# Patient Record
Sex: FEMALE | Race: BLACK OR AFRICAN AMERICAN | NOT HISPANIC OR LATINO | Employment: OTHER | RURAL
[De-identification: names, ages, dates, MRNs, and addresses within clinical notes are randomized per-mention and may not be internally consistent; named-entity substitution may affect disease eponyms.]

---

## 2017-01-05 ENCOUNTER — OFFICE VISIT (OUTPATIENT)
Dept: FAMILY MEDICINE CLINIC | Facility: CLINIC | Age: 77
End: 2017-01-05

## 2017-01-05 VITALS
SYSTOLIC BLOOD PRESSURE: 160 MMHG | OXYGEN SATURATION: 98 % | TEMPERATURE: 98.1 F | HEIGHT: 60 IN | HEART RATE: 96 BPM | WEIGHT: 182.8 LBS | BODY MASS INDEX: 35.89 KG/M2 | DIASTOLIC BLOOD PRESSURE: 68 MMHG

## 2017-01-05 DIAGNOSIS — I10 UNCONTROLLED HYPERTENSION: ICD-10-CM

## 2017-01-05 DIAGNOSIS — Z94.0 HISTORY OF KIDNEY TRANSPLANT: ICD-10-CM

## 2017-01-05 DIAGNOSIS — Z23 ENCOUNTER FOR IMMUNIZATION: ICD-10-CM

## 2017-01-05 DIAGNOSIS — I10 ESSENTIAL HYPERTENSION: Primary | ICD-10-CM

## 2017-01-05 DIAGNOSIS — Z23 NEED FOR VACCINATION: ICD-10-CM

## 2017-01-05 DIAGNOSIS — IMO0002 UNCONTROLLED TYPE 2 DIABETES MELLITUS WITH COMPLICATION, WITH LONG-TERM CURRENT USE OF INSULIN: Primary | ICD-10-CM

## 2017-01-05 DIAGNOSIS — D61.818 PANCYTOPENIA (HCC): ICD-10-CM

## 2017-01-05 DIAGNOSIS — N18.30 CHRONIC KIDNEY DISEASE, STAGE 3 (MODERATE): ICD-10-CM

## 2017-01-05 PROCEDURE — 90746 HEPB VACCINE 3 DOSE ADULT IM: CPT | Performed by: FAMILY MEDICINE

## 2017-01-05 PROCEDURE — 90472 IMMUNIZATION ADMIN EACH ADD: CPT | Performed by: FAMILY MEDICINE

## 2017-01-05 PROCEDURE — 90632 HEPA VACCINE ADULT IM: CPT | Performed by: FAMILY MEDICINE

## 2017-01-05 PROCEDURE — G0010 ADMIN HEPATITIS B VACCINE: HCPCS | Performed by: FAMILY MEDICINE

## 2017-01-05 PROCEDURE — 99213 OFFICE O/P EST LOW 20 MIN: CPT | Performed by: FAMILY MEDICINE

## 2017-01-05 NOTE — MR AVS SNAPSHOT
Chanda Yan   1/5/2017 9:30 AM   Office Visit    Dept Phone:  [phone]   Encounter #:  [id number]    Provider:  Ayaan Swenson MD   Department:  [facility]                Your Full Care Plan              Today's Medication Changes          These changes are accurate as of: 1/5/17  9:01 AM.  If you have any questions, ask your nurse or doctor.               Stop taking medication(s)listed here:     metoprolol tartrate 25 MG tablet   Commonly known as:  LOPRESSOR   Stopped by:  Ayaan Swenson MD                      Your Updated Medication List          This list is accurate as of: 1/5/17  9:01 AM.  Always use your most recent med list.                albuterol 108 (90 BASE) MCG/ACT inhaler   Commonly known as:  PROVENTIL HFA;VENTOLIN HFA   Inhale 2 puffs every 4 (four) hours as needed for wheezing.       amitriptyline 10 MG tablet   Commonly known as:  ELAVIL       Ca Carb-FA-D-B6-B12-Boron-Mg 1342-1 MG wafer       CALCIUM 1000 + D PO       carvedilol 3.125 MG tablet   Commonly known as:  COREG   Take 1 tablet by mouth 2 (Two) Times a Day.       cetirizine 10 MG tablet   Commonly known as:  zyrTEC       clopidogrel 75 MG tablet   Commonly known as:  PLAVIX       ezetimibe 10 MG tablet   Commonly known as:  ZETIA   Take 1 tablet by mouth daily.       fluticasone 50 MCG/ACT nasal spray   Commonly known as:  FLONASE   USE 1 SPRAY IN EACH NOSTRIL EVERY DAY       furosemide 40 MG tablet   Commonly known as:  LASIX   Take 1 tablet by mouth 2 (Two) Times a Day. Take one tab in a.m. and one-half tab in p.m       hydrALAZINE 25 MG tablet   Commonly known as:  APRESOLINE   Take 2 tablets by mouth 3 (Three) Times a Day.       LANTUS SOLOSTAR 100 UNIT/ML injection pen   Generic drug:  Insulin Glargine       mycophenolate 360 MG tablet delayed-release EC tablet   Commonly known as:  MYFORTIC       NIFEDICAL XL 30 MG 24 hr tablet   Generic drug:   "NIFEdipine XL       NOVOLOG 100 UNIT/ML injection   Generic drug:  insulin aspart       ONETOUCH VERIO test strip   Generic drug:  glucose blood       pantoprazole 40 MG EC tablet   Commonly known as:  PROTONIX   TAKE 1 TABLET BY MOUTH EVERY DAY       predniSONE 5 MG tablet   Commonly known as:  DELTASONE   Take 1 tablet by mouth daily.       rosuvastatin 20 MG tablet   Commonly known as:  CRESTOR       tacrolimus 1 MG capsule   Commonly known as:  PROGRAF       vitamin D3 5000 UNITS capsule capsule               We Performed the Following     CBC Auto Differential     Comprehensive Metabolic Panel     Hemoglobin A1c     Lipid Panel       You Were Diagnosed With        Codes Comments    Uncontrolled type 2 diabetes mellitus with complication, with long-term current use of insulin    -  Primary ICD-10-CM: E11.8, E11.65, Z79.4  ICD-9-CM: 250.82, V58.67     Need for vaccination     ICD-10-CM: Z23  ICD-9-CM: V05.9       Instructions    DASH Eating Plan  DASH stands for \"Dietary Approaches to Stop Hypertension.\" The DASH eating plan is a healthy eating plan that has been shown to reduce high blood pressure (hypertension). Additional health benefits may include reducing the risk of type 2 diabetes mellitus, heart disease, and stroke. The DASH eating plan may also help with weight loss.  WHAT DO I NEED TO KNOW ABOUT THE DASH EATING PLAN?  For the DASH eating plan, you will follow these general guidelines:  · Choose foods with a percent daily value for sodium of less than 5% (as listed on the food label).  · Use salt-free seasonings or herbs instead of table salt or sea salt.  · Check with your health care provider or pharmacist before using salt substitutes.  · Eat lower-sodium products, often labeled as \"lower sodium\" or \"no salt added.\"  · Eat fresh foods.  · Eat more vegetables, fruits, and low-fat dairy products.  · Choose whole grains. Look for the word \"whole\" as the first word in the ingredient list.  · Choose fish " and skinless chicken or turkey more often than red meat. Limit fish, poultry, and meat to 6 oz (170 g) each day.  · Limit sweets, desserts, sugars, and sugary drinks.  · Choose heart-healthy fats.  · Limit cheese to 1 oz (28 g) per day.  · Eat more home-cooked food and less restaurant, buffet, and fast food.  · Limit fried foods.  · Cook foods using methods other than frying.  · Limit canned vegetables. If you do use them, rinse them well to decrease the sodium.  · When eating at a restaurant, ask that your food be prepared with less salt, or no salt if possible.  WHAT FOODS CAN I EAT?  Seek help from a dietitian for individual calorie needs.  Grains  Whole grain or whole wheat bread. Brown rice. Whole grain or whole wheat pasta. Quinoa, bulgur, and whole grain cereals. Low-sodium cereals. Corn or whole wheat flour tortillas. Whole grain cornbread. Whole grain crackers. Low-sodium crackers.  Vegetables  Fresh or frozen vegetables (raw, steamed, roasted, or grilled). Low-sodium or reduced-sodium tomato and vegetable juices. Low-sodium or reduced-sodium tomato sauce and paste. Low-sodium or reduced-sodium canned vegetables.   Fruits  All fresh, canned (in natural juice), or frozen fruits.  Meat and Other Protein Products  Ground beef (85% or leaner), grass-fed beef, or beef trimmed of fat. Skinless chicken or turkey. Ground chicken or turkey. Pork trimmed of fat. All fish and seafood. Eggs. Dried beans, peas, or lentils. Unsalted nuts and seeds. Unsalted canned beans.  Dairy  Low-fat dairy products, such as skim or 1% milk, 2% or reduced-fat cheeses, low-fat ricotta or cottage cheese, or plain low-fat yogurt. Low-sodium or reduced-sodium cheeses.  Fats and Oils  Tub margarines without trans fats. Light or reduced-fat mayonnaise and salad dressings (reduced sodium). Avocado. Safflower, olive, or canola oils. Natural peanut or almond butter.  Other  Unsalted popcorn and pretzels.  The items listed above may not be a  complete list of recommended foods or beverages. Contact your dietitian for more options.  WHAT FOODS ARE NOT RECOMMENDED?  Grains  White bread. White pasta. White rice. Refined cornbread. Bagels and croissants. Crackers that contain trans fat.  Vegetables  Creamed or fried vegetables. Vegetables in a cheese sauce. Regular canned vegetables. Regular canned tomato sauce and paste. Regular tomato and vegetable juices.  Fruits  Dried fruits. Canned fruit in light or heavy syrup. Fruit juice.  Meat and Other Protein Products  Fatty cuts of meat. Ribs, chicken wings, malhotra, sausage, bologna, salami, chitterlings, fatback, hot dogs, bratwurst, and packaged luncheon meats. Salted nuts and seeds. Canned beans with salt.  Dairy  Whole or 2% milk, cream, half-and-half, and cream cheese. Whole-fat or sweetened yogurt. Full-fat cheeses or blue cheese. Nondairy creamers and whipped toppings. Processed cheese, cheese spreads, or cheese curds.  Condiments  Onion and garlic salt, seasoned salt, table salt, and sea salt. Canned and packaged gravies. Worcestershire sauce. Tartar sauce. Barbecue sauce. Teriyaki sauce. Soy sauce, including reduced sodium. Steak sauce. Fish sauce. Oyster sauce. Cocktail sauce. Horseradish. Ketchup and mustard. Meat flavorings and tenderizers. Bouillon cubes. Hot sauce. Tabasco sauce. Marinades. Taco seasonings. Relishes.  Fats and Oils  Butter, stick margarine, lard, shortening, ghee, and malhotra fat. Coconut, palm kernel, or palm oils. Regular salad dressings.  Other  Pickles and olives. Salted popcorn and pretzels.  The items listed above may not be a complete list of foods and beverages to avoid. Contact your dietitian for more information.  WHERE CAN I FIND MORE INFORMATION?  National Heart, Lung, and [employer] [place]: www.nhlbi.nih.gov/health/health-topics/topics/dash/     This information is not intended to replace advice given to you by your health care provider. Make sure you discuss any questions  you have with your health care provider.     Document Released: 2012 Document Revised: 2016 Document Reviewed: 10/22/2014  [email or web address] Patient Education © [email or web address]       Patient Instructions History      Upcoming Appointments     Visit Type Date Time Department    OFFICE VISIT 2017  9:30 AM [facility]    OFFICE VISIT 2017  8:45 AM [facility]    FOLLOW UP 2017  1:00 PM [facility] CARDIOLOGY HOP      MyChart Signup     [facility] [employer]Point allows you to send messages to your doctor, view your test results, renew your prescriptions, schedule appointments, and more. To sign up, go to [employer] and click on the Sign Up Now link in the New User? box. Enter your [employer]Point Activation Code exactly as it appears below along with the last four digits of your Social Security Number and your Date of Birth () to complete the sign-up process. If you do not sign up before the expiration date, you must request a new code.    [employer] Activation Code: [email or web address]  Expires: 2017 11:06 AM    If you have questions, you can email [email or web address] or call [phone] to talk to our [employer] staff. Remember, [employer] is NOT to be used for urgent needs. For medical emergencies, dial 911.               Other Info from Your Visit           Your Appointments     2017  9:30 AM CST   Office Visit with Ayaan Swenson MD   [facility] (--)    [address] [place] [address]  [facility] [address] [phone]           Arrive 15 minutes prior to appointment.            2017  8:45 AM CST   Office Visit with Ayaan Swenson MD   [facility] (--)    [address] [place] [address]  [facility] [address] [phone]           Arrive 15 minutes prior to  "appointment.            Feb 01, 2017  1:00 PM CST   Follow Up with Feliciano Almazan MD PhD   [facility] CARDIOLOGY (--)    [address] [place] [address] [phone]           Arrive 15 minutes prior to appointment.              Allergies     Clonidine Derivatives      Latex      Naproxen        Reason for Visit     Hypertension     Diabetes     Heartburn           Vital Signs     Blood Pressure Pulse Temperature Height    160/68 (BP Location: Left arm, Patient Position: Sitting, Cuff Size: Adult) 96 98.1 °F (36.7 °C) (Oral) 60\" (152.4 cm)    Weight Oxygen Saturation Body Mass Index Smoking Status    182 lb 12.8 oz (82.9 kg) 98% 35.7 kg/m2 Never Smoker      Problems and Diagnoses Noted     Type II diabetes mellitus without control    Need for vaccination            "

## 2017-01-05 NOTE — PATIENT INSTRUCTIONS

## 2017-01-05 NOTE — PROGRESS NOTES
Subjective   Chanda Yan is a 76 y.o. female.     Problem List  1. 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 Hyperlipidemia  3. ASCAD sp stent placement  4. Chronic Kidney Disease sp right kidney transplant   5. Obesity  6. Essential Hypertension  7. Bilateral Eyelid swelling  8. Right kidney transplant in 2010  9. GERD  10. Bilateral leg edema  11. Pancytopenia.    12. Depression   13. Unspecified murmur    Patient is 77 yo AAF with the above medical issues. Is here for recheck. Recently saw Cardiologist and BP medication was adjusted. Lopressor was discontinued and coreg was added. Currently taking coreg 3.125 mg PO BID. Along with lasix 40 mg PO BID and Nifedical XL 30 mg PO BID.  Also hydralazine was increased from 25 mg to 50 mg PO BID.  Has been taking new medications for about a week now.  Systolic BP has still been not controlled.  Diastolic BP is under control.  Pt denies any chest pain, headaches or dizziness.  ALso has murmur that may be functional murmur.  Has echocardiogram scheduled later this month.  Regarding DM type 2.  Last hga1c was 7.4 in September 2016.   On insulin pump currently.  Still watching carb intake.      Sees Nephrologist in [place] regarding right kidney transplant.  Currently on mycophenolate and prednisone    Is due for 2nd hep A vaccination today as well as 3rd Hep B vaccination     ASCVD risk high. Continues to take statin therapy.  Cannot take aspirin due to kidney transplant   Hypertension   This is a chronic problem. The problem is unchanged. The problem is uncontrolled. Pertinent negatives include no anxiety, blurred vision, chest pain, headaches, malaise/fatigue, neck pain, orthopnea, palpitations, peripheral edema, PND or shortness of breath. There are no associated agents to hypertension. Risk factors for coronary artery disease include diabetes mellitus, obesity, sedentary lifestyle and post-menopausal state. Past treatments include beta blockers, direct  "vasodilators, lifestyle changes, diuretics and calcium channel blockers. The current treatment provides no improvement. There are no compliance problems.  Hypertensive end-organ damage includes kidney disease and renovascular disease. There is no history of angina, CAD/MI, CVA, heart failure, left ventricular hypertrophy, PVD, retinopathy or a thyroid problem. Identifiable causes of hypertension include chronic renal disease. There is no history of coarctation of the aorta, hyperaldosteronism, hyperparathyroidism, a hypertension causing med, pheochromocytoma or sleep apnea.          The following portions of the patient's history were reviewed and updated as appropriate: allergies, current medications, past family history, past medical history, past social history, past surgical history and problem list.    Review of Systems   Constitutional: Negative.  Negative for malaise/fatigue.   HENT: Negative.    Eyes: Negative.  Negative for blurred vision.   Respiratory: Negative.  Negative for shortness of breath.    Cardiovascular: Negative for chest pain, palpitations, orthopnea and PND.   Endocrine: Negative.    Genitourinary: Negative.    Musculoskeletal: Negative.  Negative for neck pain.   Skin: Negative.    Allergic/Immunologic: Negative.    Neurological: Negative.  Negative for headaches.   Hematological: Negative.    Psychiatric/Behavioral: Negative.        Objective    Visit Vitals   • /68 (BP Location: Left arm, Patient Position: Sitting, Cuff Size: Adult)   • Pulse 96   • Temp 98.1 °F (36.7 °C) (Oral)   • Ht 60\" (152.4 cm)   • Wt 182 lb 12.8 oz (82.9 kg)   • SpO2 98%   • BMI 35.7 kg/m2         Chemistry        Component Value Date/Time     09/27/2016 1321    K 4.0 09/27/2016 1321     09/27/2016 1321    CO2 25 09/27/2016 1321    BUN 25 (H) 09/27/2016 1321    CREATININE 1.3 (H) 09/27/2016 1321        Component Value Date/Time    CALCIUM 9.4 09/27/2016 1321    ALKPHOS 136 (H) 04/25/2016 1039    " AST 23 04/25/2016 1039    ALT 30 04/25/2016 1039    BILITOT 0.6 04/25/2016 1039        Lab Results   Component Value Date    WBC 3.0 (L) 09/27/2016    HGB 10.0 (L) 09/27/2016    HCT 32.5 (L) 09/27/2016    MCV 82.5 09/27/2016     (L) 09/27/2016     Lab Results   Component Value Date    HGBA1C 7.4 (H) 09/27/2016     No results found for: CHOL  Lab Results   Component Value Date    TRIG 120 09/27/2016    TRIG 86 04/25/2016    TRIG 119 04/18/2016     Lab Results   Component Value Date    HDL 65 04/25/2016    HDL 63 04/18/2016    HDL 92 03/01/2016     Lab Results   Component Value Date    LDLCALC 103 09/27/2016    LDLCALC 111 04/25/2016    LDLCALC 97 04/18/2016     No results found for: LDL  No results found for: HDLLDLRATIO  No components found for: CHOLHDL  Physical Exam   Constitutional: She appears well-developed and well-nourished. No distress.   HENT:   Head: Normocephalic and atraumatic.   Right Ear: External ear normal.   Left Ear: External ear normal.   Eyes: Conjunctivae and EOM are normal. Pupils are equal, round, and reactive to light. Right eye exhibits no discharge. Left eye exhibits no discharge. No scleral icterus.   Neck: Normal range of motion. Neck supple. No JVD present. No tracheal deviation present. No thyromegaly present.   Cardiovascular: Normal rate and regular rhythm.    Murmur heard.  Pulmonary/Chest: Effort normal. No stridor. No respiratory distress.   Abdominal: Soft. She exhibits no distension and no mass. There is no tenderness. There is no rebound and no guarding. No hernia.   Musculoskeletal: Normal range of motion. She exhibits no edema, tenderness or deformity.   Lymphadenopathy:     She has no cervical adenopathy.   Neurological: She is alert. She has normal reflexes. She displays normal reflexes. No cranial nerve deficit. Coordination normal.   Skin: Skin is warm and dry. No rash noted. She is not diaphoretic. No erythema. No pallor.   Nursing note and vitals  reviewed.      Assessment/Plan   Problems Addressed this Visit        Cardiovascular and Mediastinum    Uncontrolled hypertension       Endocrine    Diabetes type 2, uncontrolled - Primary    Relevant Orders    CBC Auto Differential    Comprehensive Metabolic Panel    Hemoglobin A1c    Lipid Panel       Immune and Lymphatic    Pancytopenia       Genitourinary    Chronic kidney disease       Other    Encounter for immunization    History of kidney transplant    Need for vaccination    Relevant Orders    Hepatitis B Vaccine Adult IM (Completed)    Hepatitis A Vaccine Adult IM (Completed)        - for hypertension. Continue with coreg 3.125 mg PO BID along with Nifedical XL 30 mg PO 2 times daily along with lasix 40 mg PO BID.  Continue with hydralazine 50 mg PO TID.  - will order [facility] for BP checks. Goal <140/90.    - will recheck in 2 weeks and if not controlled consider increased hydralazine or labwork such as renin aldosterone ratio  -  2nd hep A vaccination today and 3rd hep B vacciination today  - recheck in 2 weeks  - pt need to make appt with Endocrinologist  - will get labwork checking lipid panel and hga1c, cbc and cmp today.

## 2017-01-06 LAB
ALBUMIN SERPL-MCNC: 3.9 GM/DL (ref 3.4–4.8)
ALP SERPL-CCNC: 112 U/L (ref 38–126)
ALT SERPL-CCNC: 24 U/L (ref 9–52)
ANION GAP SERPL CALCULATED.3IONS-SCNC: 12 MMOL/L (ref 5–15)
AST SERPL-CCNC: 22 U/L (ref 14–36)
BILIRUB SERPL-MCNC: 0.5 MG/DL (ref 0.2–1.3)
BUN SERPL-MCNC: 27 MG/DL (ref 7–21)
CALCIUM SERPL-MCNC: 9.4 MG/DL (ref 8.4–10.2)
CHLORIDE SERPL-SCNC: 103 MMOL/L (ref 95–110)
CHOLEST SERPL-MCNC: 227 MG/DL (ref 0–199)
CO2 SERPL-SCNC: 30 MMOL/L (ref 22–31)
CREAT SERPL-MCNC: 1.7 MG/DL (ref 0.5–1)
GLUCOSE SERPL-MCNC: 70 MG/DL (ref 60–100)
HBA1C MFR BLD CALC: 7.6 %TOTHGB (ref 4–5.6)
HDLC SERPL-MCNC: 65 MG/DL (ref 60–200)
LDLC SERPL CALC-MCNC: 138 MG/DL (ref 0–129)
POTASSIUM SERPL-SCNC: 4 MMOL/L (ref 3.5–5.1)
PROT SERPL-MCNC: 6.5 GM/DL (ref 6.3–8.6)
SODIUM SERPL-SCNC: 145 MMOL/L (ref 137–145)
TRIGL SERPL-MCNC: 119 MG/DL (ref 20–199)

## 2017-01-19 ENCOUNTER — OFFICE VISIT (OUTPATIENT)
Dept: FAMILY MEDICINE CLINIC | Facility: CLINIC | Age: 77
End: 2017-01-19

## 2017-01-19 VITALS
TEMPERATURE: 98.4 F | HEIGHT: 60 IN | OXYGEN SATURATION: 98 % | DIASTOLIC BLOOD PRESSURE: 70 MMHG | SYSTOLIC BLOOD PRESSURE: 142 MMHG | WEIGHT: 173.8 LBS | BODY MASS INDEX: 34.12 KG/M2 | HEART RATE: 55 BPM

## 2017-01-19 DIAGNOSIS — IMO0002 UNCONTROLLED TYPE 2 DIABETES MELLITUS WITH OTHER SPECIFIED COMPLICATION, WITH LONG-TERM CURRENT USE OF INSULIN: ICD-10-CM

## 2017-01-19 DIAGNOSIS — Z94.0 HISTORY OF KIDNEY TRANSPLANT: ICD-10-CM

## 2017-01-19 DIAGNOSIS — E66.9 OBESITY, UNSPECIFIED OBESITY SEVERITY, UNSPECIFIED OBESITY TYPE: ICD-10-CM

## 2017-01-19 DIAGNOSIS — I10 UNCONTROLLED HYPERTENSION: Primary | ICD-10-CM

## 2017-01-19 DIAGNOSIS — E78.2 MIXED HYPERLIPIDEMIA: ICD-10-CM

## 2017-01-19 PROCEDURE — 99214 OFFICE O/P EST MOD 30 MIN: CPT | Performed by: FAMILY MEDICINE

## 2017-01-19 RX ORDER — CARVEDILOL 3.12 MG/1
3.12 TABLET ORAL 2 TIMES DAILY
Qty: 60 TABLET | Refills: 6 | Status: SHIPPED | OUTPATIENT
Start: 2017-01-19 | End: 2017-01-19 | Stop reason: SDUPTHER

## 2017-01-19 RX ORDER — HYDRALAZINE HYDROCHLORIDE 100 MG/1
100 TABLET, FILM COATED ORAL 3 TIMES DAILY
Qty: 90 TABLET | Refills: 11 | Status: SHIPPED | OUTPATIENT
Start: 2017-01-19 | End: 2018-01-29 | Stop reason: SDUPTHER

## 2017-01-19 RX ORDER — CLOPIDOGREL BISULFATE 75 MG/1
75 TABLET ORAL DAILY
Qty: 30 TABLET | Refills: 6 | Status: SHIPPED | OUTPATIENT
Start: 2017-01-19 | End: 2017-02-08

## 2017-01-19 RX ORDER — BLOOD SUGAR DIAGNOSTIC
STRIP MISCELLANEOUS
Qty: 100 EACH | Refills: 6 | Status: SHIPPED | OUTPATIENT
Start: 2017-01-19 | End: 2017-10-17 | Stop reason: SDUPTHER

## 2017-01-19 RX ORDER — MYCOPHENOLIC ACID 360 MG/1
360 TABLET, DELAYED RELEASE ORAL 2 TIMES DAILY
Qty: 120 TABLET | Refills: 6 | Status: SHIPPED | OUTPATIENT
Start: 2017-01-19 | End: 2017-12-13 | Stop reason: SDUPTHER

## 2017-01-19 RX ORDER — FUROSEMIDE 40 MG/1
40 TABLET ORAL 2 TIMES DAILY
Qty: 30 TABLET | Refills: 11 | Status: SHIPPED | OUTPATIENT
Start: 2017-01-19 | End: 2018-09-04 | Stop reason: SDUPTHER

## 2017-01-19 RX ORDER — CARVEDILOL 3.12 MG/1
3.12 TABLET ORAL 2 TIMES DAILY
Qty: 60 TABLET | Refills: 11 | Status: SHIPPED | OUTPATIENT
Start: 2017-01-19 | End: 2017-02-08

## 2017-01-19 RX ORDER — ROSUVASTATIN CALCIUM 20 MG/1
20 TABLET, COATED ORAL DAILY
Qty: 30 TABLET | Refills: 11 | Status: SHIPPED | OUTPATIENT
Start: 2017-01-19 | End: 2017-09-15 | Stop reason: SDUPTHER

## 2017-01-19 NOTE — MR AVS SNAPSHOT
Chanda Yan   1/19/2017 8:45 AM   Office Visit    Dept Phone:  [phone]   Encounter #:  [id number]    Provider:  Ayaan Swenson MD   Department:  [facility]                Your Full Care Plan              Today's Medication Changes          These changes are accurate as of: 1/19/17  9:04 AM.  If you have any questions, ask your nurse or doctor.               Medication(s)that have changed:     hydrALAZINE 100 MG tablet   Commonly known as:  APRESOLINE   Take 1 tablet by mouth 3 (Three) Times a Day.   What changed:    - medication strength  - how much to take   Changed by:  Ayaan Swenson MD       ONETOUCH VERIO test strip   Generic drug:  glucose blood   Test 3 times daily as needed  Dx E11.9  Insulin dependant   What changed:  See the new instructions.   Changed by:  Ayaan Swenson MD       rosuvastatin 20 MG tablet   Commonly known as:  CRESTOR   Take 1 tablet by mouth Daily.   What changed:  See the new instructions.   Changed by:  Ayaan Swenson MD            Where to Get Your Medications      These medications were sent to [facility] [address] [place] [address] AT SEC of [facility] [place] - [phone]  - [phone]   [address] [place] [address]     Phone:  [phone]     carvedilol 3.125 MG tablet    clopidogrel 75 MG tablet    furosemide 40 MG tablet    hydrALAZINE 100 MG tablet    mycophenolate 360 MG tablet delayed-release EC tablet    ONETOUCH VERIO test strip    rosuvastatin 20 MG tablet                  Your Updated Medication List          This list is accurate as of: 1/19/17  9:04 AM.  Always use your most recent med list.                albuterol 108 (90 BASE) MCG/ACT inhaler   Commonly known as:  PROVENTIL HFA;VENTOLIN HFA   Inhale 2 puffs every 4 (four) hours as needed for wheezing.       amitriptyline 10 MG tablet   Commonly known as:   ELAVIL       Ca Carb-FA-D-B6-B12-Boron-Mg 1342-1 MG wafer       CALCIUM 1000 + D PO       carvedilol 3.125 MG tablet   Commonly known as:  COREG   Take 1 tablet by mouth 2 (Two) Times a Day.       cetirizine 10 MG tablet   Commonly known as:  zyrTEC       clopidogrel 75 MG tablet   Commonly known as:  PLAVIX   Take 1 tablet by mouth Daily.       ezetimibe 10 MG tablet   Commonly known as:  ZETIA   Take 1 tablet by mouth daily.       fluticasone 50 MCG/ACT nasal spray   Commonly known as:  FLONASE   USE 1 SPRAY IN EACH NOSTRIL EVERY DAY       furosemide 40 MG tablet   Commonly known as:  LASIX   Take 1 tablet by mouth 2 (Two) Times a Day. Take one tab in a.m. and one-half tab in p.m       hydrALAZINE 100 MG tablet   Commonly known as:  APRESOLINE   Take 1 tablet by mouth 3 (Three) Times a Day.       LANTUS SOLOSTAR 100 UNIT/ML injection pen   Generic drug:  Insulin Glargine       mycophenolate 360 MG tablet delayed-release EC tablet   Commonly known as:  MYFORTIC   Take 1 tablet by mouth 2 (Two) Times a Day.       NIFEDICAL XL 30 MG 24 hr tablet   Generic drug:  NIFEdipine XL       NOVOLOG 100 UNIT/ML injection   Generic drug:  insulin aspart       ONETOUCH VERIO test strip   Generic drug:  glucose blood   Test 3 times daily as needed  Dx E11.9  Insulin dependant       pantoprazole 40 MG EC tablet   Commonly known as:  PROTONIX   TAKE 1 TABLET BY MOUTH EVERY DAY       predniSONE 5 MG tablet   Commonly known as:  DELTASONE   Take 1 tablet by mouth daily.       rosuvastatin 20 MG tablet   Commonly known as:  CRESTOR   Take 1 tablet by mouth Daily.       tacrolimus 1 MG capsule   Commonly known as:  PROGRAF       vitamin D3 5000 UNITS capsule capsule               Instructions     None    Patient Instructions History      Upcoming Appointments     Visit Type Date Time Department    OFFICE VISIT 1/19/2017  8:45 AM [facility]    FOLLOW UP 2/1/2017  1:00 PM [facility] CARDIOLOGY HOP    OFFICE VISIT 2/23/2017  8:30 AM W  "[facility]      [facility]     [facility] [employer] allows you to send messages to your doctor, view your test results, renew your prescriptions, schedule appointments, and more. To sign up, go to [email or web address] and click on the Sign Up Now link in the New User? box. Enter your [employer] Activation Code exactly as it appears below along with the last four digits of your Social Security Number and your Date of Birth () to complete the sign-up process. If you do not sign up before the expiration date, you must request a new code.    [employer] Activation Code: [email or web address]  Expires: 2017  4:38 AM    If you have questions, you can email [email or web address] or call [phone] to talk to our [employer] staff. Remember, [employer] is NOT to be used for urgent needs. For medical emergencies, dial 911.               Other Info from Your Visit           Your Appointments     2017  1:00 PM CST   Follow Up with Feliciano Almazan MD PhD   [facility] CARDIOLOGY (--)    [address] [place] [address]   [facility] [address] [phone]           Arrive 15 minutes prior to appointment.            2017  8:30 AM CST   Office Visit with Ayaan Swenson MD   [facility] (--)    [address] [place] [address]  [facility] [address] [phone]           Arrive 15 minutes prior to appointment.              Allergies     Clonidine Derivatives      Latex      Naproxen        Reason for Visit     Hypertension           Vital Signs     Blood Pressure Pulse Temperature Height    142/70 (BP Location: Left arm, Patient Position: Sitting, Cuff Size: Adult) 55 98.4 °F (36.9 °C) (Oral) 60\" (152.4 cm)    Weight Oxygen Saturation Body Mass Index Smoking Status    173 lb 12.8 oz (78.8 kg) 98% 33.94 kg/m2 Never Smoker        "

## 2017-01-19 NOTE — PROGRESS NOTES
Subjective   Chanda Yan is a 76 y.o. female.       Problem List  1. 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 Hyperlipidemia  3. ASCAD sp stent placement  4. Chronic Kidney Disease sp right kidney transplant   5. Obesity  6. Essential Hypertension  7. Bilateral Eyelid swelling  8. Right kidney transplant in 2010  9. GERD  10. Bilateral leg edema  11. Pancytopenia/ anemia/ thrombocytopenia  12. Depression   13. Unspecified murmur    Patient is 77 yo AAF with the above medical issues.Is here for blood pressure recheck. Brought log book today and has been running still >140/90.  Currently on coreg 3.125 mg PO BID, hydralazine 50 mg PO TID, lasix 40 mg PO BID as well as Nifedical XL 30 mg PO q daily.  Denies any chest pain, shortness of breath or dizzziness. Recnetly had echocardiogram done on 1/10/17. Has EF of 65% mildly dilated right ventricle and possible small restrictive VSD. Also has grade 1A diastolic dysfunction.  States she is overall doing fine. Has appt to see Cardiology soon    Regarding her DM type 2 - sugars have been <130 in mornings.  Currently on Lantus 8 units subq qhs along with novolog before her meals.  Last hga1c was 7.6.      Also sees Nephrologist In [facility] regarding right kidney transplant and chronic kidney disease     Pt also is obese. Has lost almost 10 lbs since last visit. Has been exercising and watching diet better.     Pt also had DEXA scan and was normal done on 10/6/16      Hypertension   This is a chronic problem. The current episode started more than 1 year ago. The problem has been gradually improving since onset. The problem is uncontrolled. Pertinent negatives include no anxiety, blurred vision, chest pain, headaches, malaise/fatigue, neck pain, orthopnea, palpitations, peripheral edema, PND, shortness of breath or sweats. There are no associated agents to hypertension. Risk factors for coronary artery disease include diabetes mellitus, obesity and sedentary  "lifestyle. Past treatments include calcium channel blockers, direct vasodilators and beta blockers. The current treatment provides mild improvement. There are no compliance problems.  Hypertensive end-organ damage includes kidney disease. There is no history of angina, CAD/MI, CVA, heart failure, left ventricular hypertrophy, PVD, renovascular disease, retinopathy or a thyroid problem. Identifiable causes of hypertension include chronic renal disease. There is no history of coarctation of the aorta, hyperaldosteronism, hypercortisolism, hyperparathyroidism, a hypertension causing med, pheochromocytoma or sleep apnea.        The following portions of the patient's history were reviewed and updated as appropriate: allergies, current medications, past family history, past medical history, past social history, past surgical history and problem list.    Review of Systems   Constitutional: Negative for malaise/fatigue.   Eyes: Negative for blurred vision.   Respiratory: Negative for shortness of breath.    Cardiovascular: Negative for chest pain, palpitations, orthopnea and PND.   Musculoskeletal: Negative for neck pain.   Neurological: Negative for headaches.       Objective    Visit Vitals   • /70 (BP Location: Left arm, Patient Position: Sitting, Cuff Size: Adult)   • Pulse 55   • Temp 98.4 °F (36.9 °C) (Oral)   • Ht 60\" (152.4 cm)   • Wt 173 lb 12.8 oz (78.8 kg)   • SpO2 98%   • BMI 33.94 kg/m2       Chemistry        Component Value Date/Time     01/05/2017 0917    K 4.0 01/05/2017 0917     01/05/2017 0917    CO2 30 01/05/2017 0917    BUN 27 (H) 01/05/2017 0917    CREATININE 1.7 (H) 01/05/2017 0917        Component Value Date/Time    CALCIUM 9.4 01/05/2017 0917    ALKPHOS 112 01/05/2017 0917    AST 22 01/05/2017 0917    ALT 24 01/05/2017 0917    BILITOT 0.5 01/05/2017 0917        Lab Results   Component Value Date    WBC 4.2 01/05/2017    HGB 10.1 (L) 01/05/2017    HCT 34.6 (L) 01/05/2017    MCV 86.9 " 01/05/2017     (L) 01/05/2017     Lab Results   Component Value Date    HGBA1C 7.6 (H) 01/05/2017     No results found for: TSH, Z0JWZVW, Q1GKWCE, THYROIDAB   No results found for: CHOL  Lab Results   Component Value Date    TRIG 119 01/05/2017    TRIG 120 09/27/2016    TRIG 86 04/25/2016     Lab Results   Component Value Date    HDL 65 01/05/2017    HDL 65 04/25/2016    HDL 63 04/18/2016     Lab Results   Component Value Date    LDLCALC 138 (H) 01/05/2017    LDLCALC 103 09/27/2016    LDLCALC 111 04/25/2016     No results found for: LDL  No results found for: HDLLDLRATIO  No components found for: CHOLHDL    Physical Exam   Constitutional: She appears well-developed and well-nourished. No distress.   HENT:   Head: Normocephalic and atraumatic.   Right Ear: External ear normal.   Left Ear: External ear normal.   Eyes: Conjunctivae and EOM are normal. Pupils are equal, round, and reactive to light. Right eye exhibits no discharge. Left eye exhibits no discharge. No scleral icterus.   Neck: Normal range of motion. Neck supple. No JVD present. No tracheal deviation present. No thyromegaly present.   Cardiovascular: Normal rate, regular rhythm and normal heart sounds.    Pulmonary/Chest: Effort normal and breath sounds normal. No stridor. No respiratory distress. She has no wheezes.   Abdominal: Bowel sounds are normal. She exhibits no distension and no mass. There is no tenderness. There is no rebound and no guarding. No hernia.   Obese abdomen    Musculoskeletal: Normal range of motion. She exhibits no edema, tenderness or deformity.   Lymphadenopathy:     She has no cervical adenopathy.   Skin: Skin is warm and dry. No rash noted. She is not diaphoretic. No erythema. No pallor.   Psychiatric: She has a normal mood and affect. Her behavior is normal. Judgment and thought content normal.   Nursing note and vitals reviewed.      Assessment/Plan   Problems Addressed this Visit        Cardiovascular and Mediastinum     Mixed hyperlipidemia    Relevant Medications    rosuvastatin (CRESTOR) 20 MG tablet    Uncontrolled hypertension - Primary    Relevant Medications    hydrALAZINE (APRESOLINE) 100 MG tablet    carvedilol (COREG) 3.125 MG tablet    furosemide (LASIX) 40 MG tablet       Digestive    Obesity       Endocrine    Diabetes type 2, uncontrolled       Other    History of kidney transplant        - for hypertension - continue with nifedical XL 30 mg PO q daily along with coreg 3.125 mg PO BID.  Will increase hydrlazine from 50 to 100 mg PO TID.    - refilled Coreg and lasix today  - for hyperlipidemia - refill crestor 20 mg PO qhs  - continue with weight loss, diet and exercise  - DM type 2- continue with Lantus and short acting insluin. Carb counting.  Hga1c at 7.6  - recheck in 1 month   - appt with Dr. Almazan(Cardiology) soon

## 2017-02-01 ENCOUNTER — OFFICE VISIT (OUTPATIENT)
Dept: CARDIOLOGY | Facility: CLINIC | Age: 77
End: 2017-02-01

## 2017-02-01 VITALS
OXYGEN SATURATION: 98 % | WEIGHT: 169.75 LBS | DIASTOLIC BLOOD PRESSURE: 62 MMHG | BODY MASS INDEX: 33.33 KG/M2 | HEIGHT: 60 IN | SYSTOLIC BLOOD PRESSURE: 138 MMHG | HEART RATE: 97 BPM

## 2017-02-01 DIAGNOSIS — R93.1 ABNORMAL FINDINGS ON DIAGNOSTIC IMAGING OF HEART AND CORONARY CIRCULATION: ICD-10-CM

## 2017-02-01 DIAGNOSIS — E78.2 MIXED HYPERLIPIDEMIA: ICD-10-CM

## 2017-02-01 DIAGNOSIS — I25.10 CORONARY ARTERY DISEASE INVOLVING NATIVE CORONARY ARTERY OF NATIVE HEART WITHOUT ANGINA PECTORIS: ICD-10-CM

## 2017-02-01 DIAGNOSIS — I10 ESSENTIAL HYPERTENSION: Primary | ICD-10-CM

## 2017-02-01 DIAGNOSIS — Q20.8 ABNORMALITY OF RIGHT VENTRICLE OF HEART: ICD-10-CM

## 2017-02-01 PROCEDURE — 99214 OFFICE O/P EST MOD 30 MIN: CPT | Performed by: INTERNAL MEDICINE

## 2017-02-01 NOTE — PROGRESS NOTES
Cardiovascular Medicine   Feliciano Almazan M.D., Ph.D., [facility]      The patient returns to cardiology clinic for follow-up of the following cardiac problems:    PROBLEM LIST:    1.  ASCAD  A. Unknown PCP in [place]  2.  Hypertension  A. severe LVH with septal accentuation  3.  Hyperlipidemia  4.  Type II diabetes  5.  Depression  6. ESRD, s/p DDKT  7.  Right ventricle dilated  A. unable to exclude perimembranous VSD.    ASCAD  Patient returns in follow-up for  ASCAD. She states she was having shortness of breath that was considered an anginal equivalent a couple of years ago.  She underwent left heart catheterization in [place] and I do not have these records, but she had an unknown PCI.  She then moved to [place] and was seeing a cardiologist there.  She is currently on Plavix, Lopressor, and Lipitor. She is not on ASA as she is adamant that her kidney transplant physician told her not to take it.  She denies easy bruising or bleeding.  She denies resting, exertional or nocturnal angina.    RV dilation, possible VSD  At her last visit I detected a systolic ejection murmur that was new on physical examination.  She was referred for a TTE.  This showed severe asymmetric septal hypertrophy.  I was also unable to exclude a small perimembranous VSD.  Patient also was found to have diastolic dysfunction.    HTN  Concerning her diagnosis of hypertension, at her last visit she was on Lasix, Lopressor, nifedipine and an unknown medication.  Her daughter accompanied her at her last appointment.  There was a discrepancy between the medication list that the patient had and her daughter had.  Her blood pressure was not at goal her last appointment.  However, at that time she had close follow-up with PCP. Today, her medication list is reportedly reconciled.  At her last visit, her blood pressure was not at goal.  I discontinued her Lopressor and started carvedilol.  I also increased her hydralazine and asked her to see her  PCP.    JOSUÉ  Concerning her hyperlipidemia, she remains on Crestor. She is tolerating this well.  She's medication and diet compliant.  Denies side effects.      Review of Systems - History obtained from chart review and the patient  General ROS: negative  Cardiovascular ROS: no chest pain or dyspnea on exertion  family history includes Diabetes in her other; Heart disease in her father; Lung cancer in her mother; Lupus in her other.   reports that she has never smoked. She has never used smokeless tobacco. She reports that she does not drink alcohol or use illicit drugs.  Allergies   Allergen Reactions   • Clonidine Derivatives    • Latex    • Naproxen        Current Outpatient Prescriptions:   •  albuterol (PROVENTIL HFA;VENTOLIN HFA) 108 (90 BASE) MCG/ACT inhaler, Inhale 2 puffs every 4 (four) hours as needed for wheezing., Disp: 18 g, Rfl: 11  •  amitriptyline (ELAVIL) 10 MG tablet, Take 1 tablet by mouth every night., Disp: , Rfl: 0  •  Ca Carb-FA-D-B6-B12-Boron-Mg 1342-1 MG wafer, Take 1 tablet by mouth daily., Disp: , Rfl:   •  Calcium Carb-Cholecalciferol (CALCIUM 1000 + D PO), Take 1 tablet by mouth Daily., Disp: , Rfl:   •  carvedilol (COREG) 3.125 MG tablet, Take 1 tablet by mouth 2 (Two) Times a Day., Disp: 60 tablet, Rfl: 11  •  cetirizine (ZyrTEC) 10 MG tablet, Take 10 mg by mouth daily., Disp: , Rfl:   •  Cholecalciferol (VITAMIN D3) 5000 UNITS capsule capsule, Take 5,000 Units by mouth Daily., Disp: , Rfl:   •  clopidogrel (PLAVIX) 75 MG tablet, Take 1 tablet by mouth Daily., Disp: 30 tablet, Rfl: 6  •  ezetimibe (ZETIA) 10 MG tablet, Take 1 tablet by mouth daily., Disp: 31 tablet, Rfl: 11  •  fluticasone (FLONASE) 50 MCG/ACT nasal spray, USE 1 SPRAY IN EACH NOSTRIL EVERY DAY, Disp: 16 mL, Rfl: 11  •  furosemide (LASIX) 40 MG tablet, Take 1 tablet by mouth 2 (Two) Times a Day. Take one tab in a.m. and one-half tab in p.m, Disp: 30 tablet, Rfl: 11  •  hydrALAZINE (APRESOLINE) 100 MG tablet, Take 1  tablet by mouth 3 (Three) Times a Day., Disp: 90 tablet, Rfl: 11  •  LANTUS SOLOSTAR 100 UNIT/ML injection pen, INJECT 10 UNITS SUBCUTANEOUSLY UTD AS BACK UP FOR PUMP, Disp: , Rfl: 4  •  mycophenolate (MYFORTIC) 360 MG tablet delayed-release EC tablet, Take 1 tablet by mouth 2 (Two) Times a Day., Disp: 120 tablet, Rfl: 6  •  NIFEDICAL XL 30 MG 24 hr tablet, Take 1 tablet by mouth 2 (two) times a day., Disp: , Rfl: 0  •  NOVOLOG 100 UNIT/ML injection, ADM 20 TO 60 UNITS VIA INSULIN PUMP QD UTD, Disp: , Rfl: 6  •  ONETOUCH VERIO test strip, Test 3 times daily as needed  Dx E11.9  Insulin dependant, Disp: 100 each, Rfl: 6  •  pantoprazole (PROTONIX) 40 MG EC tablet, TAKE 1 TABLET BY MOUTH EVERY DAY, Disp: 30 tablet, Rfl: 11  •  predniSONE (DELTASONE) 5 MG tablet, Take 1 tablet by mouth daily., Disp: 90 tablet, Rfl: 11  •  rosuvastatin (CRESTOR) 20 MG tablet, Take 1 tablet by mouth Daily., Disp: 30 tablet, Rfl: 11  •  tacrolimus (PROGRAF) 1 MG capsule, TK 4 CS PO BID, Disp: , Rfl: 5    Physical Exam:  There were no vitals filed for this visit.  There is no height or weight on file to calculate BMI.    GEN: alert, appears stated age and cooperative  Body Habitus: overweight  HEENT: Head: Normocephalic, no lesions, without obvious abnormality. Peg-orbital edema is present.   Neck / Thyroid: Supple, no masses, nodes, nodules or enlargement. No arcus senilis, xanthelasma or xanthomas.   JVP: 6 cm of water at 45 degrees HJR: absent      Carotid:  Upstroke: easily palpated bilaterally Volume: Normal.    Carotid Bruit:  None  Subclavian Bruit: Not present.    Chest:  Normal Excursion: Good    I:E: Good  Pulmonary:clear to auscultation, no wheezes, rales or rhonchi, symmetric air entry      Precordium:  No palpable heaves or thrusts. P2 is not palpable.   [place]:  normal size and placement Palpable S4: Not present.   Heart rate: normal  Heart Rhythm: regular     Heart Sounds: S1: normal intensity  S2: increased A2,  physiologically split  S3: absent   S4: absent  Opening Snap: absent  A2-OS:  N/A  Pericardial rub: absent    Ejection click: None      Murmurs: Systolic: II/VI NORBERT at base  Diastolic: none  Extremity: no edema, cyanosis  Trophic changes: None   Pallor on elevation: Absent.    Rubor on dependency: None  Pulses: Right radial artery has 2+ (normal) pulse    DATA REVIEWED:     TTE, 2016  LV EF normal. RV dilated. Unable to exclude VSD. Severe LVH with asymmetric septal accentuation.    Procedures    Notes from [place] were reviewed.  Showing the patient was stable during her last appointments.    Myocardial perfusion stress dated 2014 shows ejection fraction 63% without evidence of ischemia.    Assessment/Plan        1. ASCAD.  No anginal symptoms. The patient has been revascularized with PCI.   Plavix, Lipitor, Lopressor  ASA was recommended, but the patient has refused    2. HTN, essential.  HD BP noted to be well controlled today in office.  LVH: marked.  LVEF:Normal. Diastolic function:Abnormal:.  DASH; medication compliance; Low sodium diet  Carvedilol, hydralazine, Nifedical and Lasix    3.  RV dilation, unclear etiology, though I was unable to exclude a small perimembranous VSD.  -A KWAKU was recommended to the patient with conscious sedation using Versed and Fentanyl. The indications and risks/benefits were discussed. This included risks of inadvertent tracheal intubation, hematoma, oropharyngeal bleeding, esophageal perforation.  The patient agreed to proceeding.  The risks of conscious sedation were also reviewed including allergy, anaphylaxis and hypoventilation requiring mechanical ventilation.  -KWAKU with anesthesia given my concerns for GORDO and concerns for desaturation with conscious sedation    4. Cardiac Risk Assessment: Known ASCAD  Recommended ASA/Statin    5. STATIN THERAPY. Patient merits treatment with a statin due to: ASCAD  Increase daily activity to 30 minutes a day  Crestor    6.  Tobacco status:  Patient is a nonsmoker    Plan for follow-up: in 5 months    Thank you for allowing me  to participate in the care of your patient. Please do not hesitate to contact me with any questions.     Feliciano Almazan M.D., Ph.D., [facility]

## 2017-02-07 DIAGNOSIS — I48.91 ATRIAL FIBRILLATION, UNSPECIFIED TYPE (HCC): Primary | ICD-10-CM

## 2017-02-08 ENCOUNTER — OFFICE VISIT (OUTPATIENT)
Dept: CARDIOLOGY | Facility: CLINIC | Age: 77
End: 2017-02-08

## 2017-02-08 VITALS
HEIGHT: 60 IN | HEART RATE: 61 BPM | OXYGEN SATURATION: 97 % | BODY MASS INDEX: 33.96 KG/M2 | SYSTOLIC BLOOD PRESSURE: 140 MMHG | DIASTOLIC BLOOD PRESSURE: 80 MMHG | WEIGHT: 173 LBS

## 2017-02-08 DIAGNOSIS — E78.2 MIXED HYPERLIPIDEMIA: ICD-10-CM

## 2017-02-08 DIAGNOSIS — I21.4 NON-ST ELEVATION MI (NSTEMI) (HCC): ICD-10-CM

## 2017-02-08 DIAGNOSIS — I48.91 ATRIAL FIBRILLATION, UNSPECIFIED TYPE (HCC): ICD-10-CM

## 2017-02-08 DIAGNOSIS — I10 ESSENTIAL HYPERTENSION: ICD-10-CM

## 2017-02-08 DIAGNOSIS — I48.0 PAROXYSMAL ATRIAL FIBRILLATION (HCC): Primary | ICD-10-CM

## 2017-02-08 DIAGNOSIS — Q20.8 ABNORMALITY OF RIGHT VENTRICLE OF HEART: ICD-10-CM

## 2017-02-08 PROCEDURE — 93000 ELECTROCARDIOGRAM COMPLETE: CPT | Performed by: INTERNAL MEDICINE

## 2017-02-08 PROCEDURE — 99214 OFFICE O/P EST MOD 30 MIN: CPT | Performed by: INTERNAL MEDICINE

## 2017-02-08 RX ORDER — CARVEDILOL 6.25 MG/1
6.25 TABLET ORAL 2 TIMES DAILY WITH MEALS
COMMUNITY
Start: 2017-02-06 | End: 2017-02-27 | Stop reason: DRUGHIGH

## 2017-02-08 RX ORDER — DABIGATRAN ETEXILATE 75 MG/1
75 CAPSULE ORAL 2 TIMES DAILY
Qty: 60 CAPSULE | Refills: 6 | Status: SHIPPED | OUTPATIENT
Start: 2017-02-08 | End: 2018-03-02 | Stop reason: SDUPTHER

## 2017-02-08 NOTE — PATIENT INSTRUCTIONS
MUGA Scan  A MUGA scan (multigated acquisition scan) is a test that makes pictures of your heart at specific times while it beats. The test uses a radioactive dye (tracer) that is injected into your bloodstream. The dye makes it possible for your health care provider to see the red blood cells passing through your heart.  A MUGA scan shows:  · How much blood your heart is pumping out with each heartbeat.  · How well your ventricles are working. Ventricles are chambers in the lower part of your heart. They pump blood to your lungs and to the rest of your body.  A MUGA scan may be done to determine:  · What is causing heart symptoms, such as chest pain.  · If the arteries that supply your heart are blocked.  · If you have heart valve disease.  · If your heart is damaged from a heart attack.  · If your heart has trouble pumping blood.  LET YOUR HEALTH CARE PROVIDER KNOW ABOUT:  · Any allergies you have.  · All medicines you are taking, including vitamins, herbs, eye drops, creams, and over-the-counter medicines.  · Any blood disorders you have.  · Previous surgeries you have had.  · Medical conditions you have.  · If you are pregnant, if you might be pregnant, or if you are breastfeeding. This is very important.  RISKS AND COMPLICATIONS  Generally this is a safe procedure. However, problems can occur and include:  · An allergic reaction to the tracer. This side effect is rare.  · Pain and redness at the IV site.  · Potential harm to your baby if you are pregnant or breastfeeding.  BEFORE THE PROCEDURE  · Do not take your regular medicines before your procedure if your health care provider asks you not to. Ask your health care provider about changing or stopping those medicines.  · Do not drink any beverages that have caffeine for several hours before the scan. Beverages containing caffeine include coffee, tea, and soft drinks.  · Ask your health care provider if you will be having an exercise scan with your MUGA  scan.  · If you will be having an exercise scan with your MUGA scan, do not eat or drink anything except water for 4 hours before the scan.  · Wear loose, comfortable clothing.  PROCEDURE  · You will be asked to lie down on an exam table.  · An IV tube will be put into one of your veins. Medicine will flow through the tube during the procedure.  · Discs called electrodes will be attached to your chest, arms, and legs. These will be connected with wires to a machine.  · A small amount of tracer will be injected through your IV. You may feel a cold sensation in your arm as it runs through your IV.  · A camera will be placed over your chest. It will take a series of pictures while you rest.  · If you need to have an exercise scan, you will walk on a treadmill or ride a stationary bicycle. Then you will be asked to lie back down on the exam table for more pictures.  · After all of the pictures have been taken, your IV tube will be removed.  AFTER THE PROCEDURE  · Drink enough fluid to keep your urine clear or pale yellow. This helps to flush the tracer out of your body.  · It is your responsibility to get your test results. Ask the lab or department performing the test when and how you will get your results.  · Keep all follow-up visits as directed by your health care provider. This is important.     This information is not intended to replace advice given to you by your health care provider. Make sure you discuss any questions you have with your health care provider.     Document Released: 02/06/2007 Document Revised: 01/08/2016 Document Reviewed: 05/07/2015  [employer] Interactive Patient Education ©2016 [employer]

## 2017-02-08 NOTE — PROGRESS NOTES
Cardiovascular Medicine   Feliciano Almazan M.D., Ph.D., [facility]      The patient returns to cardiology clinic for follow-up of the following cardiac problems:    PROBLEM LIST:    1.  ASCAD  A. Unknown PCP in [place]  2.  Hypertension  A. severe LVH with septal accentuation  3.  Hyperlipidemia  4.  Type II diabetes  5.  Depression  6. ESRD, s/p DDKT  7.  Right ventricle dilated  A. unable to exclude perimembranous VSD.    ASCAD  Patient returns in follow-up for  ASCAD. She states she was having shortness of breath that was considered an anginal equivalent a couple of years ago.  She underwent left heart catheterization in [place] and I do not have these records, but she had an unknown PCI.  She then moved to [place] and was seeing a cardiologist there.  She is currently on Plavix, Lopressor, and Lipitor. She is not on ASA as she is adamant that her kidney transplant physician told her not to take it.  She denies easy bruising or bleeding.  Since her last admission she was admitted with AF RVR at [facility]. She had a mildly abnormal troponin. She denied any chest discomfort.     RV dilation, possible VSD  At her last visit I detected a systolic ejection murmur that was new on physical examination.  She was referred for a TTE.  This showed severe asymmetric septal hypertrophy.  I was also unable to exclude a small perimembranous VSD.  Patient also was found to have diastolic dysfunction.    HTN  Concerning her diagnosis of hypertension, at her last visit she was on Lasix, Lopressor, nifedipine and an unknown medication.  Her daughter accompanied her at her last appointment.  There was a discrepancy between the medication list that the patient had and her daughter had.  Her blood pressure was not at goal her last appointment.  However, at that time she had close follow-up with PCP. Today, her medication list is reportedly reconciled.  At her last visit, her blood pressure was not at goal.  I discontinued her Lopressor and  started carvedilol.  I also increased her hydralazine and asked her to see her PCP.    HLD  Concerning her hyperlipidemia, she remains on Crestor. She is tolerating this well.  She's medication and diet compliant.  Denies side effects.    Arrhythmia  Patient is a 76 y.o. female who presents for evaluation of atrial fibrillation.  Patient was recently having increased dyspnea and palpitations.  She was seen at [facility] and diagnosed with AF RVR.  She was initially rate controlled.  She spontaneously converted.  Anticoagulation was not initiated.    The following portions of the patient's history were reviewed and updated as appropriate: allergies, current medications, past family history, past medical history, past social history, past surgical history and problem list.      Review of Systems - History obtained from chart review and the patient  General ROS: negative  Cardiovascular ROS: no chest pain or dyspnea on exertion  family history includes Diabetes in her other; Heart disease in her father; Lung cancer in her mother; Lupus in her other.   reports that she has never smoked. She has never used smokeless tobacco. She reports that she does not drink alcohol or use illicit drugs.  Allergies   Allergen Reactions   • Clonidine Derivatives    • Latex    • Naproxen        Current Outpatient Prescriptions:   •  albuterol (PROVENTIL HFA;VENTOLIN HFA) 108 (90 BASE) MCG/ACT inhaler, Inhale 2 puffs every 4 (four) hours as needed for wheezing., Disp: 18 g, Rfl: 11  •  amitriptyline (ELAVIL) 10 MG tablet, Take 1 tablet by mouth every night., Disp: , Rfl: 0  •  Ca Carb-FA-D-B6-B12-Boron-Mg 1342-1 MG wafer, Take 1 tablet by mouth daily., Disp: , Rfl:   •  Calcium Carb-Cholecalciferol (CALCIUM 1000 + D PO), Take 1 tablet by mouth Daily., Disp: , Rfl:   •  carvedilol (COREG) 3.125 MG tablet, Take 1 tablet by mouth 2 (Two) Times a Day., Disp: 60 tablet, Rfl: 11  •  cetirizine (ZyrTEC) 10 MG tablet, Take 10 mg by mouth daily., Disp:  , Rfl:   •  Cholecalciferol (VITAMIN D3) 5000 UNITS capsule capsule, Take 5,000 Units by mouth Daily., Disp: , Rfl:   •  clopidogrel (PLAVIX) 75 MG tablet, Take 1 tablet by mouth Daily., Disp: 30 tablet, Rfl: 6  •  ezetimibe (ZETIA) 10 MG tablet, Take 1 tablet by mouth daily., Disp: 31 tablet, Rfl: 11  •  fluticasone (FLONASE) 50 MCG/ACT nasal spray, USE 1 SPRAY IN EACH NOSTRIL EVERY DAY, Disp: 16 mL, Rfl: 11  •  furosemide (LASIX) 40 MG tablet, Take 1 tablet by mouth 2 (Two) Times a Day. Take one tab in a.m. and one-half tab in p.m, Disp: 30 tablet, Rfl: 11  •  hydrALAZINE (APRESOLINE) 100 MG tablet, Take 1 tablet by mouth 3 (Three) Times a Day., Disp: 90 tablet, Rfl: 11  •  LANTUS SOLOSTAR 100 UNIT/ML injection pen, INJECT 10 UNITS SUBCUTANEOUSLY UTD AS BACK UP FOR PUMP, Disp: , Rfl: 4  •  mycophenolate (MYFORTIC) 360 MG tablet delayed-release EC tablet, Take 1 tablet by mouth 2 (Two) Times a Day., Disp: 120 tablet, Rfl: 6  •  NIFEDICAL XL 30 MG 24 hr tablet, Take 1 tablet by mouth 2 (two) times a day., Disp: , Rfl: 0  •  NOVOLOG 100 UNIT/ML injection, ADM 20 TO 60 UNITS VIA INSULIN PUMP QD UTD, Disp: , Rfl: 6  •  ONETOUCH VERIO test strip, Test 3 times daily as needed  Dx E11.9  Insulin dependant, Disp: 100 each, Rfl: 6  •  pantoprazole (PROTONIX) 40 MG EC tablet, TAKE 1 TABLET BY MOUTH EVERY DAY, Disp: 30 tablet, Rfl: 11  •  predniSONE (DELTASONE) 5 MG tablet, Take 1 tablet by mouth daily., Disp: 90 tablet, Rfl: 11  •  rosuvastatin (CRESTOR) 20 MG tablet, Take 1 tablet by mouth Daily., Disp: 30 tablet, Rfl: 11  •  tacrolimus (PROGRAF) 1 MG capsule, TK 4 CS PO BID, Disp: , Rfl: 5    Physical Exam:  Vitals:    02/08/17 1256   BP: 140/80   Pulse: 61   SpO2: 97%     Body mass index is 33.79 kg/(m^2).    GEN: alert, appears stated age and cooperative  Body Habitus: overweight  HEENT: Head: Normocephalic, no lesions, without obvious abnormality. Peg-orbital edema is present.   Neck / Thyroid: Supple, no masses,  nodes, nodules or enlargement. No arcus senilis, xanthelasma or xanthomas.   JVP: 6 cm of water at 45 degrees HJR: absent      Carotid:  Upstroke: easily palpated bilaterally Volume: Normal.    Carotid Bruit:  None  Subclavian Bruit: Not present.    Chest:  Normal Excursion: Good    I:E: Good  Pulmonary:clear to auscultation, no wheezes, rales or rhonchi, symmetric air entry      Precordium:  No palpable heaves or thrusts. P2 is not palpable.   [place]:  normal size and placement Palpable S4: Not present.   Heart rate: normal  Heart Rhythm: regular     Heart Sounds: S1: normal intensity  S2: increased A2, physiologically split  S3: absent   S4: absent  Opening Snap: absent  A2-OS:  N/A  Pericardial rub: absent    Ejection click: None      Murmurs: Systolic: II/VI NORBERT at base  Diastolic: none  Extremity: no edema, cyanosis  Trophic changes: None   Pallor on elevation: Absent.    Rubor on dependency: None  Pulses: Right radial artery has 2+ (normal) pulse    DATA REVIEWED:     TTE, 2016  LV EF normal. RV dilated. Unable to exclude VSD. Severe LVH with asymmetric septal accentuation.    Procedures     EKG: Dated today. NSR. LVH. PRWP. No prior.      [facility] Notes, 2/2017:  Showing admission for atrial fibrillation.  The patient was rate controlled.  She was not provided with anticoagulation as an outpatient.  She also was noted to have abnormal troponin concerning for possible NSTEMI.     Notes from [place] were reviewed.  Showing the patient was stable during her last appointments.    Myocardial perfusion stress dated 2014 shows ejection fraction 63% without evidence of ischemia.    Assessment/Plan        1. ASCAD.  No anginal symptoms. The patient has been revascularized with PCI.  Recent admission with abnormal troponin concerning for NSTEMI.  The patient's unable to exercise because of dyspnea.  Plavix, Lipitor, Lopressor  ASA was recommended, but the patient has refused   Lexiscan MPS    2. HTN, essential.  HD BP noted  to be well controlled today in office.  LVH: marked.  LVEF:Normal. Diastolic function:Abnormal:.  DASH; medication compliance; Low sodium diet  Carvedilol, hydralazine, Nifedical and Lasix    3.  RV dilation, unclear etiology, though I was unable to exclude a small perimembranous VSD.  -A KWAKU was recommended to the patient with conscious sedation using anesthesia provider. The indications and risks/benefits were discussed. This included risks of inadvertent tracheal intubation, hematoma, oropharyngeal bleeding, esophageal perforation.  The patient agreed to proceeding.  The risks of conscious sedation were also reviewed including allergy, anaphylaxis and hypoventilation requiring mechanical ventilation.  -KWAKU with anesthesia given my concerns for GORDO and concerns for desaturation with conscious sedation    4. paroxysmal - 7 days or less Atrial fibrillation   Bactn2Pyhe0: CHADSVASC: HTN, Age >65 and Female  Anticoagulation:requested  -Pradaxa  Rate control agents:ordered.  - Agent: Coreg  -Rhythm control agents:not indicated    5. Cardiac Risk Assessment: Known ASCAD  Recommended ASA/Statin    6. STATIN THERAPY. Patient merits treatment with a statin due to: ASCAD  Increase daily activity to 30 minutes a day  Crestor    7.  Tobacco status: Patient is a nonsmoker    Plan for follow-up: in 1 month    Thank you for allowing me  to participate in the care of your patient. Please do not hesitate to contact me with any questions.     Feliciano Almazan M.D., Ph.D., [facility]

## 2017-02-09 DIAGNOSIS — I21.4 NSTEMI (NON-ST ELEVATED MYOCARDIAL INFARCTION) (HCC): Primary | ICD-10-CM

## 2017-02-20 ENCOUNTER — APPOINTMENT (OUTPATIENT)
Dept: NUCLEAR MEDICINE | Facility: HOSPITAL | Age: 77
End: 2017-02-20
Attending: INTERNAL MEDICINE

## 2017-02-20 ENCOUNTER — HOSPITAL ENCOUNTER (OUTPATIENT)
Dept: CARDIOLOGY | Facility: HOSPITAL | Age: 77
Discharge: HOME | End: 2017-02-20
Attending: INTERNAL MEDICINE | Admitting: INTERNAL MEDICINE

## 2017-02-20 ENCOUNTER — HOSPITAL ENCOUNTER (OUTPATIENT)
Dept: NUCLEAR MEDICINE | Facility: HOSPITAL | Age: 77
Discharge: HOME | End: 2017-02-20
Attending: INTERNAL MEDICINE

## 2017-02-20 DIAGNOSIS — Q21.0 VSD (VENTRICULAR SEPTAL DEFECT): Primary | ICD-10-CM

## 2017-02-20 PROCEDURE — 93018 CV STRESS TEST I&R ONLY: CPT | Performed by: INTERNAL MEDICINE

## 2017-02-20 PROCEDURE — A9500 TC99M SESTAMIBI: HCPCS | Performed by: INTERNAL MEDICINE

## 2017-02-20 PROCEDURE — 78452 HT MUSCLE IMAGE SPECT MULT: CPT | Performed by: INTERNAL MEDICINE

## 2017-02-20 PROCEDURE — 93017 CV STRESS TEST TRACING ONLY: CPT

## 2017-02-20 PROCEDURE — 25010000002 REGADENOSON 0.4 MG/5ML SOLUTION: Performed by: INTERNAL MEDICINE

## 2017-02-20 PROCEDURE — 78452 HT MUSCLE IMAGE SPECT MULT: CPT

## 2017-02-20 PROCEDURE — 0 TECHNETIUM SESTAMIBI: Performed by: INTERNAL MEDICINE

## 2017-02-20 PROCEDURE — 93016 CV STRESS TEST SUPVJ ONLY: CPT | Performed by: INTERNAL MEDICINE

## 2017-02-20 RX ORDER — 0.9 % SODIUM CHLORIDE 0.9 %
10 VIAL (ML) INJECTION EVERY 12 HOURS SCHEDULED
Status: DISCONTINUED | OUTPATIENT
Start: 2017-02-20 | End: 2017-02-21 | Stop reason: HOSPADM

## 2017-02-20 RX ORDER — SODIUM CHLORIDE 0.9 % (FLUSH) 0.9 %
1-10 SYRINGE (ML) INJECTION AS NEEDED
Status: CANCELLED | OUTPATIENT
Start: 2017-02-20

## 2017-02-20 RX ADMIN — SODIUM CHLORIDE 10 ML: 9 INJECTION INTRAMUSCULAR; INTRAVENOUS; SUBCUTANEOUS at 10:33

## 2017-02-20 RX ADMIN — REGADENOSON 0.4 MG: 0.08 INJECTION, SOLUTION INTRAVENOUS at 10:33

## 2017-02-20 RX ADMIN — Medication 1 DOSE: at 08:31

## 2017-02-20 RX ADMIN — Medication 1 DOSE: at 10:33

## 2017-02-21 ENCOUNTER — ANESTHESIA (OUTPATIENT)
Dept: CARDIOLOGY | Facility: HOSPITAL | Age: 77
End: 2017-02-21

## 2017-02-21 ENCOUNTER — ANESTHESIA EVENT (OUTPATIENT)
Dept: CARDIOLOGY | Facility: HOSPITAL | Age: 77
End: 2017-02-21

## 2017-02-21 ENCOUNTER — HOSPITAL ENCOUNTER (OUTPATIENT)
Dept: CARDIOLOGY | Facility: HOSPITAL | Age: 77
Discharge: HOME | End: 2017-02-21
Attending: INTERNAL MEDICINE | Admitting: INTERNAL MEDICINE

## 2017-02-21 VITALS
WEIGHT: 171.3 LBS | HEIGHT: 60 IN | OXYGEN SATURATION: 98 % | DIASTOLIC BLOOD PRESSURE: 65 MMHG | HEART RATE: 60 BPM | TEMPERATURE: 98.7 F | BODY MASS INDEX: 33.63 KG/M2 | RESPIRATION RATE: 20 BRPM | SYSTOLIC BLOOD PRESSURE: 152 MMHG

## 2017-02-21 VITALS
OXYGEN SATURATION: 95 % | SYSTOLIC BLOOD PRESSURE: 137 MMHG | DIASTOLIC BLOOD PRESSURE: 62 MMHG | TEMPERATURE: 98.3 F | HEART RATE: 80 BPM | RESPIRATION RATE: 18 BRPM

## 2017-02-21 DIAGNOSIS — Q21.0 VSD (VENTRICULAR SEPTAL DEFECT): ICD-10-CM

## 2017-02-21 LAB
BH CV STRESS BP STAGE 1: NORMAL
BH CV STRESS COMMENTS STAGE 1: NORMAL
BH CV STRESS DOSE REGADENOSON STAGE 1: 0.4
BH CV STRESS DURATION MIN STAGE 1: 0
BH CV STRESS DURATION SEC STAGE 1: 10
BH CV STRESS HR STAGE 1: 68
BH CV STRESS METS STAGE 1: 1
BH CV STRESS PROTOCOL 1: NORMAL
BH CV STRESS RECOVERY BP: NORMAL MMHG
BH CV STRESS RECOVERY HR: 85 BPM
BH CV STRESS STAGE 1: 1
GLUCOSE BLDC GLUCOMTR-MCNC: 137 MG/DL (ref 70–130)
LV EF NUC BP: 64 %
MAXIMAL PREDICTED HEART RATE: 146 BPM
PERCENT MAX PREDICTED HR: 62.33 %
STRESS BASELINE BP: NORMAL MMHG
STRESS BASELINE HR: 67 BPM
STRESS PERCENT HR: 73 %
STRESS POST ESTIMATED WORKLOAD: 1 METS
STRESS POST PEAK BP: NORMAL MMHG
STRESS POST PEAK HR: 91 BPM
STRESS TARGET HR: 124 BPM

## 2017-02-21 PROCEDURE — 93321 DOPPLER ECHO F-UP/LMTD STD: CPT

## 2017-02-21 PROCEDURE — 93312 ECHO TRANSESOPHAGEAL: CPT

## 2017-02-21 PROCEDURE — 93325 DOPPLER ECHO COLOR FLOW MAPG: CPT

## 2017-02-21 PROCEDURE — 25010000002 MIDAZOLAM PER 1 MG: Performed by: NURSE ANESTHETIST, CERTIFIED REGISTERED

## 2017-02-21 PROCEDURE — 93312 ECHO TRANSESOPHAGEAL: CPT | Performed by: INTERNAL MEDICINE

## 2017-02-21 PROCEDURE — 93321 DOPPLER ECHO F-UP/LMTD STD: CPT | Performed by: INTERNAL MEDICINE

## 2017-02-21 PROCEDURE — 82962 GLUCOSE BLOOD TEST: CPT

## 2017-02-21 PROCEDURE — 93325 DOPPLER ECHO COLOR FLOW MAPG: CPT | Performed by: INTERNAL MEDICINE

## 2017-02-21 PROCEDURE — 25010000002 PROPOFOL 10 MG/ML EMULSION: Performed by: NURSE ANESTHETIST, CERTIFIED REGISTERED

## 2017-02-21 PROCEDURE — C8925 2D TEE W OR W/O FOL W/CON,IN: HCPCS

## 2017-02-21 RX ORDER — MIDAZOLAM HYDROCHLORIDE 1 MG/ML
INJECTION INTRAMUSCULAR; INTRAVENOUS AS NEEDED
Status: DISCONTINUED | OUTPATIENT
Start: 2017-02-21 | End: 2017-02-21 | Stop reason: SURG

## 2017-02-21 RX ORDER — INSULIN GLARGINE 100 [IU]/ML
8 INJECTION, SOLUTION SUBCUTANEOUS NIGHTLY
COMMUNITY
End: 2017-06-14 | Stop reason: SDUPTHER

## 2017-02-21 RX ORDER — PROPOFOL 10 MG/ML
VIAL (ML) INTRAVENOUS AS NEEDED
Status: DISCONTINUED | OUTPATIENT
Start: 2017-02-21 | End: 2017-02-21 | Stop reason: SURG

## 2017-02-21 RX ORDER — FLUTICASONE PROPIONATE 50 MCG
2 SPRAY, SUSPENSION (ML) NASAL DAILY
COMMUNITY
End: 2018-09-19 | Stop reason: SDUPTHER

## 2017-02-21 RX ORDER — PANTOPRAZOLE SODIUM 40 MG/1
40 TABLET, DELAYED RELEASE ORAL DAILY
COMMUNITY
End: 2018-12-09 | Stop reason: SDUPTHER

## 2017-02-21 RX ORDER — TACROLIMUS 1 MG/1
1 CAPSULE ORAL 2 TIMES DAILY
COMMUNITY
End: 2017-05-30 | Stop reason: SDUPTHER

## 2017-02-21 RX ORDER — LIDOCAINE HYDROCHLORIDE 10 MG/ML
INJECTION, SOLUTION INFILTRATION; PERINEURAL AS NEEDED
Status: DISCONTINUED | OUTPATIENT
Start: 2017-02-21 | End: 2017-02-21 | Stop reason: SURG

## 2017-02-21 RX ORDER — SODIUM CHLORIDE 0.9 % (FLUSH) 0.9 %
1-10 SYRINGE (ML) INJECTION AS NEEDED
Status: DISCONTINUED | OUTPATIENT
Start: 2017-02-21 | End: 2017-02-22 | Stop reason: HOSPADM

## 2017-02-21 RX ORDER — SODIUM CHLORIDE, SODIUM GLUCONATE, SODIUM ACETATE, POTASSIUM CHLORIDE, AND MAGNESIUM CHLORIDE 526; 502; 368; 37; 30 MG/100ML; MG/100ML; MG/100ML; MG/100ML; MG/100ML
INJECTION, SOLUTION INTRAVENOUS CONTINUOUS PRN
Status: DISCONTINUED | OUTPATIENT
Start: 2017-02-21 | End: 2017-02-21 | Stop reason: SURG

## 2017-02-21 RX ADMIN — PROPOFOL 20 MG: 10 INJECTION, EMULSION INTRAVENOUS at 12:09

## 2017-02-21 RX ADMIN — PROPOFOL 20 MG: 10 INJECTION, EMULSION INTRAVENOUS at 12:12

## 2017-02-21 RX ADMIN — MIDAZOLAM 1 MG: 1 INJECTION INTRAMUSCULAR; INTRAVENOUS at 11:47

## 2017-02-21 RX ADMIN — PROPOFOL 20 MG: 10 INJECTION, EMULSION INTRAVENOUS at 12:10

## 2017-02-21 RX ADMIN — Medication 10 ML: at 10:18

## 2017-02-21 RX ADMIN — PROPOFOL 50 MG: 10 INJECTION, EMULSION INTRAVENOUS at 12:01

## 2017-02-21 RX ADMIN — PROPOFOL 20 MG: 10 INJECTION, EMULSION INTRAVENOUS at 12:03

## 2017-02-21 RX ADMIN — SODIUM CHLORIDE, SODIUM GLUCONATE, SODIUM ACETATE, POTASSIUM CHLORIDE, AND MAGNESIUM CHLORIDE: 526; 502; 368; 37; 30 INJECTION, SOLUTION INTRAVENOUS at 11:47

## 2017-02-21 RX ADMIN — PROPOFOL 20 MG: 10 INJECTION, EMULSION INTRAVENOUS at 12:05

## 2017-02-21 RX ADMIN — LIDOCAINE HYDROCHLORIDE 80 MG: 10 INJECTION, SOLUTION INFILTRATION; PERINEURAL at 12:01

## 2017-02-21 RX ADMIN — PROPOFOL 20 MG: 10 INJECTION, EMULSION INTRAVENOUS at 12:07

## 2017-02-21 NOTE — PLAN OF CARE
Problem: Patient Care Overview (Adult)  Goal: Plan of Care Review  Outcome: Outcome(s) achieved Date Met:  02/21/17  Discharge criteria met  Goal: Adult Individualization and Mutuality  Outcome: Outcome(s) achieved Date Met:  02/21/17  Goal: Discharge Needs Assessment  Outcome: Outcome(s) achieved Date Met:  02/21/17    Problem: Perioperative Period (Adult)  Goal: Signs and Symptoms of Listed Potential Problems Will be Absent or Manageable (Perioperative Period)  Outcome: Outcome(s) achieved Date Met:  02/21/17

## 2017-02-21 NOTE — ANESTHESIA POSTPROCEDURE EVALUATION
Patient: Chanda Yan    Procedure Summary     Date Anesthesia Start Anesthesia Stop Room / Location    02/21/17 3419 [id number] [facility] CATH LAB       Procedure Diagnosis Scheduled Providers Provider    ADULT TRANSESOPHAGEAL ECHO WITH CONTRAST (KWAKU) Abnormal findings on diagnostic imaging of heart and coronary circulation; Abnormality of right ventricle of heart  (Non-diagnostic Echo) Feliciano Almazan MD PhD Silva Barbour CRNA          Anesthesia Type: No value filed.  Last vitals  BP   146/86   Temp      Pulse  61   Resp   16   SpO2   98     Post Anesthesia Care and Evaluation    Patient location during evaluation: bedside  Patient participation: complete - patient participated  Level of consciousness: awake and alert  Pain score: 0  Pain management: adequate  Airway patency: patent  Anesthetic complications: No anesthetic complications  PONV Status: none  Cardiovascular status: acceptable  Respiratory status: acceptable  Hydration status: acceptable

## 2017-02-21 NOTE — H&P (VIEW-ONLY)
Cardiovascular Medicine   Feliciano Almazan M.D., Ph.D., [facility]      The patient returns to cardiology clinic for follow-up of the following cardiac problems:    PROBLEM LIST:    1.  ASCAD  A. Unknown PCP in [place]  2.  Hypertension  A. severe LVH with septal accentuation  3.  Hyperlipidemia  4.  Type II diabetes  5.  Depression  6. ESRD, s/p DDKT  7.  Right ventricle dilated  A. unable to exclude perimembranous VSD.    ASCAD  Patient returns in follow-up for  ASCAD. She states she was having shortness of breath that was considered an anginal equivalent a couple of years ago.  She underwent left heart catheterization in [place] and I do not have these records, but she had an unknown PCI.  She then moved to [place] and was seeing a cardiologist there.  She is currently on Plavix, Lopressor, and Lipitor. She is not on ASA as she is adamant that her kidney transplant physician told her not to take it.  She denies easy bruising or bleeding.  Since her last admission she was admitted with AF RVR at [facility]. She had a mildly abnormal troponin. She denied any chest discomfort.     RV dilation, possible VSD  At her last visit I detected a systolic ejection murmur that was new on physical examination.  She was referred for a TTE.  This showed severe asymmetric septal hypertrophy.  I was also unable to exclude a small perimembranous VSD.  Patient also was found to have diastolic dysfunction.    HTN  Concerning her diagnosis of hypertension, at her last visit she was on Lasix, Lopressor, nifedipine and an unknown medication.  Her daughter accompanied her at her last appointment.  There was a discrepancy between the medication list that the patient had and her daughter had.  Her blood pressure was not at goal her last appointment.  However, at that time she had close follow-up with PCP. Today, her medication list is reportedly reconciled.  At her last visit, her blood pressure was not at goal.  I discontinued her Lopressor and  started carvedilol.  I also increased her hydralazine and asked her to see her PCP.    HLD  Concerning her hyperlipidemia, she remains on Crestor. She is tolerating this well.  She's medication and diet compliant.  Denies side effects.    Arrhythmia  Patient is a 76 y.o. female who presents for evaluation of atrial fibrillation.  Patient was recently having increased dyspnea and palpitations.  She was seen at [facility] and diagnosed with AF RVR.  She was initially rate controlled.  She spontaneously converted.  Anticoagulation was not initiated.    The following portions of the patient's history were reviewed and updated as appropriate: allergies, current medications, past family history, past medical history, past social history, past surgical history and problem list.      Review of Systems - History obtained from chart review and the patient  General ROS: negative  Cardiovascular ROS: no chest pain or dyspnea on exertion  family history includes Diabetes in her other; Heart disease in her father; Lung cancer in her mother; Lupus in her other.   reports that she has never smoked. She has never used smokeless tobacco. She reports that she does not drink alcohol or use illicit drugs.  Allergies   Allergen Reactions   • Clonidine Derivatives    • Latex    • Naproxen        Current Outpatient Prescriptions:   •  albuterol (PROVENTIL HFA;VENTOLIN HFA) 108 (90 BASE) MCG/ACT inhaler, Inhale 2 puffs every 4 (four) hours as needed for wheezing., Disp: 18 g, Rfl: 11  •  amitriptyline (ELAVIL) 10 MG tablet, Take 1 tablet by mouth every night., Disp: , Rfl: 0  •  Ca Carb-FA-D-B6-B12-Boron-Mg 1342-1 MG wafer, Take 1 tablet by mouth daily., Disp: , Rfl:   •  Calcium Carb-Cholecalciferol (CALCIUM 1000 + D PO), Take 1 tablet by mouth Daily., Disp: , Rfl:   •  carvedilol (COREG) 3.125 MG tablet, Take 1 tablet by mouth 2 (Two) Times a Day., Disp: 60 tablet, Rfl: 11  •  cetirizine (ZyrTEC) 10 MG tablet, Take 10 mg by mouth daily., Disp:  , Rfl:   •  Cholecalciferol (VITAMIN D3) 5000 UNITS capsule capsule, Take 5,000 Units by mouth Daily., Disp: , Rfl:   •  clopidogrel (PLAVIX) 75 MG tablet, Take 1 tablet by mouth Daily., Disp: 30 tablet, Rfl: 6  •  ezetimibe (ZETIA) 10 MG tablet, Take 1 tablet by mouth daily., Disp: 31 tablet, Rfl: 11  •  fluticasone (FLONASE) 50 MCG/ACT nasal spray, USE 1 SPRAY IN EACH NOSTRIL EVERY DAY, Disp: 16 mL, Rfl: 11  •  furosemide (LASIX) 40 MG tablet, Take 1 tablet by mouth 2 (Two) Times a Day. Take one tab in a.m. and one-half tab in p.m, Disp: 30 tablet, Rfl: 11  •  hydrALAZINE (APRESOLINE) 100 MG tablet, Take 1 tablet by mouth 3 (Three) Times a Day., Disp: 90 tablet, Rfl: 11  •  LANTUS SOLOSTAR 100 UNIT/ML injection pen, INJECT 10 UNITS SUBCUTANEOUSLY UTD AS BACK UP FOR PUMP, Disp: , Rfl: 4  •  mycophenolate (MYFORTIC) 360 MG tablet delayed-release EC tablet, Take 1 tablet by mouth 2 (Two) Times a Day., Disp: 120 tablet, Rfl: 6  •  NIFEDICAL XL 30 MG 24 hr tablet, Take 1 tablet by mouth 2 (two) times a day., Disp: , Rfl: 0  •  NOVOLOG 100 UNIT/ML injection, ADM 20 TO 60 UNITS VIA INSULIN PUMP QD UTD, Disp: , Rfl: 6  •  ONETOUCH VERIO test strip, Test 3 times daily as needed  Dx E11.9  Insulin dependant, Disp: 100 each, Rfl: 6  •  pantoprazole (PROTONIX) 40 MG EC tablet, TAKE 1 TABLET BY MOUTH EVERY DAY, Disp: 30 tablet, Rfl: 11  •  predniSONE (DELTASONE) 5 MG tablet, Take 1 tablet by mouth daily., Disp: 90 tablet, Rfl: 11  •  rosuvastatin (CRESTOR) 20 MG tablet, Take 1 tablet by mouth Daily., Disp: 30 tablet, Rfl: 11  •  tacrolimus (PROGRAF) 1 MG capsule, TK 4 CS PO BID, Disp: , Rfl: 5    Physical Exam:  Vitals:    02/08/17 1256   BP: 140/80   Pulse: 61   SpO2: 97%     Body mass index is 33.79 kg/(m^2).    GEN: alert, appears stated age and cooperative  Body Habitus: overweight  HEENT: Head: Normocephalic, no lesions, without obvious abnormality. Peg-orbital edema is present.   Neck / Thyroid: Supple, no masses,  nodes, nodules or enlargement. No arcus senilis, xanthelasma or xanthomas.   JVP: 6 cm of water at 45 degrees HJR: absent      Carotid:  Upstroke: easily palpated bilaterally Volume: Normal.    Carotid Bruit:  None  Subclavian Bruit: Not present.    Chest:  Normal Excursion: Good    I:E: Good  Pulmonary:clear to auscultation, no wheezes, rales or rhonchi, symmetric air entry      Precordium:  No palpable heaves or thrusts. P2 is not palpable.   [place]:  normal size and placement Palpable S4: Not present.   Heart rate: normal  Heart Rhythm: regular     Heart Sounds: S1: normal intensity  S2: increased A2, physiologically split  S3: absent   S4: absent  Opening Snap: absent  A2-OS:  N/A  Pericardial rub: absent    Ejection click: None      Murmurs: Systolic: II/VI ONRBERT at base  Diastolic: none  Extremity: no edema, cyanosis  Trophic changes: None   Pallor on elevation: Absent.    Rubor on dependency: None  Pulses: Right radial artery has 2+ (normal) pulse    DATA REVIEWED:     TTE, 2016  LV EF normal. RV dilated. Unable to exclude VSD. Severe LVH with asymmetric septal accentuation.    Procedures     EKG: Dated today. NSR. LVH. PRWP. No prior.      [facility] Notes, 2/2017:  Showing admission for atrial fibrillation.  The patient was rate controlled.  She was not provided with anticoagulation as an outpatient.  She also was noted to have abnormal troponin concerning for possible NSTEMI.     Notes from [place] were reviewed.  Showing the patient was stable during her last appointments.    Myocardial perfusion stress dated 2014 shows ejection fraction 63% without evidence of ischemia.    Assessment/Plan        1. ASCAD.  No anginal symptoms. The patient has been revascularized with PCI.  Recent admission with abnormal troponin concerning for NSTEMI.  The patient's unable to exercise because of dyspnea.  Plavix, Lipitor, Lopressor  ASA was recommended, but the patient has refused   Lexiscan MPS    2. HTN, essential.  HD BP noted  to be well controlled today in office.  LVH: marked.  LVEF:Normal. Diastolic function:Abnormal:.  DASH; medication compliance; Low sodium diet  Carvedilol, hydralazine, Nifedical and Lasix    3.  RV dilation, unclear etiology, though I was unable to exclude a small perimembranous VSD.  -A KWAKU was recommended to the patient with conscious sedation using anesthesia provider. The indications and risks/benefits were discussed. This included risks of inadvertent tracheal intubation, hematoma, oropharyngeal bleeding, esophageal perforation.  The patient agreed to proceeding.  The risks of conscious sedation were also reviewed including allergy, anaphylaxis and hypoventilation requiring mechanical ventilation.  -KWAKU with anesthesia given my concerns for GORDO and concerns for desaturation with conscious sedation    4. paroxysmal - 7 days or less Atrial fibrillation   [id number]: CHADSVASC: HTN, Age >65 and Female  Anticoagulation:requested  -Pradaxa  Rate control agents:ordered.  - Agent: Coreg  -Rhythm control agents:not indicated    5. Cardiac Risk Assessment: Known ASCAD  Recommended ASA/Statin    6. STATIN THERAPY. Patient merits treatment with a statin due to: ASCAD  Increase daily activity to 30 minutes a day  Crestor    7.  Tobacco status: Patient is a nonsmoker    Plan for follow-up: in 1 month    Thank you for allowing me  to participate in the care of your patient. Please do not hesitate to contact me with any questions.     Feliciano Almazan M.D., Ph.D., [facility]

## 2017-02-27 ENCOUNTER — OFFICE VISIT (OUTPATIENT)
Dept: FAMILY MEDICINE CLINIC | Facility: CLINIC | Age: 77
End: 2017-02-27

## 2017-02-27 VITALS
HEART RATE: 68 BPM | TEMPERATURE: 99 F | HEIGHT: 60 IN | OXYGEN SATURATION: 97 % | SYSTOLIC BLOOD PRESSURE: 152 MMHG | DIASTOLIC BLOOD PRESSURE: 70 MMHG | BODY MASS INDEX: 33.18 KG/M2 | WEIGHT: 169 LBS

## 2017-02-27 DIAGNOSIS — I48.0 PAROXYSMAL ATRIAL FIBRILLATION (HCC): ICD-10-CM

## 2017-02-27 DIAGNOSIS — J32.9 SINUSITIS, UNSPECIFIED CHRONICITY, UNSPECIFIED LOCATION: ICD-10-CM

## 2017-02-27 DIAGNOSIS — I10 UNCONTROLLED HYPERTENSION: ICD-10-CM

## 2017-02-27 DIAGNOSIS — D64.9 ANEMIA, UNSPECIFIED TYPE: Primary | ICD-10-CM

## 2017-02-27 DIAGNOSIS — Z94.0 HISTORY OF KIDNEY TRANSPLANT: ICD-10-CM

## 2017-02-27 DIAGNOSIS — IMO0002 UNCONTROLLED TYPE 2 DIABETES MELLITUS WITH OTHER SPECIFIED COMPLICATION, WITH LONG-TERM CURRENT USE OF INSULIN: ICD-10-CM

## 2017-02-27 DIAGNOSIS — N18.30 CHRONIC KIDNEY DISEASE, STAGE 3 (MODERATE): ICD-10-CM

## 2017-02-27 DIAGNOSIS — E78.2 MIXED HYPERLIPIDEMIA: ICD-10-CM

## 2017-02-27 DIAGNOSIS — E66.9 OBESITY, UNSPECIFIED OBESITY SEVERITY, UNSPECIFIED OBESITY TYPE: ICD-10-CM

## 2017-02-27 LAB
ALBUMIN SERPL-MCNC: 4.5 G/DL (ref 3.4–4.8)
ALBUMIN/GLOB SERPL: 1.8 G/DL (ref 1.1–1.8)
ALP SERPL-CCNC: 93 U/L (ref 38–126)
ALT SERPL W P-5'-P-CCNC: 23 U/L (ref 9–52)
ANION GAP SERPL CALCULATED.3IONS-SCNC: 15 MMOL/L (ref 5–15)
AST SERPL-CCNC: 19 U/L (ref 14–36)
BASOPHILS # BLD AUTO: 0.01 10*3/MM3 (ref 0–0.2)
BASOPHILS NFR BLD AUTO: 0.2 % (ref 0–2)
BILIRUB SERPL-MCNC: 0.7 MG/DL (ref 0.2–1.3)
BUN BLD-MCNC: 52 MG/DL (ref 7–21)
BUN/CREAT SERPL: 23.1 (ref 7–25)
CALCIUM SPEC-SCNC: 9.9 MG/DL (ref 8.4–10.2)
CHLORIDE SERPL-SCNC: 99 MMOL/L (ref 95–110)
CO2 SERPL-SCNC: 27 MMOL/L (ref 22–31)
CREAT BLD-MCNC: 2.25 MG/DL (ref 0.5–1)
DEPRECATED RDW RBC AUTO: 45.5 FL (ref 36.4–46.3)
EOSINOPHIL # BLD AUTO: 0.03 10*3/MM3 (ref 0–0.7)
EOSINOPHIL NFR BLD AUTO: 0.6 % (ref 0–7)
ERYTHROCYTE [DISTWIDTH] IN BLOOD BY AUTOMATED COUNT: 14.8 % (ref 11.5–14.5)
FERRITIN SERPL-MCNC: 705 NG/ML (ref 11.1–264)
FOLATE SERPL-MCNC: 15 NG/ML (ref 2.76–21)
GFR SERPL CREATININE-BSD FRML MDRD: 26 ML/MIN/1.73 (ref 39–90)
GLOBULIN UR ELPH-MCNC: 2.5 GM/DL (ref 2.3–3.5)
GLUCOSE BLD-MCNC: 155 MG/DL (ref 60–100)
HCT VFR BLD AUTO: 33.4 % (ref 35–45)
HGB BLD-MCNC: 10.4 G/DL (ref 12–15.5)
IMM GRANULOCYTES # BLD: 0 10*3/MM3 (ref 0–0.02)
IMM GRANULOCYTES NFR BLD: 0 % (ref 0–0.5)
IRON 24H UR-MRATE: 33 MCG/DL (ref 37–170)
IRON SATN MFR SERPL: 55 % (ref 15–50)
LYMPHOCYTES # BLD AUTO: 0.65 10*3/MM3 (ref 0.6–4.2)
LYMPHOCYTES NFR BLD AUTO: 13.2 % (ref 10–50)
MCH RBC QN AUTO: 26.1 PG (ref 26.5–34)
MCHC RBC AUTO-ENTMCNC: 31.1 G/DL (ref 31.4–36)
MCV RBC AUTO: 83.7 FL (ref 80–98)
MONOCYTES # BLD AUTO: 0.57 10*3/MM3 (ref 0–0.9)
MONOCYTES NFR BLD AUTO: 11.6 % (ref 0–12)
NEUTROPHILS # BLD AUTO: 3.67 10*3/MM3 (ref 2–8.6)
NEUTROPHILS NFR BLD AUTO: 74.4 % (ref 37–80)
PLATELET # BLD AUTO: 183 10*3/MM3 (ref 150–450)
PMV BLD AUTO: 11.8 FL (ref 8–12)
POTASSIUM BLD-SCNC: 3.7 MMOL/L (ref 3.5–5.1)
PROT SERPL-MCNC: 7 G/DL (ref 6.3–8.6)
RBC # BLD AUTO: 3.99 10*6/MM3 (ref 3.77–5.16)
SODIUM BLD-SCNC: 141 MMOL/L (ref 137–145)
TIBC SERPL-MCNC: 60 MCG/DL (ref 265–497)
TSH SERPL DL<=0.05 MIU/L-ACNC: 0.94 MIU/ML (ref 0.46–4.68)
VIT B12 BLD-MCNC: 743 PG/ML (ref 239–931)
WBC NRBC COR # BLD: 4.93 10*3/MM3 (ref 3.2–9.8)

## 2017-02-27 PROCEDURE — 99214 OFFICE O/P EST MOD 30 MIN: CPT | Performed by: FAMILY MEDICINE

## 2017-02-27 PROCEDURE — 83540 ASSAY OF IRON: CPT | Performed by: FAMILY MEDICINE

## 2017-02-27 PROCEDURE — 85025 COMPLETE CBC W/AUTO DIFF WBC: CPT | Performed by: FAMILY MEDICINE

## 2017-02-27 PROCEDURE — 82746 ASSAY OF FOLIC ACID SERUM: CPT | Performed by: FAMILY MEDICINE

## 2017-02-27 PROCEDURE — 83550 IRON BINDING TEST: CPT | Performed by: FAMILY MEDICINE

## 2017-02-27 PROCEDURE — 82728 ASSAY OF FERRITIN: CPT | Performed by: FAMILY MEDICINE

## 2017-02-27 PROCEDURE — 80053 COMPREHEN METABOLIC PANEL: CPT | Performed by: FAMILY MEDICINE

## 2017-02-27 PROCEDURE — 84443 ASSAY THYROID STIM HORMONE: CPT | Performed by: FAMILY MEDICINE

## 2017-02-27 PROCEDURE — 82607 VITAMIN B-12: CPT | Performed by: FAMILY MEDICINE

## 2017-02-27 RX ORDER — AMOXICILLIN AND CLAVULANATE POTASSIUM 875; 125 MG/1; MG/1
1 TABLET, FILM COATED ORAL 2 TIMES DAILY
Qty: 20 TABLET | Refills: 0 | Status: SHIPPED | OUTPATIENT
Start: 2017-02-27 | End: 2017-03-08

## 2017-02-27 RX ORDER — CARVEDILOL 3.12 MG/1
1 TABLET ORAL 2 TIMES DAILY
COMMUNITY
Start: 2017-02-24 | End: 2017-03-17 | Stop reason: SDUPTHER

## 2017-02-27 NOTE — PROGRESS NOTES
Subjective   Chanda Yan is a 76 y.o. female.     Problem List  1. 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 Hyperlipidemia  3. ASCAD sp stent placement/severe LDH/Right ventricular Delitation  4. ESRD sp right kidney transplant   5. Obesity  6. Essential Hypertension  7. Bilateral Eyelid swelling  8. Right kidney transplant in 2010  9. GERD  10. Bilateral leg edema  11. Pancytopenia/ anemia/ thrombocytopenia  12. Depression   13. Unspecified murmur  14. Paroxymal Atrial Fibrillation     Pt is 75 yo AAF with the above medical issues. Is here for followup. States about a few weeks ago was having palpitations and chest pain. Went to [facility] was found that heart rate was in atrial fibrillation. Pt was rate controlled with medication but no anticoagulation was given. Went to go see Cardiologist recently and was started on Pradaxa  75 mg PO BID. Currently pt is rate controlled. Denies any chest pain or symptoms.  Has had outpatient stress test and echocardiogram and is due for followup on results.  Also pt's BP is not controlled today and at home despite taking hydralazine, coreg and Nifedical XR.  Pt is also taking myfortic, prednisone and tacrolimus for her kidney transplant.  Has yet to see Nephrologist and Endocrinologist but pt does not have a ride to take her to appts.  In addition pt has been complaining of sinus pressure and tenderness for the past week. Flonase and antihistamine have not been helping. Denies any fever     Hypertension   This is a chronic problem. The current episode started more than 1 year ago. The problem is unchanged. The problem is uncontrolled. Associated symptoms include headaches. Pertinent negatives include no anxiety, chest pain, malaise/fatigue, neck pain, orthopnea, palpitations, peripheral edema, PND or shortness of breath. Associated agents: myfortic, prednisone and tracolimus  Risk factors for coronary artery disease include diabetes mellitus, obesity, sedentary lifestyle,  stress, post-menopausal state and dyslipidemia. Past treatments include beta blockers, calcium channel blockers, direct vasodilators and diuretics. The current treatment provides mild improvement. There are no compliance problems.  Hypertensive end-organ damage includes kidney disease, CAD/MI, left ventricular hypertrophy and renovascular disease. There is no history of angina, heart failure or a thyroid problem. Identifiable causes of hypertension include chronic renal disease. There is no history of coarctation of the aorta, hyperaldosteronism, hypercortisolism, hyperparathyroidism, pheochromocytoma or sleep apnea.   Atrial Fibrillation   Presents for follow-up visit. Symptoms include dizziness and hypertension. Symptoms are negative for an AICD problem, bradycardia, chest pain, hemodynamic instability, hypotension, pacemaker problem, palpitations, shortness of breath, syncope, tachycardia and weakness. The symptoms have been stable. Past medical history includes atrial fibrillation.   Sinusitis   This is a new problem. The current episode started in the past 7 days. The problem is unchanged. There has been no fever. The fever has been present for less than 1 day. Her pain is at a severity of 5/10. The pain is moderate. Associated symptoms include congestion, headaches, sinus pressure, sneezing and a sore throat. Pertinent negatives include no chills, coughing, diaphoresis, ear pain, hoarse voice, neck pain, shortness of breath or swollen glands. Treatments tried: flonase, antihistamine. The treatment provided no relief.          The following portions of the patient's history were reviewed and updated as appropriate: allergies, current medications, past family history, past medical history, past social history, past surgical history and problem list.    Review of Systems   Constitutional: Negative.  Negative for chills, diaphoresis and malaise/fatigue.   HENT: Positive for congestion, sinus pressure, sneezing and  "sore throat. Negative for dental problem, drooling, ear discharge, ear pain, facial swelling, hearing loss, hoarse voice, mouth sores, nosebleeds, postnasal drip, rhinorrhea, tinnitus, trouble swallowing and voice change.    Eyes: Negative.    Respiratory: Negative.  Negative for cough and shortness of breath.    Cardiovascular: Negative.  Negative for chest pain, palpitations, orthopnea, syncope and PND.   Gastrointestinal: Negative.    Endocrine: Negative.    Genitourinary: Negative.    Musculoskeletal: Negative.  Negative for neck pain.   Skin: Negative.    Allergic/Immunologic: Negative.    Neurological: Positive for dizziness and headaches. Negative for weakness.   Hematological: Negative.    Psychiatric/Behavioral: Negative.        Objective    Visit Vitals   • /70 (BP Location: Right arm, Patient Position: Sitting, Cuff Size: Adult)   • Pulse 68   • Temp 99 °F (37.2 °C) (Oral)   • Ht 60\" (152.4 cm)   • Wt 169 lb (76.7 kg)   • SpO2 97%   • BMI 33.01 kg/m2       Chemistry        Component Value Date/Time     01/05/2017 0917    K 4.0 01/05/2017 0917     01/05/2017 0917    CO2 30 01/05/2017 0917    BUN 27 (H) 01/05/2017 0917    CREATININE 1.7 (H) 01/05/2017 0917        Component Value Date/Time    CALCIUM 9.4 01/05/2017 0917    ALKPHOS 112 01/05/2017 0917    AST 22 01/05/2017 0917    ALT 24 01/05/2017 0917    BILITOT 0.5 01/05/2017 0917        Lab Results   Component Value Date    WBC 4.2 01/05/2017    HGB 10.1 (L) 01/05/2017    HCT 34.6 (L) 01/05/2017    MCV 86.9 01/05/2017     (L) 01/05/2017     Lab Results   Component Value Date    HGBA1C 7.6 (H) 01/05/2017     No results found for: CHOL  Lab Results   Component Value Date    TRIG 119 01/05/2017    TRIG 120 09/27/2016    TRIG 86 04/25/2016     Lab Results   Component Value Date    HDL 65 01/05/2017    HDL 65 04/25/2016    HDL 63 04/18/2016     Lab Results   Component Value Date    LDLCALC 138 (H) 01/05/2017    LDLCALC 103 09/27/2016    " LDLCALC 111 04/25/2016     No results found for: LDL  No results found for: HDLLDLRATIO  No components found for: CHOLHDL  No results found for: TSH, H6QVIKK, H1GOIJE, THYROIDAB  Physical Exam   Constitutional: She is oriented to person, place, and time. She appears well-developed and well-nourished. No distress.   HENT:   Head: Normocephalic and atraumatic.   Right Ear: External ear normal.   Left Ear: External ear normal.   Sinus tenderness frontal and maxillary area on palpation    Eyes: Conjunctivae and EOM are normal. Pupils are equal, round, and reactive to light. Right eye exhibits no discharge. Left eye exhibits no discharge. No scleral icterus.   Neck: Normal range of motion. Neck supple. No JVD present. No tracheal deviation present. No thyromegaly present.   Cardiovascular: Normal rate.  A regularly irregular rhythm present.   Murmur heard.  Pulmonary/Chest: Effort normal and breath sounds normal. No stridor. No respiratory distress. She has no wheezes.   Abdominal: Soft. Bowel sounds are normal. She exhibits no distension and no mass. There is no tenderness. There is no rebound and no guarding. No hernia.   Obese abdomen   Musculoskeletal: Normal range of motion. She exhibits no edema, tenderness or deformity.   Lymphadenopathy:     She has no cervical adenopathy.   Neurological: She is alert and oriented to person, place, and time. She has normal reflexes. No cranial nerve deficit. Coordination normal.   Skin: Skin is warm and dry. No rash noted. She is not diaphoretic. No erythema. No pallor.   Psychiatric: She has a normal mood and affect. Her behavior is normal. Judgment and thought content normal.       Assessment/Plan   Problems Addressed this Visit        Cardiovascular and Mediastinum    Mixed hyperlipidemia    Uncontrolled hypertension    Relevant Medications    carvedilol (COREG) 3.125 MG tablet    NIFEDICAL XL 30 MG 24 hr tablet    Paroxysmal atrial fibrillation    Relevant Medications     carvedilol (COREG) 3.125 MG tablet    NIFEDICAL XL 30 MG 24 hr tablet       Respiratory    Sinusitis       Digestive    Obesity       Endocrine    Diabetes type 2, uncontrolled       Genitourinary    Chronic kidney disease       Hematopoietic and Hemostatic    Anemia - Primary    Relevant Orders    CBC Auto Differential    Vitamin B12    Ferritin    Folate    Iron Profile    Comprehensive Metabolic Panel    Occult Blood X 1, Stool    TSH       Other    History of kidney transplant      - for hyperlpidemia - continue with crestor  - Paroxymal A fib - Cardiology following. Currently rate controlled with coreg.  Continue with Pradaxa.  Will order TSH  - for anemia - check iron, hemoccult stool, b12 and foalte levels   - for CKD sp kidney transplant - CMP.  Pt advised to see regular Nephrologist. Continue with Myfortic, Tacrolimus and Prednisone  - for sinusitis -  augmentin 875-125 mg PO BID for 10 days.  Sinusitis information given  - for obesity -counseled on weight loss, diet and exercise. Continue with high healthy fat diet ,low sugar and low carb  - DM type 2 - currently on insulin pump.  Pt advised to followup with Endocrinologist  - for hypertension-  Not at goal at home and in office today.  Will go up on Nifedical XL from 30 to 60 mg PO q daily. Continue with hydralazine 100 mg PO TID along with Coreg 3.125 mg PO BID.  May be hypertensive due to mediation.  Tacrolimus, Myfortic and Prednisone all can cause hypertension.  Pt advised to bring BP recordings on next visit  - recheck in 1 month

## 2017-02-27 NOTE — PATIENT INSTRUCTIONS
Take 2 pills of nifedical XR 30 mg by mouth daily =60 mg a day. Then  new prescription  PLEASE RECORD BLOOD PRESSURE and bring on next visit  - get labwork   Sinusitis, Adult  Sinusitis is redness, soreness, and inflammation of the paranasal sinuses. Paranasal sinuses are air pockets within the bones of your face. They are located beneath your eyes, in the middle of your forehead, and above your eyes. In healthy paranasal sinuses, mucus is able to drain out, and air is able to circulate through them by way of your nose. However, when your paranasal sinuses are inflamed, mucus and air can become trapped. This can allow bacteria and other germs to grow and cause infection.  Sinusitis can develop quickly and last only a short time (acute) or continue over a long period (chronic). Sinusitis that lasts for more than 12 weeks is considered chronic.  CAUSES  Causes of sinusitis include:  · Allergies.  · Structural abnormalities, such as displacement of the cartilage that separates your nostrils (deviated septum), which can decrease the air flow through your nose and sinuses and affect sinus drainage.  · Functional abnormalities, such as when the small hairs (cilia) that line your sinuses and help remove mucus do not work properly or are not present.  SIGNS AND SYMPTOMS  Symptoms of acute and chronic sinusitis are the same. The primary symptoms are pain and pressure around the affected sinuses. Other symptoms include:  · Upper toothache.  · Earache.  · Headache.  · Bad breath.  · Decreased sense of smell and taste.  · A cough, which worsens when you are lying flat.  · Fatigue.  · Fever.  · Thick drainage from your nose, which often is green and may contain pus (purulent).  · Swelling and warmth over the affected sinuses.  DIAGNOSIS  Your health care provider will perform a physical exam. During your exam, your health care provider may perform any of the following to help determine if you have acute sinusitis or  chronic sinusitis:  · Look in your nose for signs of abnormal growths in your nostrils (nasal polyps).  · Tap over the affected sinus to check for signs of infection.  · View the inside of your sinuses using an imaging device that has a light attached (endoscope).  If your health care provider suspects that you have chronic sinusitis, one or more of the following tests may be recommended:  · Allergy tests.  · Nasal culture. A sample of mucus is taken from your nose, sent to a lab, and screened for bacteria.  · Nasal cytology. A sample of mucus is taken from your nose and examined by your health care provider to determine if your sinusitis is related to an allergy.  TREATMENT  Most cases of acute sinusitis are related to a viral infection and will resolve on their own within 10 days. Sometimes, medicines are prescribed to help relieve symptoms of both acute and chronic sinusitis. These may include pain medicines, decongestants, nasal steroid sprays, or saline sprays.  However, for sinusitis related to a bacterial infection, your health care provider will prescribe antibiotic medicines. These are medicines that will help kill the bacteria causing the infection.  Rarely, sinusitis is caused by a fungal infection. In these cases, your health care provider will prescribe antifungal medicine.  For some cases of chronic sinusitis, surgery is needed. Generally, these are cases in which sinusitis recurs more than 3 times per year, despite other treatments.  HOME CARE INSTRUCTIONS  · Drink plenty of water. Water helps thin the mucus so your sinuses can drain more easily.  · Use a humidifier.  · Inhale steam 3-4 times a day (for example, sit in the bathroom with the shower running).  · Apply a warm, moist washcloth to your face 3-4 times a day, or as directed by your health care provider.  · Use saline nasal sprays to help moisten and clean your sinuses.  · Take medicines only as directed by your health care provider.  · If  you were prescribed either an antibiotic or antifungal medicine, finish it all even if you start to feel better.  SEEK IMMEDIATE MEDICAL CARE IF:  · You have increasing pain or severe headaches.  · You have nausea, vomiting, or drowsiness.  · You have swelling around your face.  · You have vision problems.  · You have a stiff neck.  · You have difficulty breathing.     This information is not intended to replace advice given to you by your health care provider. Make sure you discuss any questions you have with your health care provider.     Document Released: 12/18/2006 Document Revised: 01/08/2016 Document Reviewed: 10/12/2016  [employer] Interactive Patient Education ©2016 [employer]

## 2017-02-28 LAB — GASTROCULT GAST QL: NEGATIVE

## 2017-02-28 PROCEDURE — 82270 OCCULT BLOOD FECES: CPT | Performed by: FAMILY MEDICINE

## 2017-03-08 ENCOUNTER — OFFICE VISIT (OUTPATIENT)
Dept: CARDIOLOGY | Facility: CLINIC | Age: 77
End: 2017-03-08

## 2017-03-08 VITALS
BODY MASS INDEX: 33.38 KG/M2 | DIASTOLIC BLOOD PRESSURE: 58 MMHG | SYSTOLIC BLOOD PRESSURE: 138 MMHG | HEIGHT: 60 IN | WEIGHT: 170 LBS | OXYGEN SATURATION: 96 % | HEART RATE: 77 BPM

## 2017-03-08 DIAGNOSIS — Q20.8 ABNORMALITY OF RIGHT VENTRICLE OF HEART: ICD-10-CM

## 2017-03-08 DIAGNOSIS — E78.2 MIXED HYPERLIPIDEMIA: ICD-10-CM

## 2017-03-08 DIAGNOSIS — I48.0 PAROXYSMAL ATRIAL FIBRILLATION (HCC): Primary | ICD-10-CM

## 2017-03-08 DIAGNOSIS — Z79.01 ANTICOAGULATION ADEQUATE: ICD-10-CM

## 2017-03-08 DIAGNOSIS — I10 ESSENTIAL HYPERTENSION: ICD-10-CM

## 2017-03-08 PROCEDURE — 99214 OFFICE O/P EST MOD 30 MIN: CPT | Performed by: INTERNAL MEDICINE

## 2017-03-08 RX ORDER — NIFEDIPINE 60 MG/1
60 TABLET, EXTENDED RELEASE ORAL DAILY
COMMUNITY
End: 2017-03-23 | Stop reason: HOSPADM

## 2017-03-08 NOTE — PROGRESS NOTES
Cardiovascular Medicine   Feliciano Almazan M.D., Ph.D., [facility]      The patient returns to cardiology clinic for follow-up of the following cardiac problems:    PROBLEM LIST:    1.  ASCAD  A. Unknown PCP in [place]  2.  Hypertension  A. severe LVH with septal accentuation  3.  Hyperlipidemia  4.  Type II diabetes  5.  Depression  6. ESRD, s/p DDKT  7.  Right ventricle dilated  A. unable to exclude perimembranous VSD.    ASCAD  Patient returns in follow-up for  ASCAD. She states she was having shortness of breath that was considered an anginal equivalent a couple of years ago.  She underwent left heart catheterization in [place] and I do not have these records, but she had an unknown PCI.  She then moved to [place] and was seeing a cardiologist there.  She is currently on Plavix, Lopressor, and Lipitor. She is not on ASA as she is adamant that her kidney transplant physician told her not to take it.  She denies easy bruising or bleeding.  When I saw her last time she did actually been seen at [facility]  and was found have abnormal troponin.  However, she denied any chest pain.  I sent her for a myocardial perfusion stress.  Fortunately, this showed no evidence of inducible ischemia.    RV dilation  At her last visit I detected a systolic ejection murmur that was new on physical examination.  She was referred for a TTE.  This showed severe asymmetric septal hypertrophy.  I was also unable to exclude a small perimembranous VSD.  Patient also was found to have diastolic dysfunction.  I sent her for a KWAKU.  KWAKU shows septal hypertrophy without obstruction.  Additionally, the color flow turbulence that was seen actually localizes to the LMCA.  She did not have any evidence of a VSD.    HTN  Concerning her diagnosis of hypertension, at her last visit she was on Lasix, Lopressor, nifedipine and an unknown medication.  Her daughter accompanied her at her last appointment.  There was a discrepancy between the medication list that  the patient had and her daughter had.  Her blood pressure was not at goal her last appointment.     HLD  Concerning her hyperlipidemia, she remains on Crestor. She is tolerating this well.  She's medication and diet compliant.  Denies side effects.    Arrhythmia  Patient is a 76 y.o. female who presents for evaluation of atrial fibrillation.  Patient was recently having increased dyspnea and palpitations.  She was seen at [facility] and diagnosed with AF RVR.  She was initially rate controlled.  She spontaneously converted.  Anticoagulation was not initiated.  At her last visit, I started her on Pradaxa.    Anticoagulation  Patient is here for anticoagulation follow-up.  Indication: atrial fibrillation  Bleeding Signs/Symptoms:  None  Thromboembolic Signs/Symptoms:  None  Missed Pradaxa Doses:  None    The following portions of the patient's history were reviewed and updated as appropriate: allergies, current medications, past family history, past medical history, past social history, past surgical history and problem list.      Review of Systems - History obtained from chart review and the patient  General ROS: negative  Cardiovascular ROS: no chest pain or dyspnea on exertion  family history includes Diabetes in her other; Heart disease in her father; Lung cancer in her mother; Lupus in her other.   reports that she has never smoked. She has never used smokeless tobacco. She reports that she does not drink alcohol or use illicit drugs.  Allergies   Allergen Reactions   • Clonidine Derivatives    • Latex    • Naproxen        Current Outpatient Prescriptions:   •  albuterol (PROVENTIL HFA;VENTOLIN HFA) 108 (90 BASE) MCG/ACT inhaler, Inhale 2 puffs every 4 (four) hours as needed for wheezing., Disp: 18 g, Rfl: 11  •  amitriptyline (ELAVIL) 10 MG tablet, Take 10 mg by mouth Every Night., Disp: , Rfl: 0  •  aspirin 81 MG tablet, Take 1 tablet by mouth Daily., Disp: 30 tablet, Rfl: 11  •  Ca Carb-FA-D-B6-B12-Boron-Mg 1342-1  MG wafer, Take 1 tablet by mouth daily., Disp: , Rfl:   •  Calcium Carb-Cholecalciferol (CALCIUM 1000 + D PO), Take 1 tablet by mouth Daily., Disp: , Rfl:   •  carvedilol (COREG) 3.125 MG tablet, Take 1 tablet by mouth 2 (Two) Times a Day., Disp: , Rfl:   •  cetirizine (ZyrTEC) 10 MG tablet, Take 10 mg by mouth daily., Disp: , Rfl:   •  Cholecalciferol (VITAMIN D3) 5000 UNITS capsule capsule, Take 5,000 Units by mouth Daily., Disp: , Rfl:   •  dabigatran etexilate (PRADAXA) 75 MG capsule, Take 1 capsule by mouth 2 (Two) Times a Day., Disp: 60 capsule, Rfl: 6  •  ezetimibe (ZETIA) 10 MG tablet, Take 1 tablet by mouth daily., Disp: 31 tablet, Rfl: 11  •  fluticasone (FLONASE) 50 MCG/ACT nasal spray, 2 sprays into each nostril Daily., Disp: , Rfl:   •  hydrALAZINE (APRESOLINE) 100 MG tablet, Take 1 tablet by mouth 3 (Three) Times a Day., Disp: 90 tablet, Rfl: 11  •  insulin glargine (LANTUS) 100 UNIT/ML injection, Inject 8 Units under the skin Every Night., Disp: , Rfl:   •  mycophenolate (MYFORTIC) 360 MG tablet delayed-release EC tablet, Take 1 tablet by mouth 2 (Two) Times a Day., Disp: 120 tablet, Rfl: 6  •  NIFEdipine XL (PROCARDIA XL) 60 MG 24 hr tablet, Take 60 mg by mouth Daily., Disp: , Rfl:   •  ONETOUCH VERIO test strip, Test 3 times daily as needed  Dx E11.9  Insulin dependant, Disp: 100 each, Rfl: 6  •  pantoprazole (PROTONIX) 40 MG EC tablet, Take 40 mg by mouth Daily., Disp: , Rfl:   •  predniSONE (DELTASONE) 5 MG tablet, Take 1 tablet by mouth daily., Disp: 90 tablet, Rfl: 11  •  rosuvastatin (CRESTOR) 20 MG tablet, Take 1 tablet by mouth Daily., Disp: 30 tablet, Rfl: 11  •  tacrolimus (PROGRAF) 1 MG capsule, Take 1 mg by mouth 2 (Two) Times a Day., Disp: , Rfl:   •  furosemide (LASIX) 40 MG tablet, Take 1 tablet by mouth 2 (Two) Times a Day. Take one tab in a.m. and one-half tab in p.m, Disp: 30 tablet, Rfl: 11  •  insulin aspart (NovoLOG) 100 UNIT/ML patient supplied pump, Inject 1.2 Units under the  skin Continuous. Pt can also dose with sliding scale, Disp: , Rfl:     Physical Exam:  Vitals:    03/08/17 1432   BP: 138/58   Pulse: 77   SpO2: 96%     Body mass index is 33.2 kg/(m^2).    GEN: alert, appears stated age and cooperative  Body Habitus: overweight  HEENT: Head: Normocephalic, no lesions, without obvious abnormality. Peg-orbital edema is present.   Neck / Thyroid: Supple, no masses, nodes, nodules or enlargement. No arcus senilis, xanthelasma or xanthomas.   JVP: 6 cm of water at 45 degrees HJR: absent      Carotid:  Upstroke: easily palpated bilaterally Volume: Normal.    Carotid Bruit:  None  Subclavian Bruit: Not present.    Chest:  Normal Excursion: Good    I:E: Good  Pulmonary:clear to auscultation, no wheezes, rales or rhonchi, symmetric air entry      Precordium:  No palpable heaves or thrusts. P2 is not palpable.   [place]:  normal size and placement Palpable S4: Not present.   Heart rate: normal  Heart Rhythm: regular     Heart Sounds: S1: normal intensity  S2: increased A2, physiologically split  S3: absent   S4: absent  Opening Snap: absent  A2-OS:  N/A  Pericardial rub: absent    Ejection click: None      Murmurs: Systolic: II/VI NORBERT at base  Diastolic: none  Extremity: no edema, cyanosis  Trophic changes: None   Pallor on elevation: Absent.    Rubor on dependency: None  Pulses: Right radial artery has 2+ (normal) pulse    DATA REVIEWED:   February 2017  · A 1-day rest/stress protocol myocardial perfusion imaging study was performed  · A pharmacological stress test was performed using regadenoson  · Horizontal ST segment depression of 2 mm was noted during stress in the inferior leads (II, III and aVF), and returning to baseline after 5-9 minutes of recovery.  · Findings consistent with an abnormal ECG stress test.  · Myocardial perfusion imaging indicates a normal myocardial perfusion study with no evidence of ischemia.  · Left ventricular ejection fraction is normal (Calculated EF =  64%).  · Impressions are consistent with a low risk study.      · Transesophageal echocardiogram with agitated saline contrast.  · Left ventricular function is normal.  · Estimated EF appears to be in the range of 61 - 65%.  · Left ventricular wall thickness is consistent with severe septal asymmetric hypertrophy.  · The right ventricle is normal in size and shape with normal systolic function  · There is no evidence for VSD. The unusual color flow Doppler on the echocardigram correlates with LMCA physiologic flow.  · A prominent Eustachian valve is present with a filamentous structure off the eustachian valve.  · Mckeon grade II aortic arch  · No significant valvular abnormality.        TTE, 2016  LV EF normal. RV dilated. Unable to exclude VSD. Severe LVH with asymmetric septal accentuation.    Procedures     EKG: Dated today. NSR. LVH. PRWP. No prior.      [facility] Notes, 2/2017:  Showing admission for atrial fibrillation.  The patient was rate controlled.  She was not provided with anticoagulation as an outpatient.  She also was noted to have abnormal troponin concerning for possible NSTEMI.     Notes from [place] were reviewed.  Showing the patient was stable during her last appointments.    Myocardial perfusion stress dated 2014 shows ejection fraction 63% without evidence of ischemia.    Assessment/Plan        1. ASCAD.  No anginal symptoms. The patient has been revascularized with PCI.    Plavix, Lipitor, Lopressor, ASA    2. HTN, essential.  HD BP noted to be well controlled today in office.  LVH: marked.  LVEF:Normal. Diastolic function:Abnormal:.  DASH; medication compliance; Low sodium diet  Carvedilol, hydralazine, Nifedical and Lasix    3.  RV dilation, unclear etiology.  The degree was stable on her most recent TTE.  No evidence of VSD.  -I recommended a repeat limited TTE in 6 months.  If no interval dilation will stop monitoring.    4. paroxysmal - 7 days or less Atrial fibrillation   [id number]:  CHADSVASC: HTN, Age >65 and Female  Anticoagulation:requested  -Pradaxa  Rate control agents:ordered.  - Agent: Coreg  -Rhythm control agents:not indicated    5. Cardiac Risk Assessment: Known ASCAD  Recommended ASA/Statin    6. STATIN THERAPY. Patient merits treatment with a statin due to: ASCAD  Crestor    7.  Tobacco status: Patient is a nonsmoker    Follow-up: 6 months with limited TTE        Thank you for allowing me  to participate in the care of your patient. Please do not hesitate to contact me with any questions.     Feliciano Almazan M.D., Ph.D., FACC

## 2017-03-17 RX ORDER — CARVEDILOL 3.12 MG/1
3.12 TABLET ORAL 2 TIMES DAILY
Qty: 60 TABLET | Refills: 3 | Status: SHIPPED | OUTPATIENT
Start: 2017-03-17 | End: 2017-05-30 | Stop reason: SDUPTHER

## 2017-03-21 ENCOUNTER — HOSPITAL ENCOUNTER (INPATIENT)
Facility: HOSPITAL | Age: 77
LOS: 2 days | Discharge: HOME | End: 2017-03-23
Attending: FAMILY MEDICINE | Admitting: FAMILY MEDICINE

## 2017-03-21 ENCOUNTER — APPOINTMENT (OUTPATIENT)
Dept: GENERAL RADIOLOGY | Facility: HOSPITAL | Age: 77
End: 2017-03-21

## 2017-03-21 ENCOUNTER — TELEPHONE (OUTPATIENT)
Dept: CARDIOLOGY | Facility: CLINIC | Age: 77
End: 2017-03-21

## 2017-03-21 PROBLEM — I48.91 ATRIAL FIBRILLATION (HCC): Status: ACTIVE | Noted: 2017-03-21

## 2017-03-21 LAB
ALBUMIN SERPL-MCNC: 4.2 G/DL (ref 3.4–4.8)
ALBUMIN/GLOB SERPL: 1.8 G/DL (ref 1.1–1.8)
ALP SERPL-CCNC: 86 U/L (ref 38–126)
ALT SERPL W P-5'-P-CCNC: 25 U/L (ref 9–52)
AMYLASE SERPL-CCNC: 87 U/L (ref 50–130)
ANION GAP SERPL CALCULATED.3IONS-SCNC: 12 MMOL/L (ref 5–15)
ARTICHOKE IGE QN: 76 MG/DL (ref 1–129)
AST SERPL-CCNC: 34 U/L (ref 14–36)
BASOPHILS # BLD AUTO: 0.01 10*3/MM3 (ref 0–0.2)
BASOPHILS NFR BLD AUTO: 0.3 % (ref 0–2)
BILIRUB SERPL-MCNC: 0.5 MG/DL (ref 0.2–1.3)
BUN BLD-MCNC: 47 MG/DL (ref 7–21)
BUN/CREAT SERPL: 23.2 (ref 7–25)
CALCIUM SPEC-SCNC: 9.4 MG/DL (ref 8.4–10.2)
CHLORIDE SERPL-SCNC: 96 MMOL/L (ref 95–110)
CHOLEST SERPL-MCNC: 185 MG/DL (ref 0–199)
CK SERPL-CCNC: 73 U/L (ref 30–135)
CO2 SERPL-SCNC: 26 MMOL/L (ref 22–31)
CREAT BLD-MCNC: 2.03 MG/DL (ref 0.5–1)
DEPRECATED RDW RBC AUTO: 44.8 FL (ref 36.4–46.3)
EOSINOPHIL # BLD AUTO: 0.02 10*3/MM3 (ref 0–0.7)
EOSINOPHIL NFR BLD AUTO: 0.6 % (ref 0–7)
ERYTHROCYTE [DISTWIDTH] IN BLOOD BY AUTOMATED COUNT: 15.2 % (ref 11.5–14.5)
GFR SERPL CREATININE-BSD FRML MDRD: 29 ML/MIN/1.73 (ref 39–90)
GLOBULIN UR ELPH-MCNC: 2.3 GM/DL (ref 2.3–3.5)
GLUCOSE BLD-MCNC: 168 MG/DL (ref 60–100)
GLUCOSE BLDC GLUCOMTR-MCNC: 212 MG/DL (ref 70–130)
HBA1C MFR BLD: 7.81 % (ref 4–5.6)
HCT VFR BLD AUTO: 29.7 % (ref 35–45)
HDLC SERPL-MCNC: 62 MG/DL (ref 60–200)
HGB BLD-MCNC: 9.5 G/DL (ref 12–15.5)
IMM GRANULOCYTES # BLD: 0.01 10*3/MM3 (ref 0–0.02)
IMM GRANULOCYTES NFR BLD: 0.3 % (ref 0–0.5)
LDLC/HDLC SERPL: 1.56 {RATIO} (ref 0–3.22)
LIPASE SERPL-CCNC: 215 U/L (ref 23–300)
LYMPHOCYTES # BLD AUTO: 0.64 10*3/MM3 (ref 0.6–4.2)
LYMPHOCYTES NFR BLD AUTO: 17.9 % (ref 10–50)
MAGNESIUM SERPL-MCNC: 2.4 MG/DL (ref 1.6–2.3)
MCH RBC QN AUTO: 26.1 PG (ref 26.5–34)
MCHC RBC AUTO-ENTMCNC: 32 G/DL (ref 31.4–36)
MCV RBC AUTO: 81.6 FL (ref 80–98)
MONOCYTES # BLD AUTO: 0.56 10*3/MM3 (ref 0–0.9)
MONOCYTES NFR BLD AUTO: 15.6 % (ref 0–12)
NEUTROPHILS # BLD AUTO: 2.34 10*3/MM3 (ref 2–8.6)
NEUTROPHILS NFR BLD AUTO: 65.3 % (ref 37–80)
NT-PROBNP SERPL-MCNC: 3210 PG/ML (ref 0–900)
PLATELET # BLD AUTO: 176 10*3/MM3 (ref 150–450)
PMV BLD AUTO: 10.4 FL (ref 8–12)
POTASSIUM BLD-SCNC: 3.3 MMOL/L (ref 3.5–5.1)
PROT SERPL-MCNC: 6.5 G/DL (ref 6.3–8.6)
RBC # BLD AUTO: 3.64 10*6/MM3 (ref 3.77–5.16)
SODIUM BLD-SCNC: 134 MMOL/L (ref 137–145)
TRIGL SERPL-MCNC: 130 MG/DL (ref 20–199)
TROPONIN I SERPL-MCNC: 0.34 NG/ML
TSH SERPL DL<=0.05 MIU/L-ACNC: 1.54 MIU/ML (ref 0.46–4.68)
WBC NRBC COR # BLD: 3.58 10*3/MM3 (ref 3.2–9.8)

## 2017-03-21 PROCEDURE — 82150 ASSAY OF AMYLASE: CPT | Performed by: FAMILY MEDICINE

## 2017-03-21 PROCEDURE — 84443 ASSAY THYROID STIM HORMONE: CPT | Performed by: FAMILY MEDICINE

## 2017-03-21 PROCEDURE — 83036 HEMOGLOBIN GLYCOSYLATED A1C: CPT | Performed by: FAMILY MEDICINE

## 2017-03-21 PROCEDURE — 82550 ASSAY OF CK (CPK): CPT | Performed by: FAMILY MEDICINE

## 2017-03-21 PROCEDURE — 80053 COMPREHEN METABOLIC PANEL: CPT | Performed by: FAMILY MEDICINE

## 2017-03-21 PROCEDURE — 71010 HC CHEST PA OR AP: CPT

## 2017-03-21 PROCEDURE — 82962 GLUCOSE BLOOD TEST: CPT

## 2017-03-21 PROCEDURE — 93005 ELECTROCARDIOGRAM TRACING: CPT | Performed by: FAMILY MEDICINE

## 2017-03-21 PROCEDURE — 84484 ASSAY OF TROPONIN QUANT: CPT | Performed by: FAMILY MEDICINE

## 2017-03-21 PROCEDURE — 85025 COMPLETE CBC W/AUTO DIFF WBC: CPT | Performed by: FAMILY MEDICINE

## 2017-03-21 PROCEDURE — 93010 ELECTROCARDIOGRAM REPORT: CPT | Performed by: INTERNAL MEDICINE

## 2017-03-21 PROCEDURE — 63710000001 INSULIN DETEMIR PER 5 UNITS: Performed by: FAMILY MEDICINE

## 2017-03-21 PROCEDURE — 83880 ASSAY OF NATRIURETIC PEPTIDE: CPT | Performed by: FAMILY MEDICINE

## 2017-03-21 PROCEDURE — 83735 ASSAY OF MAGNESIUM: CPT | Performed by: FAMILY MEDICINE

## 2017-03-21 PROCEDURE — 83690 ASSAY OF LIPASE: CPT | Performed by: FAMILY MEDICINE

## 2017-03-21 PROCEDURE — 80061 LIPID PANEL: CPT | Performed by: FAMILY MEDICINE

## 2017-03-21 PROCEDURE — 63710000001 MYCOPHENOLATE MOFETIL PER 250 MG: Performed by: INTERNAL MEDICINE

## 2017-03-21 RX ORDER — SODIUM CHLORIDE 0.9 % (FLUSH) 0.9 %
1-10 SYRINGE (ML) INJECTION AS NEEDED
Status: DISCONTINUED | OUTPATIENT
Start: 2017-03-21 | End: 2017-03-23 | Stop reason: HOSPADM

## 2017-03-21 RX ORDER — DOCUSATE SODIUM 100 MG/1
100 CAPSULE, LIQUID FILLED ORAL 2 TIMES DAILY
Status: DISCONTINUED | OUTPATIENT
Start: 2017-03-21 | End: 2017-03-23 | Stop reason: HOSPADM

## 2017-03-21 RX ORDER — MYCOPHENOLIC ACID 360 MG/1
360 TABLET, DELAYED RELEASE ORAL 2 TIMES DAILY
Status: DISCONTINUED | OUTPATIENT
Start: 2017-03-22 | End: 2017-03-23

## 2017-03-21 RX ORDER — PREDNISONE 1 MG/1
5 TABLET ORAL DAILY
Status: DISCONTINUED | OUTPATIENT
Start: 2017-03-22 | End: 2017-03-23 | Stop reason: HOSPADM

## 2017-03-21 RX ORDER — ASPIRIN 81 MG/1
81 TABLET ORAL DAILY
Status: DISCONTINUED | OUTPATIENT
Start: 2017-03-22 | End: 2017-03-21

## 2017-03-21 RX ORDER — MYCOPHENOLATE MOFETIL 250 MG/1
500 CAPSULE ORAL EVERY 12 HOURS SCHEDULED
Status: COMPLETED | OUTPATIENT
Start: 2017-03-21 | End: 2017-03-22

## 2017-03-21 RX ORDER — MYCOPHENOLIC ACID 360 MG/1
360 TABLET, DELAYED RELEASE ORAL 2 TIMES DAILY
Status: DISCONTINUED | OUTPATIENT
Start: 2017-03-21 | End: 2017-03-21

## 2017-03-21 RX ORDER — DABIGATRAN ETEXILATE 75 MG/1
75 CAPSULE ORAL EVERY 12 HOURS SCHEDULED
Status: DISCONTINUED | OUTPATIENT
Start: 2017-03-21 | End: 2017-03-23 | Stop reason: HOSPADM

## 2017-03-21 RX ORDER — NICOTINE POLACRILEX 4 MG
15 LOZENGE BUCCAL
Status: DISCONTINUED | OUTPATIENT
Start: 2017-03-21 | End: 2017-03-23 | Stop reason: HOSPADM

## 2017-03-21 RX ORDER — HYDRALAZINE HYDROCHLORIDE 50 MG/1
100 TABLET, FILM COATED ORAL EVERY 8 HOURS SCHEDULED
Status: DISCONTINUED | OUTPATIENT
Start: 2017-03-21 | End: 2017-03-23 | Stop reason: HOSPADM

## 2017-03-21 RX ORDER — PANTOPRAZOLE SODIUM 40 MG/1
40 TABLET, DELAYED RELEASE ORAL DAILY
Status: DISCONTINUED | OUTPATIENT
Start: 2017-03-22 | End: 2017-03-22 | Stop reason: SDUPTHER

## 2017-03-21 RX ORDER — ONDANSETRON 2 MG/ML
4 INJECTION INTRAMUSCULAR; INTRAVENOUS EVERY 6 HOURS PRN
Status: DISCONTINUED | OUTPATIENT
Start: 2017-03-21 | End: 2017-03-23 | Stop reason: HOSPADM

## 2017-03-21 RX ORDER — ASPIRIN 81 MG/1
81 TABLET ORAL DAILY
Status: DISCONTINUED | OUTPATIENT
Start: 2017-03-22 | End: 2017-03-23 | Stop reason: HOSPADM

## 2017-03-21 RX ORDER — NIFEDIPINE 60 MG/1
60 TABLET, EXTENDED RELEASE ORAL DAILY
Status: DISCONTINUED | OUTPATIENT
Start: 2017-03-22 | End: 2017-03-22

## 2017-03-21 RX ORDER — ATORVASTATIN CALCIUM 40 MG/1
40 TABLET, FILM COATED ORAL DAILY
Status: DISCONTINUED | OUTPATIENT
Start: 2017-03-22 | End: 2017-03-23 | Stop reason: HOSPADM

## 2017-03-21 RX ORDER — TACROLIMUS 1 MG/1
1 CAPSULE ORAL 2 TIMES DAILY
Status: DISCONTINUED | OUTPATIENT
Start: 2017-03-21 | End: 2017-03-22

## 2017-03-21 RX ORDER — DEXTROSE MONOHYDRATE 25 G/50ML
25 INJECTION, SOLUTION INTRAVENOUS
Status: DISCONTINUED | OUTPATIENT
Start: 2017-03-21 | End: 2017-03-23 | Stop reason: HOSPADM

## 2017-03-21 RX ORDER — AMITRIPTYLINE HYDROCHLORIDE 10 MG/1
10 TABLET, FILM COATED ORAL NIGHTLY
Status: DISCONTINUED | OUTPATIENT
Start: 2017-03-21 | End: 2017-03-23 | Stop reason: HOSPADM

## 2017-03-21 RX ORDER — ACETAMINOPHEN 325 MG/1
650 TABLET ORAL EVERY 4 HOURS PRN
Status: DISCONTINUED | OUTPATIENT
Start: 2017-03-21 | End: 2017-03-23 | Stop reason: HOSPADM

## 2017-03-21 RX ORDER — FUROSEMIDE 40 MG/1
40 TABLET ORAL 2 TIMES DAILY
Status: DISCONTINUED | OUTPATIENT
Start: 2017-03-21 | End: 2017-03-23 | Stop reason: HOSPADM

## 2017-03-21 RX ORDER — CARVEDILOL 3.12 MG/1
3.12 TABLET ORAL 2 TIMES DAILY
Status: DISCONTINUED | OUTPATIENT
Start: 2017-03-21 | End: 2017-03-23 | Stop reason: HOSPADM

## 2017-03-21 RX ADMIN — HYDRALAZINE HYDROCHLORIDE 100 MG: 50 TABLET ORAL at 23:55

## 2017-03-21 RX ADMIN — DABIGATRAN ETEXILATE MESYLATE 75 MG: 75 CAPSULE ORAL at 23:54

## 2017-03-21 RX ADMIN — INSULIN DETEMIR 8 UNITS: 100 INJECTION, SOLUTION SUBCUTANEOUS at 23:57

## 2017-03-21 RX ADMIN — CARVEDILOL 3.12 MG: 3.12 TABLET, FILM COATED ORAL at 23:56

## 2017-03-21 RX ADMIN — AMITRIPTYLINE HYDROCHLORIDE 10 MG: 10 TABLET, FILM COATED ORAL at 23:56

## 2017-03-21 RX ADMIN — FUROSEMIDE 40 MG: 40 TABLET ORAL at 23:57

## 2017-03-21 RX ADMIN — MYCOPHENOLATE MOFETIL 500 MG: 250 CAPSULE ORAL at 23:54

## 2017-03-22 LAB
ALBUMIN SERPL-MCNC: 4.1 G/DL (ref 3.4–4.8)
ALBUMIN/GLOB SERPL: 1.7 G/DL (ref 1.1–1.8)
ALP SERPL-CCNC: 77 U/L (ref 38–126)
ALT SERPL W P-5'-P-CCNC: 23 U/L (ref 9–52)
ANION GAP SERPL CALCULATED.3IONS-SCNC: 12 MMOL/L (ref 5–15)
ARTICHOKE IGE QN: 74 MG/DL (ref 1–129)
AST SERPL-CCNC: 28 U/L (ref 14–36)
BASOPHILS # BLD AUTO: 0.01 10*3/MM3 (ref 0–0.2)
BASOPHILS NFR BLD AUTO: 0.3 % (ref 0–2)
BILIRUB SERPL-MCNC: 0.5 MG/DL (ref 0.2–1.3)
BUN BLD-MCNC: 45 MG/DL (ref 7–21)
BUN/CREAT SERPL: 21.4 (ref 7–25)
CALCIUM SPEC-SCNC: 9.3 MG/DL (ref 8.4–10.2)
CHLORIDE SERPL-SCNC: 101 MMOL/L (ref 95–110)
CHOLEST SERPL-MCNC: 183 MG/DL (ref 0–199)
CO2 SERPL-SCNC: 27 MMOL/L (ref 22–31)
CREAT BLD-MCNC: 2.1 MG/DL (ref 0.5–1)
DEPRECATED RDW RBC AUTO: 46.7 FL (ref 36.4–46.3)
EOSINOPHIL # BLD AUTO: 0.03 10*3/MM3 (ref 0–0.7)
EOSINOPHIL NFR BLD AUTO: 0.9 % (ref 0–7)
ERYTHROCYTE [DISTWIDTH] IN BLOOD BY AUTOMATED COUNT: 15.5 % (ref 11.5–14.5)
GFR SERPL CREATININE-BSD FRML MDRD: 28 ML/MIN/1.73 (ref 39–90)
GLOBULIN UR ELPH-MCNC: 2.4 GM/DL (ref 2.3–3.5)
GLUCOSE BLD-MCNC: 190 MG/DL (ref 60–100)
GLUCOSE BLDC GLUCOMTR-MCNC: 180 MG/DL (ref 70–130)
GLUCOSE BLDC GLUCOMTR-MCNC: 219 MG/DL (ref 70–130)
GLUCOSE BLDC GLUCOMTR-MCNC: 308 MG/DL (ref 70–130)
HCT VFR BLD AUTO: 29.2 % (ref 35–45)
HDLC SERPL-MCNC: 59 MG/DL (ref 60–200)
HGB BLD-MCNC: 9.2 G/DL (ref 12–15.5)
IMM GRANULOCYTES # BLD: 0 10*3/MM3 (ref 0–0.02)
IMM GRANULOCYTES NFR BLD: 0 % (ref 0–0.5)
LDLC/HDLC SERPL: 1.66 {RATIO} (ref 0–3.22)
LYMPHOCYTES # BLD AUTO: 0.71 10*3/MM3 (ref 0.6–4.2)
LYMPHOCYTES NFR BLD AUTO: 21.6 % (ref 10–50)
MAGNESIUM SERPL-MCNC: 2.4 MG/DL (ref 1.6–2.3)
MCH RBC QN AUTO: 26.1 PG (ref 26.5–34)
MCHC RBC AUTO-ENTMCNC: 31.5 G/DL (ref 31.4–36)
MCV RBC AUTO: 82.7 FL (ref 80–98)
MONOCYTES # BLD AUTO: 0.55 10*3/MM3 (ref 0–0.9)
MONOCYTES NFR BLD AUTO: 16.8 % (ref 0–12)
NEUTROPHILS # BLD AUTO: 1.98 10*3/MM3 (ref 2–8.6)
NEUTROPHILS NFR BLD AUTO: 60.4 % (ref 37–80)
NT-PROBNP SERPL-MCNC: 2700 PG/ML (ref 0–900)
PLATELET # BLD AUTO: 177 10*3/MM3 (ref 150–450)
PMV BLD AUTO: 10.8 FL (ref 8–12)
POTASSIUM BLD-SCNC: 3.6 MMOL/L (ref 3.5–5.1)
PROT SERPL-MCNC: 6.5 G/DL (ref 6.3–8.6)
RBC # BLD AUTO: 3.53 10*6/MM3 (ref 3.77–5.16)
SODIUM BLD-SCNC: 140 MMOL/L (ref 137–145)
TRIGL SERPL-MCNC: 130 MG/DL (ref 20–199)
TROPONIN I SERPL-MCNC: 0.11 NG/ML
TROPONIN I SERPL-MCNC: 0.15 NG/ML
TROPONIN I SERPL-MCNC: 0.23 NG/ML
WBC NRBC COR # BLD: 3.28 10*3/MM3 (ref 3.2–9.8)

## 2017-03-22 PROCEDURE — 83735 ASSAY OF MAGNESIUM: CPT | Performed by: FAMILY MEDICINE

## 2017-03-22 PROCEDURE — 63710000001 TACROLIMUS PER 1 MG: Performed by: FAMILY MEDICINE

## 2017-03-22 PROCEDURE — 63710000001 MYCOPHENOLATE MOFETIL PER 250 MG: Performed by: INTERNAL MEDICINE

## 2017-03-22 PROCEDURE — 63710000001 PREDNISONE PER 5 MG: Performed by: FAMILY MEDICINE

## 2017-03-22 PROCEDURE — 85025 COMPLETE CBC W/AUTO DIFF WBC: CPT | Performed by: FAMILY MEDICINE

## 2017-03-22 PROCEDURE — 80061 LIPID PANEL: CPT | Performed by: FAMILY MEDICINE

## 2017-03-22 PROCEDURE — 82962 GLUCOSE BLOOD TEST: CPT

## 2017-03-22 PROCEDURE — 83880 ASSAY OF NATRIURETIC PEPTIDE: CPT | Performed by: FAMILY MEDICINE

## 2017-03-22 PROCEDURE — 84484 ASSAY OF TROPONIN QUANT: CPT | Performed by: FAMILY MEDICINE

## 2017-03-22 PROCEDURE — 99233 SBSQ HOSP IP/OBS HIGH 50: CPT | Performed by: INTERNAL MEDICINE

## 2017-03-22 PROCEDURE — 80053 COMPREHEN METABOLIC PANEL: CPT | Performed by: FAMILY MEDICINE

## 2017-03-22 PROCEDURE — 63710000001 INSULIN DETEMIR PER 5 UNITS: Performed by: FAMILY MEDICINE

## 2017-03-22 RX ORDER — TACROLIMUS 1 MG/1
4 CAPSULE ORAL ONCE
Status: COMPLETED | OUTPATIENT
Start: 2017-03-22 | End: 2017-03-22

## 2017-03-22 RX ORDER — TACROLIMUS 1 MG/1
3 CAPSULE ORAL ONCE
Status: DISCONTINUED | OUTPATIENT
Start: 2017-03-22 | End: 2017-03-22

## 2017-03-22 RX ORDER — DILTIAZEM HYDROCHLORIDE 90 MG/1
180 CAPSULE, EXTENDED RELEASE ORAL DAILY
Status: DISCONTINUED | OUTPATIENT
Start: 2017-03-22 | End: 2017-03-23 | Stop reason: HOSPADM

## 2017-03-22 RX ORDER — PANTOPRAZOLE SODIUM 40 MG/1
40 TABLET, DELAYED RELEASE ORAL
Status: DISCONTINUED | OUTPATIENT
Start: 2017-03-22 | End: 2017-03-23 | Stop reason: HOSPADM

## 2017-03-22 RX ORDER — TACROLIMUS 1 MG/1
4 CAPSULE ORAL 2 TIMES DAILY
Status: DISCONTINUED | OUTPATIENT
Start: 2017-03-22 | End: 2017-03-23 | Stop reason: HOSPADM

## 2017-03-22 RX ADMIN — DILTIAZEM HYDROCHLORIDE 180 MG: 90 CAPSULE, EXTENDED RELEASE ORAL at 18:07

## 2017-03-22 RX ADMIN — CARVEDILOL 3.12 MG: 3.12 TABLET, FILM COATED ORAL at 09:00

## 2017-03-22 RX ADMIN — ATORVASTATIN CALCIUM 40 MG: 40 TABLET, FILM COATED ORAL at 09:00

## 2017-03-22 RX ADMIN — PANTOPRAZOLE SODIUM 40 MG: 40 TABLET, DELAYED RELEASE ORAL at 14:26

## 2017-03-22 RX ADMIN — HYDRALAZINE HYDROCHLORIDE 100 MG: 50 TABLET ORAL at 14:25

## 2017-03-22 RX ADMIN — DABIGATRAN ETEXILATE MESYLATE 75 MG: 75 CAPSULE ORAL at 09:00

## 2017-03-22 RX ADMIN — HYDRALAZINE HYDROCHLORIDE 100 MG: 50 TABLET ORAL at 05:47

## 2017-03-22 RX ADMIN — CARVEDILOL 3.12 MG: 3.12 TABLET, FILM COATED ORAL at 17:57

## 2017-03-22 RX ADMIN — DOCUSATE SODIUM 100 MG: 100 CAPSULE, LIQUID FILLED ORAL at 17:57

## 2017-03-22 RX ADMIN — DOCUSATE SODIUM 100 MG: 100 CAPSULE, LIQUID FILLED ORAL at 09:00

## 2017-03-22 RX ADMIN — DABIGATRAN ETEXILATE MESYLATE 75 MG: 75 CAPSULE ORAL at 21:31

## 2017-03-22 RX ADMIN — INSULIN DETEMIR 8 UNITS: 100 INJECTION, SOLUTION SUBCUTANEOUS at 21:31

## 2017-03-22 RX ADMIN — NIFEDIPINE 60 MG: 60 TABLET, FILM COATED, EXTENDED RELEASE ORAL at 09:00

## 2017-03-22 RX ADMIN — MYCOPHENOLATE MOFETIL 500 MG: 250 CAPSULE ORAL at 08:59

## 2017-03-22 RX ADMIN — PREDNISONE 5 MG: 5 TABLET ORAL at 08:59

## 2017-03-22 RX ADMIN — PANTOPRAZOLE SODIUM 40 MG: 40 TABLET, DELAYED RELEASE ORAL at 08:59

## 2017-03-22 RX ADMIN — FUROSEMIDE 40 MG: 40 TABLET ORAL at 17:57

## 2017-03-22 RX ADMIN — FUROSEMIDE 40 MG: 40 TABLET ORAL at 09:00

## 2017-03-22 RX ADMIN — HYDRALAZINE HYDROCHLORIDE 100 MG: 50 TABLET ORAL at 21:31

## 2017-03-22 RX ADMIN — AMITRIPTYLINE HYDROCHLORIDE 10 MG: 10 TABLET, FILM COATED ORAL at 21:31

## 2017-03-22 RX ADMIN — ASPIRIN 81 MG: 81 TABLET, COATED ORAL at 09:00

## 2017-03-22 RX ADMIN — DOCUSATE SODIUM 100 MG: 100 CAPSULE, LIQUID FILLED ORAL at 00:01

## 2017-03-22 RX ADMIN — TACROLIMUS 4 MG: 1 CAPSULE ORAL at 01:20

## 2017-03-22 RX ADMIN — TACROLIMUS 4 MG: 1 CAPSULE ORAL at 09:00

## 2017-03-22 RX ADMIN — TACROLIMUS 4 MG: 1 CAPSULE ORAL at 17:57

## 2017-03-22 NOTE — PLAN OF CARE
Problem: Patient Care Overview (Adult)  Goal: Plan of Care Review  Outcome: Ongoing (interventions implemented as appropriate)    03/21/17 2940   Coping/Psychosocial Response Interventions   Plan Of Care Reviewed With patient   Patient Care Overview   Progress improving       Goal: Adult Individualization and Mutuality  Outcome: Ongoing (interventions implemented as appropriate)  Goal: Discharge Needs Assessment  Outcome: Ongoing (interventions implemented as appropriate)    Problem: Arrhythmia/Dysrhythmia (Symptomatic) (Adult)  Goal: Signs and Symptoms of Listed Potential Problems Will be Absent or Manageable (Arrhythmia/Dysrhythmia)  Outcome: Ongoing (interventions implemented as appropriate)

## 2017-03-22 NOTE — PLAN OF CARE
Problem: Patient Care Overview (Adult)  Goal: Plan of Care Review  Outcome: Ongoing (interventions implemented as appropriate)    03/22/17 1816   Coping/Psychosocial Response Interventions   Plan Of Care Reviewed With patient   Patient Care Overview   Progress improving       Goal: Adult Individualization and Mutuality  Outcome: Ongoing (interventions implemented as appropriate)  Goal: Discharge Needs Assessment  Outcome: Ongoing (interventions implemented as appropriate)    Problem: Arrhythmia/Dysrhythmia (Symptomatic) (Adult)  Goal: Signs and Symptoms of Listed Potential Problems Will be Absent or Manageable (Arrhythmia/Dysrhythmia)  Outcome: Ongoing (interventions implemented as appropriate)

## 2017-03-22 NOTE — PROGRESS NOTES
Discharge Planning Assessment  [facility]     Patient Name: Chanda Yan  MRN: [id number]  Today's Date: 3/22/2017    Admit Date: 3/21/2017          Discharge Needs Assessment       03/22/17 1501    Living Environment    Lives With child(darshana), adult    Living Arrangements house    Provides Primary Care For no one    Quality Of Family Relationships involved;helpful;supportive    Able to Return to Prior Living Arrangements yes    Discharge Needs Assessment    Concerns To Be Addressed adjustment to diagnosis/illness concerns    Readmission Within The Last 30 Days no previous admission in last 30 days    Anticipated Changes Related to Illness none    Equipment Currently Used at Home none    Equipment Needed After Discharge none    Transportation Available family or friend will provide;car    Discharge Disposition home or self-care    Discharge Planning Comments Pt reports she does not need HH services at d/c.             Discharge Plan       03/22/17 1501    Case Management/Social Work Plan    Plan Home no Services    Patient/Family In Agreement With Plan yes    Additional Comments Pt voices no needs or concerns at this time. Pt reports she does not need HH services.         Discharge Placement     No information found        Expected Discharge Date and Time     Expected Discharge Date Expected Discharge Time    Mar 23, 2017               Demographic Summary       03/22/17 1500    Referral Information    Admission Type inpatient    Arrived From home or self-care    Contact Information    Permission Granted to Share Information With ;primary care physician    Primary Care Physician Information    Name DAVID OMER            Functional Status       03/22/17 1500    IADL    Medications independent    Meal Preparation independent    Housekeeping independent    Laundry independent    Shopping independent    Oral Care independent    Cognitive/Perceptual/Developmental    Current Mental Status/Cognitive  Functioning no deficits noted    Recent Changes in Mental Status/Cognitive Functioning no changes            Psychosocial     None            Abuse/Neglect     None            Legal     None            Substance Abuse     None            Patient Forms     None          Stacey Stevens, [place]

## 2017-03-22 NOTE — H&P
[facility] Medicine Admission      Date of Admission: 3/21/2017      Primary Care Physician: Ayaan Swenson MD      Chief Complaint: Chest pain and rapid heart rate.    HPI: 76-year-old Afro-American female presented to the ER with complaints of pain in her chest along with palpitations.  Patient reports that she had been doing fairly well up until this morning.  Reports that she was sitting and was about to take her morning medications when she had sudden onset of chest pain which was mostly localized to the middle of the chest, she rated the pain as mild to moderate.  And describes the pain as a heartburn.  She denied any radiation of the pain.  She denies any aggravating or relieving factors.  The pain was not associated with any nausea vomiting or sweating.  Patient denied any radiation of the pain to the neck, arm, to the jaw or to the back or interscapular region.   Pain was associated with rapid racing of the heart.  Patient reports that she has had similar problems in the past and was diagnosed to have atrial fibrillation.  At this point the EMS was called and patient was transferred to the ER at [facility].  Evaluation done a at the ER in [facility] revealed that patient has A. fib with RVR.  Patient reports to me that she was given aspirin and nitroglycerin by the EMS which did not help her.  Patient was started on Cardizem drip in the ER following which patient converted to normal sinus rhythm with a controlled rate. 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    Past Surgical History:  has a past surgical history that includes Colonoscopy (02/12/2014); Injection of Medication (12/21/2015); Injection of Medication (06/30/2016); Injection of Medication (12/21/2015); and Transplantation renal.    Family History: family history includes Diabetes in her other; Heart disease in her father; Lung cancer in her mother; Lupus in her other.    Social History:  reports that she has never smoked. She has never used smokeless tobacco. She reports that she does not drink alcohol or use illicit drugs.    Allergies:   Allergies   Allergen Reactions   • Clonidine Derivatives    • Latex    • Naproxen        Medications: .  Prior to Admission medications    Medication Sig Start Date End Date Taking? Authorizing Provider   albuterol (PROVENTIL HFA;VENTOLIN HFA) 108 (90 BASE) MCG/ACT inhaler Inhale 2 puffs every 4 (four) hours as needed for wheezing. 9/27/16  Yes Ayaan Swenson MD   amitriptyline (ELAVIL) 10 MG tablet Take 10 mg by mouth Every Night. 6/17/16  Yes Historical Provider, MD   aspirin 81 MG tablet Take 1 tablet by mouth Daily. 2/23/17  Yes Ayaan Swenson MD   Ca Carb-FA-D-B6-B12-Boron-Mg 1342-1 MG wafer Take 1 tablet by mouth daily.   Yes Historical Provider, MD   Calcium Carb-Cholecalciferol (CALCIUM 1000 + D PO) Take 1 tablet by mouth Daily.   Yes Historical Provider, MD   carvedilol (COREG) 3.125 MG tablet Take 1 tablet by mouth 2 (Two) Times a Day. 3/17/17  Yes Ayaan Swenson MD   cetirizine (ZyrTEC) 10 MG tablet Take 10 mg by mouth daily.   Yes Historical Provider, MD   Cholecalciferol (VITAMIN D3) 5000 UNITS capsule capsule Take 5,000 Units by mouth Daily.   Yes Historical Provider, MD   dabigatran etexilate (PRADAXA) 75 MG capsule Take 1 capsule by mouth 2 (Two) Times a Day. 2/8/17  Yes Feliciano Almazan MD PhD   ezetimibe  (ZETIA) 10 MG tablet Take 1 tablet by mouth daily. 9/27/16  Yes Ayaan Swenson MD   fluticasone (FLONASE) 50 MCG/ACT nasal spray 2 sprays into each nostril Daily.   Yes Historical Provider, MD   furosemide (LASIX) 40 MG tablet Take 1 tablet by mouth 2 (Two) Times a Day. Take one tab in a.m. and one-half tab in p.m 1/19/17  Yes Ayaan Swenson MD   hydrALAZINE (APRESOLINE) 100 MG tablet Take 1 tablet by mouth 3 (Three) Times a Day. 1/19/17  Yes Ayaan Swenson MD   insulin aspart (NovoLOG) 100 UNIT/ML patient supplied pump Inject 1.2 Units under the skin Continuous. Pt can also dose with sliding scale   Yes Historical Provider, MD   insulin glargine (LANTUS) 100 UNIT/ML injection Inject 8 Units under the skin Every Night.   Yes Historical Provider, MD   mycophenolate (MYFORTIC) 360 MG tablet delayed-release EC tablet Take 1 tablet by mouth 2 (Two) Times a Day. 1/19/17  Yes Ayaan Swenson MD   NIFEdipine XL (PROCARDIA XL) 60 MG 24 hr tablet Take 60 mg by mouth Daily.   Yes Historical Provider, MD   ONETOUCH VERIO test strip Test 3 times daily as needed  Dx E11.9  Insulin dependant 1/19/17  Yes Ayaan Swenson MD   pantoprazole (PROTONIX) 40 MG EC tablet Take 40 mg by mouth Daily.   Yes Historical Provider, MD   predniSONE (DELTASONE) 5 MG tablet Take 1 tablet by mouth daily. 9/27/16  Yes Ayaan Swenson MD   rosuvastatin (CRESTOR) 20 MG tablet Take 1 tablet by mouth Daily. 1/19/17  Yes Ayaan Swenson MD   tacrolimus (PROGRAF) 1 MG capsule Take 1 mg by mouth 2 (Two) Times a Day.   Yes Historical Provider, MD       Review of Systems:  Review of Systems   Constitutional: Negative for fatigue and fever.   HENT: Negative for congestion.    Eyes: Negative for visual disturbance.   Respiratory: Negative for chest tightness and shortness of breath.    Cardiovascular: Negative for chest pain, palpitations and leg swelling.   Gastrointestinal: Negative for abdominal distention, abdominal pain, diarrhea, nausea and vomiting.    Neurological: Negative for dizziness, speech difficulty and weakness.   Psychiatric/Behavioral: Negative for agitation, confusion and suicidal ideas.      Otherwise complete ROS is negative except as mentioned above.    Physical Exam:   Temp:  [98 °F (36.7 °C)] 98 °F (36.7 °C)  Heart Rate:  [70-73] 70  Resp:  [18] 18  BP: (165)/(70) 165/70  Physical Exam   Constitutional: She is oriented to person, place, and time. She appears well-developed and well-nourished.   HENT:   Head: Normocephalic and atraumatic.   Eyes: Pupils are equal, round, and reactive to light.   Neck: Neck supple.   Cardiovascular: Normal rate, regular rhythm and normal heart sounds.    Pulmonary/Chest: Effort normal and breath sounds normal.   Abdominal: Soft. Bowel sounds are normal.   Neurological: She is alert and oriented to person, place, and time.   Skin: Skin is warm.   Psychiatric: She has a normal mood and affect. Her behavior is normal. Judgment and thought content normal.         Results Reviewed:  I have personally reviewed current lab, radiology, and data and agree with results.  Lab Results (last 24 hours)     Procedure Component Value Units Date/Time    POC Glucose Fingerstick [[id number]]  (Abnormal) Collected:  03/21/17 2039    Specimen:  Blood Updated:  03/21/17 2102     Glucose 212 (H) mg/dL       Meter: [id number]: [id number] VASU CASTILLO       Troponin [[id number]] Collected:  03/21/17 2154    Specimen:  Blood Updated:  03/21/17 2159    Troponin [[id number]] Collected:  03/21/17 2154    Specimen:  Blood Updated:  03/21/17 2159    TSH [[id number]] Collected:  03/21/17 2154    Specimen:  Blood Updated:  03/21/17 2159    CBC & Differential [[id number]] Collected:  03/21/17 2154    Specimen:  Blood Updated:  03/21/17 2203    Narrative:       The following orders were created for panel order CBC & Differential.  Procedure                               Abnormality         Status                     ---------                                -----------         ------                     CBC Auto Differential[[id number]]         Abnormal            Final result                 Please view results for these tests on the individual orders.    CBC Auto Differential [[id number]]  (Abnormal) Collected:  03/21/17 2154    Specimen:  Blood Updated:  03/21/17 2203     WBC 3.58 10*3/mm3      RBC 3.64 (L) 10*6/mm3      Hemoglobin 9.5 (L) g/dL      Hematocrit 29.7 (L) %      MCV 81.6 fL      MCH 26.1 (L) pg      MCHC 32.0 g/dL      RDW 15.2 (H) %      RDW-SD 44.8 fl      MPV 10.4 fL      Platelets 176 10*3/mm3      Neutrophil % 65.3 %      Lymphocyte % 17.9 %      Monocyte % 15.6 (H) %      Eosinophil % 0.6 %      Basophil % 0.3 %      Immature Grans % 0.3 %      Neutrophils, Absolute 2.34 10*3/mm3      Lymphocytes, Absolute 0.64 10*3/mm3      Monocytes, Absolute 0.56 10*3/mm3      Eosinophils, Absolute 0.02 10*3/mm3      Basophils, Absolute 0.01 10*3/mm3      Immature Grans, Absolute 0.01 10*3/mm3     Hemoglobin A1c [[id number]]  (Abnormal) Collected:  03/21/17 2154    Specimen:  Blood Updated:  03/21/17 2210     Hemoglobin A1C 7.81 (H) %     Magnesium [[id number]]  (Abnormal) Collected:  03/21/17 2154    Specimen:  Blood Updated:  03/21/17 2214     Magnesium 2.4 (H) mg/dL     Amylase [[id number]]  (Normal) Collected:  03/21/17 2154    Specimen:  Blood Updated:  03/21/17 2214     Amylase 87 U/L     CK [[id number]]  (Normal) Collected:  03/21/17 2154    Specimen:  Blood Updated:  03/21/17 2214     Creatine Kinase 73 U/L     Lipase [[id number]]  (Normal) Collected:  03/21/17 2154    Specimen:  Blood Updated:  03/21/17 2214     Lipase 215 U/L     Lipid Panel [[id number]]  (Normal) Collected:  03/21/17 2154    Specimen:  Blood Updated:  03/21/17 2224     Total Cholesterol 185 mg/dL      Triglycerides 130 mg/dL      HDL Cholesterol 62 mg/dL      LDL Cholesterol  76 mg/dL      LDL/HDL Ratio 1.56     BNP [[id number]]  (Abnormal) Collected:  03/21/17 3591    Specimen:  Blood  Updated:  03/21/17 2228     proBNP 3210.0 (H) pg/mL     Comprehensive Metabolic Panel [[id number]]  (Abnormal) Collected:  03/21/17 2154    Specimen:  Blood Updated:  03/21/17 2239     Glucose 168 (H) mg/dL      BUN 47 (H) mg/dL      Creatinine 2.03 (H) mg/dL      Sodium 134 (L) mmol/L      Potassium 3.3 (L) mmol/L      Chloride 96 mmol/L      CO2 26.0 mmol/L      Calcium 9.4 mg/dL      Total Protein 6.5 g/dL      Albumin 4.20 g/dL      ALT (SGPT) 25 U/L      AST (SGOT) 34 U/L      Alkaline Phosphatase 86 U/L      Total Bilirubin 0.5 mg/dL      eGFR  African Amer 29 (L) mL/min/1.73      Globulin 2.3 gm/dL      A/G Ratio 1.8 g/dL      BUN/Creatinine Ratio 23.2      Anion Gap 12.0 mmol/L     Narrative:       The MDRD GFR formula is only valid for adults with stable renal function between ages 18 and 70.        Imaging Results (last 24 hours)     Procedure Component Value Units Date/Time    XR Chest 1 View [[id number]]      Updated:  03/21/17 2206            Assessment and plan    76-year-old admitted for    1.  A. fib with RVR- history of atrial fibrillation in the past, patient was initially treated with IV Cardizem.  She is now in normal sinus rhythm.  Cardizem drip is held because the patient's heart rate was in the 60s.  We'll continue the patient on pradaxa, cardiology consult be placed.  Echo done on 2/21/17  · Transesophageal echocardiogram with agitated saline contrast.  · Left ventricular function is normal.  · Estimated EF appears to be in the range of 61 - 65%.  · Left ventricular wall thickness is consistent with severe septal asymmetric hypertrophy.  · The right ventricle is normal in size and shape with normal systolic function  · There is no evidence for VSD. The unusual color flow Doppler on the echocardigram correlates with LMCA physiologic flow.  · A prominent Eustachian valve is present with a filamentous structure off the eustachian valve.  · Mckeon grade II aortic arch  · No significant valvular  abnormality.    2.  Coronary artery disease- patient denies any chest pain now. continue the patient on aspirin Coreg Lipitor and voiding ACEI/ ARB due to chronic in disease.    3.  Chronic kidney disease status post renal transplant- creatinine appears to be at its baseline.  Continue the patient on transplant medication.  Nephrology will be consulted.    4.  Diabetes mellitus- A1c 7.8, continue the patient on ADA diet, Levemir and insulin pump  .    5.  Hypertension- continue the patient on home medications that Coreg, hydralazine and nifedipine.    6.  DVT prophylaxis with pradaxa,    7.  Anemia secondary to chronic kidney disease - follow patient's H&H.    I discussed the patients findings and my recommendations with: patient.    Akbar Bashir MD  03/21/17  10:43 PM

## 2017-03-22 NOTE — CONSULTS
Cardiology at [facility]  Cardiovascular Consultation Note      Chanda Yan  312/1  [id number]  1940    DATE OF ADMISSION: 3/21/2017  DATE OF CONSULTATION:  3/22/2017    Ayaan Swenson MD  Treatment Team:   Attending Provider: Akbar Bashir MD  Consulting Physician: Chaim Kate MD  Consulting Physician: Dimas Manzanares MD  Admitting Provider: Akbar Bashir MD    No chief complaint on file.      Chief complaint/ Reason for Consultation: History of paroxysmal atrial fibrillation transferred [facility] when she admitted for evaluations of palpitation noted to be in atrial fibrillation with a rapid ventricular response with a heart rate up to 130 to 1 40 bpm currently she is in sinus rhythm and associated chest pain with mildly abnormal troponin and chest with a stage III renal insufficiency      History of Present Illness: 76 years old patient with the past medical history significant for hypertension with hypertensive nephropathy and a single disease status post renal transplant and history of paroxysmal atrial fibrillations who went to the [facility] for evaluations of palpitation, fluttering and associates chest pain and shortness of breath.  Per patient and her heart rate was close to 170-1 80 bpm however on the EKG the heart rate is 1:30 to 1 40 bpm.  The patient was started on IV Cardizem and then transferred to our facility patient continued sinus rhythm.  The patient mildly abnormal troponin multifactorial in etiology given the stage III kidney disease.  BNP is approximately thousand also seemed to be probably normal given her age and stage III renal status.  She is resting comfortably in the chair.  No chest pain was reported at the time of medication however she had the chest pain during tachycardia.  She had normal stress test done her behavior February 2000 to 17 by Dr. Almazan echocardiographic study done and reported to have  hypertension with hypertensive heart disease mildly dilated left atrium good heart function diastolic dysfunction and possibility small  VSD was reported.  Patient denies any syncopal episode.  Baseline EKG sinus rhythm with evidence of left ventricle hypertrophy and strain pattern  No dysuria hematuria.  Denies any bright red blood per rectum.  Denies any syncopal episode.    Past Medical History:   Diagnosis Date   • Acute bronchitis 12/21/2015   • Allergic    • Asthma    • Chronic kidney disease 04/25/2016    unspecified   • Coronary atherosclerosis 06/22/2016    s/p stent placement 2 years ago      • Diabetes mellitus type 2, insulin dependent 06/22/2016   • Dyslipidemia 06/22/2016   • Edema of left upper eyelid 06/30/2016   • Edema of right upper eyelid 06/30/2016   • Essential hypertension 06/30/2016   • History of echocardiogram 07/07/2016    Normal LV function with Ef of 65%.Normal RV size and function.Grade 1a diastolic dysfunction.Mild LVH, and severe left atrial dilation.Moderate tricuspid regurg.   • Hyperlipidemia 06/22/2016   • Low back pain 02/29/2016   • Sinusitis    • Upper respiratory infection 12/21/2015       Past Surgical History:   Procedure Laterality Date   • COLONOSCOPY  02/12/2014    Solid stool through out the colon.I biopsies the colonic mucosa given her diarrhea.Exam markedly limited by poor prep.[facility].   • INJECTION OF MEDICATION  12/21/2015    Kenalog (1)     • INJECTION OF MEDICATION  06/30/2016    PNEUMOC VAC/ADMIN/RCVD 4040F (1)      • INJECTION OF MEDICATION  12/21/2015    Rocephin (1)     • TRANSPLANTATION RENAL         No current facility-administered medications on file prior to encounter.      Current Outpatient Prescriptions on File Prior to Encounter   Medication Sig Dispense Refill   • albuterol (PROVENTIL HFA;VENTOLIN HFA) 108 (90 BASE) MCG/ACT inhaler Inhale 2 puffs every 4 (four) hours as needed for wheezing. 18 g 11   • amitriptyline (ELAVIL) 10 MG tablet Take 10 mg  by mouth Every Night.  0   • aspirin 81 MG tablet Take 1 tablet by mouth Daily. 30 tablet 11   • Ca Carb-FA-D-B6-B12-Boron-Mg 1342-1 MG wafer Take 1 tablet by mouth daily.     • Calcium Carb-Cholecalciferol (CALCIUM 1000 + D PO) Take 1 tablet by mouth Daily.     • carvedilol (COREG) 3.125 MG tablet Take 1 tablet by mouth 2 (Two) Times a Day. 60 tablet 3   • cetirizine (ZyrTEC) 10 MG tablet Take 10 mg by mouth daily.     • Cholecalciferol (VITAMIN D3) 5000 UNITS capsule capsule Take 5,000 Units by mouth Daily.     • dabigatran etexilate (PRADAXA) 75 MG capsule Take 1 capsule by mouth 2 (Two) Times a Day. 60 capsule 6   • ezetimibe (ZETIA) 10 MG tablet Take 1 tablet by mouth daily. 31 tablet 11   • fluticasone (FLONASE) 50 MCG/ACT nasal spray 2 sprays into each nostril Daily.     • furosemide (LASIX) 40 MG tablet Take 1 tablet by mouth 2 (Two) Times a Day. Take one tab in a.m. and one-half tab in p.m 30 tablet 11   • hydrALAZINE (APRESOLINE) 100 MG tablet Take 1 tablet by mouth 3 (Three) Times a Day. 90 tablet 11   • insulin aspart (NovoLOG) 100 UNIT/ML patient supplied pump Inject 1.2 Units under the skin Continuous. Pt can also dose with sliding scale     • insulin glargine (LANTUS) 100 UNIT/ML injection Inject 8 Units under the skin Every Night.     • mycophenolate (MYFORTIC) 360 MG tablet delayed-release EC tablet Take 1 tablet by mouth 2 (Two) Times a Day. 120 tablet 6   • NIFEdipine XL (PROCARDIA XL) 60 MG 24 hr tablet Take 60 mg by mouth Daily.     • ONETOUCH VERIO test strip Test 3 times daily as needed  Dx E11.9  Insulin dependant 100 each 6   • pantoprazole (PROTONIX) 40 MG EC tablet Take 40 mg by mouth Daily.     • predniSONE (DELTASONE) 5 MG tablet Take 1 tablet by mouth daily. 90 tablet 11   • rosuvastatin (CRESTOR) 20 MG tablet Take 1 tablet by mouth Daily. 30 tablet 11   • tacrolimus (PROGRAF) 1 MG capsule Take 1 mg by mouth 2 (Two) Times a Day.         Social History     Social History   • Marital  "status:      Spouse name: N/A   • Number of children: N/A   • Years of education: N/A     Occupational History   • Not on file.     Social History Main Topics   • Smoking status: Never Smoker   • Smokeless tobacco: Never Used   • Alcohol use No   • Drug use: No   • Sexual activity: Defer     Other Topics Concern   • Not on file     Social History Narrative           REVIEW OF SYSTEMS:   Review of Systems   Constitution: Negative for diaphoresis and weakness.   HENT: Positive for hoarse voice. Negative for ear pain and headaches.    Eyes: Negative for discharge.   Cardiovascular: Positive for chest pain, irregular heartbeat and palpitations. Negative for dyspnea on exertion, near-syncope, paroxysmal nocturnal dyspnea and syncope.   Respiratory: Negative for snoring and sputum production.    Endocrine: Negative for polydipsia and polyphagia.   Skin: Negative for flushing.   Musculoskeletal: Negative for falls.   Gastrointestinal: Negative for change in bowel habit, constipation and dysphagia.   Genitourinary: Negative for dysuria and flank pain.   Neurological: Negative for focal weakness and loss of balance.   Psychiatric/Behavioral: The patient is not nervous/anxious.          Objective:     Vitals:    03/22/17 0730 03/22/17 0805 03/22/17 1129 03/22/17 1528   BP: 145/66  153/70 141/63   BP Location: Left arm  Left arm Left arm   Patient Position: Sitting  Sitting Sitting   Pulse: 71 91 79 83   Resp: 18  18 18   Temp: 97.4 °F (36.3 °C)  97.6 °F (36.4 °C) 99.3 °F (37.4 °C)   TempSrc: Temporal Artery   Oral Oral   SpO2: 98%  98% 95%   Weight:       Height:         Body mass index is 32.5 kg/(m^2).  Flowsheet Rows         First Filed Value    Admission Height  60\" (152.4 cm) Documented at 03/21/2017 2030    Admission Weight  172 lb 12.8 oz (78.4 kg) Documented at 03/21/2017 2030          Intake/Output Summary (Last 24 hours) at 03/22/17 1601  Last data filed at 03/22/17 1100   Gross per 24 hour   Intake          "    1080 ml   Output             1300 ml   Net             -220 ml         Physical Exam   Physical Exam   Constitutional: She is oriented to person, place, and time. She appears well-developed and well-nourished.   HENT:   Head: Normocephalic and atraumatic.   Eyes: Pupils are equal, round, and reactive to light. Right eye exhibits no discharge. No scleral icterus.   Neck: Normal range of motion. No JVD present. No thyromegaly present.   Cardiovascular: Normal rate and regular rhythm.    Murmur heard.  Pulmonary/Chest: Effort normal and breath sounds normal. She has no wheezes.   Abdominal: Soft. Bowel sounds are normal.   Musculoskeletal: She exhibits no edema or tenderness.   Neurological: She is oriented to person, place, and time.   Skin: Skin is warm and dry.   Psychiatric: She has a normal mood and affect.       Radiology Review    SCANNED - IMAGING   Final Result      XR Chest 1 View   Final Result   Cardiomegaly, no active disease.         Electronically signed by:  Farhan Graff MD  3/21/2017 10:16 PM   CDT Workstation: [id number]          Lab Results:  Lab Results (last 24 hours)     Procedure Component Value Units Date/Time    POC Glucose Fingerstick [[id number]]  (Abnormal) Collected:  03/21/17 2039    Specimen:  Blood Updated:  03/21/17 2102     Glucose 212 (H) mg/dL       Meter: [id number]: [id number] VASU CASTILLO       CBC & Differential [[id number]] Collected:  03/21/17 2154    Specimen:  Blood Updated:  03/21/17 2203    Narrative:       The following orders were created for panel order CBC & Differential.  Procedure                               Abnormality         Status                     ---------                               -----------         ------                     CBC Auto Differential[[id number]]         Abnormal            Final result                 Please view results for these tests on the individual orders.    CBC Auto Differential [[id number]]  (Abnormal) Collected:   03/21/17 2154    Specimen:  Blood Updated:  03/21/17 2203     WBC 3.58 10*3/mm3      RBC 3.64 (L) 10*6/mm3      Hemoglobin 9.5 (L) g/dL      Hematocrit 29.7 (L) %      MCV 81.6 fL      MCH 26.1 (L) pg      MCHC 32.0 g/dL      RDW 15.2 (H) %      RDW-SD 44.8 fl      MPV 10.4 fL      Platelets 176 10*3/mm3      Neutrophil % 65.3 %      Lymphocyte % 17.9 %      Monocyte % 15.6 (H) %      Eosinophil % 0.6 %      Basophil % 0.3 %      Immature Grans % 0.3 %      Neutrophils, Absolute 2.34 10*3/mm3      Lymphocytes, Absolute 0.64 10*3/mm3      Monocytes, Absolute 0.56 10*3/mm3      Eosinophils, Absolute 0.02 10*3/mm3      Basophils, Absolute 0.01 10*3/mm3      Immature Grans, Absolute 0.01 10*3/mm3     Hemoglobin A1c [[id number]]  (Abnormal) Collected:  03/21/17 2154    Specimen:  Blood Updated:  03/21/17 2210     Hemoglobin A1C 7.81 (H) %     Magnesium [[id number]]  (Abnormal) Collected:  03/21/17 2154    Specimen:  Blood Updated:  03/21/17 2214     Magnesium 2.4 (H) mg/dL     Amylase [[id number]]  (Normal) Collected:  03/21/17 2154    Specimen:  Blood Updated:  03/21/17 2214     Amylase 87 U/L     CK [[id number]]  (Normal) Collected:  03/21/17 2154    Specimen:  Blood Updated:  03/21/17 2214     Creatine Kinase 73 U/L     Lipase [[id number]]  (Normal) Collected:  03/21/17 2154    Specimen:  Blood Updated:  03/21/17 2214     Lipase 215 U/L     Lipid Panel [[id number]]  (Normal) Collected:  03/21/17 2154    Specimen:  Blood Updated:  03/21/17 2224     Total Cholesterol 185 mg/dL      Triglycerides 130 mg/dL      HDL Cholesterol 62 mg/dL      LDL Cholesterol  76 mg/dL      LDL/HDL Ratio 1.56    BNP [[id number]]  (Abnormal) Collected:  03/21/17 2154    Specimen:  Blood Updated:  03/21/17 2228     proBNP 3210.0 (H) pg/mL     Comprehensive Metabolic Panel [[id number]]  (Abnormal) Collected:  03/21/17 2154    Specimen:  Blood Updated:  03/21/17 2239     Glucose 168 (H) mg/dL      BUN 47 (H) mg/dL      Creatinine 2.03 (H) mg/dL      Sodium 134  (L) mmol/L      Potassium 3.3 (L) mmol/L      Chloride 96 mmol/L      CO2 26.0 mmol/L      Calcium 9.4 mg/dL      Total Protein 6.5 g/dL      Albumin 4.20 g/dL      ALT (SGPT) 25 U/L      AST (SGOT) 34 U/L      Alkaline Phosphatase 86 U/L      Total Bilirubin 0.5 mg/dL      eGFR  African Amer 29 (L) mL/min/1.73      Globulin 2.3 gm/dL      A/G Ratio 1.8 g/dL      BUN/Creatinine Ratio 23.2     Anion Gap 12.0 mmol/L     Narrative:       The MDRD GFR formula is only valid for adults with stable renal function between ages 18 and 70.    Troponin [[id number]]  (Abnormal) Collected:  03/21/17 2154    Specimen:  Blood Updated:  03/21/17 2307     Troponin I 0.341 (C) ng/mL     TSH [[id number]]  (Normal) Collected:  03/21/17 2154    Specimen:  Blood Updated:  03/21/17 2307     TSH 1.540 mIU/mL     CBC & Differential [[id number]] Collected:  03/22/17 0604    Specimen:  Blood Updated:  03/22/17 0618    Narrative:       The following orders were created for panel order CBC & Differential.  Procedure                               Abnormality         Status                     ---------                               -----------         ------                     CBC Auto Differential[[id number]]         Abnormal            Final result                 Please view results for these tests on the individual orders.    CBC Auto Differential [[id number]]  (Abnormal) Collected:  03/22/17 0604    Specimen:  Blood Updated:  03/22/17 0618     WBC 3.28 10*3/mm3      RBC 3.53 (L) 10*6/mm3      Hemoglobin 9.2 (L) g/dL      Hematocrit 29.2 (L) %      MCV 82.7 fL      MCH 26.1 (L) pg      MCHC 31.5 g/dL      RDW 15.5 (H) %      RDW-SD 46.7 (H) fl      MPV 10.8 fL      Platelets 177 10*3/mm3      Neutrophil % 60.4 %      Lymphocyte % 21.6 %      Monocyte % 16.8 (H) %      Eosinophil % 0.9 %      Basophil % 0.3 %      Immature Grans % 0.0 %      Neutrophils, Absolute 1.98 (L) 10*3/mm3      Lymphocytes, Absolute 0.71 10*3/mm3      Monocytes, Absolute 0.55  10*3/mm3      Eosinophils, Absolute 0.03 10*3/mm3      Basophils, Absolute 0.01 10*3/mm3      Immature Grans, Absolute 0.00 10*3/mm3     Comprehensive Metabolic Panel [[id number]]  (Abnormal) Collected:  03/22/17 0604    Specimen:  Blood Updated:  03/22/17 0628     Glucose 190 (H) mg/dL      BUN 45 (H) mg/dL      Creatinine 2.10 (H) mg/dL      Sodium 140 mmol/L      Potassium 3.6 mmol/L      Chloride 101 mmol/L      CO2 27.0 mmol/L      Calcium 9.3 mg/dL      Total Protein 6.5 g/dL      Albumin 4.10 g/dL      ALT (SGPT) 23 U/L      AST (SGOT) 28 U/L      Alkaline Phosphatase 77 U/L      Total Bilirubin 0.5 mg/dL      eGFR  African Amer 28 (L) mL/min/1.73      Globulin 2.4 gm/dL      A/G Ratio 1.7 g/dL      BUN/Creatinine Ratio 21.4     Anion Gap 12.0 mmol/L     Narrative:       The MDRD GFR formula is only valid for adults with stable renal function between ages 18 and 70.    Magnesium [[id number]]  (Abnormal) Collected:  03/22/17 0604    Specimen:  Blood Updated:  03/22/17 0628     Magnesium 2.4 (H) mg/dL     Lipid Panel [[id number]]  (Abnormal) Collected:  03/22/17 0604    Specimen:  Blood Updated:  03/22/17 0638     Total Cholesterol 183 mg/dL      Triglycerides 130 mg/dL      HDL Cholesterol 59 (L) mg/dL      LDL Cholesterol  74 mg/dL      LDL/HDL Ratio 1.66    Troponin [[id number]]  (Abnormal) Collected:  03/22/17 0604    Specimen:  Blood Updated:  03/22/17 0641     Troponin I 0.232 (C) ng/mL     BNP [[id number]]  (Abnormal) Collected:  03/22/17 0604    Specimen:  Blood Updated:  03/22/17 0641     proBNP 2700.0 (H) pg/mL     POC Glucose Fingerstick [[id number]]  (Abnormal) Collected:  03/22/17 0541    Specimen:  Blood Updated:  03/22/17 0658     Glucose 219 (H) mg/dL       Sliding Scale AdminRN NotifiedMeter: [id number]: [id number] SHERRY       SCANNED - LABS [[id number]] Resulted:  03/21/17      Updated:  03/22/17 1010    POC Glucose Fingerstick [[id number]]  (Abnormal) Collected:  03/22/17 1126    Specimen:  Blood  Updated:  03/22/17 1142     Glucose 180 (H) mg/dL       Meter: [id number]: [id number] LC MCCURDY       Troponin [[id number]]  (Abnormal) Collected:  03/22/17 1134    Specimen:  Blood Updated:  03/22/17 1300     Troponin I 0.154 (C) ng/mL           I personally viewed and interpreted the patient's EKG/Telemetry data      Assessment/Plan:     #1 paroxysmal atrial fibrillation with rapid ventricular response on EKG the heart rate was 1:30 to 1 40 bpm per patient heart rate was greater than 170.  #2 hypertension with hypertensive heart disease with the possibility of small VSD on last echocardiographic study # 3 stage III kidney disease status post renal transplant #4 abnormal BNP multifactorial in etiology given her rate is stage III kidney disease clinically she is not in congestive heart failure #5 mildly abnormal troponin gain multifactorial in etiology possibly secondary to atrial fibrillation with a rapid ventricle response, read by stage III kidney disease patient abnormal recent stress test.      Clinically there is no sign of any acute cardiac decompensation based the clinical history physical finding.  No evidence of active ischemia.  Had a normal recent stress test done and mildly abnormal troponin probably secondary to atrial fibrillation with rapid ventricle response compounded by stage III kidney disease I doubt this is ischemic in etiology.  She had elevated BNP but clinically doesn't appear to be in congestive heart failure again this is multifactorial in etiology.  She is currently in a sinus rhythm and being managed with dabigatran 75 mg twice a day to decrease risk of cardiac bun phenomena Coreg with history of hypertension hypertensive heart disease.  She is on Procardia the calcium channel blocker can be contemplated such as Cardizem as an AV davian blocking drug she is on anti-Spinal Injections Medications.  Recommend to Continue transplant medication.  Dr. Almazan will follow from  tomorrow          Thank you for allowing me to participate in the care of Chanda Yan. Feel free to contact me directly with any further questions or concerns.    Janette Carpenter MD  03/22/17  4:01 PM.      EMR Dragon/Transcription disclaimer:   Much of this encounter note is an electronic transcription/translation of spoken language to printed text. The electronic translation of spoken language may permit erroneous, or at times, nonsensical words or phrases to be inadvertently transcribed; Although I have reviewed the note for such errors, some may still exist.

## 2017-03-22 NOTE — CONSULTS
Referring Provider: Hospitalist  Reason for Consultation: Elevated serum creatinine    Subjective     Chief complaint No chief complaint on file.      History of present illness:  The patient is a 76-year-old -American lady with past medical history of end-stage renal disease secondary to hypertensive nephrosclerosis who was on maintenance hemodialysis for 7 years before she had a cadaveric kidney transplant at [facility] in [place] in 2012.  Since then she's been followed by Dr. Tim Reza.  Her baseline serum creatinine runs around 2 mg/dL and her immunosuppressive medications include Prograf Myfortic and prednisone.  She presented to the nursing home at [facility] with rapid heartbeat.  She was found to be in atrial fibrillation with rapid ventricular response and was transferred to [facility] for further treatment and management.  By the time she arrived here she converted back to normal sinus rhythm and has been in normal sinus rhythm then.  She was evaluated  Dr. Carpenter.  He switched her from Procardia to Cardizem.  She reports no shortness of breath chest pain or urinary symptoms.    Past Medical History:   Diagnosis Date   • Acute bronchitis 12/21/2015   • Allergic    • Asthma    • Chronic kidney disease 04/25/2016    unspecified   • Coronary atherosclerosis 06/22/2016    s/p stent placement 2 years ago      • Diabetes mellitus type 2, insulin dependent 06/22/2016   • Dyslipidemia 06/22/2016   • Edema of left upper eyelid 06/30/2016   • Edema of right upper eyelid 06/30/2016   • Essential hypertension 06/30/2016   • History of echocardiogram 07/07/2016    Normal LV function with Ef of 65%.Normal RV size and function.Grade 1a diastolic dysfunction.Mild LVH, and severe left atrial dilation.Moderate tricuspid regurg.   • Hyperlipidemia 06/22/2016   • Low back pain 02/29/2016   • Sinusitis    • Upper respiratory infection 12/21/2015     Past Surgical  History:   Procedure Laterality Date   • COLONOSCOPY  02/12/2014    Solid stool through out the colon.I biopsies the colonic mucosa given her diarrhea.Exam markedly limited by poor prep.[facility].   • INJECTION OF MEDICATION  12/21/2015    Kenalog (1)     • INJECTION OF MEDICATION  06/30/2016    PNEUMOC VAC/ADMIN/RCVD 4040F (1)      • INJECTION OF MEDICATION  12/21/2015    Rocephin (1)     • TRANSPLANTATION RENAL       Family History   Problem Relation Age of Onset   • Lung cancer Mother    • Heart disease Father    • Lupus Other    • Diabetes Other        Social History   Substance Use Topics   • Smoking status: Never Smoker   • Smokeless tobacco: Never Used   • Alcohol use No     Prescriptions Prior to Admission   Medication Sig Dispense Refill Last Dose   • albuterol (PROVENTIL HFA;VENTOLIN HFA) 108 (90 BASE) MCG/ACT inhaler Inhale 2 puffs every 4 (four) hours as needed for wheezing. 18 g 11 3/20/2017 at Unknown time   • amitriptyline (ELAVIL) 10 MG tablet Take 10 mg by mouth Every Night.  0 3/20/2017 at Unknown time   • aspirin 81 MG tablet Take 1 tablet by mouth Daily. 30 tablet 11 3/21/2017 at Unknown time   • Ca Carb-FA-D-B6-B12-Boron-Mg 1342-1 MG wafer Take 1 tablet by mouth daily.   3/20/2017 at Unknown time   • Calcium Carb-Cholecalciferol (CALCIUM 1000 + D PO) Take 1 tablet by mouth Daily.   3/20/2017 at Unknown time   • carvedilol (COREG) 3.125 MG tablet Take 1 tablet by mouth 2 (Two) Times a Day. 60 tablet 3 3/20/2017 at Unknown time   • cetirizine (ZyrTEC) 10 MG tablet Take 10 mg by mouth daily.   3/20/2017 at Unknown time   • Cholecalciferol (VITAMIN D3) 5000 UNITS capsule capsule Take 5,000 Units by mouth Daily.   3/20/2017 at Unknown time   • dabigatran etexilate (PRADAXA) 75 MG capsule Take 1 capsule by mouth 2 (Two) Times a Day. 60 capsule 6 3/20/2017 at Unknown time   • ezetimibe (ZETIA) 10 MG tablet Take 1 tablet by mouth daily. 31 tablet 11 3/20/2017 at Unknown time   • fluticasone (FLONASE) 50  "MCG/ACT nasal spray 2 sprays into each nostril Daily.   3/20/2017 at Unknown time   • furosemide (LASIX) 40 MG tablet Take 1 tablet by mouth 2 (Two) Times a Day. Take one tab in a.m. and one-half tab in p.m 30 tablet 11 3/20/2017 at Unknown time   • hydrALAZINE (APRESOLINE) 100 MG tablet Take 1 tablet by mouth 3 (Three) Times a Day. 90 tablet 11 3/20/2017 at Unknown time   • insulin aspart (NovoLOG) 100 UNIT/ML patient supplied pump Inject 1.2 Units under the skin Continuous. Pt can also dose with sliding scale   3/21/2017 at Unknown time   • insulin glargine (LANTUS) 100 UNIT/ML injection Inject 8 Units under the skin Every Night.   3/20/2017 at Unknown time   • mycophenolate (MYFORTIC) 360 MG tablet delayed-release EC tablet Take 1 tablet by mouth 2 (Two) Times a Day. 120 tablet 6 3/20/2017 at Unknown time   • NIFEdipine XL (PROCARDIA XL) 60 MG 24 hr tablet Take 60 mg by mouth Daily.   3/20/2017 at Unknown time   • ONETOUCH VERIO test strip Test 3 times daily as needed  Dx E11.9  Insulin dependant 100 each 6 3/20/2017 at Unknown time   • pantoprazole (PROTONIX) 40 MG EC tablet Take 40 mg by mouth Daily.   3/20/2017 at Unknown time   • predniSONE (DELTASONE) 5 MG tablet Take 1 tablet by mouth daily. 90 tablet 11 3/20/2017 at Unknown time   • rosuvastatin (CRESTOR) 20 MG tablet Take 1 tablet by mouth Daily. 30 tablet 11 3/20/2017 at Unknown time   • tacrolimus (PROGRAF) 1 MG capsule Take 1 mg by mouth 2 (Two) Times a Day.   3/20/2017 at Unknown time     Allergies:  Clonidine derivatives; Latex; and Naproxen    Review of Systems  Pertinent items are noted in HPI.    Objective     Vital Signs  Temp:  [97 °F (36.1 °C)-99.3 °F (37.4 °C)] 99.3 °F (37.4 °C)  Heart Rate:  [68-91] 82  Resp:  [16-18] 18  BP: (141-165)/(63-70) 141/63    Flowsheet Rows         First Filed Value    Admission Height  60\" (152.4 cm) Documented at 03/21/2017 2030    Admission Weight  172 lb 12.8 oz (78.4 kg) Documented at 03/21/2017 2030         "   I/O this shift:  In: 480 [P.O.:480]  Out: 700 [Urine:700]  I/O last 3 completed shifts:  In: 600 [P.O.:600]  Out: 600 [Urine:600]    Intake/Output Summary (Last 24 hours) at 03/22/17 1644  Last data filed at 03/22/17 1100   Gross per 24 hour   Intake             1080 ml   Output             1300 ml   Net             -220 ml       Physical Exam:     General Appearance:    Alert, cooperative, in no acute distress   Head:    Normocephalic, without obvious abnormality, atraumatic   Eyes:            Lids and lashes normal, conjunctivae and sclerae normal, no   icterus, no pallor, corneas clear, PERRLA   Ears:    Ears appear intact with no abnormalities noted   Throat:   No oral lesions, no thrush, oral mucosa moist   Neck:   No adenopathy, supple, trachea midline, no thyromegaly, no     carotid bruit, no JVD       Lungs:     Clear to auscultation,respirations regular, even and                   unlabored    Heart:    Regular rhythm and normal rate, normal S1 and S2, no            murmur, no gallop, no rub, no click   Breast Exam:    Deferred   Abdomen:     soft nontender nondistended.  Transplanted kidney is        palpable in the right iliac fossa.  There is no tenderness.  Bowel sounds are present.  No other palpable masses    Genitalia:    Deferred   Extremities:   Moves all extremities well, no edema, no cyanosis, no              redness   Pulses:   Pulses palpable and equal bilaterally   Skin:   No bleeding, bruising or rash               Results Review:    Lab Results (last 24 hours)     Procedure Component Value Units Date/Time    POC Glucose Fingerstick [[id number]]  (Abnormal) Collected:  03/21/17 2039    Specimen:  Blood Updated:  03/21/17 2102     Glucose 212 (H) mg/dL       Meter: [id number]: [id number] VASU CASTILLO       CBC & Differential [[id number]] Collected:  03/21/17 2154    Specimen:  Blood Updated:  03/21/17 2203    Narrative:       The following orders were created for panel order CBC &  Differential.  Procedure                               Abnormality         Status                     ---------                               -----------         ------                     CBC Auto Differential[[id number]]         Abnormal            Final result                 Please view results for these tests on the individual orders.    CBC Auto Differential [[id number]]  (Abnormal) Collected:  03/21/17 2154    Specimen:  Blood Updated:  03/21/17 2203     WBC 3.58 10*3/mm3      RBC 3.64 (L) 10*6/mm3      Hemoglobin 9.5 (L) g/dL      Hematocrit 29.7 (L) %      MCV 81.6 fL      MCH 26.1 (L) pg      MCHC 32.0 g/dL      RDW 15.2 (H) %      RDW-SD 44.8 fl      MPV 10.4 fL      Platelets 176 10*3/mm3      Neutrophil % 65.3 %      Lymphocyte % 17.9 %      Monocyte % 15.6 (H) %      Eosinophil % 0.6 %      Basophil % 0.3 %      Immature Grans % 0.3 %      Neutrophils, Absolute 2.34 10*3/mm3      Lymphocytes, Absolute 0.64 10*3/mm3      Monocytes, Absolute 0.56 10*3/mm3      Eosinophils, Absolute 0.02 10*3/mm3      Basophils, Absolute 0.01 10*3/mm3      Immature Grans, Absolute 0.01 10*3/mm3     Hemoglobin A1c [[id number]]  (Abnormal) Collected:  03/21/17 2154    Specimen:  Blood Updated:  03/21/17 2210     Hemoglobin A1C 7.81 (H) %     Magnesium [[id number]]  (Abnormal) Collected:  03/21/17 2154    Specimen:  Blood Updated:  03/21/17 2214     Magnesium 2.4 (H) mg/dL     Amylase [[id number]]  (Normal) Collected:  03/21/17 2154    Specimen:  Blood Updated:  03/21/17 2214     Amylase 87 U/L     CK [[id number]]  (Normal) Collected:  03/21/17 2154    Specimen:  Blood Updated:  03/21/17 2214     Creatine Kinase 73 U/L     Lipase [[id number]]  (Normal) Collected:  03/21/17 2154    Specimen:  Blood Updated:  03/21/17 2214     Lipase 215 U/L     Lipid Panel [[id number]]  (Normal) Collected:  03/21/17 2154    Specimen:  Blood Updated:  03/21/17 2224     Total Cholesterol 185 mg/dL      Triglycerides 130 mg/dL      HDL Cholesterol 62 mg/dL       LDL Cholesterol  76 mg/dL      LDL/HDL Ratio 1.56    BNP [[id number]]  (Abnormal) Collected:  03/21/17 2154    Specimen:  Blood Updated:  03/21/17 2228     proBNP 3210.0 (H) pg/mL     Comprehensive Metabolic Panel [[id number]]  (Abnormal) Collected:  03/21/17 2154    Specimen:  Blood Updated:  03/21/17 2239     Glucose 168 (H) mg/dL      BUN 47 (H) mg/dL      Creatinine 2.03 (H) mg/dL      Sodium 134 (L) mmol/L      Potassium 3.3 (L) mmol/L      Chloride 96 mmol/L      CO2 26.0 mmol/L      Calcium 9.4 mg/dL      Total Protein 6.5 g/dL      Albumin 4.20 g/dL      ALT (SGPT) 25 U/L      AST (SGOT) 34 U/L      Alkaline Phosphatase 86 U/L      Total Bilirubin 0.5 mg/dL      eGFR  African Amer 29 (L) mL/min/1.73      Globulin 2.3 gm/dL      A/G Ratio 1.8 g/dL      BUN/Creatinine Ratio 23.2     Anion Gap 12.0 mmol/L     Narrative:       The MDRD GFR formula is only valid for adults with stable renal function between ages 18 and 70.    Troponin [[id number]]  (Abnormal) Collected:  03/21/17 2154    Specimen:  Blood Updated:  03/21/17 2307     Troponin I 0.341 (C) ng/mL     TSH [[id number]]  (Normal) Collected:  03/21/17 2154    Specimen:  Blood Updated:  03/21/17 2307     TSH 1.540 mIU/mL     CBC & Differential [[id number]] Collected:  03/22/17 0604    Specimen:  Blood Updated:  03/22/17 0618    Narrative:       The following orders were created for panel order CBC & Differential.  Procedure                               Abnormality         Status                     ---------                               -----------         ------                     CBC Auto Differential[[id number]]         Abnormal            Final result                 Please view results for these tests on the individual orders.    CBC Auto Differential [[id number]]  (Abnormal) Collected:  03/22/17 0604    Specimen:  Blood Updated:  03/22/17 0618     WBC 3.28 10*3/mm3      RBC 3.53 (L) 10*6/mm3      Hemoglobin 9.2 (L) g/dL      Hematocrit 29.2 (L) %      MCV 82.7  fL      MCH 26.1 (L) pg      MCHC 31.5 g/dL      RDW 15.5 (H) %      RDW-SD 46.7 (H) fl      MPV 10.8 fL      Platelets 177 10*3/mm3      Neutrophil % 60.4 %      Lymphocyte % 21.6 %      Monocyte % 16.8 (H) %      Eosinophil % 0.9 %      Basophil % 0.3 %      Immature Grans % 0.0 %      Neutrophils, Absolute 1.98 (L) 10*3/mm3      Lymphocytes, Absolute 0.71 10*3/mm3      Monocytes, Absolute 0.55 10*3/mm3      Eosinophils, Absolute 0.03 10*3/mm3      Basophils, Absolute 0.01 10*3/mm3      Immature Grans, Absolute 0.00 10*3/mm3     Comprehensive Metabolic Panel [[id number]]  (Abnormal) Collected:  03/22/17 0604    Specimen:  Blood Updated:  03/22/17 0628     Glucose 190 (H) mg/dL      BUN 45 (H) mg/dL      Creatinine 2.10 (H) mg/dL      Sodium 140 mmol/L      Potassium 3.6 mmol/L      Chloride 101 mmol/L      CO2 27.0 mmol/L      Calcium 9.3 mg/dL      Total Protein 6.5 g/dL      Albumin 4.10 g/dL      ALT (SGPT) 23 U/L      AST (SGOT) 28 U/L      Alkaline Phosphatase 77 U/L      Total Bilirubin 0.5 mg/dL      eGFR  African Amer 28 (L) mL/min/1.73      Globulin 2.4 gm/dL      A/G Ratio 1.7 g/dL      BUN/Creatinine Ratio 21.4     Anion Gap 12.0 mmol/L     Narrative:       The MDRD GFR formula is only valid for adults with stable renal function between ages 18 and 70.    Magnesium [[id number]]  (Abnormal) Collected:  03/22/17 0604    Specimen:  Blood Updated:  03/22/17 0628     Magnesium 2.4 (H) mg/dL     Lipid Panel [[id number]]  (Abnormal) Collected:  03/22/17 0604    Specimen:  Blood Updated:  03/22/17 0638     Total Cholesterol 183 mg/dL      Triglycerides 130 mg/dL      HDL Cholesterol 59 (L) mg/dL      LDL Cholesterol  74 mg/dL      LDL/HDL Ratio 1.66    Troponin [[id number]]  (Abnormal) Collected:  03/22/17 0604    Specimen:  Blood Updated:  03/22/17 0641     Troponin I 0.232 (C) ng/mL     BNP [[id number]]  (Abnormal) Collected:  03/22/17 0604    Specimen:  Blood Updated:  03/22/17 0641     proBNP 2700.0 (H) pg/mL     POC  Glucose Fingerstick [[id number]]  (Abnormal) Collected:  03/22/17 0541    Specimen:  Blood Updated:  03/22/17 0658     Glucose 219 (H) mg/dL       Sliding Scale AdminRN NotifiedMeter: [id number]: [id number] SHERRY       SCANNED - LABS [[id number]] Resulted:  03/21/17      Updated:  03/22/17 1010    POC Glucose Fingerstick [[id number]]  (Abnormal) Collected:  03/22/17 1126    Specimen:  Blood Updated:  03/22/17 1142     Glucose 180 (H) mg/dL       Meter: [id number]: [id number] LC MCCURDY       Troponin [[id number]]  (Abnormal) Collected:  03/22/17 1134    Specimen:  Blood Updated:  03/22/17 1300     Troponin I 0.154 (C) ng/mL           BUN BUN   Date Value Ref Range Status   03/22/2017 45 (H) 7 - 21 mg/dL Final   03/21/2017 47 (H) 7 - 21 mg/dL Final      Creatinine Creatinine   Date Value Ref Range Status   03/22/2017 2.10 (H) 0.50 - 1.00 mg/dL Final   03/21/2017 2.03 (H) 0.50 - 1.00 mg/dL Final        HGB Hemoglobin   Date Value Ref Range Status   03/22/2017 9.2 (L) 12.0 - 15.5 g/dL Final   03/21/2017 9.5 (L) 12.0 - 15.5 g/dL Final              amitriptyline 10 mg Oral Nightly   aspirin 81 mg Oral Daily   atorvastatin 40 mg Oral Daily   carvedilol 3.125 mg Oral BID   dabigatran etexilate 75 mg Oral Q12H   diltiaZEM  mg Oral Daily   docusate sodium 100 mg Oral BID   furosemide 40 mg Oral BID   hydrALAZINE 100 mg Oral Q8H   insulin detemir 8 Units Subcutaneous Nightly   mycophenolate 360 mg Oral BID   pantoprazole 40 mg Oral Daily   pantoprazole 40 mg Oral QAM AC   predniSONE 5 mg Oral Daily   tacrolimus 4 mg Oral BID          Assessment/Plan   1. End-stage renal disease secondary to hypertensive nephrosclerosis status post cadaveric kidney transplant.  2. Chronic kidney disease stage III which seems to represent baseline  3.  Paroxysmal atrial fibrillation  4.  Type 2 diabetes mellitus  5.  Multiple other medical problems as listed before  Plan  1.  Renal function is at baseline so we will  continue with the same immunosuppressive medications.  2.  From a renal standpoint there is no need for any further workup.  3.  Patient wants to follow-up with me in [place] office and we will arrange for it upon discharge    Thank you for involving me in the care of this pleasant lady  Active Problems:    Atrial fibrillation        I discussed the patients findings and my recommendations with patient    Dimas Manzanares MD  03/22/17  4:44 PM

## 2017-03-22 NOTE — PROGRESS NOTES
1.  A. fib with RVR- history of atrial fibrillation in the past, patient was initially treated with IV Cardizem.  She is now in normal sinus rhythm.  Cardizem drip is held because the patient's heart rate was in the 60s.  We'll continue the patient on pradaxa, cardiology consult be placed.  Echo done on 2/21/17  · Transesophageal echocardiogram with agitated saline contrast.  · Left ventricular function is normal.  · Estimated EF appears to be in the range of 61 - 65%.  · Left ventricular wall thickness is consistent with severe septal asymmetric hypertrophy.  · The right ventricle is normal in size and shape with normal systolic function  · There is no evidence for VSD. The unusual color flow Doppler on the echocardigram correlates with LMCA physiologic flow.  · A prominent Eustachian valve is present with a filamentous structure off the eustachian valve.  · Mckeon grade II aortic arch  · No significant valvular abnormality.     2.  Coronary artery disease- patient denies any chest pain now. continue the patient on aspirin Coreg Lipitor and voiding ACEI/ ARB due to chronic in disease.     3.  Chronic kidney disease status post renal transplant- creatinine appears to be at its baseline. Continue the patient on transplant medication. Nephrology will be consulted.     4.  Diabetes mellitus- A1c 7.8, continue the patient on ADA diet, Levemir and insulin pump .     5.  Hypertension- continue the patient on home medications that Coreg, hydralazine and nifedipine.     6.  DVT prophylaxis with pradaxa,     7.  Anemia secondary to chronic kidney disease - follow patient's H&H.     I overall feels okay denies any chest pain or shortness of breath will continue to monitor

## 2017-03-23 VITALS
OXYGEN SATURATION: 96 % | HEART RATE: 68 BPM | DIASTOLIC BLOOD PRESSURE: 61 MMHG | WEIGHT: 166.4 LBS | TEMPERATURE: 98.5 F | RESPIRATION RATE: 18 BRPM | BODY MASS INDEX: 32.67 KG/M2 | SYSTOLIC BLOOD PRESSURE: 130 MMHG | HEIGHT: 60 IN

## 2017-03-23 LAB
ALBUMIN SERPL-MCNC: 4.1 G/DL (ref 3.4–4.8)
ALBUMIN/GLOB SERPL: 1.9 G/DL (ref 1.1–1.8)
ALP SERPL-CCNC: 84 U/L (ref 38–126)
ALT SERPL W P-5'-P-CCNC: 28 U/L (ref 9–52)
ANION GAP SERPL CALCULATED.3IONS-SCNC: 13 MMOL/L (ref 5–15)
AST SERPL-CCNC: 27 U/L (ref 14–36)
BASOPHILS # BLD AUTO: 0.01 10*3/MM3 (ref 0–0.2)
BASOPHILS NFR BLD AUTO: 0.3 % (ref 0–2)
BILIRUB SERPL-MCNC: 0.4 MG/DL (ref 0.2–1.3)
BUN BLD-MCNC: 56 MG/DL (ref 7–21)
BUN/CREAT SERPL: 24.9 (ref 7–25)
CALCIUM SPEC-SCNC: 9.1 MG/DL (ref 8.4–10.2)
CHLORIDE SERPL-SCNC: 97 MMOL/L (ref 95–110)
CO2 SERPL-SCNC: 26 MMOL/L (ref 22–31)
CREAT BLD-MCNC: 2.25 MG/DL (ref 0.5–1)
DEPRECATED RDW RBC AUTO: 45 FL (ref 36.4–46.3)
EOSINOPHIL # BLD AUTO: 0.05 10*3/MM3 (ref 0–0.7)
EOSINOPHIL NFR BLD AUTO: 1.3 % (ref 0–7)
ERYTHROCYTE [DISTWIDTH] IN BLOOD BY AUTOMATED COUNT: 15.2 % (ref 11.5–14.5)
GFR SERPL CREATININE-BSD FRML MDRD: 26 ML/MIN/1.73 (ref 39–90)
GLOBULIN UR ELPH-MCNC: 2.2 GM/DL (ref 2.3–3.5)
GLUCOSE BLD-MCNC: 252 MG/DL (ref 60–100)
GLUCOSE BLDC GLUCOMTR-MCNC: 163 MG/DL (ref 70–130)
GLUCOSE BLDC GLUCOMTR-MCNC: 215 MG/DL (ref 70–130)
GLUCOSE BLDC GLUCOMTR-MCNC: 239 MG/DL (ref 70–130)
GLUCOSE BLDC GLUCOMTR-MCNC: 268 MG/DL (ref 70–130)
HCT VFR BLD AUTO: 27.6 % (ref 35–45)
HGB BLD-MCNC: 8.9 G/DL (ref 12–15.5)
IMM GRANULOCYTES # BLD: 0.01 10*3/MM3 (ref 0–0.02)
IMM GRANULOCYTES NFR BLD: 0.3 % (ref 0–0.5)
LYMPHOCYTES # BLD AUTO: 0.73 10*3/MM3 (ref 0.6–4.2)
LYMPHOCYTES NFR BLD AUTO: 18.4 % (ref 10–50)
MCH RBC QN AUTO: 26.3 PG (ref 26.5–34)
MCHC RBC AUTO-ENTMCNC: 32.2 G/DL (ref 31.4–36)
MCV RBC AUTO: 81.4 FL (ref 80–98)
MONOCYTES # BLD AUTO: 0.33 10*3/MM3 (ref 0–0.9)
MONOCYTES NFR BLD AUTO: 8.3 % (ref 0–12)
NEUTROPHILS # BLD AUTO: 2.83 10*3/MM3 (ref 2–8.6)
NEUTROPHILS NFR BLD AUTO: 71.4 % (ref 37–80)
PLATELET # BLD AUTO: 160 10*3/MM3 (ref 150–450)
PMV BLD AUTO: 10.1 FL (ref 8–12)
POTASSIUM BLD-SCNC: 4 MMOL/L (ref 3.5–5.1)
PROT SERPL-MCNC: 6.3 G/DL (ref 6.3–8.6)
RBC # BLD AUTO: 3.39 10*6/MM3 (ref 3.77–5.16)
SODIUM BLD-SCNC: 136 MMOL/L (ref 137–145)
TROPONIN I SERPL-MCNC: 0.08 NG/ML
TROPONIN I SERPL-MCNC: 0.09 NG/ML
TROPONIN I SERPL-MCNC: 0.1 NG/ML
WBC NRBC COR # BLD: 3.96 10*3/MM3 (ref 3.2–9.8)
WHOLE BLOOD HOLD SPECIMEN: NORMAL

## 2017-03-23 PROCEDURE — 82962 GLUCOSE BLOOD TEST: CPT

## 2017-03-23 PROCEDURE — 80053 COMPREHEN METABOLIC PANEL: CPT | Performed by: INTERNAL MEDICINE

## 2017-03-23 PROCEDURE — 84484 ASSAY OF TROPONIN QUANT: CPT | Performed by: FAMILY MEDICINE

## 2017-03-23 PROCEDURE — 85025 COMPLETE CBC W/AUTO DIFF WBC: CPT | Performed by: INTERNAL MEDICINE

## 2017-03-23 PROCEDURE — 63710000001 PREDNISONE PER 5 MG: Performed by: FAMILY MEDICINE

## 2017-03-23 PROCEDURE — 63710000001 TACROLIMUS PER 1 MG: Performed by: FAMILY MEDICINE

## 2017-03-23 PROCEDURE — 99232 SBSQ HOSP IP/OBS MODERATE 35: CPT | Performed by: INTERNAL MEDICINE

## 2017-03-23 RX ORDER — TACROLIMUS 1 MG/1
1 CAPSULE ORAL EVERY 12 HOURS
Status: DISCONTINUED | OUTPATIENT
Start: 2017-03-23 | End: 2017-03-23 | Stop reason: SDUPTHER

## 2017-03-23 RX ORDER — DILTIAZEM HYDROCHLORIDE 90 MG/1
180 CAPSULE, EXTENDED RELEASE ORAL DAILY
Qty: 60 CAPSULE | Refills: 2 | Status: SHIPPED | OUTPATIENT
Start: 2017-03-23 | End: 2017-03-27

## 2017-03-23 RX ORDER — MYCOPHENOLIC ACID 360 MG/1
360 TABLET, DELAYED RELEASE ORAL 2 TIMES DAILY
Status: DISCONTINUED | OUTPATIENT
Start: 2017-03-23 | End: 2017-03-23 | Stop reason: HOSPADM

## 2017-03-23 RX ADMIN — CARVEDILOL 3.12 MG: 3.12 TABLET, FILM COATED ORAL at 10:44

## 2017-03-23 RX ADMIN — ASPIRIN 81 MG: 81 TABLET, COATED ORAL at 10:44

## 2017-03-23 RX ADMIN — PREDNISONE 5 MG: 5 TABLET ORAL at 10:45

## 2017-03-23 RX ADMIN — ATORVASTATIN CALCIUM 40 MG: 40 TABLET, FILM COATED ORAL at 10:44

## 2017-03-23 RX ADMIN — TACROLIMUS 4 MG: 1 CAPSULE ORAL at 10:43

## 2017-03-23 RX ADMIN — DILTIAZEM HYDROCHLORIDE 180 MG: 90 CAPSULE, EXTENDED RELEASE ORAL at 10:44

## 2017-03-23 RX ADMIN — FUROSEMIDE 40 MG: 40 TABLET ORAL at 10:44

## 2017-03-23 RX ADMIN — DABIGATRAN ETEXILATE MESYLATE 75 MG: 75 CAPSULE ORAL at 10:44

## 2017-03-23 RX ADMIN — PANTOPRAZOLE SODIUM 40 MG: 40 TABLET, DELAYED RELEASE ORAL at 10:44

## 2017-03-23 RX ADMIN — HYDRALAZINE HYDROCHLORIDE 100 MG: 50 TABLET ORAL at 16:48

## 2017-03-23 RX ADMIN — DOCUSATE SODIUM 100 MG: 100 CAPSULE, LIQUID FILLED ORAL at 10:47

## 2017-03-23 NOTE — PROGRESS NOTES
"   LOS: 2 days    Patient Care Team:  Ayaan Swenson MD as PCP - General    Subjective:  Patient seems to be doing fairly well.  She does not have any chest pain shortness of breath or other complaints.  No urinary symptoms.  Tolerating immunosuppressive medications well.    Review of Systems:   ROS    Objective     Vital Signs  Temp:  [97 °F (36.1 °C)-99.3 °F (37.4 °C)] 97.2 °F (36.2 °C)  Heart Rate:  [69-88] 79  Resp:  [18-20] 18  BP: (118-168)/(50-72) 168/72    Flowsheet Rows         First Filed Value    Admission Height  60\" (152.4 cm) Documented at 03/21/2017 2030    Admission Weight  172 lb 12.8 oz (78.4 kg) Documented at 03/21/2017 2030             I/O last 3 completed shifts:  In: 1080 [P.O.:1080]  Out: 1300 [Urine:1300]  No intake or output data in the 24 hours ending 03/23/17 1443    Physical Exam:  Physical Exam   Lungs fair air entry bilaterally.  Heart regular rate and rhythm no gallop.  Abdomen soft and tender distended no tenderness over the transplanted kidney in the right iliac fossa.  Extremities no clubbing cyanosis or edema     Results Review:      Results from last 7 days  Lab Units 03/23/17  0141 03/22/17  0604 03/21/17  2154   SODIUM mmol/L 136* 140 134*   POTASSIUM mmol/L 4.0 3.6 3.3*   CHLORIDE mmol/L 97 101 96   TOTAL CO2 mmol/L 26.0 27.0 26.0   BUN mg/dL 56* 45* 47*   CREATININE mg/dL 2.25* 2.10* 2.03*   CALCIUM mg/dL 9.1 9.3 9.4   BILIRUBIN mg/dL 0.4 0.5 0.5   ALK PHOS U/L 84 77 86   ALT (SGPT) U/L 28 23 25   AST (SGOT) U/L 27 28 34   GLUCOSE mg/dL 252* 190* 168*       Estimated Creatinine Clearance: 19.9 mL/min (by C-G formula based on Cr of 2.25).      Results from last 7 days  Lab Units 03/22/17  0604 03/21/17  2154   MAGNESIUM mg/dL 2.4* 2.4*               Results from last 7 days  Lab Units 03/23/17  0141 03/22/17  0604 03/21/17  2154   WBC 10*3/mm3 3.96 3.28 3.58   HEMOGLOBIN g/dL 8.9* 9.2* 9.5*   PLATELETS 10*3/mm3 160 177 176               Imaging Results (last 24 hours)     ** " No results found for the last 24 hours. **            Medication Review:   Current Facility-Administered Medications   Medication Dose Route Frequency Provider Last Rate Last Dose   • acetaminophen (TYLENOL) tablet 650 mg  650 mg Oral Q4H PRN Akbar Bashir MD       • amitriptyline (ELAVIL) tablet 10 mg  10 mg Oral Nightly Akbar Bashir MD   10 mg at 03/22/17 2131   • aspirin EC tablet 81 mg  81 mg Oral Daily Akbar Bashir MD   81 mg at 03/23/17 1044   • atorvastatin (LIPITOR) tablet 40 mg  40 mg Oral Daily Akbar Bashir MD   40 mg at 03/23/17 1044   • carvedilol (COREG) tablet 3.125 mg  3.125 mg Oral BID Akbar Bashir MD   3.125 mg at 03/23/17 1044   • dabigatran etexilate (PRADAXA) capsule 75 mg  75 mg Oral Q12H Akbar Bashir MD   75 mg at 03/23/17 1044   • dextrose (D50W) solution 25 g  25 g Intravenous Q15 Min PRN Akbar Bashir MD       • dextrose (GLUTOSE) oral gel 15 g  15 g Oral Q15 Min PRN Akbar Bashir MD       • diltiaZEM SR (CARDIZEM SR) 12 hr capsule 180 mg  180 mg Oral Daily Janette Carpenter MD   180 mg at 03/23/17 1044   • docusate sodium (COLACE) capsule 100 mg  100 mg Oral BID Akbar Bashir MD   100 mg at 03/23/17 1047   • furosemide (LASIX) tablet 40 mg  40 mg Oral BID Akbar Bashir MD   40 mg at 03/23/17 1044   • glucagon (human recombinant) (GLUCAGEN DIAGNOSTIC) injection 1 mg  1 mg Subcutaneous Q15 Min PRN Akbar Bashir MD       • hydrALAZINE (APRESOLINE) tablet 100 mg  100 mg Oral Q8H Akbar Bashir MD   100 mg at 03/22/17 2131   • insulin detemir (LEVEMIR) injection 8 Units  8 Units Subcutaneous Nightly Akbar Bashir MD   8 Units at 03/22/17 9027   • mycophenolate (MYFORTIC) EC tablet 360 mg  360 mg Oral BID Akbar Bashir MD       • ondansetron (ZOFRAN) injection 4 mg  4 mg Intravenous Q6H PRN Akbar Bashir MD       • pantoprazole (PROTONIX) EC tablet 40 mg  40 mg Oral QAM AC Jennifer Buchanan MD   40 mg at  03/23/17 1044   • predniSONE (DELTASONE) tablet 5 mg  5 mg Oral Daily Akbar Bashir MD   5 mg at 03/23/17 1045   • sodium chloride 0.9 % flush 1-10 mL  1-10 mL Intravenous PRN Akbar Bashir MD       • tacrolimus (PROGRAF) capsule 4 mg  4 mg Oral BID Akbar Bashir MD   4 mg at 03/23/17 1043       Assessment/Plan   1.  Chronic kidney disease stage III  in a patient with cadaveric kidney transplant, renal function is stable and at baseline  2.  Paroxysmal atrial fibrillation, now back in normal sinus rhythm with  3.  Type 2 diabetes mellitus  4.  Multiple other medical problems as listed before  Plan   Patient is being discharged home today.  She will continue on same immunosuppressive medications.  She'll come and see me in [place] office in about 3 weeks from discharge.  We'll obtain a CBC CMP and Prograf level prior to office visit  Active Problems:    Atrial fibrillation              Dimas Manzanares MD  03/23/17  2:43 PM

## 2017-03-23 NOTE — PROGRESS NOTES
"Cardiovascular Daily Progress Note--Sign off  Feliciano Almazan M.D., Ph.D., [facility]      Subjective     Interval History:   Remains in sinus. No further episodes.     Review of Systems - History obtained from chart review and the patient  General ROS: negative  Respiratory ROS: no cough, shortness of breath, or wheezing  Cardiovascular ROS: no chest pain or dyspnea on exertion  Full ROS taken and was otherwise negative.    Objective     Vital Sign Min/Max for last 24 hours  Temp  Min: 97 °F (36.1 °C)  Max: 99.3 °F (37.4 °C)   BP  Min: 118/50  Max: 153/70   Pulse  Min: 69  Max: 91   Resp  Min: 18  Max: 20   SpO2  Min: 95 %  Max: 98 %   No Data Recorded   No Data Recorded     Flowsheet Rows         First Filed Value    Admission Height  60\" (152.4 cm) Documented at 03/21/2017 2030    Admission Weight  172 lb 12.8 oz (78.4 kg) Documented at 03/21/2017 2030        Last 3 weights    03/21/17 2030 03/22/17  0422   Weight: 172 lb 12.8 oz (78.4 kg) 166 lb 6.4 oz (75.5 kg)     Body mass index is 32.5 kg/(m^2).    Physical Exam:   GEN: alert, appears stated age and cooperative  Body Habitus: obese  HEENT: Head: Normocephalic, no lesions, without obvious abnormality.  Neck / Thyroid: Supple, no masses, nodes, nodules or enlargement. No arcus senilis, xanthelasma or xanthomas.   JVP: 6 cm of water at 45 degrees HJR: absent      Carotid:  Upstroke: easily palpated bilaterally Volume: Normal.    Carotid Bruit:  None  Subclavian Bruit: Not present.    Chest:  Normal Excursion: Good    I:E: Good  Pulmonary:clear to auscultation, no wheezes, rales or rhonchi, symmetric air entry      Precordium:  No palpable heaves or thrusts. P2 is not palpable.   [place]:  normal size and placement Palpable S4: Not present.   Heart rate: normal  Heart Rhythm: regular     Heart Sounds: S1: normal intensity  S2: normal intensity, increased P2, increased A2, physiologically split  S3: absent   S4: absent  Opening Snap: absent  A2-OS:  N/A  Pericardial " rub: absent    Ejection click: None      Murmurs: Systolic: II/VI NORBERT at base  Diastolic: none  Abdomen: Soft, non-tender, normal bowel sounds; no bruits, organomegaly or masses.  Extremity: no edema, cyanosis  Trophic changes: None   Pallor on elevation: Absent.    Rubor on dependency: None  Pulses: Right radial artery has 2+ (normal) pulse and Left radial artery has 2+ (normal) pulse         DATA REVIEWED:    Lab Results (last 24 hours)     Procedure Component Value Units Date/Time    SCANNED - LABS [[id number]] Resulted:  03/21/17      Updated:  03/22/17 1010    POC Glucose Fingerstick [[id number]]  (Abnormal) Collected:  03/22/17 1126    Specimen:  Blood Updated:  03/22/17 1142     Glucose 180 (H) mg/dL       Meter: [id number]: [id number] LC MCCURDY       Troponin [[id number]]  (Abnormal) Collected:  03/22/17 1134    Specimen:  Blood Updated:  03/22/17 1300     Troponin I 0.154 (C) ng/mL     POC Glucose Fingerstick [[id number]]  (Abnormal) Collected:  03/22/17 1539    Specimen:  Blood Updated:  03/22/17 1803     Glucose 308 (H) mg/dL       RN NotifiedMeter: [id number]: [id number] JOSIANERENETTA BOLAÑOSIE       Troponin [[id number]]  (Abnormal) Collected:  03/22/17 1806    Specimen:  Blood Updated:  03/22/17 1854     Troponin I 0.112 (H) ng/mL     POC Glucose Fingerstick [[id number]]  (Abnormal) Collected:  03/22/17 2031    Specimen:  Blood Updated:  03/23/17 0021     Glucose 239 (H) mg/dL       RN NotifiedMeter: [id number]: [id number] DENIA GALLOWAY       CBC & Differential [[id number]] Collected:  03/23/17 0141    Specimen:  Blood Updated:  03/23/17 0209    Narrative:       The following orders were created for panel order CBC & Differential.  Procedure                               Abnormality         Status                     ---------                               -----------         ------                     CBC Auto Differential[[id number]]         Abnormal            Final result                  Please view results for these tests on the individual orders.    CBC Auto Differential [[id number]]  (Abnormal) Collected:  03/23/17 0141    Specimen:  Blood Updated:  03/23/17 0209     WBC 3.96 10*3/mm3      RBC 3.39 (L) 10*6/mm3      Hemoglobin 8.9 (L) g/dL      Hematocrit 27.6 (L) %      MCV 81.4 fL      MCH 26.3 (L) pg      MCHC 32.2 g/dL      RDW 15.2 (H) %      RDW-SD 45.0 fl      MPV 10.1 fL      Platelets 160 10*3/mm3      Neutrophil % 71.4 %      Lymphocyte % 18.4 %      Monocyte % 8.3 %      Eosinophil % 1.3 %      Basophil % 0.3 %      Immature Grans % 0.3 %      Neutrophils, Absolute 2.83 10*3/mm3      Lymphocytes, Absolute 0.73 10*3/mm3      Monocytes, Absolute 0.33 10*3/mm3      Eosinophils, Absolute 0.05 10*3/mm3      Basophils, Absolute 0.01 10*3/mm3      Immature Grans, Absolute 0.01 10*3/mm3     Comprehensive Metabolic Panel [[id number]]  (Abnormal) Collected:  03/23/17 0141    Specimen:  Blood Updated:  03/23/17 0215     Glucose 252 (H) mg/dL      BUN 56 (H) mg/dL      Creatinine 2.25 (H) mg/dL      Sodium 136 (L) mmol/L      Potassium 4.0 mmol/L      Chloride 97 mmol/L      CO2 26.0 mmol/L      Calcium 9.1 mg/dL      Total Protein 6.3 g/dL      Albumin 4.10 g/dL      ALT (SGPT) 28 U/L      AST (SGOT) 27 U/L      Alkaline Phosphatase 84 U/L      Total Bilirubin 0.4 mg/dL      eGFR  African Amer 26 (L) mL/min/1.73      Globulin 2.2 (L) gm/dL      A/G Ratio 1.9 (H) g/dL      BUN/Creatinine Ratio 24.9     Anion Gap 13.0 mmol/L     Narrative:       The MDRD GFR formula is only valid for adults with stable renal function between ages 18 and 70.    Troponin [[id number]]  (Abnormal) Collected:  03/23/17 0139    Specimen:  Blood Updated:  03/23/17 0256     Troponin I 0.095 (H) ng/mL     Extra Tubes [[id number]] Collected:  03/23/17 0526    Specimen:  Blood from Blood, Venous Line Updated:  03/23/17 0621    Narrative:       The following orders were created for panel order Extra Tubes.  Procedure                                Abnormality         Status                     ---------                               -----------         ------                     Daija Garcia[[id number]]                                      In process                   Please view results for these tests on the individual orders.    Daija Top [[id number]] Collected:  03/23/17 0526    Specimen:  Blood Updated:  03/23/17 0621    Troponin [[id number]]  (Abnormal) Collected:  03/23/17 0526    Specimen:  Blood Updated:  03/23/17 0700     Troponin I 0.092 (H) ng/mL         Imaging Results (last 24 hours)     Procedure Component Value Units Date/Time    SCANNED - IMAGING [[id number]] Resulted:  03/21/17      Updated:  03/22/17 1014          EKG: Sinus with LVH    · A 1-day rest/stress protocol myocardial perfusion imaging study was performed  · A pharmacological stress test was performed using regadenoson  · Horizontal ST segment depression of 2 mm was noted during stress in the inferior leads (II, III and aVF), and returning to baseline after 5-9 minutes of recovery.  · Findings consistent with an abnormal ECG stress test.  · Myocardial perfusion imaging indicates a normal myocardial perfusion study with no evidence of ischemia.  · Left ventricular ejection fraction is normal (Calculated EF = 64%).  · Impressions are consistent with a low risk study.    · Transesophageal echocardiogram with agitated saline contrast.  · Left ventricular function is normal.  · Estimated EF appears to be in the range of 61 - 65%.  · Left ventricular wall thickness is consistent with severe septal asymmetric hypertrophy.  · The right ventricle is normal in size and shape with normal systolic function  · There is no evidence for VSD. The unusual color flow Doppler on the echocardigram correlates with LMCA physiologic flow.  · A prominent Eustachian valve is present with a filamentous structure off the eustachian valve.  · Mckeon grade II aortic arch  · No significant  valvular abnormality.      Assessment/Plan     1. paroxysmal - 7 days or less Atrial fibrillation. Now in NSR withour recurrence.   [id number]: CHADSVASC: HTN, Age >65 and Female  Anticoagulation:requested  -Pradaxa  Rate control agents:ordered.  - Agent: Coreg,  Diltiazem  -Rhythm control agents:not indicated    2. ASCAD. No anginal symptoms. The patient has been revascularized with PCI.   ASA, Lipitor, Coreg     3. HTN, essential.  HD BP noted to be well controlled today in office. LVH: marked. LVEF:Normal. Diastolic function:Abnormal:.  DASH; medication compliance; Low sodium diet  Continue medications     4.  RV dilation, unclear etiology.  The degree was stable on her most recent TTE. No evidence of VSD.  -I recommended a repeat limited TTE in 6 months. If no interval dilation will stop monitoring.     .5. Cardiac Risk Assessment: Known ASCAD  Recommended ASA/Statin     6. STATIN THERAPY. Patient merits treatment with a statin due to: ASCAD  [place]     7. Tobacco status: Patient is a nonsmoker    Thank you for allowing me to participate in the care of your patient.  If I can be of any further assistance, please do not hesitate contact me.    Will sign off. Please have the patient seen in follow-up in 6-8 weeks.           This document has been electronically signed by Feliciano Almazan MD PhD on March 23, 2017 7:24 AM

## 2017-03-23 NOTE — PROGRESS NOTES
[facility] Medicine Services  INPATIENT PROGRESS NOTE    Length of Stay: 2  Date of Admission: 3/21/2017  Primary Care Physician: Ayaan Swenson MD    Subjective   Subjective    Feels fine.  No chest pain  No SOA.    Review of Systems   Constitutional: Negative for activity change.   Respiratory: Negative for chest tightness and shortness of breath.    Cardiovascular: Negative for chest pain.   Gastrointestinal: Negative for diarrhea.   Genitourinary: Negative for dysuria.        All pertinent negatives and positives are as above. All other systems have been reviewed and are negative unless otherwise stated.     Objective    Temp:  [97 °F (36.1 °C)-99.3 °F (37.4 °C)] 97.2 °F (36.2 °C)  Heart Rate:  [69-88] 79  Resp:  [18-20] 18  BP: (118-168)/(50-72) 168/72  Physical Exam   Constitutional: She appears well-developed and well-nourished. No distress.   HENT:   Head: Normocephalic and atraumatic.   Cardiovascular: Normal rate.    Pulmonary/Chest: Effort normal. No respiratory distress. She has no wheezes.   Abdominal: Soft.   Musculoskeletal: Normal range of motion.   Neurological: She is alert.   Skin: Skin is warm and dry. She is not diaphoretic.   Psychiatric: She has a normal mood and affect. Her behavior is normal. Judgment and thought content normal.   Vitals reviewed.          Results Review:  I have reviewed the labs, radiology results, and diagnostic studies.    Laboratory Data:   Lab Results (last 24 hours)     Procedure Component Value Units Date/Time    Troponin [[id number]]  (Abnormal) Collected:  03/22/17 1134    Specimen:  Blood Updated:  03/22/17 1300     Troponin I 0.154 (C) ng/mL     POC Glucose Fingerstick [[id number]]  (Abnormal) Collected:  03/22/17 1539    Specimen:  Blood Updated:  03/22/17 1803     Glucose 308 (H) mg/dL       RN NotifiedMeter: [id number]: [id number] JOISANERENETTA BOLAÑOSIE       Troponin [[id number]]  (Abnormal) Collected:  03/22/17 1806     Specimen:  Blood Updated:  03/22/17 1854     Troponin I 0.112 (H) ng/mL     POC Glucose Fingerstick [[id number]]  (Abnormal) Collected:  03/22/17 2031    Specimen:  Blood Updated:  03/23/17 0021     Glucose 239 (H) mg/dL       RN NotifiedMeter: [id number]: [id number] DENIA GALLOWAY       CBC & Differential [[id number]] Collected:  03/23/17 0141    Specimen:  Blood Updated:  03/23/17 0209    Narrative:       The following orders were created for panel order CBC & Differential.  Procedure                               Abnormality         Status                     ---------                               -----------         ------                     CBC Auto Differential[[id number]]         Abnormal            Final result                 Please view results for these tests on the individual orders.    CBC Auto Differential [[id number]]  (Abnormal) Collected:  03/23/17 0141    Specimen:  Blood Updated:  03/23/17 0209     WBC 3.96 10*3/mm3      RBC 3.39 (L) 10*6/mm3      Hemoglobin 8.9 (L) g/dL      Hematocrit 27.6 (L) %      MCV 81.4 fL      MCH 26.3 (L) pg      MCHC 32.2 g/dL      RDW 15.2 (H) %      RDW-SD 45.0 fl      MPV 10.1 fL      Platelets 160 10*3/mm3      Neutrophil % 71.4 %      Lymphocyte % 18.4 %      Monocyte % 8.3 %      Eosinophil % 1.3 %      Basophil % 0.3 %      Immature Grans % 0.3 %      Neutrophils, Absolute 2.83 10*3/mm3      Lymphocytes, Absolute 0.73 10*3/mm3      Monocytes, Absolute 0.33 10*3/mm3      Eosinophils, Absolute 0.05 10*3/mm3      Basophils, Absolute 0.01 10*3/mm3      Immature Grans, Absolute 0.01 10*3/mm3     Comprehensive Metabolic Panel [[id number]]  (Abnormal) Collected:  03/23/17 0141    Specimen:  Blood Updated:  03/23/17 0215     Glucose 252 (H) mg/dL      BUN 56 (H) mg/dL      Creatinine 2.25 (H) mg/dL      Sodium 136 (L) mmol/L      Potassium 4.0 mmol/L      Chloride 97 mmol/L      CO2 26.0 mmol/L      Calcium 9.1 mg/dL      Total Protein 6.3 g/dL      Albumin 4.10 g/dL       ALT (SGPT) 28 U/L      AST (SGOT) 27 U/L      Alkaline Phosphatase 84 U/L      Total Bilirubin 0.4 mg/dL      eGFR  African Amer 26 (L) mL/min/1.73      Globulin 2.2 (L) gm/dL      A/G Ratio 1.9 (H) g/dL      BUN/Creatinine Ratio 24.9     Anion Gap 13.0 mmol/L     Narrative:       The MDRD GFR formula is only valid for adults with stable renal function between ages 18 and 70.    Troponin [[id number]]  (Abnormal) Collected:  03/23/17 0139    Specimen:  Blood Updated:  03/23/17 0256     Troponin I 0.095 (H) ng/mL     Troponin [[id number]]  (Abnormal) Collected:  03/23/17 0526    Specimen:  Blood Updated:  03/23/17 0700     Troponin I 0.092 (H) ng/mL     POC Glucose Fingerstick [[id number]]  (Abnormal) Collected:  03/23/17 0651    Specimen:  Blood Updated:  03/23/17 0816     Glucose 163 (H) mg/dL       RN NotifiedMeter: [id number]: [id number] CHESNEL LAVERNE       Extra Tubes [[id number]] Collected:  03/23/17 0526    Specimen:  Blood from Blood, Venous Line Updated:  03/23/17 1001    Narrative:       The following orders were created for panel order Extra Tubes.  Procedure                               Abnormality         Status                     ---------                               -----------         ------                     Lavender Top[[id number]]                                      Final result                 Please view results for these tests on the individual orders.    Lavender Top [[id number]] Collected:  03/23/17 0526    Specimen:  Blood Updated:  03/23/17 1001     Extra Tube hold for add-on      Auto resulted       POC Glucose Fingerstick [[id number]]  (Abnormal) Collected:  03/23/17 1004    Specimen:  Blood Updated:  03/23/17 1038     Glucose 215 (H) mg/dL       RN NotifiedMeter: [id number]: [id number] DARRICK HANIA             Culture Data:   No results found for: BLOODCX  No results found for: URINECX  No results found for: RESPCX  No results found for: WOUNDCX  No results found for:  STOOLCX  No components found for: BODYFLD    Radiology Data:   Imaging Results (last 24 hours)     ** No results found for the last 24 hours. **          I have reviewed the patient current medications.     Assessment/Plan     Hospital Problem List     Atrial fibrillation          Atrial fibrillation - rate controlled now, cardizem  HTN - hydralazine  CKD - stable  DM II - levemir  Anemia of chronic disease - stable          Discharge Planning: I expect patient to be discharged to home today    Austen Cruz MD   03/23/17   12:27 PM

## 2017-03-23 NOTE — DISCHARGE SUMMARY
"    [facility] Medicine Services  DISCHARGE SUMMARY       Date of Admission: 3/21/2017  Date of Discharge:  3/23/2017  Primary Care Physician: Ayaan Swenson MD    Presenting Problem/History of Present Illness:  Atrial fibrillation [I48.91]       Final Discharge Diagnoses:  Hospital Problem List     Atrial fibrillation          Consults:   Consults     Date and Time Order Name Status Description    3/22/2017 1632 Inpatient Consult to Cardiology      3/21/2017 2212 Inpatient Consult to Nephrology Completed     3/21/2017 2146 Inpatient Consult to Cardiology Completed           Procedures Performed:                 Pertinent Test Results:     Chief Complaint on Day of Discharge: none    Hospital Course:  The patient is a 76 y.o. female who presented to [facility] with complaints of pain in her chest along with palpitations. Patient reports that she had been doing fairly well up until this morning. Reports that she was sitting and was about to take her morning medications when she had sudden onset of chest pain which was mostly localized to the middle of the chest, she rated the pain as mild to moderate. And describes the pain as a heartburn. She had Atrial fibrillation with RVR but  It resolved shortly after admission  She was continued on coreg and cardizem and pradaxa.  Patient was stable on discharge    Condition on Discharge:  good    Physical Exam on Discharge:  /72 (BP Location: Left arm, Patient Position: Sitting)  Pulse 79  Temp 97.2 °F (36.2 °C) (Oral)   Resp 18  Ht 60\" (152.4 cm)  Wt 166 lb 6.4 oz (75.5 kg)  SpO2 97%  BMI 32.5 kg/m2  Physical Exam   Constitutional: She appears well-developed and well-nourished. No distress.   HENT:   Head: Normocephalic and atraumatic.   Cardiovascular: Normal rate and regular rhythm.    Pulmonary/Chest: Effort normal and breath sounds normal. No respiratory distress.   Abdominal: Soft.   Neurological: She " is alert.   Skin: Skin is warm and dry. She is not diaphoretic.   Psychiatric: She has a normal mood and affect. Her behavior is normal. Judgment and thought content normal.   Vitals reviewed.        Discharge Disposition:  Home or Self Care    Discharge Medications:   Yuriy Orsanam Reza   Home Medication Instructions GABY:[id number]    Printed on:03/23/17 2334   Medication Information                      albuterol (PROVENTIL HFA;VENTOLIN HFA) 108 (90 BASE) MCG/ACT inhaler  Inhale 2 puffs every 4 (four) hours as needed for wheezing.             amitriptyline (ELAVIL) 10 MG tablet  Take 10 mg by mouth Every Night.             aspirin 81 MG tablet  Take 1 tablet by mouth Daily.             Ca Carb-FA-D-B6-B12-Boron-Mg 1342-1 MG wafer  Take 1 tablet by mouth daily.             Calcium Carb-Cholecalciferol (CALCIUM 1000 + D PO)  Take 1 tablet by mouth Daily.             carvedilol (COREG) 3.125 MG tablet  Take 1 tablet by mouth 2 (Two) Times a Day.             cetirizine (ZyrTEC) 10 MG tablet  Take 10 mg by mouth daily.             Cholecalciferol (VITAMIN D3) 5000 UNITS capsule capsule  Take 5,000 Units by mouth Daily.             dabigatran etexilate (PRADAXA) 75 MG capsule  Take 1 capsule by mouth 2 (Two) Times a Day.             diltiaZEM SR (CARDIZEM SR) 90 MG 12 hr capsule  Take 2 capsules by mouth Daily.             ezetimibe (ZETIA) 10 MG tablet  Take 1 tablet by mouth daily.             fluticasone (FLONASE) 50 MCG/ACT nasal spray  2 sprays into each nostril Daily.             furosemide (LASIX) 40 MG tablet  Take 1 tablet by mouth 2 (Two) Times a Day. Take one tab in a.m. and one-half tab in p.m             hydrALAZINE (APRESOLINE) 100 MG tablet  Take 1 tablet by mouth 3 (Three) Times a Day.             insulin aspart (NovoLOG) 100 UNIT/ML patient supplied pump  Inject 1.2 Units under the skin Continuous. Pt can also dose with sliding scale             insulin glargine (LANTUS) 100 UNIT/ML injection  Inject 8  Units under the skin Every Night.             mycophenolate (MYFORTIC) 360 MG tablet delayed-release EC tablet  Take 1 tablet by mouth 2 (Two) Times a Day.             ONETOUCH VERIO test strip  Test 3 times daily as needed  Dx E11.9  Insulin dependant             pantoprazole (PROTONIX) 40 MG EC tablet  Take 40 mg by mouth Daily.             predniSONE (DELTASONE) 5 MG tablet  Take 1 tablet by mouth daily.             rosuvastatin (CRESTOR) 20 MG tablet  Take 1 tablet by mouth Daily.             tacrolimus (PROGRAF) 1 MG capsule  Take 1 mg by mouth 2 (Two) Times a Day.                 Discharge Diet:   Diet Instructions     Diet: Cardiac; Thin Liquids, No Restrictions       Discharge Diet:  Cardiac   Fluid Consistency:  Thin Liquids, No Restrictions                 Activity at Discharge:   Activity Instructions     Activity as Tolerated                     Discharge Care Plan/Instructions: f/u with PCP and cardiology    Follow-up Appointments:   Future Appointments  Date Time Provider Department Center   3/27/2017 10:00 AM Ayaan Swenson MD [facility]  [facility] None   9/6/2017 9:00 AM [facility] ECHO ROOM W CD [facility] None   9/13/2017 11:15 AM Feliciano Almazan MD PhD [facility] None       Test Results Pending at Discharge:  Order Current Status    Troponin Collected (03/23/17 1223)          Austen Cruz MD  03/23/17  12:36 PM

## 2017-03-27 ENCOUNTER — OFFICE VISIT (OUTPATIENT)
Dept: FAMILY MEDICINE CLINIC | Facility: CLINIC | Age: 77
End: 2017-03-27

## 2017-03-27 VITALS
WEIGHT: 163 LBS | BODY MASS INDEX: 32 KG/M2 | SYSTOLIC BLOOD PRESSURE: 154 MMHG | RESPIRATION RATE: 16 BRPM | HEIGHT: 60 IN | HEART RATE: 68 BPM | DIASTOLIC BLOOD PRESSURE: 78 MMHG | TEMPERATURE: 98.6 F

## 2017-03-27 DIAGNOSIS — IMO0002 UNCONTROLLED TYPE 2 DIABETES MELLITUS WITH OTHER SPECIFIED COMPLICATION, WITH LONG-TERM CURRENT USE OF INSULIN: ICD-10-CM

## 2017-03-27 DIAGNOSIS — I48.0 PAROXYSMAL ATRIAL FIBRILLATION (HCC): Primary | ICD-10-CM

## 2017-03-27 DIAGNOSIS — D64.9 ANEMIA, UNSPECIFIED TYPE: ICD-10-CM

## 2017-03-27 DIAGNOSIS — I10 UNCONTROLLED HYPERTENSION: ICD-10-CM

## 2017-03-27 PROCEDURE — 99214 OFFICE O/P EST MOD 30 MIN: CPT | Performed by: FAMILY MEDICINE

## 2017-03-27 RX ORDER — NIFEDIPINE 30 MG/1
TABLET, EXTENDED RELEASE ORAL
Refills: 10 | COMMUNITY
Start: 2017-03-12 | End: 2017-03-28

## 2017-03-27 RX ORDER — ASPIRIN 81 MG
TABLET, DELAYED RELEASE (ENTERIC COATED) ORAL
Refills: 11 | COMMUNITY
Start: 2017-02-24 | End: 2017-04-28

## 2017-03-27 RX ORDER — DILTIAZEM HYDROCHLORIDE 90 MG/1
90 CAPSULE, EXTENDED RELEASE ORAL 2 TIMES DAILY
Qty: 60 CAPSULE | Refills: 11 | Status: SHIPPED | OUTPATIENT
Start: 2017-03-27

## 2017-03-27 RX ORDER — DILTIAZEM HYDROCHLORIDE 90 MG/1
90 CAPSULE, EXTENDED RELEASE ORAL 2 TIMES DAILY
Qty: 60 CAPSULE | Refills: 11 | Status: SHIPPED | OUTPATIENT
Start: 2017-03-27 | End: 2017-03-27 | Stop reason: SDUPTHER

## 2017-03-27 RX ORDER — DILTIAZEM HYDROCHLORIDE 180 MG/1
180 CAPSULE, COATED, EXTENDED RELEASE ORAL DAILY
Qty: 30 CAPSULE | Refills: 11 | Status: SHIPPED | OUTPATIENT
Start: 2017-03-27 | End: 2017-03-27

## 2017-03-27 NOTE — PATIENT INSTRUCTIONS
" Cardizem 90 mg. Take 2 tablets daily  - call Dr. Ordonez for diabetic eye examination  DASH Eating Plan  DASH stands for \"Dietary Approaches to Stop Hypertension.\" The DASH eating plan is a healthy eating plan that has been shown to reduce high blood pressure (hypertension). Additional health benefits may include reducing the risk of type 2 diabetes mellitus, heart disease, and stroke. The DASH eating plan may also help with weight loss.  WHAT DO I NEED TO KNOW ABOUT THE DASH EATING PLAN?  For the DASH eating plan, you will follow these general guidelines:  · Choose foods with a percent daily value for sodium of less than 5% (as listed on the food label).  · Use salt-free seasonings or herbs instead of table salt or sea salt.  · Check with your health care provider or pharmacist before using salt substitutes.  · Eat lower-sodium products, often labeled as \"lower sodium\" or \"no salt added.\"  · Eat fresh foods.  · Eat more vegetables, fruits, and low-fat dairy products.  · Choose whole grains. Look for the word \"whole\" as the first word in the ingredient list.  · Choose fish and skinless chicken or turkey more often than red meat. Limit fish, poultry, and meat to 6 oz (170 g) each day.  · Limit sweets, desserts, sugars, and sugary drinks.  · Choose heart-healthy fats.  · Limit cheese to 1 oz (28 g) per day.  · Eat more home-cooked food and less restaurant, buffet, and fast food.  · Limit fried foods.  · Cook foods using methods other than frying.  · Limit canned vegetables. If you do use them, rinse them well to decrease the sodium.  · When eating at a restaurant, ask that your food be prepared with less salt, or no salt if possible.  WHAT FOODS CAN I EAT?  Seek help from a dietitian for individual calorie needs.  Grains  Whole grain or whole wheat bread. Brown rice. Whole grain or whole wheat pasta. Quinoa, bulgur, and whole grain cereals. Low-sodium cereals. Corn or whole wheat flour tortillas. Whole grain " cornbread. Whole grain crackers. Low-sodium crackers.  Vegetables  Fresh or frozen vegetables (raw, steamed, roasted, or grilled). Low-sodium or reduced-sodium tomato and vegetable juices. Low-sodium or reduced-sodium tomato sauce and paste. Low-sodium or reduced-sodium canned vegetables.   Fruits  All fresh, canned (in natural juice), or frozen fruits.  Meat and Other Protein Products  Ground beef (85% or leaner), grass-fed beef, or beef trimmed of fat. Skinless chicken or turkey. Ground chicken or turkey. Pork trimmed of fat. All fish and seafood. Eggs. Dried beans, peas, or lentils. Unsalted nuts and seeds. Unsalted canned beans.  Dairy  Low-fat dairy products, such as skim or 1% milk, 2% or reduced-fat cheeses, low-fat ricotta or cottage cheese, or plain low-fat yogurt. Low-sodium or reduced-sodium cheeses.  Fats and Oils  Tub margarines without trans fats. Light or reduced-fat mayonnaise and salad dressings (reduced sodium). Avocado. Safflower, olive, or canola oils. Natural peanut or almond butter.  Other  Unsalted popcorn and pretzels.  The items listed above may not be a complete list of recommended foods or beverages. Contact your dietitian for more options.  WHAT FOODS ARE NOT RECOMMENDED?  Grains  White bread. White pasta. White rice. Refined cornbread. Bagels and croissants. Crackers that contain trans fat.  Vegetables  Creamed or fried vegetables. Vegetables in a cheese sauce. Regular canned vegetables. Regular canned tomato sauce and paste. Regular tomato and vegetable juices.  Fruits  Dried fruits. Canned fruit in light or heavy syrup. Fruit juice.  Meat and Other Protein Products  Fatty cuts of meat. Ribs, chicken wings, malhotra, sausage, bologna, salami, chitterlings, fatback, hot dogs, bratwurst, and packaged luncheon meats. Salted nuts and seeds. Canned beans with salt.  Dairy  Whole or 2% milk, cream, half-and-half, and cream cheese. Whole-fat or sweetened yogurt. Full-fat cheeses or blue cheese.  Nondairy creamers and whipped toppings. Processed cheese, cheese spreads, or cheese curds.  Condiments  Onion and garlic salt, seasoned salt, table salt, and sea salt. Canned and packaged gravies. Worcestershire sauce. Tartar sauce. Barbecue sauce. Teriyaki sauce. Soy sauce, including reduced sodium. Steak sauce. Fish sauce. Oyster sauce. Cocktail sauce. Horseradish. Ketchup and mustard. Meat flavorings and tenderizers. Bouillon cubes. Hot sauce. Tabasco sauce. Marinades. Taco seasonings. Relishes.  Fats and Oils  Butter, stick margarine, lard, shortening, ghee, and malhotra fat. Coconut, palm kernel, or palm oils. Regular salad dressings.  Other  Pickles and olives. Salted popcorn and pretzels.  The items listed above may not be a complete list of foods and beverages to avoid. Contact your dietitian for more information.  WHERE CAN I FIND MORE INFORMATION?  National [employer], [employer], [employer] [place]: www.nhlbi.nih.gov/health/health-topics/topics/dash/     This information is not intended to replace advice given to you by your health care provider. Make sure you discuss any questions you have with your health care provider.     Document Released: 12/06/2012 Document Revised: 01/08/2016 Document Reviewed: 10/22/2014  [employer] Interactive Patient Education ©2016 [employer]

## 2017-03-27 NOTE — PROGRESS NOTES
Subjective   Chanda Yan is a 76 y.o. female.     Problem List  1. 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 Hyperlipidemia  3. ASCAD sp stent placement/severe LDH/Right ventricular Delitation  4. ESRD sp right kidney transplant   5. Obesity  6. Essential Hypertension  8. Right kidney transplant in 2010  9. GERD  10. Bilateral leg edema  11. Anemia of Chronic Disease   12. Depression   13. Unspecified murmur  14. Paroxymal Atrial Fibrillation       Pt is 75 yo AAF with the above medical issues who is here for hospital followup.  Pt was admitted to [facility] on 3/21/17 and discharged on 3/23/17.  Pt on 3/21/17 was having complaints of chest pain and palpitations that occurred suddenly while sitting at her table.  Pt was found to be in Atrial fibrillation with RVR at ED that shortly resolved after admission.  Pt was continued on coreg and pradaxa.  Also Cardizem was given.  Pt was seen by both Nephrologist for CKD sp right kidney transplant as well as Dr. Almazan (Cardiologist)  Pt was discharged in stable condition on 3/23/17.  Pt today states she is doing better and has not had episodes of chest pain or palpitations since discharge. She has been compliant with medications.  Pt currently is rate controlled and VS stable. BP is still not controlled     Pt also recently saw Endocrinologist regarding DM type 2.  Has insulin pump. Currently on  Novolog before meals as well as Lantus at bedtime. Pt is due for refills on Novolog.  Last hga1c was 7.8    Atrial Fibrillation   Presents for follow-up visit. Symptoms include chest pain, hypertension, palpitations and shortness of breath. Symptoms are negative for an AICD problem, bradycardia, hypotension, pacemaker problem, syncope, tachycardia and weakness. The symptoms have been stable. Past medical history includes atrial fibrillation. There are no medication compliance problems.   Anemia   Presents for follow-up visit. Symptoms include palpitations. There has been no  abdominal pain, anorexia, bruising/bleeding easily, confusion, fever, leg swelling, light-headedness, malaise/fatigue, pallor, paresthesias, pica or weight loss. Signs of blood loss that are not present include hematemesis, hematochezia, melena, menorrhagia and vaginal bleeding. Past medical history includes chronic renal disease. There is no history of heart failure. There are no compliance problems.    Hypertension   This is a chronic problem. The problem is unchanged. The problem is uncontrolled. Associated symptoms include chest pain, palpitations and shortness of breath. Pertinent negatives include no anxiety, blurred vision, headaches, malaise/fatigue, neck pain, orthopnea, peripheral edema, PND or sweats. Risk factors for coronary artery disease include diabetes mellitus, obesity, sedentary lifestyle, post-menopausal state and dyslipidemia. Past treatments include calcium channel blockers, beta blockers and direct vasodilators. The current treatment provides mild improvement. There are no compliance problems.  Hypertensive end-organ damage includes kidney disease. There is no history of angina, CAD/MI, CVA, heart failure, left ventricular hypertrophy, PVD, renovascular disease or a thyroid problem. Identifiable causes of hypertension include chronic renal disease. There is no history of coarctation of the aorta, hyperaldosteronism, hypercortisolism, hyperparathyroidism, a hypertension causing med, pheochromocytoma or sleep apnea.          The following portions of the patient's history were reviewed and updated as appropriate: allergies, current medications, past family history, past medical history, past social history, past surgical history and problem list.    Review of Systems   Constitutional: Negative.  Negative for fever, malaise/fatigue and weight loss.   HENT: Negative.    Eyes: Negative.  Negative for blurred vision.   Respiratory: Positive for chest tightness and shortness of breath.   "  Cardiovascular: Positive for chest pain and palpitations. Negative for orthopnea, syncope and PND.   Gastrointestinal: Negative.  Negative for abdominal pain, anorexia, hematemesis, hematochezia and melena.   Endocrine: Negative.    Genitourinary: Negative.  Negative for menorrhagia and vaginal bleeding.   Musculoskeletal: Negative.  Negative for neck pain.   Skin: Negative.  Negative for pallor.   Allergic/Immunologic: Negative.    Neurological: Negative.  Negative for weakness, light-headedness, headaches and paresthesias.   Hematological: Negative.  Does not bruise/bleed easily.   Psychiatric/Behavioral: Negative.  Negative for confusion.       Objective    /78  Pulse 68  Temp 98.6 °F (37 °C)  Resp 16  Ht 60\" (152.4 cm)  Wt 163 lb (73.9 kg)  BMI 31.83 kg/m2    Chemistry        Component Value Date/Time     (L) 03/23/2017 0141    K 4.0 03/23/2017 0141    CL 97 03/23/2017 0141    CO2 26.0 03/23/2017 0141    BUN 56 (H) 03/23/2017 0141    CREATININE 2.25 (H) 03/23/2017 0141        Component Value Date/Time    CALCIUM 9.1 03/23/2017 0141    ALKPHOS 84 03/23/2017 0141    AST 27 03/23/2017 0141    ALT 28 03/23/2017 0141    BILITOT 0.4 03/23/2017 0141        Lab Results   Component Value Date    WBC 3.96 03/23/2017    HGB 8.9 (L) 03/23/2017    HCT 27.6 (L) 03/23/2017    MCV 81.4 03/23/2017     03/23/2017     Lab Results   Component Value Date    CHOL 183 03/22/2017    CHOL 185 03/21/2017     Lab Results   Component Value Date    TRIG 130 03/22/2017    TRIG 130 03/21/2017    TRIG 119 01/05/2017     Lab Results   Component Value Date    HDL 59 (L) 03/22/2017    HDL 62 03/21/2017    HDL 65 01/05/2017     Lab Results   Component Value Date    LDLCALC 138 (H) 01/05/2017    LDLCALC 103 09/27/2016    LDLCALC 111 04/25/2016     No results found for: LDL  No results found for: HDLLDLRATIO  No components found for: CHOLHDL  Lab Results   Component Value Date    HGBA1C 7.81 (H) 03/21/2017     Lab Results "   Component Value Date    TSH 1.540 03/21/2017     Physical Exam   Constitutional: She is oriented to person, place, and time. She appears well-developed and well-nourished. No distress.   HENT:   Head: Normocephalic and atraumatic.   Right Ear: External ear normal.   Left Ear: External ear normal.   Eyes: Conjunctivae and EOM are normal. Pupils are equal, round, and reactive to light. Right eye exhibits no discharge. Left eye exhibits no discharge. No scleral icterus.   Neck: Normal range of motion. Neck supple. No JVD present. No tracheal deviation present. No thyromegaly present.   Cardiovascular: Normal rate and S2 normal.  An irregularly irregular rhythm present.   No murmur heard.   No systolic murmur is present    No diastolic murmur is present   Pulmonary/Chest: Effort normal and breath sounds normal. No stridor. No respiratory distress. She has no wheezes.   Abdominal: Soft. Bowel sounds are normal. She exhibits no distension and no mass. There is no tenderness. There is no rebound and no guarding. No hernia.   Musculoskeletal: Normal range of motion. She exhibits no edema, tenderness or deformity.   Lymphadenopathy:     She has no cervical adenopathy.   Neurological: She is alert and oriented to person, place, and time. She has normal reflexes. No cranial nerve deficit. Coordination normal.   Skin: Skin is warm and dry. No rash noted. She is not diaphoretic. No erythema. No pallor.   Psychiatric: She has a normal mood and affect. Her behavior is normal. Judgment and thought content normal.   Nursing note and vitals reviewed.      Assessment/Plan   Problems Addressed this Visit        Cardiovascular and Mediastinum    Uncontrolled hypertension    Relevant Medications    diltiaZEM SR (CARDIZEM SR) 90 MG 12 hr capsule    Paroxysmal atrial fibrillation - Primary    Relevant Medications    diltiaZEM SR (CARDIZEM SR) 90 MG 12 hr capsule       Endocrine    Diabetes type 2, uncontrolled       Hematopoietic and  Hemostatic    Anemia        - for Paroxysmal Atrial Fibrillation - Pt currently rate controlled. Has followup appt with Cardiologist in a few months.  Pt asymptomatic VS stable. Continue with coreg, Pradaxa and Cardizem. Will refill Cardizem SR  90 mg 2 pills daily.  Pt advised to call 911 or go to ER if symptoms of chest pain or palpitations recur.    - For hypertension - will continue to monitior. Continue with hydralazine, coreg and cardizem.  Pt just recently started on cardizem.  Will recheck in 1 month.  Recommend DASH diet  - DM type 2 - will refill Novolog insulin. Pt to continue with Lantus.  Recommend ADA diet.  Has appt with Endocrinologist soon   -for Anemia - likely anemia of chronic disease. Will continue to monitor hemoglobin.    - recheck in 1 month    - pt advised to Call Dr. Ordonez office for diabetic eye examination

## 2017-04-28 ENCOUNTER — OFFICE VISIT (OUTPATIENT)
Dept: FAMILY MEDICINE CLINIC | Facility: CLINIC | Age: 77
End: 2017-04-28

## 2017-04-28 VITALS
SYSTOLIC BLOOD PRESSURE: 144 MMHG | HEART RATE: 66 BPM | WEIGHT: 163.4 LBS | HEIGHT: 60 IN | DIASTOLIC BLOOD PRESSURE: 60 MMHG | TEMPERATURE: 98.6 F | RESPIRATION RATE: 16 BRPM | BODY MASS INDEX: 32.08 KG/M2

## 2017-04-28 DIAGNOSIS — H61.22 IMPACTED CERUMEN OF LEFT EAR: ICD-10-CM

## 2017-04-28 DIAGNOSIS — I10 UNCONTROLLED HYPERTENSION: ICD-10-CM

## 2017-04-28 DIAGNOSIS — I48.0 PAROXYSMAL ATRIAL FIBRILLATION (HCC): ICD-10-CM

## 2017-04-28 DIAGNOSIS — Z94.0 HISTORY OF KIDNEY TRANSPLANT: ICD-10-CM

## 2017-04-28 DIAGNOSIS — N18.30 CHRONIC KIDNEY DISEASE, STAGE 3 (MODERATE): ICD-10-CM

## 2017-04-28 DIAGNOSIS — H92.02 OTALGIA, LEFT: Primary | ICD-10-CM

## 2017-04-28 PROCEDURE — 99214 OFFICE O/P EST MOD 30 MIN: CPT | Performed by: FAMILY MEDICINE

## 2017-04-28 RX ORDER — LOSARTAN POTASSIUM 25 MG/1
25 TABLET ORAL DAILY
Qty: 30 TABLET | Refills: 11 | Status: SHIPPED | OUTPATIENT
Start: 2017-04-28 | End: 2017-05-30 | Stop reason: SDUPTHER

## 2017-05-01 RX ORDER — FUROSEMIDE 40 MG/1
TABLET ORAL
Qty: 30 TABLET | Refills: 0 | Status: SHIPPED | OUTPATIENT
Start: 2017-05-01 | End: 2017-05-21 | Stop reason: SDUPTHER

## 2017-05-22 RX ORDER — FUROSEMIDE 40 MG/1
TABLET ORAL
Qty: 30 TABLET | Refills: 0 | Status: SHIPPED | OUTPATIENT
Start: 2017-05-22 | End: 2017-05-30 | Stop reason: SDUPTHER

## 2017-05-30 ENCOUNTER — OFFICE VISIT (OUTPATIENT)
Dept: FAMILY MEDICINE CLINIC | Facility: CLINIC | Age: 77
End: 2017-05-30

## 2017-05-30 VITALS
WEIGHT: 162.2 LBS | HEART RATE: 63 BPM | DIASTOLIC BLOOD PRESSURE: 60 MMHG | RESPIRATION RATE: 16 BRPM | TEMPERATURE: 98.6 F | SYSTOLIC BLOOD PRESSURE: 152 MMHG | BODY MASS INDEX: 31.84 KG/M2 | HEIGHT: 60 IN

## 2017-05-30 DIAGNOSIS — H21.01 HYPHEMA, RIGHT: ICD-10-CM

## 2017-05-30 DIAGNOSIS — H91.91 HEARING LOSS, RIGHT: ICD-10-CM

## 2017-05-30 DIAGNOSIS — I10 UNCONTROLLED HYPERTENSION: Primary | ICD-10-CM

## 2017-05-30 DIAGNOSIS — Z94.0 HISTORY OF KIDNEY TRANSPLANT: ICD-10-CM

## 2017-05-30 DIAGNOSIS — N18.30 CHRONIC KIDNEY DISEASE, STAGE 3 (MODERATE): ICD-10-CM

## 2017-05-30 PROBLEM — H21.00 HYPHEMA: Status: ACTIVE | Noted: 2017-05-30

## 2017-05-30 PROBLEM — H91.90 HEARING LOSS: Status: ACTIVE | Noted: 2017-05-30

## 2017-05-30 PROCEDURE — 99214 OFFICE O/P EST MOD 30 MIN: CPT | Performed by: FAMILY MEDICINE

## 2017-05-30 RX ORDER — CARVEDILOL 3.12 MG/1
TABLET ORAL
Qty: 60 TABLET | Refills: 0 | Status: SHIPPED | OUTPATIENT
Start: 2017-05-30 | End: 2017-09-05

## 2017-05-30 RX ORDER — TACROLIMUS 1 MG/1
1 CAPSULE ORAL 2 TIMES DAILY
Qty: 240 CAPSULE | Refills: 11 | Status: SHIPPED | OUTPATIENT
Start: 2017-05-30 | End: 2017-12-13 | Stop reason: SDUPTHER

## 2017-05-30 RX ORDER — FUROSEMIDE 40 MG/1
TABLET ORAL
Qty: 30 TABLET | Refills: 0 | Status: SHIPPED | OUTPATIENT
Start: 2017-05-30 | End: 2017-06-14

## 2017-05-30 RX ORDER — LOSARTAN POTASSIUM 25 MG/1
50 TABLET ORAL DAILY
Qty: 30 TABLET | Refills: 11 | Status: SHIPPED | OUTPATIENT
Start: 2017-05-30 | End: 2017-12-06 | Stop reason: HOSPADM

## 2017-06-14 ENCOUNTER — OFFICE VISIT (OUTPATIENT)
Dept: OTOLARYNGOLOGY | Facility: CLINIC | Age: 77
End: 2017-06-14

## 2017-06-14 VITALS — TEMPERATURE: 97.4 F | HEIGHT: 60 IN | WEIGHT: 156 LBS | BODY MASS INDEX: 30.63 KG/M2

## 2017-06-14 DIAGNOSIS — J30.0 VASOMOTOR RHINITIS: ICD-10-CM

## 2017-06-14 DIAGNOSIS — H61.23 BILATERAL IMPACTED CERUMEN: Primary | ICD-10-CM

## 2017-06-14 PROCEDURE — 99203 OFFICE O/P NEW LOW 30 MIN: CPT | Performed by: OTOLARYNGOLOGY

## 2017-06-14 RX ORDER — IPRATROPIUM BROMIDE 42 UG/1
2 SPRAY, METERED NASAL 2 TIMES DAILY
Qty: 15 ML | Refills: 11 | Status: SHIPPED | OUTPATIENT
Start: 2017-06-14

## 2017-06-14 RX ORDER — TRAMADOL HYDROCHLORIDE 200 MG/1
185 TABLET, EXTENDED RELEASE ORAL DAILY
COMMUNITY
End: 2017-09-05

## 2017-06-14 RX ORDER — INSULIN GLARGINE 100 [IU]/ML
INJECTION, SOLUTION SUBCUTANEOUS
Refills: 4 | COMMUNITY
Start: 2017-05-12

## 2017-06-14 NOTE — PROGRESS NOTES
Subjective   Chanda Yan is a 76 y.o. female.   CC: bothersome clear rhinitis in ams omly and cerumen impaction  History of Present Illness   Bothersome rhinitis the morning 1 cm treated she's use Flonase without success.  She's not having facial pain or purulent drainage  She also claims her ear stopped up had more of a pulse sensation in the past and is now only at night.  That's related to her ears being stopped up.  She's not having any other major ear complaints pain drainage or other symptoms she's tried peroxide  in her ears without success.      The following portions of the patient's history were reviewed and updated as appropriate: allergies, current medications, past family history, past medical history, past social history, past surgical history and problem list.      Chanda Yan reports that she has never smoked. She has never used smokeless tobacco. She reports that she does not drink alcohol or use illicit drugs.  Patient is not a tobacco user and has not been counseled for use of tobacco products    Family History   Problem Relation Age of Onset   • Lung cancer Mother    • Heart disease Father    • Lupus Other    • Diabetes Other          Current Outpatient Prescriptions:   •  albuterol (PROVENTIL HFA;VENTOLIN HFA) 108 (90 BASE) MCG/ACT inhaler, Inhale 2 puffs every 4 (four) hours as needed for wheezing., Disp: 18 g, Rfl: 11  •  amitriptyline (ELAVIL) 10 MG tablet, Take 10 mg by mouth Every Night., Disp: , Rfl: 0  •  aspirin 81 MG tablet, Take 1 tablet by mouth Daily., Disp: 30 tablet, Rfl: 11  •  Ca Carb-FA-D-B6-B12-Boron-Mg 1342-1 MG wafer, Take 1 tablet by mouth daily., Disp: , Rfl:   •  Calcium Carb-Cholecalciferol (CALCIUM 1000 + D PO), Take 1 tablet by mouth Daily., Disp: , Rfl:   •  carvedilol (COREG) 3.125 MG tablet, TAKE 1 TABLET BY MOUTH TWICE DAILY, Disp: 60 tablet, Rfl: 0  •  cetirizine (ZyrTEC) 10 MG tablet, Take 10 mg by mouth daily., Disp: , Rfl:   •  Cholecalciferol (VITAMIN D3)  5000 UNITS capsule capsule, Take 5,000 Units by mouth Daily., Disp: , Rfl:   •  dabigatran etexilate (PRADAXA) 75 MG capsule, Take 1 capsule by mouth 2 (Two) Times a Day., Disp: 60 capsule, Rfl: 6  •  diltiaZEM SR (CARDIZEM SR) 90 MG 12 hr capsule, Take 1 capsule by mouth 2 (Two) Times a Day., Disp: 60 capsule, Rfl: 11  •  ezetimibe (ZETIA) 10 MG tablet, Take 1 tablet by mouth daily., Disp: 31 tablet, Rfl: 11  •  fluticasone (FLONASE) 50 MCG/ACT nasal spray, 2 sprays into each nostril Daily., Disp: , Rfl:   •  furosemide (LASIX) 40 MG tablet, Take 1 tablet by mouth 2 (Two) Times a Day. Take one tab in a.m. and one-half tab in p.m, Disp: 30 tablet, Rfl: 11  •  hydrALAZINE (APRESOLINE) 100 MG tablet, Take 1 tablet by mouth 3 (Three) Times a Day., Disp: 90 tablet, Rfl: 11  •  insulin aspart (NOVOLOG) 100 UNIT/ML injection, Inject 5 Units under the skin 3 (Three) Times a Day Before Meals., Disp: 300 Units, Rfl: 11  •  LANTUS SOLOSTAR 100 UNIT/ML injection pen, INJECT 10 UNITS SUBCUTANEOUSLY UTD AS A BACK UP FOR PUMP, Disp: , Rfl: 4  •  losartan (COZAAR) 25 MG tablet, Take 2 tablets by mouth Daily., Disp: 30 tablet, Rfl: 11  •  mycophenolate (MYFORTIC) 360 MG tablet delayed-release EC tablet, Take 1 tablet by mouth 2 (Two) Times a Day., Disp: 120 tablet, Rfl: 6  •  ONETOUCH VERIO test strip, Test 3 times daily as needed  Dx E11.9  Insulin dependant, Disp: 100 each, Rfl: 6  •  pantoprazole (PROTONIX) 40 MG EC tablet, Take 40 mg by mouth Daily., Disp: , Rfl:   •  predniSONE (DELTASONE) 5 MG tablet, Take 1 tablet by mouth daily., Disp: 90 tablet, Rfl: 11  •  rosuvastatin (CRESTOR) 20 MG tablet, Take 1 tablet by mouth Daily., Disp: 30 tablet, Rfl: 11  •  tacrolimus (PROGRAF) 1 MG capsule, Take 1 capsule by mouth 2 (Two) Times a Day., Disp: 240 capsule, Rfl: 11  •  traMADol ER (ULTRAM-ER) 200 MG 24 hr tablet, Take 185 mg by mouth Daily., Disp: , Rfl:   •  ipratropium (ATROVENT) 0.06 % nasal spray, 2 sprays into each nostril 2  (Two) Times a Day., Disp: 15 mL, Rfl: 11      Past Medical History:   Diagnosis Date   • Acute bronchitis 12/21/2015   • Allergic    • Asthma    • Chronic kidney disease 04/25/2016    unspecified   • Coronary atherosclerosis 06/22/2016    s/p stent placement 2 years ago      • Diabetes mellitus type 2, insulin dependent 06/22/2016   • Dyslipidemia 06/22/2016   • Edema of left upper eyelid 06/30/2016   • Edema of right upper eyelid 06/30/2016   • Essential hypertension 06/30/2016   • History of echocardiogram 07/07/2016    Normal LV function with Ef of 65%.Normal RV size and function.Grade 1a diastolic dysfunction.Mild LVH, and severe left atrial dilation.Moderate tricuspid regurg.   • Hyperlipidemia 06/22/2016   • Low back pain 02/29/2016   • Sinusitis    • Upper respiratory infection 12/21/2015         Review of Systems   Constitutional: Positive for unexpected weight change. Negative for fever.   HENT: Positive for rhinorrhea and tinnitus. Negative for congestion, ear discharge, ear pain and facial swelling.    Endocrine:        Diabetes   Hematological: Negative.    All other systems reviewed and are negative.          Objective   Physical Exam   Constitutional: She is oriented to person, place, and time. She appears well-developed and well-nourished.   HENT:   Head: Normocephalic and atraumatic.   Right Ear: Hearing, tympanic membrane, external ear and ear canal normal.   Left Ear: Hearing, tympanic membrane, external ear and ear canal normal.   Ears:    Nose: Nose normal. No mucosal edema, rhinorrhea, nasal deformity or septal deviation. No epistaxis. Right sinus exhibits no maxillary sinus tenderness and no frontal sinus tenderness. Left sinus exhibits no maxillary sinus tenderness and no frontal sinus tenderness.   Mouth/Throat: Uvula is midline, oropharynx is clear and moist and mucous membranes are normal. No trismus in the jaw. Normal dentition. No oropharyngeal exudate or posterior oropharyngeal edema. No  tonsillar exudate.       Eyes: Conjunctivae are normal.   Neck: Normal range of motion. Neck supple. No JVD present. No tracheal deviation present. No thyromegaly present.   Cardiovascular: Normal rate and regular rhythm.    Pulmonary/Chest: Effort normal.   Musculoskeletal: Normal range of motion.   Lymphadenopathy:        Head (right side): No submental, no submandibular, no tonsillar, no preauricular, no posterior auricular and no occipital adenopathy present.        Head (left side): No submental, no submandibular, no tonsillar, no preauricular, no posterior auricular and no occipital adenopathy present.     She has no cervical adenopathy.        Right cervical: No superficial cervical, no deep cervical and no posterior cervical adenopathy present.       Left cervical: No superficial cervical, no deep cervical and no posterior cervical adenopathy present.   Neurological: She is alert and oriented to person, place, and time. No cranial nerve deficit.   Skin: Skin is warm.   Psychiatric: She has a normal mood and affect. Her speech is normal and behavior is normal. Thought content normal.   Nursing note and vitals reviewed.            Assessment/Plan   Ora was seen today for cerumen impaction and ear problem.    Diagnoses and all orders for this visit:    Bilateral impacted cerumen    Vasomotor rhinitis    Other orders  -     ipratropium (ATROVENT) 0.06 % nasal spray; 2 sprays into each nostril 2 (Two) Times a Day.    Discussed a trial of using ipratropium only in the morning To drainage.  She's call was not helpful.  Explained her that she can hear better and felt better.  She tolerated this well.  I suggested that is a persistent issues with her for recheck her hearing she is to let us know.  Otherwise will see her as needed  Discussed techniques to minimize wax buildup using peroxide she does not use Q-tips or ears.

## 2017-07-03 ENCOUNTER — LAB (OUTPATIENT)
Dept: LAB | Facility: CLINIC | Age: 77
End: 2017-07-03

## 2017-07-03 ENCOUNTER — TRANSCRIBE ORDERS (OUTPATIENT)
Dept: LAB | Facility: CLINIC | Age: 77
End: 2017-07-03

## 2017-07-03 DIAGNOSIS — N18.6 END STAGE RENAL DISEASE (HCC): Primary | ICD-10-CM

## 2017-07-03 DIAGNOSIS — Z94.0 KIDNEY REPLACED BY TRANSPLANT: ICD-10-CM

## 2017-07-03 DIAGNOSIS — E11.9 DIABETES MELLITUS WITHOUT COMPLICATION (HCC): ICD-10-CM

## 2017-07-03 DIAGNOSIS — N18.6 END STAGE RENAL DISEASE (HCC): ICD-10-CM

## 2017-07-03 DIAGNOSIS — E55.9 UNSPECIFIED VITAMIN D DEFICIENCY: ICD-10-CM

## 2017-07-03 DIAGNOSIS — I10 ESSENTIAL HYPERTENSION, MALIGNANT: ICD-10-CM

## 2017-07-03 DIAGNOSIS — E78.5 HYPERLIPOPROTEINEMIA: ICD-10-CM

## 2017-07-03 DIAGNOSIS — I48.91 NEW ONSET ATRIAL FIBRILLATION (HCC): ICD-10-CM

## 2017-07-03 LAB
25(OH)D3 SERPL-MCNC: 76.4 NG/ML (ref 30–100)
ANISOCYTOSIS BLD QL: NORMAL
BASOPHILS # BLD AUTO: 0 10*3/MM3 (ref 0–0.2)
BASOPHILS NFR BLD AUTO: 0 % (ref 0–2)
CLUMPED PLATELETS: NORMAL
DEPRECATED RDW RBC AUTO: 46.8 FL (ref 36.4–46.3)
EOSINOPHIL # BLD AUTO: 0.06 10*3/MM3 (ref 0–0.7)
EOSINOPHIL NFR BLD AUTO: 1.8 % (ref 0–7)
ERYTHROCYTE [DISTWIDTH] IN BLOOD BY AUTOMATED COUNT: 15.6 % (ref 11.5–14.5)
HCT VFR BLD AUTO: 28.9 % (ref 35–45)
HGB BLD-MCNC: 8.9 G/DL (ref 12–15.5)
IMM GRANULOCYTES # BLD: 0 10*3/MM3 (ref 0–0.02)
IMM GRANULOCYTES NFR BLD: 0 % (ref 0–0.5)
LYMPHOCYTES # BLD AUTO: 0.66 10*3/MM3 (ref 0.6–4.2)
LYMPHOCYTES NFR BLD AUTO: 19.4 % (ref 10–50)
MCH RBC QN AUTO: 25.2 PG (ref 26.5–34)
MCHC RBC AUTO-ENTMCNC: 30.8 G/DL (ref 31.4–36)
MCV RBC AUTO: 81.9 FL (ref 80–98)
MONOCYTES # BLD AUTO: 0.51 10*3/MM3 (ref 0–0.9)
MONOCYTES NFR BLD AUTO: 15 % (ref 0–12)
NEUTROPHILS # BLD AUTO: 2.18 10*3/MM3 (ref 2–8.6)
NEUTROPHILS NFR BLD AUTO: 63.8 % (ref 37–80)
NRBC BLD MANUAL-RTO: 0 /100 WBC (ref 0–0)
OVALOCYTES BLD QL SMEAR: NORMAL
PHOSPHATE SERPL-MCNC: 3.1 MG/DL (ref 2.4–4.4)
PLATELET # BLD AUTO: 129 10*3/MM3 (ref 150–450)
PMV BLD AUTO: 12.2 FL (ref 8–12)
POLYCHROMASIA BLD QL SMEAR: NORMAL
RBC # BLD AUTO: 3.53 10*6/MM3 (ref 3.77–5.16)
SMALL PLATELETS BLD QL SMEAR: NORMAL
URATE SERPL-MCNC: 6.8 MG/DL (ref 2.5–8.5)
WBC MORPH BLD: NORMAL
WBC NRBC COR # BLD: 3.41 10*3/MM3 (ref 3.2–9.8)

## 2017-07-03 PROCEDURE — 84550 ASSAY OF BLOOD/URIC ACID: CPT | Performed by: FAMILY MEDICINE

## 2017-07-03 PROCEDURE — 80061 LIPID PANEL: CPT | Performed by: FAMILY MEDICINE

## 2017-07-03 PROCEDURE — 80053 COMPREHEN METABOLIC PANEL: CPT | Performed by: FAMILY MEDICINE

## 2017-07-03 PROCEDURE — 83036 HEMOGLOBIN GLYCOSYLATED A1C: CPT | Performed by: FAMILY MEDICINE

## 2017-07-03 PROCEDURE — 84100 ASSAY OF PHOSPHORUS: CPT | Performed by: FAMILY MEDICINE

## 2017-07-03 PROCEDURE — 80197 ASSAY OF TACROLIMUS: CPT | Performed by: INTERNAL MEDICINE

## 2017-07-03 PROCEDURE — 82306 VITAMIN D 25 HYDROXY: CPT | Performed by: INTERNAL MEDICINE

## 2017-07-03 PROCEDURE — 85025 COMPLETE CBC W/AUTO DIFF WBC: CPT | Performed by: INTERNAL MEDICINE

## 2017-07-03 PROCEDURE — 85007 BL SMEAR W/DIFF WBC COUNT: CPT | Performed by: INTERNAL MEDICINE

## 2017-07-07 LAB — TACROLIMUS BLD-MCNC: 2.6 NG/ML (ref 2–20)

## 2017-07-21 ENCOUNTER — TRANSCRIBE ORDERS (OUTPATIENT)
Dept: LAB | Facility: CLINIC | Age: 77
End: 2017-07-21

## 2017-07-21 ENCOUNTER — LAB (OUTPATIENT)
Dept: LAB | Facility: CLINIC | Age: 77
End: 2017-07-21

## 2017-07-21 DIAGNOSIS — E78.49 OTHER HYPERLIPIDEMIA: ICD-10-CM

## 2017-07-21 DIAGNOSIS — Z94.0 KIDNEY REPLACED BY TRANSPLANT: ICD-10-CM

## 2017-07-21 DIAGNOSIS — N18.6 END STAGE RENAL DISEASE (HCC): ICD-10-CM

## 2017-07-21 DIAGNOSIS — N18.6 END STAGE RENAL DISEASE (HCC): Primary | ICD-10-CM

## 2017-07-21 LAB
FERRITIN SERPL-MCNC: 589 NG/ML (ref 11.1–264)
FOLATE SERPL-MCNC: 14.2 NG/ML (ref 2.76–21)
IRON 24H UR-MRATE: 49 MCG/DL (ref 37–170)
IRON SATN MFR SERPL: 20 % (ref 15–50)
PTH-INTACT SERPL-MCNC: 348.3 PG/ML (ref 10–65)
TIBC SERPL-MCNC: 249 MCG/DL (ref 265–497)
VIT B12 BLD-MCNC: 731 PG/ML (ref 239–931)

## 2017-07-21 PROCEDURE — 82607 VITAMIN B-12: CPT | Performed by: INTERNAL MEDICINE

## 2017-07-21 PROCEDURE — 82728 ASSAY OF FERRITIN: CPT | Performed by: INTERNAL MEDICINE

## 2017-07-21 PROCEDURE — 83540 ASSAY OF IRON: CPT | Performed by: INTERNAL MEDICINE

## 2017-07-21 PROCEDURE — 36415 COLL VENOUS BLD VENIPUNCTURE: CPT | Performed by: FAMILY MEDICINE

## 2017-07-21 PROCEDURE — 82746 ASSAY OF FOLIC ACID SERUM: CPT | Performed by: INTERNAL MEDICINE

## 2017-07-21 PROCEDURE — 83550 IRON BINDING TEST: CPT | Performed by: INTERNAL MEDICINE

## 2017-07-21 PROCEDURE — 83970 ASSAY OF PARATHORMONE: CPT | Performed by: INTERNAL MEDICINE

## 2017-07-24 LAB
CREAT UR-MCNC: 38 MG/DL
PROT UR-MCNC: 10.9 MG/DL
PROT/CREAT UR: 286.8 MG/G CREA (ref 0–200)

## 2017-07-24 PROCEDURE — 36415 COLL VENOUS BLD VENIPUNCTURE: CPT | Performed by: FAMILY MEDICINE

## 2017-07-24 PROCEDURE — 80197 ASSAY OF TACROLIMUS: CPT | Performed by: INTERNAL MEDICINE

## 2017-07-24 PROCEDURE — 82570 ASSAY OF URINE CREATININE: CPT | Performed by: INTERNAL MEDICINE

## 2017-07-24 PROCEDURE — 84156 ASSAY OF PROTEIN URINE: CPT | Performed by: INTERNAL MEDICINE

## 2017-07-27 LAB — TACROLIMUS BLD-MCNC: NORMAL NG/ML (ref 2–20)

## 2017-08-30 NOTE — TELEPHONE ENCOUNTER
Patient called has been in hospital due to low blood sugar.  Requested glucagon injections.  Spoke with Dr. Swenson new order given of glucagon 1 mg. Subq x 5 refills for low blood sugar per V.O. Dr Swenson/Celia Vinson  Patient has follow up christina next week.

## 2017-09-05 ENCOUNTER — OFFICE VISIT (OUTPATIENT)
Dept: FAMILY MEDICINE CLINIC | Facility: CLINIC | Age: 77
End: 2017-09-05

## 2017-09-05 VITALS
HEART RATE: 72 BPM | HEIGHT: 60 IN | WEIGHT: 159.4 LBS | DIASTOLIC BLOOD PRESSURE: 63 MMHG | OXYGEN SATURATION: 97 % | BODY MASS INDEX: 31.29 KG/M2 | SYSTOLIC BLOOD PRESSURE: 158 MMHG

## 2017-09-05 DIAGNOSIS — Z46.81 COUNSELING FOR INSULIN PUMP: ICD-10-CM

## 2017-09-05 DIAGNOSIS — Z94.0 HISTORY OF KIDNEY TRANSPLANT: ICD-10-CM

## 2017-09-05 DIAGNOSIS — E16.2 HYPOGLYCEMIA: ICD-10-CM

## 2017-09-05 DIAGNOSIS — N18.30 CHRONIC KIDNEY DISEASE, STAGE 3 (MODERATE): ICD-10-CM

## 2017-09-05 DIAGNOSIS — I10 UNCONTROLLED HYPERTENSION: ICD-10-CM

## 2017-09-05 DIAGNOSIS — IMO0002 UNCONTROLLED TYPE 2 DIABETES MELLITUS WITH COMPLICATION, WITH LONG-TERM CURRENT USE OF INSULIN: Primary | ICD-10-CM

## 2017-09-05 PROCEDURE — 99214 OFFICE O/P EST MOD 30 MIN: CPT | Performed by: FAMILY MEDICINE

## 2017-09-05 RX ORDER — CARVEDILOL 6.25 MG/1
6.25 TABLET ORAL 2 TIMES DAILY WITH MEALS
Qty: 60 TABLET | Refills: 11 | Status: SHIPPED | OUTPATIENT
Start: 2017-09-05 | End: 2017-09-15 | Stop reason: SDUPTHER

## 2017-09-05 NOTE — PROGRESS NOTES
Subjective   Chanda Yan is a 76 y.o. female.     Problem List  1. 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 Hyperlipidemia  3. ASCAD sp stent placement/severe LDH/Right ventricular Delitation  4. ESRD sp right kidney transplant   5. Obesity  6. Essential Hypertension  8. Right kidney transplant in 2010  9. GERD  10. Bilateral leg edema  11. Anemia of Chronic Disease   12. Depression   13. Unspecified murmur  14. Paroxymal Atrial Fibrillation     Pt is 75 yo AAF with the above medical issues. She is here for recheck. Pt was recently at [facility] hospital and was admitted overnight for hypoglycemic episodes.  Pt currently has an insulin pump for DM type 2 and pt seems to think she may have overdosed on her insulin before her meals. Pt's usually carb to insulin unit was 40:1 and pt has a basal rate of 1.1-1.4.  Pt was admitted on 8/28/17. Prior to admission pt must have entered 10 units instead of 1 units on her insulin pump.  She became unresponsive around 11 pm.  Pt was transferred via ambulance to [facility] and had a blood sugar of 25. She received multiple treatments of D5 and D50 and her sugar stabilized. Pt also was given her medications for atrial fibrillation and was restarted on coreg and pradaxa for anticoagulation.   Pt was discharged the next day in stable condition. Pt was recently prescribed a glucagon kit and has yet to pick that up along with glucose tabs.  Pt states she feels better today. She has appt with Endocrinologist in [facility] but pt would like to see and Endocrinologist here.  Pt's last hemoglobin a1c was >9.0    BP is still not at goal despite pt taking losartan 50 mg PO q daily, hydralazine 100 mg PO TID lasix 40 mg PO BID and coreg 3.125 mg PO BID.  Pt states she is compliant with medications     Pt also sees Nephrologist in [place] regarding right kidney transplant and CKD.  Pt's CR and GFR have been stable          Diabetes   She presents for her follow-up diabetic visit.  She has type 2 diabetes mellitus. Her disease course has been fluctuating. Pertinent negatives for hypoglycemia include no confusion, dizziness, headaches, hunger, mood changes, nervousness/anxiousness, pallor, seizures, sleepiness, speech difficulty, sweats or tremors. Pertinent negatives for diabetes include no blurred vision, no chest pain, no fatigue, no foot ulcerations, no polydipsia, no polyphagia, no polyuria, no visual change, no weakness and no weight loss. Hypoglycemia complications include blackouts and hospitalization. Pertinent negatives for hypoglycemia complications include no required assistance and no required glucagon injection. Pertinent negatives for diabetic complications include no autonomic neuropathy, CVA, heart disease, impotence, nephropathy, peripheral neuropathy, PVD or retinopathy. Risk factors for coronary artery disease include diabetes mellitus, hypertension, dyslipidemia, sedentary lifestyle, stress and obesity. Current diabetic treatment includes insulin pump. She is compliant with treatment most of the time. She is following a generally unhealthy diet. Meal planning includes carbohydrate counting. She has not had a previous visit with a dietitian. She participates in exercise intermittently. Her home blood glucose trend is fluctuating dramatically. Her breakfast blood glucose is taken between 7-8 am. Her breakfast blood glucose range is generally 140-180 mg/dl. An ACE inhibitor/angiotensin II receptor blocker is being taken. She does not see a podiatrist.Eye exam is not current.   Atrial Fibrillation   Presents for follow-up visit. Symptoms are negative for an AICD problem, bradycardia, chest pain, dizziness, hemodynamic instability, hypertension, hypotension, pacemaker problem, palpitations, shortness of breath, syncope, tachycardia and weakness. The symptoms have been stable. Past medical history includes atrial fibrillation. There are no medication compliance problems.    Hypertension   This is a chronic problem. The current episode started more than 1 year ago. The problem has been waxing and waning since onset. The problem is uncontrolled. Pertinent negatives include no anxiety, blurred vision, chest pain, headaches, malaise/fatigue, neck pain, orthopnea, palpitations, peripheral edema, PND, shortness of breath or sweats. Risk factors for coronary artery disease include diabetes mellitus, obesity, sedentary lifestyle and dyslipidemia. Past treatments include angiotensin blockers, beta blockers, diuretics and direct vasodilators. The current treatment provides mild improvement. There are no compliance problems.  Hypertensive end-organ damage includes kidney disease. There is no history of angina, CAD/MI, CVA, heart failure, left ventricular hypertrophy, PVD, renovascular disease, retinopathy or a thyroid problem. Identifiable causes of hypertension include chronic renal disease. There is no history of coarctation of the aorta, hyperaldosteronism, hypercortisolism, hyperparathyroidism, a hypertension causing med, pheochromocytoma or sleep apnea.            The following portions of the patient's history were reviewed and updated as appropriate: allergies, current medications, past family history, past medical history, past social history, past surgical history and problem list.    Review of Systems   Constitutional: Negative for fatigue, malaise/fatigue and weight loss.   Eyes: Negative for blurred vision.   Respiratory: Negative for shortness of breath.    Cardiovascular: Negative for chest pain, palpitations, orthopnea, syncope and PND.   Endocrine: Negative for polydipsia, polyphagia and polyuria.   Genitourinary: Negative for impotence.   Musculoskeletal: Negative for neck pain.   Skin: Negative for pallor.   Neurological: Negative for dizziness, tremors, seizures, speech difficulty, weakness and headaches.   Psychiatric/Behavioral: Negative for confusion. The patient is not  "nervous/anxious.        Objective    /63 (BP Location: Left arm, Patient Position: Sitting)  Pulse 72  Ht 60\" (152.4 cm)  Wt 159 lb 6.4 oz (72.3 kg)  SpO2 97%  BMI 31.13 kg/m2  Lab on 07/21/2017   Component Date Value Ref Range Status   • Iron 07/21/2017 49  37 - 170 mcg/dL Final   • TIBC 07/21/2017 249* 265 - 497 mcg/dL Final   • Iron Saturation 07/21/2017 20  15 - 50 % Final   • Ferritin 07/21/2017 589.00* 11.10 - 264.00 ng/mL Final   • Folate 07/21/2017 14.20  2.76 - 21.00 ng/mL Final   • Vitamin B-12 07/21/2017 731  239 - 931 pg/mL Final   • Tacrolimus 07/24/2017 None Detected  2.0 - 20.0 ng/mL Final            Trough (immediately following                  transplant)                       15.0          Trough (steady state, 2 weeks or                  more after transplant):      3.0 - 8.0                                   Detection Limit = 1.0          Performed by LC-MS/MS technology.   • PTH, Intact 07/21/2017 348.3* 10.0 - 65.0 pg/mL Final   • Protein/Creatinine Ratio, Urine 07/24/2017 286.8* 0.0 - 200.0 mg/G Crea Final   • Creatinine, Urine 07/24/2017 38.0  mg/dL Final   • Total Protein, Urine 07/24/2017 10.9  mg/dL Final       Chemistry        Component Value Date/Time     07/03/2017 0822    K 4.1 07/03/2017 0822     07/03/2017 0822    CO2 24.0 07/03/2017 0822    BUN 39 (H) 07/03/2017 0822    CREATININE 1.69 (H) 07/03/2017 0822        Component Value Date/Time    CALCIUM 9.4 07/03/2017 0822    ALKPHOS 85 07/03/2017 0822    AST 22 07/03/2017 0822    ALT 43 07/03/2017 0822    BILITOT 0.4 07/03/2017 0822        Lab Results   Component Value Date    WBC 3.41 07/03/2017    HGB 8.9 (L) 07/03/2017    HCT 28.9 (L) 07/03/2017    MCV 81.9 07/03/2017     (L) 07/03/2017     Lab Results   Component Value Date    CHOL 158 07/03/2017    CHOL 183 03/22/2017    CHOL 185 03/21/2017     Lab Results   Component Value Date    TRIG 74 07/03/2017    TRIG 130 03/22/2017    TRIG 130 03/21/2017 "     Lab Results   Component Value Date    HDL 57 (L) 07/03/2017    HDL 59 (L) 03/22/2017    HDL 62 03/21/2017     Lab Results   Component Value Date    LDLCALC 138 (H) 01/05/2017    LDLCALC 103 09/27/2016    LDLCALC 111 04/25/2016     No results found for: LDL  No results found for: HDLLDLRATIO  No components found for: CHOLHDL  Lab Results   Component Value Date    HGBA1C 9.11 (H) 07/03/2017     Lab Results   Component Value Date    TSH 1.540 03/21/2017     Lab on 07/21/2017   Component Date Value Ref Range Status   • Iron 07/21/2017 49  37 - 170 mcg/dL Final   • TIBC 07/21/2017 249* 265 - 497 mcg/dL Final   • Iron Saturation 07/21/2017 20  15 - 50 % Final   • Ferritin 07/21/2017 589.00* 11.10 - 264.00 ng/mL Final   • Folate 07/21/2017 14.20  2.76 - 21.00 ng/mL Final   • Vitamin B-12 07/21/2017 731  239 - 931 pg/mL Final   • Tacrolimus 07/24/2017 None Detected  2.0 - 20.0 ng/mL Final            Trough (immediately following                  transplant)                       15.0          Trough (steady state, 2 weeks or                  more after transplant):      3.0 - 8.0                                   Detection Limit = 1.0          Performed by LC-MS/MS technology.   • PTH, Intact 07/21/2017 348.3* 10.0 - 65.0 pg/mL Final   • Protein/Creatinine Ratio, Urine 07/24/2017 286.8* 0.0 - 200.0 mg/G Crea Final   • Creatinine, Urine 07/24/2017 38.0  mg/dL Final   • Total Protein, Urine 07/24/2017 10.9  mg/dL Final       Physical Exam   Constitutional: She is oriented to person, place, and time. She appears well-developed and well-nourished. No distress.   HENT:   Head: Normocephalic and atraumatic.   Right Ear: External ear normal.   Left Ear: External ear normal.   Eyes: Conjunctivae and EOM are normal. Pupils are equal, round, and reactive to light. Right eye exhibits no discharge. Left eye exhibits no discharge. No scleral icterus.   Neck: Normal range of motion. Neck supple. No JVD present. No tracheal deviation  present. No thyromegaly present.   Cardiovascular: Normal rate, regular rhythm and normal heart sounds.    Pulmonary/Chest: Effort normal. No stridor. No respiratory distress. She has no wheezes.   Abdominal: Soft. She exhibits no distension and no mass. There is no tenderness. There is no rebound and no guarding. No hernia.   Musculoskeletal: Normal range of motion. She exhibits no edema, tenderness or deformity.   Lymphadenopathy:     She has no cervical adenopathy.   Neurological: She is alert and oriented to person, place, and time. She has normal reflexes. No cranial nerve deficit. Coordination normal.   Skin: Skin is warm and dry. No rash noted. She is not diaphoretic. No erythema. No pallor.   Psychiatric: She has a normal mood and affect.   Nursing note and vitals reviewed.      Assessment/Plan   Problems Addressed this Visit        Cardiovascular and Mediastinum    Uncontrolled hypertension    Relevant Medications    carvedilol (COREG) 6.25 MG tablet       Endocrine    Diabetes type 2, uncontrolled - Primary    Relevant Orders    Ambulatory Referral to Endocrinology       Genitourinary    Chronic kidney disease       Other    Counseling for insulin pump    Relevant Orders    Ambulatory Referral to Endocrinology        - for hypertension - continue lasix, losartan and hydralazine. Will go up on dosage of coreg from 3.125 mg to 6.25 mg PO BID.  PT advised to check blood pressure daily and bring on next visit  - For DM type 2/hypoglycemic episodes  - pt has appt with Endocrinologist soon.  Pt would like to see Endocrinologist here. Will refer pt to Dr. Levine (Endocrinologist) consultation appreciated. Pt has yet to pickup glucagon injection and glucose tabs. Advised pt to check sugars daily and bring on next visit  - CKD stage/history of kidney transplant - Nephrology following   -recheck in 2 weeks  - pt advised if similar episodes occur  Such as hypoglycemia to go to ER or call 911

## 2017-09-05 NOTE — PATIENT INSTRUCTIONS
Will go up on coreg from 3.125 to 6.25 mg twice a day. Take 2 pills of 3.125 mg in morning and evening.  Record heart rate as well     Get Prevnar 13 vaccine at pharmacy  Get flu vaccine at pharmacy.

## 2017-09-06 PROBLEM — Z46.81 COUNSELING FOR INSULIN PUMP: Status: ACTIVE | Noted: 2017-09-06

## 2017-09-06 PROBLEM — E16.2 HYPOGLYCEMIA: Status: ACTIVE | Noted: 2017-09-06

## 2017-09-11 NOTE — PROGRESS NOTES
Subjective   Chanda Yan is a 76 y.o. female.     Problem List  1. 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 Hyperlipidemia  3. ASCAD sp stent placement/severe LDH/Right ventricular Delitation  4. ESRD sp right kidney transplant   5. Obesity  6. Essential Hypertension  8. Right kidney transplant in 2010  9. GERD  10. Bilateral leg edema  11. Anemia of Chronic Disease   12. Depression   13. Unspecified murmur  14. Paroxymal Atrial Fibrillation     Pt is 77 yo AAF with the above medical issues. Is here for recheck on BP. Was started on losartan 25 mg PO q daily.  PT states she can tolerate medication. Is also taking coreg 3.125 mg PO BID, Cardizem 90 mg PO BID along with lasix 40 mg PO BID.  Denies any chest pain headaches or dizziness. BP has been running >150/90.  With some readings less than <150/90.     Pt also needs refills on myfortic for her history or right kidney transplantation.  She also follows Nephrologist in [place] regarding this     Pt also has right sided hyphema on that she experienced after blowing her nose a few days ago. Denies any pain or vision problems currently.  Pt is on pradaxa for atrial fibrillation    Pt continues to have some hearing loss on right side.  Has tried debrox ointment which has helped with cerumen.  Has appt with Dr. Vini FAROOQ later in June     Hypertension   This is a chronic problem. The current episode started more than 1 year ago. The problem has been waxing and waning since onset. The problem is uncontrolled. Associated symptoms include sweats. Pertinent negatives include no anxiety, blurred vision, chest pain, headaches, malaise/fatigue, neck pain, orthopnea, palpitations, peripheral edema or PND. There are no associated agents to hypertension. Risk factors for coronary artery disease include diabetes mellitus, dyslipidemia, post-menopausal state, obesity and sedentary lifestyle. Past treatments include ACE inhibitors, angiotensin blockers, beta blockers,  "calcium channel blockers, diuretics and lifestyle changes. The current treatment provides moderate improvement. There are no compliance problems.  Hypertensive end-organ damage includes kidney disease, CAD/MI and renovascular disease. There is no history of angina, CVA, heart failure, left ventricular hypertrophy, PVD, retinopathy or a thyroid problem. Identifiable causes of hypertension include chronic renal disease. There is no history of coarctation of the aorta, hyperaldosteronism, hypercortisolism, hyperparathyroidism or sleep apnea.        The following portions of the patient's history were reviewed and updated as appropriate: allergies, current medications, past family history, past medical history, past social history, past surgical history and problem list.    Review of Systems   Constitutional: Negative.  Negative for malaise/fatigue.   HENT: Positive for hearing loss.    Eyes: Negative.  Negative for blurred vision.   Respiratory: Negative.    Cardiovascular: Negative.  Negative for chest pain, palpitations, orthopnea and PND.   Gastrointestinal: Negative.    Endocrine: Negative.    Genitourinary: Negative.    Musculoskeletal: Negative.  Negative for neck pain.   Skin: Negative.    Allergic/Immunologic: Negative.    Neurological: Negative for headaches.   Hematological: Negative.    Psychiatric/Behavioral: Negative.        Objective    /60  Pulse 63  Temp 98.6 °F (37 °C)  Resp 16  Ht 60\" (152.4 cm)  Wt 162 lb 3.2 oz (73.6 kg)  BMI 31.68 kg/m2    /60  Pulse 63  Temp 98.6 °F (37 °C)  Resp 16  Ht 60\" (152.4 cm)  Wt 162 lb 3.2 oz (73.6 kg)  BMI 31.68 kg/m2    Chemistry        Component Value Date/Time     (L) 03/23/2017 0141    K 4.0 03/23/2017 0141    CL 97 03/23/2017 0141    CO2 26.0 03/23/2017 0141    BUN 56 (H) 03/23/2017 0141    CREATININE 2.25 (H) 03/23/2017 0141        Component Value Date/Time    CALCIUM 9.1 03/23/2017 0141    ALKPHOS 84 03/23/2017 0141    AST 27 " 03/23/2017 0141    ALT 28 03/23/2017 0141    BILITOT 0.4 03/23/2017 0141        Lab Results   Component Value Date    WBC 3.96 03/23/2017    HGB 8.9 (L) 03/23/2017    HCT 27.6 (L) 03/23/2017    MCV 81.4 03/23/2017     03/23/2017           Lab Results   Component Value Date    CHOL 183 03/22/2017    CHOL 185 03/21/2017     Lab Results   Component Value Date    TRIG 130 03/22/2017    TRIG 130 03/21/2017    TRIG 119 01/05/2017     Lab Results   Component Value Date    HDL 59 (L) 03/22/2017    HDL 62 03/21/2017    HDL 65 01/05/2017     Lab Results   Component Value Date    LDLCALC 138 (H) 01/05/2017    LDLCALC 103 09/27/2016    LDLCALC 111 04/25/2016     No results found for: LDL  No results found for: HDLLDLRATIO  No components found for: CHOLHDL  Lab Results   Component Value Date    HGBA1C 7.81 (H) 03/21/2017     Lab Results   Component Value Date    TSH 1.540 03/21/2017     Physical Exam   Constitutional: She is oriented to person, place, and time. She appears well-developed and well-nourished.   HENT:   Head: Normocephalic and atraumatic.   Right Ear: External ear normal.   Left Ear: External ear normal.   Some cerumen debris in right ear    Eyes: EOM are normal. Pupils are equal, round, and reactive to light. Right eye exhibits no discharge. Left eye exhibits no discharge. No scleral icterus.   Hyphemia on medial conjunctiva on right eye    Neck: Normal range of motion. Neck supple. No JVD present. No tracheal deviation present. No thyromegaly present.   Cardiovascular: Normal rate, regular rhythm and normal heart sounds.   Abdominal: Soft. Bowel sounds are normal. She exhibits no distension and no mass. There is no tenderness. There is no rebound and no guarding. No hernia.   Musculoskeletal: Normal range of motion. She exhibits no edema, tenderness or deformity.   Lymphadenopathy:     She has no cervical adenopathy.   Neurological: She is alert and oriented to person, place, and time. She has normal  reflexes. No cranial nerve deficit. Coordination normal.   Skin: Skin is warm and dry. No rash noted. She is not diaphoretic. No erythema. No pallor.   Psychiatric: She has a normal mood and affect. Her behavior is normal. Thought content normal.   Nursing note and vitals reviewed.  Physical Exam   Constitutional: She is oriented to person, place, and time. She appears well-developed and well-nourished.   HENT:   Head: Normocephalic and atraumatic.   Right Ear: External ear normal.   Left Ear: External ear normal.   Some cerumen debris in right ear    Eyes: EOM are normal. Pupils are equal, round, and reactive to light. Right eye exhibits no discharge. Left eye exhibits no discharge. No scleral icterus.   Hyphemia on medial conjunctiva on right eye    Neck: Normal range of motion. Neck supple. No JVD present. No tracheal deviation present. No thyromegaly present.   Cardiovascular: Normal rate, regular rhythm and normal heart sounds.    Pulmonary/Chest: Effort normal and breath sounds normal. No stridor. No respiratory distress.   Abdominal: Soft. Bowel sounds are normal. She exhibits no distension and no mass. There is no tenderness. There is no rebound and no guarding. No hernia.   Musculoskeletal: Normal range of motion. She exhibits no edema, tenderness or deformity.   Lymphadenopathy:     She has no cervical adenopathy.   Neurological: She is alert and oriented to person, place, and time. She has normal reflexes. No cranial nerve deficit. Coordination normal.   Skin: Skin is warm and dry. No rash noted. She is not diaphoretic. No erythema. No pallor.   Psychiatric: She has a normal mood and affect. Her behavior is normal. Thought content normal.   Nursing note and vitals reviewed.      Assessment/Plan   Problems Addressed this Visit        Cardiovascular and Mediastinum    Uncontrolled hypertension - Primary    Relevant Medications    losartan (COZAAR) 25 MG tablet       Nervous and Auditory    Hearing loss        Genitourinary    Chronic kidney disease       Other    History of kidney transplant    Hyphema        - regarding hyphemia - advised pt this will resolve on its own.  Gave information on home care instructions. If problems persist or vision gets worse advised pt to go see her Ophthalmologist.    - hearing loss - appt with Dr. Martini in June  - CKD and history of kidney transplant - will refill myfortic.  360 mg PO BID  - for hypertension - continue with coreg, cardizem and hydralazine. Will go up on dosage of losartan from 25 to 50 mg PO q daily.  Pt to continue checking BP at home  - recheck in 1 month   - get labwork and recheck DM type 2 in 1 month

## 2017-09-12 ENCOUNTER — LAB (OUTPATIENT)
Dept: LAB | Facility: CLINIC | Age: 77
End: 2017-09-12

## 2017-09-12 ENCOUNTER — TRANSCRIBE ORDERS (OUTPATIENT)
Dept: LAB | Facility: CLINIC | Age: 77
End: 2017-09-12

## 2017-09-12 DIAGNOSIS — Z94.0 KIDNEY REPLACED BY TRANSPLANT: ICD-10-CM

## 2017-09-12 DIAGNOSIS — I48.91 ATRIAL FIBRILLATION, UNSPECIFIED TYPE (HCC): ICD-10-CM

## 2017-09-12 DIAGNOSIS — E08.3213 DIABETES MELLITUS DUE TO UNDERLYING CONDITION WITH BOTH EYES AFFECTED BY MILD NONPROLIFERATIVE RETINOPATHY AND MACULAR EDEMA, WITHOUT LONG-TERM CURRENT USE OF INSULIN (HCC): ICD-10-CM

## 2017-09-12 DIAGNOSIS — N18.6 END STAGE RENAL DISEASE (HCC): Primary | ICD-10-CM

## 2017-09-12 DIAGNOSIS — N18.6 END STAGE RENAL DISEASE (HCC): ICD-10-CM

## 2017-09-12 LAB
ALBUMIN SERPL-MCNC: 3.8 G/DL (ref 3.4–4.8)
ANION GAP SERPL CALCULATED.3IONS-SCNC: 11 MMOL/L (ref 5–15)
ARTICHOKE IGE QN: 94 MG/DL (ref 1–129)
BASOPHILS # BLD AUTO: 0.02 10*3/MM3 (ref 0–0.2)
BASOPHILS NFR BLD AUTO: 0.8 % (ref 0–2)
BUN BLD-MCNC: 44 MG/DL (ref 7–21)
BUN/CREAT SERPL: 19.1 (ref 7–25)
CALCIUM SPEC-SCNC: 9.3 MG/DL (ref 8.4–10.2)
CHLORIDE SERPL-SCNC: 99 MMOL/L (ref 95–110)
CHOLEST SERPL-MCNC: 175 MG/DL (ref 0–199)
CO2 SERPL-SCNC: 26 MMOL/L (ref 22–31)
CREAT BLD-MCNC: 2.3 MG/DL (ref 0.5–1)
DEPRECATED RDW RBC AUTO: 46.6 FL (ref 36.4–46.3)
EOSINOPHIL # BLD AUTO: 0.07 10*3/MM3 (ref 0–0.7)
EOSINOPHIL NFR BLD AUTO: 2.7 % (ref 0–7)
ERYTHROCYTE [DISTWIDTH] IN BLOOD BY AUTOMATED COUNT: 15.1 % (ref 11.5–14.5)
GFR SERPL CREATININE-BSD FRML MDRD: 25 ML/MIN/1.73 (ref 39–90)
GLUCOSE BLD-MCNC: 150 MG/DL (ref 60–100)
HBA1C MFR BLD: 8.5 % (ref 4–5.6)
HCT VFR BLD AUTO: 29.5 % (ref 35–45)
HDLC SERPL-MCNC: 46 MG/DL (ref 60–200)
HGB BLD-MCNC: 9.2 G/DL (ref 12–15.5)
IMM GRANULOCYTES # BLD: 0 10*3/MM3 (ref 0–0.02)
IMM GRANULOCYTES NFR BLD: 0 % (ref 0–0.5)
LDLC/HDLC SERPL: 2.22 {RATIO} (ref 0–3.22)
LYMPHOCYTES # BLD AUTO: 0.49 10*3/MM3 (ref 0.6–4.2)
LYMPHOCYTES NFR BLD AUTO: 18.6 % (ref 10–50)
MCH RBC QN AUTO: 26.3 PG (ref 26.5–34)
MCHC RBC AUTO-ENTMCNC: 31.2 G/DL (ref 31.4–36)
MCV RBC AUTO: 84.3 FL (ref 80–98)
MONOCYTES # BLD AUTO: 0.43 10*3/MM3 (ref 0–0.9)
MONOCYTES NFR BLD AUTO: 16.3 % (ref 0–12)
NEUTROPHILS # BLD AUTO: 1.63 10*3/MM3 (ref 2–8.6)
NEUTROPHILS NFR BLD AUTO: 61.6 % (ref 37–80)
PHOSPHATE SERPL-MCNC: 4.1 MG/DL (ref 2.4–4.4)
PLATELET # BLD AUTO: 177 10*3/MM3 (ref 150–450)
PMV BLD AUTO: 11.2 FL (ref 8–12)
POTASSIUM BLD-SCNC: 3.9 MMOL/L (ref 3.5–5.1)
RBC # BLD AUTO: 3.5 10*6/MM3 (ref 3.77–5.16)
SODIUM BLD-SCNC: 136 MMOL/L (ref 137–145)
TRIGL SERPL-MCNC: 134 MG/DL (ref 20–199)
WBC NRBC COR # BLD: 2.64 10*3/MM3 (ref 3.2–9.8)

## 2017-09-12 PROCEDURE — 85025 COMPLETE CBC W/AUTO DIFF WBC: CPT | Performed by: INTERNAL MEDICINE

## 2017-09-12 PROCEDURE — 80061 LIPID PANEL: CPT | Performed by: INTERNAL MEDICINE

## 2017-09-12 PROCEDURE — 36415 COLL VENOUS BLD VENIPUNCTURE: CPT | Performed by: FAMILY MEDICINE

## 2017-09-12 PROCEDURE — 80069 RENAL FUNCTION PANEL: CPT | Performed by: INTERNAL MEDICINE

## 2017-09-12 PROCEDURE — 83036 HEMOGLOBIN GLYCOSYLATED A1C: CPT | Performed by: INTERNAL MEDICINE

## 2017-09-12 PROCEDURE — 80197 ASSAY OF TACROLIMUS: CPT | Performed by: INTERNAL MEDICINE

## 2017-09-15 ENCOUNTER — OFFICE VISIT (OUTPATIENT)
Dept: FAMILY MEDICINE CLINIC | Facility: CLINIC | Age: 77
End: 2017-09-15

## 2017-09-15 VITALS
SYSTOLIC BLOOD PRESSURE: 126 MMHG | HEART RATE: 65 BPM | HEIGHT: 60 IN | BODY MASS INDEX: 30.74 KG/M2 | WEIGHT: 156.6 LBS | RESPIRATION RATE: 16 BRPM | TEMPERATURE: 98.6 F | DIASTOLIC BLOOD PRESSURE: 60 MMHG

## 2017-09-15 DIAGNOSIS — IMO0002 UNCONTROLLED TYPE 2 DIABETES MELLITUS WITH OTHER SPECIFIED COMPLICATION, WITH LONG-TERM CURRENT USE OF INSULIN: ICD-10-CM

## 2017-09-15 DIAGNOSIS — Z94.0 HISTORY OF KIDNEY TRANSPLANT: ICD-10-CM

## 2017-09-15 DIAGNOSIS — N18.30 CHRONIC KIDNEY DISEASE, STAGE 3 (MODERATE): ICD-10-CM

## 2017-09-15 DIAGNOSIS — I10 UNCONTROLLED HYPERTENSION: Primary | ICD-10-CM

## 2017-09-15 LAB — TACROLIMUS BLD-MCNC: 3.2 NG/ML (ref 2–20)

## 2017-09-15 PROCEDURE — 99214 OFFICE O/P EST MOD 30 MIN: CPT | Performed by: FAMILY MEDICINE

## 2017-09-15 RX ORDER — ROSUVASTATIN CALCIUM 20 MG/1
20 TABLET, COATED ORAL DAILY
Qty: 90 TABLET | Refills: 11 | Status: SHIPPED | OUTPATIENT
Start: 2017-09-15 | End: 2018-04-30 | Stop reason: SDUPTHER

## 2017-09-15 RX ORDER — CARVEDILOL 6.25 MG/1
6.25 TABLET ORAL 2 TIMES DAILY WITH MEALS
Qty: 180 TABLET | Refills: 3 | Status: SHIPPED | OUTPATIENT
Start: 2017-09-15 | End: 2017-12-06 | Stop reason: HOSPADM

## 2017-09-15 RX ORDER — ROSUVASTATIN CALCIUM 20 MG/1
20 TABLET, COATED ORAL DAILY
Qty: 30 TABLET | Refills: 11 | Status: SHIPPED | OUTPATIENT
Start: 2017-09-15 | End: 2017-09-15 | Stop reason: SDUPTHER

## 2017-09-15 NOTE — PROGRESS NOTES
"Subjective   Chanda Yan is a 77 y.o. female.     History of Present Illness     Problem List  1. 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 Hyperlipidemia  3. ASCAD sp stent placement/severe LDH/Right ventricular Delitation  4. ESRD sp right kidney transplant   5. Obesity  6. Essential Hypertension  8. Right kidney transplant in 2010  9. GERD  10. Bilateral leg edema  11. Anemia of Chronic Disease   12. Depression   13. Unspecified murmur  14. Paroxymal Atrial Fibrillation     Pt is 76 yo AAF with the above medical issues Is here for recheck on hypertension. On last visit pt's coreg was increased to 6.25 mg PO BID.  Pt states BP better controlled  With losartan 50 mg PO q daily and cardizem 90 mg PO BID.  Pt also is on lasix 50 mg pO BID.  Pt has been following with Nephrologist for CKD and sp kidney transplantation. Also seen Endocrinologist recently and had pump adjusted. Currently takes insulin at basal rate of 4.4. Pt also counting carbs. She is seeing less frequent hypoglycemic episodes. Pt was at [facility] recently admitted for hypoglycemic episodes. Sugasr better controlled      The following portions of the patient's history were reviewed and updated as appropriate: allergies, current medications, past family history, past medical history, past social history, past surgical history and problem list.    Review of Systems   Constitutional: Negative.    HENT: Negative.    Eyes: Negative.    Respiratory: Negative.    Cardiovascular: Negative.    Gastrointestinal: Negative.    Endocrine: Negative.    Genitourinary: Negative.    Musculoskeletal: Negative.    Skin: Negative.    Allergic/Immunologic: Negative.    Neurological: Negative.    Hematological: Negative.    Psychiatric/Behavioral: Negative.        Objective    /60  Pulse 65  Temp 98.6 °F (37 °C)  Resp 16  Ht 60\" (152.4 cm)  Wt 156 lb 9.6 oz (71 kg)  BMI 30.58 kg/m2      Chemistry        Component Value Date/Time     (L) 09/12/2017 " 0814    K 3.9 09/12/2017 0814    CL 99 09/12/2017 0814    CO2 26.0 09/12/2017 0814    BUN 44 (H) 09/12/2017 0814    CREATININE 2.30 (H) 09/12/2017 0814        Component Value Date/Time    CALCIUM 9.3 09/12/2017 0814    ALKPHOS 85 07/03/2017 0822    AST 22 07/03/2017 0822    ALT 43 07/03/2017 0822    BILITOT 0.4 07/03/2017 0822        Lab Results   Component Value Date    WBC 2.64 (L) 09/12/2017    HGB 9.2 (L) 09/12/2017    HCT 29.5 (L) 09/12/2017    MCV 84.3 09/12/2017     09/12/2017     Lab Results   Component Value Date    CHOL 175 09/12/2017    CHOL 158 07/03/2017    CHOL 183 03/22/2017     Lab Results   Component Value Date    TRIG 134 09/12/2017    TRIG 74 07/03/2017    TRIG 130 03/22/2017     Lab Results   Component Value Date    HDL 46 (L) 09/12/2017    HDL 57 (L) 07/03/2017    HDL 59 (L) 03/22/2017     Lab Results   Component Value Date    LDLCALC 138 (H) 01/05/2017    LDLCALC 103 09/27/2016    LDLCALC 111 04/25/2016     No results found for: LDL  No results found for: HDLLDLRATIO  No components found for: CHOLHDL  Lab Results   Component Value Date    HGBA1C 8.5 (H) 09/12/2017     Lab Results   Component Value Date    TSH 1.540 03/21/2017     Physical Exam   Constitutional: She is oriented to person, place, and time. She appears well-developed and well-nourished. No distress.   HENT:   Head: Normocephalic and atraumatic.   Right Ear: External ear normal.   Left Ear: External ear normal.   Eyes: Conjunctivae and EOM are normal. Pupils are equal, round, and reactive to light. Right eye exhibits no discharge. Left eye exhibits no discharge. No scleral icterus.   Neck: Normal range of motion. Neck supple. No JVD present. No tracheal deviation present. No thyromegaly present.   Cardiovascular: Normal rate, regular rhythm and normal heart sounds.    Pulmonary/Chest: Effort normal. No stridor. No respiratory distress. She has no wheezes.   Abdominal: Soft. She exhibits no distension and no mass. There is no  tenderness. There is no rebound and no guarding. No hernia.   Obese abdomen    Musculoskeletal: Normal range of motion. She exhibits tenderness. She exhibits no edema or deformity.   Lymphadenopathy:     She has no cervical adenopathy.   Neurological: She is alert and oriented to person, place, and time. She has normal reflexes. No cranial nerve deficit. Coordination normal.   Skin: Skin is warm and dry. No rash noted. No erythema. No pallor.   Psychiatric: She has a normal mood and affect. Her behavior is normal.   Nursing note and vitals reviewed.      Assessment/Plan   Problems Addressed this Visit        Cardiovascular and Mediastinum    Uncontrolled hypertension - Primary    Relevant Medications    carvedilol (COREG) 6.25 MG tablet       Endocrine    Diabetes type 2, uncontrolled       Genitourinary    Chronic kidney disease       Other    History of kidney transplant        - Blood pressure currently controlled. Continue with lasix, losartan, cardizem, coreg BID.    - DM type 2 - pt referred to Dr. Levine (Endocrinologist) continue with insulin pump. Sugars better conrolled  - CKD stage 3/sp kidney transplant - continue with tacrolimus and myfortic.  Nephrologist following   - recheck in 1 month

## 2017-10-12 DIAGNOSIS — R05.9 COUGH: ICD-10-CM

## 2017-10-12 RX ORDER — ALBUTEROL SULFATE 90 UG/1
AEROSOL, METERED RESPIRATORY (INHALATION)
Qty: 18 G | Refills: 0 | Status: SHIPPED | OUTPATIENT
Start: 2017-10-12 | End: 2017-12-15 | Stop reason: SDUPTHER

## 2017-10-16 ENCOUNTER — OFFICE VISIT (OUTPATIENT)
Dept: FAMILY MEDICINE CLINIC | Facility: CLINIC | Age: 77
End: 2017-10-16

## 2017-10-16 ENCOUNTER — TRANSCRIBE ORDERS (OUTPATIENT)
Dept: LAB | Facility: CLINIC | Age: 77
End: 2017-10-16

## 2017-10-16 ENCOUNTER — LAB (OUTPATIENT)
Dept: LAB | Facility: CLINIC | Age: 77
End: 2017-10-16

## 2017-10-16 VITALS
BODY MASS INDEX: 30.35 KG/M2 | TEMPERATURE: 98.6 F | DIASTOLIC BLOOD PRESSURE: 74 MMHG | RESPIRATION RATE: 16 BRPM | HEART RATE: 65 BPM | HEIGHT: 60 IN | WEIGHT: 154.6 LBS | SYSTOLIC BLOOD PRESSURE: 174 MMHG

## 2017-10-16 DIAGNOSIS — IMO0002 UNCONTROLLED TYPE 2 DIABETES MELLITUS WITH OTHER SPECIFIED COMPLICATION, WITH LONG-TERM CURRENT USE OF INSULIN: ICD-10-CM

## 2017-10-16 DIAGNOSIS — N18.6 END STAGE RENAL DISEASE (HCC): Primary | ICD-10-CM

## 2017-10-16 DIAGNOSIS — N18.30 STAGE 3 CHRONIC KIDNEY DISEASE (HCC): ICD-10-CM

## 2017-10-16 DIAGNOSIS — Z94.0 HISTORY OF KIDNEY TRANSPLANT: ICD-10-CM

## 2017-10-16 DIAGNOSIS — E78.2 MIXED HYPERLIPIDEMIA: Primary | ICD-10-CM

## 2017-10-16 DIAGNOSIS — N18.6 END STAGE RENAL DISEASE (HCC): ICD-10-CM

## 2017-10-16 DIAGNOSIS — I48.0 PAROXYSMAL ATRIAL FIBRILLATION (HCC): ICD-10-CM

## 2017-10-16 LAB
BASOPHILS # BLD AUTO: 0.01 10*3/MM3 (ref 0–0.2)
BASOPHILS NFR BLD AUTO: 0.3 % (ref 0–2)
DEPRECATED RDW RBC AUTO: 47.2 FL (ref 36.4–46.3)
EOSINOPHIL # BLD AUTO: 0.1 10*3/MM3 (ref 0–0.7)
EOSINOPHIL NFR BLD AUTO: 2.9 % (ref 0–7)
ERYTHROCYTE [DISTWIDTH] IN BLOOD BY AUTOMATED COUNT: 15.1 % (ref 11.5–14.5)
HCT VFR BLD AUTO: 28.4 % (ref 35–45)
HGB BLD-MCNC: 8.6 G/DL (ref 12–15.5)
IMM GRANULOCYTES # BLD: 0 10*3/MM3 (ref 0–0.02)
IMM GRANULOCYTES NFR BLD: 0 % (ref 0–0.5)
LYMPHOCYTES # BLD AUTO: 0.67 10*3/MM3 (ref 0.6–4.2)
LYMPHOCYTES NFR BLD AUTO: 19.3 % (ref 10–50)
MCH RBC QN AUTO: 25.6 PG (ref 26.5–34)
MCHC RBC AUTO-ENTMCNC: 30.3 G/DL (ref 31.4–36)
MCV RBC AUTO: 84.5 FL (ref 80–98)
MONOCYTES # BLD AUTO: 0.33 10*3/MM3 (ref 0–0.9)
MONOCYTES NFR BLD AUTO: 9.5 % (ref 0–12)
NEUTROPHILS # BLD AUTO: 2.37 10*3/MM3 (ref 2–8.6)
NEUTROPHILS NFR BLD AUTO: 68 % (ref 37–80)
PLATELET # BLD AUTO: 181 10*3/MM3 (ref 150–450)
PMV BLD AUTO: 12.3 FL (ref 8–12)
RBC # BLD AUTO: 3.36 10*6/MM3 (ref 3.77–5.16)
WBC NRBC COR # BLD: 3.48 10*3/MM3 (ref 3.2–9.8)

## 2017-10-16 PROCEDURE — 80197 ASSAY OF TACROLIMUS: CPT | Performed by: INTERNAL MEDICINE

## 2017-10-16 PROCEDURE — 36415 COLL VENOUS BLD VENIPUNCTURE: CPT | Performed by: FAMILY MEDICINE

## 2017-10-16 PROCEDURE — 83970 ASSAY OF PARATHORMONE: CPT | Performed by: INTERNAL MEDICINE

## 2017-10-16 PROCEDURE — 83735 ASSAY OF MAGNESIUM: CPT | Performed by: INTERNAL MEDICINE

## 2017-10-16 PROCEDURE — 82306 VITAMIN D 25 HYDROXY: CPT | Performed by: INTERNAL MEDICINE

## 2017-10-16 PROCEDURE — 80069 RENAL FUNCTION PANEL: CPT | Performed by: INTERNAL MEDICINE

## 2017-10-16 PROCEDURE — 85025 COMPLETE CBC W/AUTO DIFF WBC: CPT | Performed by: INTERNAL MEDICINE

## 2017-10-16 PROCEDURE — 99214 OFFICE O/P EST MOD 30 MIN: CPT | Performed by: FAMILY MEDICINE

## 2017-10-16 NOTE — PROGRESS NOTES
"Subjective   Chanda Yan is a 77 y.o. female.     History of Present Illness     Problem List  1. 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 Hyperlipidemia ASCVD risk high   3. ASCAD sp stent placement/severe LDH/Right ventricular Delitation  4. ESRD sp right kidney transplant   5. Obesity BMI >30   6. Essential Hypertension  8. Right kidney transplant in 2010  9. GERD  10. Bilateral leg edema  11. Anemia of Chronic Disease   12. Depression   13. Unspecified murmur  14. Paroxymal Atrial Fibrillation     Pt is 78 yo AAF with the above medical issues. Is here for followup.  Sees Nephrologist. Dr. Morley for CKD/ESRD and sp right kidney transplant. Pt had labwork recently. States her sugars have been well controlled lately. Pt is on insulin pump. Last hga1c was <9.0.  States morning sugars running <130 in am and <180 2 hours after meals.  Has yet to hear with appt with Dr. Levine (Endocrinologist). BP is also elevated today but it is well controlled at home. Did bring BP readings and is runing <140/90.  Pt continue st o take hydralazine, losartan, coreg. For hyperlipidemi pt is on zetia and crestor.  Last LDL was not at goal     The following portions of the patient's history were reviewed and updated as appropriate: allergies, current medications, past family history, past medical history, past social history, past surgical history and problem list.    Review of Systems   Constitutional: Negative.    HENT: Negative.    Eyes: Negative.    Respiratory: Negative.    Cardiovascular: Negative.    Gastrointestinal: Negative.    Endocrine: Negative.    Genitourinary: Negative.    Musculoskeletal: Negative.    Skin: Negative.    Allergic/Immunologic: Negative.    Neurological: Negative.    Hematological: Negative.    Psychiatric/Behavioral: Negative.        Objective    /74  Pulse 65  Temp 98.6 °F (37 °C)  Resp 16  Ht 60\" (152.4 cm)  Wt 154 lb 9.6 oz (70.1 kg)  BMI 30.19 kg/m2      Chemistry      "   Component Value Date/Time     (L) 09/12/2017 0814    K 3.9 09/12/2017 0814    CL 99 09/12/2017 0814    CO2 26.0 09/12/2017 0814    BUN 44 (H) 09/12/2017 0814    CREATININE 2.30 (H) 09/12/2017 0814        Component Value Date/Time    CALCIUM 9.3 09/12/2017 0814    ALKPHOS 85 07/03/2017 0822    AST 22 07/03/2017 0822    ALT 43 07/03/2017 0822    BILITOT 0.4 07/03/2017 0822        Lab Results   Component Value Date    WBC 2.64 (L) 09/12/2017    HGB 9.2 (L) 09/12/2017    HCT 29.5 (L) 09/12/2017    MCV 84.3 09/12/2017     09/12/2017     Lab Results   Component Value Date    CHOL 175 09/12/2017    CHOL 158 07/03/2017    CHOL 183 03/22/2017     Lab Results   Component Value Date    TRIG 134 09/12/2017    TRIG 74 07/03/2017    TRIG 130 03/22/2017     Lab Results   Component Value Date    HDL 46 (L) 09/12/2017    HDL 57 (L) 07/03/2017    HDL 59 (L) 03/22/2017     Lab Results   Component Value Date    LDLCALC 138 (H) 01/05/2017    LDLCALC 103 09/27/2016    LDLCALC 111 04/25/2016     No results found for: LDL  No results found for: HDLLDLRATIO  No components found for: CHOLHDL  Lab Results   Component Value Date    HGBA1C 8.5 (H) 09/12/2017     Lab Results   Component Value Date    TSH 1.540 03/21/2017       Lab on 09/12/2017   Component Date Value Ref Range Status   • Total Cholesterol 09/12/2017 175  0 - 199 mg/dL Final   • Triglycerides 09/12/2017 134  20 - 199 mg/dL Final   • HDL Cholesterol 09/12/2017 46* 60 - 200 mg/dL Final   • LDL Cholesterol  09/12/2017 94  1 - 129 mg/dL Final   • LDL/HDL Ratio 09/12/2017 2.22  0.00 - 3.22 Final   • Hemoglobin A1C 09/12/2017 8.5* 4 - 5.6 % Final   • Glucose 09/12/2017 150* 60 - 100 mg/dL Final   • BUN 09/12/2017 44* 7 - 21 mg/dL Final   • Creatinine 09/12/2017 2.30* 0.50 - 1.00 mg/dL Final   • Sodium 09/12/2017 136* 137 - 145 mmol/L Final   • Potassium 09/12/2017 3.9  3.5 - 5.1 mmol/L Final   • Chloride 09/12/2017 99  95 - 110 mmol/L Final   • CO2 09/12/2017 26.0  22.0  - 31.0 mmol/L Final   • Calcium 09/12/2017 9.3  8.4 - 10.2 mg/dL Final   • Albumin 09/12/2017 3.80  3.40 - 4.80 g/dL Final   • Phosphorus 09/12/2017 4.1  2.4 - 4.4 mg/dL Final   • Anion Gap 09/12/2017 11.0  5.0 - 15.0 mmol/L Final   • BUN/Creatinine Ratio 09/12/2017 19.1  7.0 - 25.0 Final   • eGFR   Amer 09/12/2017 25* 39 - 90 mL/min/1.73 Final   • Tacrolimus 09/12/2017 3.2  2.0 - 20.0 ng/mL Final            Trough (immediately following                  transplant)                       15.0          Trough (steady state, 2 weeks or                  more after transplant):      3.0 - 8.0                                   Detection Limit = 1.0          Performed by LC-MS/MS technology.   • WBC 09/12/2017 2.64* 3.20 - 9.80 10*3/mm3 Final   • RBC 09/12/2017 3.50* 3.77 - 5.16 10*6/mm3 Final   • Hemoglobin 09/12/2017 9.2* 12.0 - 15.5 g/dL Final   • Hematocrit 09/12/2017 29.5* 35.0 - 45.0 % Final   • MCV 09/12/2017 84.3  80.0 - 98.0 fL Final   • MCH 09/12/2017 26.3* 26.5 - 34.0 pg Final   • MCHC 09/12/2017 31.2* 31.4 - 36.0 g/dL Final   • RDW 09/12/2017 15.1* 11.5 - 14.5 % Final   • RDW-SD 09/12/2017 46.6* 36.4 - 46.3 fl Final   • MPV 09/12/2017 11.2  8.0 - 12.0 fL Final   • Platelets 09/12/2017 177  150 - 450 10*3/mm3 Final   • Neutrophil % 09/12/2017 61.6  37.0 - 80.0 % Final   • Lymphocyte % 09/12/2017 18.6  10.0 - 50.0 % Final   • Monocyte % 09/12/2017 16.3* 0.0 - 12.0 % Final   • Eosinophil % 09/12/2017 2.7  0.0 - 7.0 % Final   • Basophil % 09/12/2017 0.8  0.0 - 2.0 % Final   • Immature Grans % 09/12/2017 0.0  0.0 - 0.5 % Final   • Neutrophils, Absolute 09/12/2017 1.63* 2.00 - 8.60 10*3/mm3 Final   • Lymphocytes, Absolute 09/12/2017 0.49* 0.60 - 4.20 10*3/mm3 Final   • Monocytes, Absolute 09/12/2017 0.43  0.00 - 0.90 10*3/mm3 Final   • Eosinophils, Absolute 09/12/2017 0.07  0.00 - 0.70 10*3/mm3 Final   • Basophils, Absolute 09/12/2017 0.02  0.00 - 0.20 10*3/mm3 Final   • Immature Grans, Absolute 09/12/2017  0.00  0.00 - 0.02 10*3/mm3 Final       Physical Exam   Constitutional: She is oriented to person, place, and time. She appears well-developed and well-nourished. No distress.   HENT:   Head: Normocephalic and atraumatic.   Right Ear: External ear normal.   Left Ear: External ear normal.   Eyes: Conjunctivae and EOM are normal. Pupils are equal, round, and reactive to light. Right eye exhibits no discharge. Left eye exhibits no discharge. No scleral icterus.   Neck: Normal range of motion. Neck supple. No JVD present. No tracheal deviation present. No thyromegaly present.   Cardiovascular: Normal rate, regular rhythm and normal heart sounds.    Pulmonary/Chest: Effort normal. No stridor. No respiratory distress. She has no wheezes.   Abdominal: Soft. She exhibits no distension and no mass. There is no tenderness. There is no rebound and no guarding. No hernia.   Obese abdomen    Musculoskeletal: Normal range of motion. She exhibits no edema or deformity.   Lymphadenopathy:     She has no cervical adenopathy.   Neurological: She is alert and oriented to person, place, and time. She has normal reflexes. No cranial nerve deficit. Coordination normal.   Skin: Skin is warm and dry. No rash noted. No erythema. No pallor.   Psychiatric: She has a normal mood and affect. Her behavior is normal.   Nursing note and vitals reviewed.      Assessment/Plan   Problems Addressed this Visit        Cardiovascular and Mediastinum    Mixed hyperlipidemia - Primary    Atrial fibrillation       Endocrine    Diabetes type 2, uncontrolled       Other    Chronic kidney disease    History of kidney transplant        - CKD/history of kidney transplant. Nephrology following - is on prednisone and tacrolimus curerntly.    - essential hypertension - currently elevated today. May have White Coat Syndrome.  Have BP readings today from home and will put on file.    - atrial fibrillation - currently rate controlled on coreg,ditilazem. On Pradaxa for  anticoagulation  - hyperlipidemia - continue crestor and zetia  - DM type 2 - will look into appt with Dr. Levine (Endocrinologist).  PT would like to establish with Endocrinologist at [facility].  -will Schedule Medicare Wellness Exam  - recheck in 2 months

## 2017-10-17 LAB
25(OH)D3 SERPL-MCNC: 73.2 NG/ML (ref 30–100)
ALBUMIN SERPL-MCNC: 3.8 G/DL (ref 3.4–4.8)
ANION GAP SERPL CALCULATED.3IONS-SCNC: 13 MMOL/L (ref 5–15)
BILIRUB UR QL STRIP: ABNORMAL
BUN BLD-MCNC: 57 MG/DL (ref 7–21)
BUN/CREAT SERPL: 20.6 (ref 7–25)
CALCIUM SPEC-SCNC: 9.7 MG/DL (ref 8.4–10.2)
CHLORIDE SERPL-SCNC: 106 MMOL/L (ref 95–110)
CLARITY UR: ABNORMAL
CO2 SERPL-SCNC: 22 MMOL/L (ref 22–31)
COLOR UR: ABNORMAL
CREAT BLD-MCNC: 2.77 MG/DL (ref 0.5–1)
GFR SERPL CREATININE-BSD FRML MDRD: 20 ML/MIN/1.73 (ref 39–90)
GLUCOSE BLD-MCNC: 77 MG/DL (ref 60–100)
GLUCOSE UR STRIP-MCNC: NEGATIVE MG/DL
HGB UR QL STRIP.AUTO: ABNORMAL
KETONES UR QL STRIP: ABNORMAL
LEUKOCYTE ESTERASE UR QL STRIP.AUTO: NEGATIVE
MAGNESIUM SERPL-MCNC: 1.9 MG/DL (ref 1.6–2.3)
NITRITE UR QL STRIP: NEGATIVE
PH UR STRIP.AUTO: 5 [PH] (ref 5–8)
PHOSPHATE SERPL-MCNC: 3.6 MG/DL (ref 2.4–4.4)
POTASSIUM BLD-SCNC: 4.1 MMOL/L (ref 3.5–5.1)
PROT UR QL STRIP: ABNORMAL
SODIUM BLD-SCNC: 141 MMOL/L (ref 137–145)
SP GR UR STRIP: 1.03 (ref 1–1.03)
UROBILINOGEN UR QL STRIP: ABNORMAL

## 2017-10-17 PROCEDURE — 84156 ASSAY OF PROTEIN URINE: CPT | Performed by: INTERNAL MEDICINE

## 2017-10-17 PROCEDURE — 82570 ASSAY OF URINE CREATININE: CPT | Performed by: INTERNAL MEDICINE

## 2017-10-17 RX ORDER — EZETIMIBE 10 MG/1
10 TABLET ORAL DAILY
Qty: 31 TABLET | Refills: 11 | Status: SHIPPED | OUTPATIENT
Start: 2017-10-17 | End: 2018-08-12 | Stop reason: SDUPTHER

## 2017-10-17 RX ORDER — BLOOD SUGAR DIAGNOSTIC
STRIP MISCELLANEOUS
Qty: 100 EACH | Refills: 6 | Status: SHIPPED | OUTPATIENT
Start: 2017-10-17 | End: 2017-10-26

## 2017-10-18 ENCOUNTER — OFFICE VISIT (OUTPATIENT)
Dept: OTOLARYNGOLOGY | Facility: CLINIC | Age: 77
End: 2017-10-18

## 2017-10-18 VITALS — BODY MASS INDEX: 31.02 KG/M2 | TEMPERATURE: 97.7 F | WEIGHT: 158 LBS | HEIGHT: 60 IN

## 2017-10-18 DIAGNOSIS — H61.23 BILATERAL IMPACTED CERUMEN: Primary | ICD-10-CM

## 2017-10-18 DIAGNOSIS — H90.0 CONDUCTIVE HEARING LOSS, BILATERAL: ICD-10-CM

## 2017-10-18 LAB
BACTERIA UR QL AUTO: ABNORMAL /HPF
CREAT UR-MCNC: 135.4 MG/DL
HYALINE CASTS UR QL AUTO: ABNORMAL /LPF
PROT UR-MCNC: 23.5 MG/DL
PROT/CREAT UR: 173.6 MG/G CREA (ref 0–200)
PTH-INTACT SERPL-MCNC: 245.3 PG/ML (ref 10–65)
RBC # UR: ABNORMAL /HPF
REF LAB TEST METHOD: ABNORMAL
SQUAMOUS #/AREA URNS HPF: ABNORMAL /HPF
TACROLIMUS BLD-MCNC: 2.9 NG/ML (ref 2–20)
WBC UR QL AUTO: ABNORMAL /HPF

## 2017-10-18 PROCEDURE — 99213 OFFICE O/P EST LOW 20 MIN: CPT | Performed by: OTOLARYNGOLOGY

## 2017-10-18 PROCEDURE — 69210 REMOVE IMPACTED EAR WAX UNI: CPT | Performed by: OTOLARYNGOLOGY

## 2017-10-18 NOTE — PROGRESS NOTES
Subjective   Chanda Yan is a 77 y.o. female.   CC; f/u OE and CI    History of Present Illness     Surgeon comes back in follow-up from her cerumen impaction itching says itching much better she still  stopped up she's not having pain drainage there hearing is slightly down she has another's no other significant ENT complaints    The following portions of the patient's history were reviewed and updated as appropriate: allergies, current medications, past family history, past medical history, past social history, past surgical history and problem list.      Current Outpatient Prescriptions:   •  amitriptyline (ELAVIL) 10 MG tablet, Take 10 mg by mouth Every Night., Disp: , Rfl: 0  •  aspirin 81 MG tablet, Take 1 tablet by mouth Daily., Disp: 30 tablet, Rfl: 11  •  Ca Carb-FA-D-B6-B12-Boron-Mg 1342-1 MG wafer, Take 1 tablet by mouth daily., Disp: , Rfl:   •  Calcium Carb-Cholecalciferol (CALCIUM 1000 + D PO), Take 1 tablet by mouth Daily., Disp: , Rfl:   •  carvedilol (COREG) 6.25 MG tablet, Take 1 tablet by mouth 2 (Two) Times a Day With Meals., Disp: 180 tablet, Rfl: 3  •  cetirizine (ZyrTEC) 10 MG tablet, Take 10 mg by mouth daily., Disp: , Rfl:   •  Cholecalciferol (VITAMIN D3) 5000 UNITS capsule capsule, Take 5,000 Units by mouth Daily., Disp: , Rfl:   •  dabigatran etexilate (PRADAXA) 75 MG capsule, Take 1 capsule by mouth 2 (Two) Times a Day., Disp: 60 capsule, Rfl: 6  •  diltiaZEM SR (CARDIZEM SR) 90 MG 12 hr capsule, Take 1 capsule by mouth 2 (Two) Times a Day., Disp: 60 capsule, Rfl: 11  •  ezetimibe (ZETIA) 10 MG tablet, Take 1 tablet by mouth Daily., Disp: 31 tablet, Rfl: 11  •  fluticasone (FLONASE) 50 MCG/ACT nasal spray, 2 sprays into each nostril Daily., Disp: , Rfl:   •  furosemide (LASIX) 40 MG tablet, Take 1 tablet by mouth 2 (Two) Times a Day. Take one tab in a.m. and one-half tab in p.m, Disp: 30 tablet, Rfl: 11  •  glucagon (GLUCAGON EMERGENCY) 1 MG injection, Inject 1 mg under the skin 1  (One) Time As Needed for Low Blood Sugar for up to 1 dose., Disp: 1 kit, Rfl: 5  •  hydrALAZINE (APRESOLINE) 100 MG tablet, Take 1 tablet by mouth 3 (Three) Times a Day., Disp: 90 tablet, Rfl: 11  •  insulin aspart (NOVOLOG) 100 UNIT/ML injection, Inject 5 Units under the skin 3 (Three) Times a Day Before Meals., Disp: 300 Units, Rfl: 11  •  ipratropium (ATROVENT) 0.06 % nasal spray, 2 sprays into each nostril 2 (Two) Times a Day., Disp: 15 mL, Rfl: 11  •  LANTUS SOLOSTAR 100 UNIT/ML injection pen, INJECT 10 UNITS SUBCUTANEOUSLY UTD AS A BACK UP FOR PUMP, Disp: , Rfl: 4  •  losartan (COZAAR) 25 MG tablet, Take 2 tablets by mouth Daily., Disp: 30 tablet, Rfl: 11  •  mycophenolate (MYFORTIC) 360 MG tablet delayed-release EC tablet, Take 1 tablet by mouth 2 (Two) Times a Day., Disp: 120 tablet, Rfl: 6  •  ONETOUCH VERIO test strip, Test 3 times daily as needed  Dx E11.9  Insulin dependant, Disp: 100 each, Rfl: 6  •  pantoprazole (PROTONIX) 40 MG EC tablet, Take 40 mg by mouth Daily., Disp: , Rfl:   •  predniSONE (DELTASONE) 5 MG tablet, Take 1 tablet by mouth daily., Disp: 90 tablet, Rfl: 11  •  rosuvastatin (CRESTOR) 20 MG tablet, Take 1 tablet by mouth Daily., Disp: 90 tablet, Rfl: 11  •  tacrolimus (PROGRAF) 1 MG capsule, Take 1 capsule by mouth 2 (Two) Times a Day., Disp: 240 capsule, Rfl: 11  •  VENTOLIN  (90 Base) MCG/ACT inhaler, INHALE 2 PUFFS BY MOUTH EVERY 4 HOURS AS NEEDED FOR WHEEZING, Disp: 18 g, Rfl: 0    Allergies   Allergen Reactions   • Clonidine Derivatives    • Latex    • Naproxen             Review of Systems   Constitutional: Negative for fever.   HENT: Positive for hearing loss. Negative for ear pain.    Hematological: Negative for adenopathy.           Objective   Physical Exam   Constitutional: She is oriented to person, place, and time. She appears well-developed and well-nourished.   HENT:   Head: Normocephalic and atraumatic.   Right Ear: Hearing, tympanic membrane and external ear  normal.   Left Ear: Hearing, tympanic membrane and external ear normal.   Ears:    Nose: Nose normal. No mucosal edema, rhinorrhea, nasal deformity or septal deviation. No epistaxis. Right sinus exhibits no maxillary sinus tenderness and no frontal sinus tenderness. Left sinus exhibits no maxillary sinus tenderness and no frontal sinus tenderness.   Mouth/Throat: Uvula is midline, oropharynx is clear and moist and mucous membranes are normal. No trismus in the jaw. Normal dentition. No oropharyngeal exudate or posterior oropharyngeal edema. No tonsillar exudate.       Eyes: Conjunctivae are normal.   Neck: Normal range of motion. Neck supple. No JVD present. No tracheal deviation present. No thyromegaly present.   Cardiovascular: Normal rate and regular rhythm.    Pulmonary/Chest: Effort normal.   Musculoskeletal: Normal range of motion.   Lymphadenopathy:        Head (right side): No submental, no submandibular, no tonsillar, no preauricular, no posterior auricular and no occipital adenopathy present.        Head (left side): No submental, no submandibular, no tonsillar, no preauricular, no posterior auricular and no occipital adenopathy present.     She has no cervical adenopathy.        Right cervical: No superficial cervical, no deep cervical and no posterior cervical adenopathy present.       Left cervical: No superficial cervical, no deep cervical and no posterior cervical adenopathy present.   Neurological: She is alert and oriented to person, place, and time. No cranial nerve deficit.   Skin: Skin is warm.   Psychiatric: She has a normal mood and affect. Her speech is normal and behavior is normal. Thought content normal.   Nursing note and vitals reviewed.    Teen tolerated cleaning of her ears with use of microscope.  She has narrow ear canals which contribute problem discussed the critical need      Assessment/Plan   Ora was seen today for follow-up.    Diagnoses and all orders for this  visit:    Bilateral impacted cerumen    Conductive hearing loss, bilateral      She can hear better after cleaning her ears discussed the critical need using peroxide regular basis not using Q-tips.  Call for unusual pain or discomfort otherwise will recheck in 3 months all questions were answered

## 2017-10-20 ENCOUNTER — OFFICE VISIT (OUTPATIENT)
Dept: FAMILY MEDICINE CLINIC | Facility: CLINIC | Age: 77
End: 2017-10-20

## 2017-10-20 VITALS
SYSTOLIC BLOOD PRESSURE: 168 MMHG | WEIGHT: 156 LBS | DIASTOLIC BLOOD PRESSURE: 66 MMHG | HEART RATE: 91 BPM | TEMPERATURE: 97.4 F | HEIGHT: 60 IN | BODY MASS INDEX: 30.63 KG/M2 | OXYGEN SATURATION: 98 %

## 2017-10-20 DIAGNOSIS — A63.0 ANOGENITAL WART: Primary | ICD-10-CM

## 2017-10-20 DIAGNOSIS — L98.9 SKIN SORE: ICD-10-CM

## 2017-10-20 PROCEDURE — 99213 OFFICE O/P EST LOW 20 MIN: CPT | Performed by: NURSE PRACTITIONER

## 2017-10-20 RX ORDER — LOSARTAN POTASSIUM 50 MG/1
TABLET ORAL
Refills: 6 | COMMUNITY
Start: 2017-10-04 | End: 2017-12-15 | Stop reason: SDUPTHER

## 2017-10-20 RX ORDER — INFLUENZA A VIRUS A/MICHIGAN/45/2015 X-275 (H1N1) ANTIGEN (FORMALDEHYDE INACTIVATED), INFLUENZA A VIRUS A/SINGAPORE/INFIMH-16-0019/2016 IVR-186 (H3N2) ANTIGEN (FORMALDEHYDE INACTIVATED), AND INFLUENZA B VIRUS B/MARYLAND/15/2016 BX-69A (A B/COLORADO/6/2017-LIKE VIRUS) ANTIGEN (FORMALDEHYDE INACTIVATED) 60; 60; 60 UG/.5ML; UG/.5ML; UG/.5ML
INJECTION, SUSPENSION INTRAMUSCULAR
Refills: 0 | COMMUNITY
Start: 2017-09-05 | End: 2017-12-14

## 2017-10-20 RX ORDER — ASPIRIN 81 MG
TABLET, DELAYED RELEASE (ENTERIC COATED) ORAL
Refills: 11 | COMMUNITY
Start: 2017-10-04 | End: 2017-10-20 | Stop reason: SDUPTHER

## 2017-10-20 RX ORDER — PNEUMOCOCCAL 13-VALENT CONJUGATE VACCINE 2.2; 2.2; 2.2; 2.2; 2.2; 4.4; 2.2; 2.2; 2.2; 2.2; 2.2; 2.2; 2.2 UG/.5ML; UG/.5ML; UG/.5ML; UG/.5ML; UG/.5ML; UG/.5ML; UG/.5ML; UG/.5ML; UG/.5ML; UG/.5ML; UG/.5ML; UG/.5ML; UG/.5ML
INJECTION, SUSPENSION INTRAMUSCULAR
Refills: 0 | COMMUNITY
Start: 2017-09-05 | End: 2017-12-14

## 2017-10-20 RX ORDER — TRAMADOL HYDROCHLORIDE 50 MG/1
50 TABLET ORAL 2 TIMES DAILY
Qty: 28 TABLET | Refills: 0 | Status: SHIPPED | OUTPATIENT
Start: 2017-10-20

## 2017-10-20 RX ORDER — CLOPIDOGREL BISULFATE 75 MG/1
TABLET ORAL
Refills: 5 | COMMUNITY
Start: 2017-07-27 | End: 2017-12-15

## 2017-10-20 NOTE — PROGRESS NOTES
"Subjective   Ora Juaquin Yan is a 77 y.o. female.     HPI Comments: Patient c/o painful rash/sores that have been present to her akira area for the last 9 months.  Reports the pain is keeping her up at night.  Does c/o associated itching as well.  Was previously living in [place] and seen by a dermatologist and had several areas \"frozen off\" from the vaginal/groin area and these have not returned.  New areas are now present in the akira rectal area and she has sores on the buttocks.  She reports that a biopsy was taken of the areas and was told that they were not genital warts.  Reports that areas did not develop until after she had a renal transplant.  Reports that a couple of the \"sores\" will open an drain at night.  Has tried bathing in purex and bleach, alcohol and iodine with no relief in symptoms.  She has not seen anyone since being in [place] for this due to being \"embarrased\" by the location, but reports the pain is becoming unbearable.       The following portions of the patient's history were reviewed and updated as appropriate: allergies, current medications, past medical history, past social history, past surgical history and problem list.    Review of Systems   Constitutional: Negative for chills and fever.   Respiratory: Negative for shortness of breath.    Cardiovascular: Negative for chest pain.   Genitourinary: Negative for vaginal bleeding and vaginal discharge.   Skin: Positive for rash.       Objective    /66 (BP Location: Right arm, Patient Position: Sitting, Cuff Size: Adult)  Pulse 91  Temp 97.4 °F (36.3 °C) (Tympanic)   Ht 60\" (152.4 cm)  Wt 156 lb (70.8 kg)  SpO2 98%  BMI 30.47 kg/m2    Physical Exam   Constitutional: She is oriented to person, place, and time. No distress.   Cardiovascular: Normal rate and regular rhythm.    Murmur heard.  Pulmonary/Chest: Effort normal and breath sounds normal. She has no wheezes. She has no rales.   Neurological: She is alert and oriented to person, " place, and time.   Skin:   Multiple warty type lesion noted to lower vulva region and perirectally.     There are three open sores with epidermis peeled off noted to buttocks region (two on left buttocks, 1 on right).  Center tissue is pink, moist.     Nursing note and vitals reviewed.      Assessment/Plan   Ora was seen today for rash.    Diagnoses and all orders for this visit:    Anogenital wart  -     traMADol (ULTRAM) 50 MG tablet; Take 1 tablet by mouth 2 (Two) Times a Day.  -     Ambulatory Referral to Dermatology    Skin sore  -     mupirocin (BACTROBAN) 2 % ointment; Apply  topically 3 (Three) Times a Day.      Bacitracin placed on open sores in office with Rx for Bactroban to use at home.  Keep areas clean with warm, soapy water.      Referral to dermatology for evaluation and treatment of warty lesions.    Rx for Tramadol to use as needed for pain--do not drive while taking.

## 2017-10-20 NOTE — PATIENT INSTRUCTIONS
Warts  Warts are small growths on the skin. They are common and can occur on various areas of the body. A person may have one wart or multiple warts. Most warts are not painful, and they usually do not cause problems. However, warts can cause pain if they are large or occur in an area of the body where pressure will be applied to them, such as the bottom of the foot.  In many cases, warts do not require treatment. They usually go away on their own over a period of many months to a couple years. Various treatments may be done for warts that cause problems or do not go away. Sometimes, warts go away and then come back again.  CAUSES  Warts are caused by a type of virus that is called human papillomavirus (HPV). This virus can spread from person to person through direct contact. Warts can also spread to other areas of the body when a person scratches a wart and then scratches another area of his or her body.   RISK FACTORS  Warts are more likely to develop in:  · People who are 10-20 years of age.  · People who have a weakened body defense system (immune system).  SYMPTOMS  A wart may be round or oval or have an irregular shape. Most warts have a rough surface. Warts may range in color from skin color to light yellow, brown, or gray. They are generally less than ½ inch (1.3 cm) in size. Most warts are painless, but some can be painful when pressure is applied to them.  DIAGNOSIS  A wart can usually be diagnosed from its appearance. In some cases, a tissue sample may be removed (biopsy) to be looked at under a microscope.  TREATMENT  In many cases, warts do not need treatment. If treatment is needed, options may include:  · Applying medicated solutions, creams, or patches to the wart. These may be over-the-counter or prescription medicines that make the skin soft so that layers will gradually shed away. In many cases, the medicine is applied one or two times per day and covered with a bandage.  · Putting duct tape over  the top of the wart (occlusion). You will leave the tape in place for as long as told by your health care provider, then you will replace it with a new strip of tape. This is done until the wart goes away.  · Freezing the wart with liquid nitrogen (cryotherapy).  · Burning the wart with:    Laser treatment.    An electrified probe (electrocautery).  · Injection of a medicine (Candida antigen) into the wart to help the body's immune system to fight off the wart.  · Surgery to remove the wart.  HOME CARE INSTRUCTIONS  · Apply over-the-counter and prescription medicines only as told by your health care provider.  · Do not apply over-the-counter wart medicines to your face or genitals before you ask your health care provider if it is okay to do so.  · Do not scratch or pick at a wart.  · Wash your hands after you touch a wart.  · Avoid shaving hair that is over a wart.  · Keep all follow-up visits as told by your health care provider. This is important.  SEEK MEDICAL CARE IF:  · Your warts do not improve after treatment.  · You have redness, swelling, or pain at the site of a wart.  · You have bleeding from a wart that does not stop with light pressure.  · You have diabetes and you develop a wart.     This information is not intended to replace advice given to you by your health care provider. Make sure you discuss any questions you have with your health care provider.     Document Released: 09/27/2006 Document Revised: 09/07/2016 Document Reviewed: 03/14/2016  [employer] Interactive Patient Education ©2017 [employer]

## 2017-10-26 RX ORDER — BLOOD-GLUCOSE METER
1 KIT MISCELLANEOUS DAILY
Qty: 1 EACH | Refills: 0 | Status: SHIPPED | OUTPATIENT
Start: 2017-10-26

## 2017-11-30 DIAGNOSIS — R05.9 COUGH: ICD-10-CM

## 2017-12-01 ENCOUNTER — HOSPITAL ENCOUNTER (INPATIENT)
Facility: HOSPITAL | Age: 77
LOS: 2 days | Discharge: HOME | End: 2017-12-06
Attending: FAMILY MEDICINE | Admitting: FAMILY MEDICINE

## 2017-12-01 ENCOUNTER — TELEPHONE (OUTPATIENT)
Dept: CARDIOLOGY | Facility: CLINIC | Age: 77
End: 2017-12-01

## 2017-12-01 DIAGNOSIS — R07.9 CHEST PAIN, UNSPECIFIED TYPE: Primary | ICD-10-CM

## 2017-12-01 LAB
ALBUMIN SERPL-MCNC: 3.8 G/DL (ref 3.4–4.8)
ALBUMIN/GLOB SERPL: 1.4 G/DL (ref 1.1–1.8)
ALP SERPL-CCNC: 71 U/L (ref 38–126)
ALT SERPL W P-5'-P-CCNC: 19 U/L (ref 9–52)
ANION GAP SERPL CALCULATED.3IONS-SCNC: 10 MMOL/L (ref 5–15)
AST SERPL-CCNC: 31 U/L (ref 14–36)
BASOPHILS # BLD AUTO: 0.01 10*3/MM3 (ref 0–0.2)
BASOPHILS NFR BLD AUTO: 0.2 % (ref 0–2)
BILIRUB SERPL-MCNC: 0.4 MG/DL (ref 0.2–1.3)
BUN BLD-MCNC: 47 MG/DL (ref 7–21)
BUN/CREAT SERPL: 17.4 (ref 7–25)
CALCIUM SPEC-SCNC: 9.3 MG/DL (ref 8.4–10.2)
CHLORIDE SERPL-SCNC: 98 MMOL/L (ref 95–110)
CO2 SERPL-SCNC: 24 MMOL/L (ref 22–31)
CREAT BLD-MCNC: 2.7 MG/DL (ref 0.5–1)
DEPRECATED RDW RBC AUTO: 44.7 FL (ref 36.4–46.3)
EOSINOPHIL # BLD AUTO: 0 10*3/MM3 (ref 0–0.7)
EOSINOPHIL NFR BLD AUTO: 0 % (ref 0–7)
ERYTHROCYTE [DISTWIDTH] IN BLOOD BY AUTOMATED COUNT: 15.1 % (ref 11.5–14.5)
GFR SERPL CREATININE-BSD FRML MDRD: 21 ML/MIN/1.73 (ref 39–90)
GLOBULIN UR ELPH-MCNC: 2.7 GM/DL (ref 2.3–3.5)
GLUCOSE BLD-MCNC: 200 MG/DL (ref 60–100)
GLUCOSE BLDC GLUCOMTR-MCNC: 230 MG/DL (ref 70–130)
HCT VFR BLD AUTO: 24.1 % (ref 35–45)
HGB BLD-MCNC: 7.6 G/DL (ref 12–15.5)
IMM GRANULOCYTES # BLD: 0.01 10*3/MM3 (ref 0–0.02)
IMM GRANULOCYTES NFR BLD: 0.2 % (ref 0–0.5)
LYMPHOCYTES # BLD AUTO: 0.57 10*3/MM3 (ref 0.6–4.2)
LYMPHOCYTES NFR BLD AUTO: 13.8 % (ref 10–50)
MCH RBC QN AUTO: 25.6 PG (ref 26.5–34)
MCHC RBC AUTO-ENTMCNC: 31.5 G/DL (ref 31.4–36)
MCV RBC AUTO: 81.1 FL (ref 80–98)
MONOCYTES # BLD AUTO: 0.28 10*3/MM3 (ref 0–0.9)
MONOCYTES NFR BLD AUTO: 6.8 % (ref 0–12)
NEUTROPHILS # BLD AUTO: 3.25 10*3/MM3 (ref 2–8.6)
NEUTROPHILS NFR BLD AUTO: 79 % (ref 37–80)
PLATELET # BLD AUTO: 205 10*3/MM3 (ref 150–450)
PMV BLD AUTO: 9.3 FL (ref 8–12)
POTASSIUM BLD-SCNC: 3.8 MMOL/L (ref 3.5–5.1)
PROT SERPL-MCNC: 6.5 G/DL (ref 6.3–8.6)
RBC # BLD AUTO: 2.97 10*6/MM3 (ref 3.77–5.16)
SODIUM BLD-SCNC: 132 MMOL/L (ref 137–145)
TROPONIN I SERPL-MCNC: 0.06 NG/ML
TROPONIN I SERPL-MCNC: 0.06 NG/ML
TSH SERPL DL<=0.05 MIU/L-ACNC: 0.27 MIU/ML (ref 0.46–4.68)
WBC NRBC COR # BLD: 4.12 10*3/MM3 (ref 3.2–9.8)

## 2017-12-01 PROCEDURE — 85025 COMPLETE CBC W/AUTO DIFF WBC: CPT | Performed by: FAMILY MEDICINE

## 2017-12-01 PROCEDURE — 86901 BLOOD TYPING SEROLOGIC RH(D): CPT

## 2017-12-01 PROCEDURE — 93005 ELECTROCARDIOGRAM TRACING: CPT | Performed by: FAMILY MEDICINE

## 2017-12-01 PROCEDURE — G0378 HOSPITAL OBSERVATION PER HR: HCPCS

## 2017-12-01 PROCEDURE — 93010 ELECTROCARDIOGRAM REPORT: CPT | Performed by: INTERNAL MEDICINE

## 2017-12-01 PROCEDURE — 80053 COMPREHEN METABOLIC PANEL: CPT | Performed by: FAMILY MEDICINE

## 2017-12-01 PROCEDURE — 84484 ASSAY OF TROPONIN QUANT: CPT | Performed by: FAMILY MEDICINE

## 2017-12-01 PROCEDURE — 82962 GLUCOSE BLOOD TEST: CPT

## 2017-12-01 PROCEDURE — 84443 ASSAY THYROID STIM HORMONE: CPT | Performed by: FAMILY MEDICINE

## 2017-12-01 PROCEDURE — 82728 ASSAY OF FERRITIN: CPT | Performed by: INTERNAL MEDICINE

## 2017-12-01 PROCEDURE — 83550 IRON BINDING TEST: CPT | Performed by: INTERNAL MEDICINE

## 2017-12-01 PROCEDURE — 63710000001 TACROLIMUS PER 1 MG: Performed by: FAMILY MEDICINE

## 2017-12-01 PROCEDURE — 86900 BLOOD TYPING SEROLOGIC ABO: CPT

## 2017-12-01 PROCEDURE — 83540 ASSAY OF IRON: CPT | Performed by: INTERNAL MEDICINE

## 2017-12-01 RX ORDER — ALBUTEROL SULFATE 90 UG/1
AEROSOL, METERED RESPIRATORY (INHALATION)
Qty: 18 G | Refills: 6 | Status: SHIPPED | OUTPATIENT
Start: 2017-12-01 | End: 2017-12-14 | Stop reason: SDUPTHER

## 2017-12-01 RX ORDER — ROSUVASTATIN CALCIUM 20 MG/1
20 TABLET, COATED ORAL DAILY
Status: DISCONTINUED | OUTPATIENT
Start: 2017-12-02 | End: 2017-12-06 | Stop reason: HOSPADM

## 2017-12-01 RX ORDER — MYCOPHENOLIC ACID 360 MG/1
360 TABLET, DELAYED RELEASE ORAL 2 TIMES DAILY
Status: DISCONTINUED | OUTPATIENT
Start: 2017-12-01 | End: 2017-12-06 | Stop reason: HOSPADM

## 2017-12-01 RX ORDER — LOSARTAN POTASSIUM 50 MG/1
50 TABLET ORAL DAILY
Status: DISCONTINUED | OUTPATIENT
Start: 2017-12-02 | End: 2017-12-06 | Stop reason: HOSPADM

## 2017-12-01 RX ORDER — NICOTINE POLACRILEX 4 MG
15 LOZENGE BUCCAL
Status: DISCONTINUED | OUTPATIENT
Start: 2017-12-01 | End: 2017-12-06 | Stop reason: HOSPADM

## 2017-12-01 RX ORDER — PANTOPRAZOLE SODIUM 40 MG/1
40 TABLET, DELAYED RELEASE ORAL DAILY
Status: DISCONTINUED | OUTPATIENT
Start: 2017-12-02 | End: 2017-12-06 | Stop reason: HOSPADM

## 2017-12-01 RX ORDER — TRAMADOL HYDROCHLORIDE 50 MG/1
50 TABLET ORAL 2 TIMES DAILY
Status: DISCONTINUED | OUTPATIENT
Start: 2017-12-01 | End: 2017-12-06 | Stop reason: HOSPADM

## 2017-12-01 RX ORDER — DEXTROSE MONOHYDRATE 25 G/50ML
25 INJECTION, SOLUTION INTRAVENOUS
Status: DISCONTINUED | OUTPATIENT
Start: 2017-12-01 | End: 2017-12-06 | Stop reason: HOSPADM

## 2017-12-01 RX ORDER — TACROLIMUS 0.5 MG/1
3 CAPSULE ORAL NIGHTLY
Status: COMPLETED | OUTPATIENT
Start: 2017-12-01 | End: 2017-12-03

## 2017-12-01 RX ORDER — FAMOTIDINE 40 MG/1
40 TABLET, FILM COATED ORAL NIGHTLY
Status: DISCONTINUED | OUTPATIENT
Start: 2017-12-01 | End: 2017-12-06 | Stop reason: HOSPADM

## 2017-12-01 RX ORDER — PREDNISONE 1 MG/1
5 TABLET ORAL DAILY
Status: DISCONTINUED | OUTPATIENT
Start: 2017-12-02 | End: 2017-12-06 | Stop reason: HOSPADM

## 2017-12-01 RX ORDER — TACROLIMUS 1 MG/1
3 CAPSULE ORAL NIGHTLY
Status: DISCONTINUED | OUTPATIENT
Start: 2017-12-04 | End: 2017-12-06 | Stop reason: HOSPADM

## 2017-12-01 RX ORDER — CARVEDILOL 6.25 MG/1
6.25 TABLET ORAL 2 TIMES DAILY WITH MEALS
Status: DISCONTINUED | OUTPATIENT
Start: 2017-12-01 | End: 2017-12-05

## 2017-12-01 RX ORDER — SODIUM CHLORIDE 0.9 % (FLUSH) 0.9 %
1-10 SYRINGE (ML) INJECTION AS NEEDED
Status: DISCONTINUED | OUTPATIENT
Start: 2017-12-01 | End: 2017-12-06 | Stop reason: HOSPADM

## 2017-12-01 RX ORDER — DILTIAZEM HYDROCHLORIDE 180 MG/1
180 CAPSULE, COATED, EXTENDED RELEASE ORAL
Status: DISCONTINUED | OUTPATIENT
Start: 2017-12-02 | End: 2017-12-06 | Stop reason: HOSPADM

## 2017-12-01 RX ORDER — ACETAMINOPHEN 325 MG/1
650 TABLET ORAL EVERY 4 HOURS PRN
Status: DISCONTINUED | OUTPATIENT
Start: 2017-12-01 | End: 2017-12-06 | Stop reason: HOSPADM

## 2017-12-01 RX ORDER — DABIGATRAN ETEXILATE 75 MG/1
75 CAPSULE ORAL 2 TIMES DAILY
Status: DISCONTINUED | OUTPATIENT
Start: 2017-12-01 | End: 2017-12-02

## 2017-12-01 RX ORDER — HYDRALAZINE HYDROCHLORIDE 50 MG/1
100 TABLET, FILM COATED ORAL 3 TIMES DAILY
Status: DISCONTINUED | OUTPATIENT
Start: 2017-12-01 | End: 2017-12-04

## 2017-12-01 RX ORDER — TACROLIMUS 0.5 MG/1
4 CAPSULE ORAL EVERY MORNING
Status: COMPLETED | OUTPATIENT
Start: 2017-12-02 | End: 2017-12-04

## 2017-12-01 RX ORDER — TACROLIMUS 1 MG/1
4 CAPSULE ORAL EVERY MORNING
Status: DISCONTINUED | OUTPATIENT
Start: 2017-12-05 | End: 2017-12-06 | Stop reason: HOSPADM

## 2017-12-01 RX ADMIN — TACROLIMUS 3 MG: 0.5 CAPSULE ORAL at 23:18

## 2017-12-01 RX ADMIN — TRAMADOL HYDROCHLORIDE 50 MG: 50 TABLET, FILM COATED ORAL at 23:19

## 2017-12-01 RX ADMIN — FAMOTIDINE 40 MG: 40 TABLET ORAL at 21:52

## 2017-12-01 RX ADMIN — HYDRALAZINE HYDROCHLORIDE 100 MG: 50 TABLET ORAL at 21:52

## 2017-12-01 RX ADMIN — CARVEDILOL 6.25 MG: 6.25 TABLET, FILM COATED ORAL at 21:52

## 2017-12-01 NOTE — TELEPHONE ENCOUNTER
----- Message from Nelly Hsieh sent at 12/1/2017 10:37 AM CST -----  Regarding: Call  Contact: [phone]  Patient called to advise she stay's cold all the time due to her being anemic.  Should she be seen by Dr Almazan?    Please call her @ [phone]    Thank you    Patient should f/u with pcp per dr. Almazan.

## 2017-12-02 ENCOUNTER — APPOINTMENT (OUTPATIENT)
Dept: ULTRASOUND IMAGING | Facility: HOSPITAL | Age: 77
End: 2017-12-02

## 2017-12-02 LAB
ABO GROUP BLD: NORMAL
ALBUMIN SERPL-MCNC: 3.4 G/DL (ref 3.4–4.8)
ALBUMIN/GLOB SERPL: 1.4 G/DL (ref 1.1–1.8)
ALP SERPL-CCNC: 66 U/L (ref 38–126)
ALT SERPL W P-5'-P-CCNC: 21 U/L (ref 9–52)
ANION GAP SERPL CALCULATED.3IONS-SCNC: 13 MMOL/L (ref 5–15)
ANISOCYTOSIS BLD QL: NORMAL
AST SERPL-CCNC: 15 U/L (ref 14–36)
BACTERIA UR QL AUTO: ABNORMAL /HPF
BASOPHILS # BLD AUTO: 0.01 10*3/MM3 (ref 0–0.2)
BASOPHILS NFR BLD AUTO: 0.3 % (ref 0–2)
BILIRUB SERPL-MCNC: 0.2 MG/DL (ref 0.2–1.3)
BILIRUB UR QL STRIP: NEGATIVE
BLD GP AB SCN SERPL QL: NEGATIVE
BUN BLD-MCNC: 48 MG/DL (ref 7–21)
BUN/CREAT SERPL: 16.4 (ref 7–25)
CALCIUM SPEC-SCNC: 9 MG/DL (ref 8.4–10.2)
CHLORIDE SERPL-SCNC: 99 MMOL/L (ref 95–110)
CLARITY UR: ABNORMAL
CO2 SERPL-SCNC: 24 MMOL/L (ref 22–31)
COLOR UR: YELLOW
CREAT BLD-MCNC: 2.92 MG/DL (ref 0.5–1)
CREAT UR-MCNC: 138.7 MG/DL
DEPRECATED RDW RBC AUTO: 45.5 FL (ref 36.4–46.3)
EOSINOPHIL # BLD AUTO: 0.03 10*3/MM3 (ref 0–0.7)
EOSINOPHIL NFR BLD AUTO: 0.8 % (ref 0–7)
ERYTHROCYTE [DISTWIDTH] IN BLOOD BY AUTOMATED COUNT: 15.3 % (ref 11.5–14.5)
FERRITIN SERPL-MCNC: 541 NG/ML (ref 11.1–264)
GFR SERPL CREATININE-BSD FRML MDRD: 19 ML/MIN/1.73 (ref 39–90)
GLOBULIN UR ELPH-MCNC: 2.5 GM/DL (ref 2.3–3.5)
GLUCOSE BLD-MCNC: 140 MG/DL (ref 60–100)
GLUCOSE BLDC GLUCOMTR-MCNC: 177 MG/DL (ref 70–130)
GLUCOSE BLDC GLUCOMTR-MCNC: 197 MG/DL (ref 70–130)
GLUCOSE BLDC GLUCOMTR-MCNC: 94 MG/DL (ref 70–130)
GLUCOSE UR STRIP-MCNC: NEGATIVE MG/DL
HCT VFR BLD AUTO: 22.2 % (ref 35–45)
HEMOCCULT STL QL: POSITIVE
HGB BLD-MCNC: 7 G/DL (ref 12–15.5)
HGB UR QL STRIP.AUTO: NEGATIVE
HYALINE CASTS UR QL AUTO: ABNORMAL /LPF
HYPOCHROMIA BLD QL: NORMAL
IMM GRANULOCYTES # BLD: 0.01 10*3/MM3 (ref 0–0.02)
IMM GRANULOCYTES NFR BLD: 0.3 % (ref 0–0.5)
IRON 24H UR-MRATE: 41 MCG/DL (ref 37–170)
IRON SATN MFR SERPL: 21 % (ref 15–50)
KETONES UR QL STRIP: NEGATIVE
LEUKOCYTE ESTERASE UR QL STRIP.AUTO: ABNORMAL
LYMPHOCYTES # BLD AUTO: 0.76 10*3/MM3 (ref 0.6–4.2)
LYMPHOCYTES NFR BLD AUTO: 21.5 % (ref 10–50)
Lab: NORMAL
MCH RBC QN AUTO: 25.8 PG (ref 26.5–34)
MCHC RBC AUTO-ENTMCNC: 31.5 G/DL (ref 31.4–36)
MCV RBC AUTO: 81.9 FL (ref 80–98)
MONOCYTES # BLD AUTO: 0.45 10*3/MM3 (ref 0–0.9)
MONOCYTES NFR BLD AUTO: 12.7 % (ref 0–12)
NEUTROPHILS # BLD AUTO: 2.27 10*3/MM3 (ref 2–8.6)
NEUTROPHILS NFR BLD AUTO: 64.4 % (ref 37–80)
NITRITE UR QL STRIP: NEGATIVE
PH UR STRIP.AUTO: <=5 [PH] (ref 5–9)
PLATELET # BLD AUTO: 192 10*3/MM3 (ref 150–450)
PMV BLD AUTO: 10.7 FL (ref 8–12)
POLYCHROMASIA BLD QL SMEAR: NORMAL
POTASSIUM BLD-SCNC: 3.6 MMOL/L (ref 3.5–5.1)
PROT SERPL-MCNC: 5.9 G/DL (ref 6.3–8.6)
PROT UR QL STRIP: NEGATIVE
PROT UR-MCNC: 11.8 MG/DL
RBC # BLD AUTO: 2.71 10*6/MM3 (ref 3.77–5.16)
RBC # UR: ABNORMAL /HPF
REF LAB TEST METHOD: ABNORMAL
RH BLD: POSITIVE
SMALL PLATELETS BLD QL SMEAR: ADEQUATE
SODIUM BLD-SCNC: 136 MMOL/L (ref 137–145)
SODIUM UR-SCNC: 47 MMOL/L (ref 30–90)
SP GR UR STRIP: 1.01 (ref 1–1.03)
SQUAMOUS #/AREA URNS HPF: ABNORMAL /HPF
TIBC SERPL-MCNC: 198 MCG/DL (ref 265–497)
TROPONIN I SERPL-MCNC: 0.05 NG/ML
UROBILINOGEN UR QL STRIP: ABNORMAL
WBC MORPH BLD: NORMAL
WBC NRBC COR # BLD: 3.53 10*3/MM3 (ref 3.2–9.8)
WBC UR QL AUTO: ABNORMAL /HPF

## 2017-12-02 PROCEDURE — 81001 URINALYSIS AUTO W/SCOPE: CPT | Performed by: INTERNAL MEDICINE

## 2017-12-02 PROCEDURE — G0378 HOSPITAL OBSERVATION PER HR: HCPCS

## 2017-12-02 PROCEDURE — 82272 OCCULT BLD FECES 1-3 TESTS: CPT | Performed by: FAMILY MEDICINE

## 2017-12-02 PROCEDURE — 82570 ASSAY OF URINE CREATININE: CPT | Performed by: INTERNAL MEDICINE

## 2017-12-02 PROCEDURE — 63710000001 TACROLIMUS PER 1 MG: Performed by: FAMILY MEDICINE

## 2017-12-02 PROCEDURE — 86850 RBC ANTIBODY SCREEN: CPT | Performed by: FAMILY MEDICINE

## 2017-12-02 PROCEDURE — 85007 BL SMEAR W/DIFF WBC COUNT: CPT | Performed by: FAMILY MEDICINE

## 2017-12-02 PROCEDURE — 85025 COMPLETE CBC W/AUTO DIFF WBC: CPT | Performed by: FAMILY MEDICINE

## 2017-12-02 PROCEDURE — 80053 COMPREHEN METABOLIC PANEL: CPT | Performed by: FAMILY MEDICINE

## 2017-12-02 PROCEDURE — 86923 COMPATIBILITY TEST ELECTRIC: CPT

## 2017-12-02 PROCEDURE — 84156 ASSAY OF PROTEIN URINE: CPT | Performed by: INTERNAL MEDICINE

## 2017-12-02 PROCEDURE — 84484 ASSAY OF TROPONIN QUANT: CPT | Performed by: FAMILY MEDICINE

## 2017-12-02 PROCEDURE — 76776 US EXAM K TRANSPL W/DOPPLER: CPT

## 2017-12-02 PROCEDURE — 86900 BLOOD TYPING SEROLOGIC ABO: CPT | Performed by: FAMILY MEDICINE

## 2017-12-02 PROCEDURE — 84300 ASSAY OF URINE SODIUM: CPT | Performed by: INTERNAL MEDICINE

## 2017-12-02 PROCEDURE — 82962 GLUCOSE BLOOD TEST: CPT

## 2017-12-02 PROCEDURE — 86901 BLOOD TYPING SEROLOGIC RH(D): CPT | Performed by: FAMILY MEDICINE

## 2017-12-02 PROCEDURE — 63710000001 PREDNISONE PER 5 MG: Performed by: FAMILY MEDICINE

## 2017-12-02 RX ORDER — SODIUM CHLORIDE 9 MG/ML
100 INJECTION, SOLUTION INTRAVENOUS CONTINUOUS
Status: DISCONTINUED | OUTPATIENT
Start: 2017-12-02 | End: 2017-12-04

## 2017-12-02 RX ADMIN — SODIUM CHLORIDE 100 ML/HR: 900 INJECTION, SOLUTION INTRAVENOUS at 11:36

## 2017-12-02 RX ADMIN — ROSUVASTATIN CALCIUM 20 MG: 20 TABLET, FILM COATED ORAL at 08:39

## 2017-12-02 RX ADMIN — LOSARTAN POTASSIUM 50 MG: 50 TABLET, FILM COATED ORAL at 08:39

## 2017-12-02 RX ADMIN — CARVEDILOL 6.25 MG: 6.25 TABLET, FILM COATED ORAL at 18:41

## 2017-12-02 RX ADMIN — CARVEDILOL 6.25 MG: 6.25 TABLET, FILM COATED ORAL at 08:42

## 2017-12-02 RX ADMIN — PREDNISONE 5 MG: 5 TABLET ORAL at 08:39

## 2017-12-02 RX ADMIN — HYDRALAZINE HYDROCHLORIDE 100 MG: 50 TABLET ORAL at 16:48

## 2017-12-02 RX ADMIN — DILTIAZEM HYDROCHLORIDE 180 MG: 180 CAPSULE, COATED, EXTENDED RELEASE ORAL at 08:39

## 2017-12-02 RX ADMIN — TACROLIMUS 3 MG: 0.5 CAPSULE ORAL at 20:59

## 2017-12-02 RX ADMIN — HYDRALAZINE HYDROCHLORIDE 100 MG: 50 TABLET ORAL at 08:39

## 2017-12-02 RX ADMIN — MYCOPHENOLIC ACID 360 MG: 360 TABLET, DELAYED RELEASE ORAL at 18:39

## 2017-12-02 RX ADMIN — PANTOPRAZOLE SODIUM 40 MG: 40 TABLET, DELAYED RELEASE ORAL at 08:39

## 2017-12-02 RX ADMIN — TRAMADOL HYDROCHLORIDE 50 MG: 50 TABLET, FILM COATED ORAL at 08:39

## 2017-12-02 RX ADMIN — TRAMADOL HYDROCHLORIDE 50 MG: 50 TABLET, FILM COATED ORAL at 18:37

## 2017-12-02 RX ADMIN — TACROLIMUS 4 MG: 0.5 CAPSULE ORAL at 08:39

## 2017-12-02 RX ADMIN — HYDRALAZINE HYDROCHLORIDE 100 MG: 50 TABLET ORAL at 20:59

## 2017-12-02 RX ADMIN — FAMOTIDINE 40 MG: 40 TABLET ORAL at 20:59

## 2017-12-03 LAB
ALBUMIN SERPL-MCNC: 3.4 G/DL (ref 3.4–4.8)
ALBUMIN/GLOB SERPL: 1.4 G/DL (ref 1.1–1.8)
ALP SERPL-CCNC: 62 U/L (ref 38–126)
ALT SERPL W P-5'-P-CCNC: 16 U/L (ref 9–52)
ANION GAP SERPL CALCULATED.3IONS-SCNC: 10 MMOL/L (ref 5–15)
AST SERPL-CCNC: 23 U/L (ref 14–36)
BASOPHILS # BLD AUTO: 0.01 10*3/MM3 (ref 0–0.2)
BASOPHILS NFR BLD AUTO: 0.3 % (ref 0–2)
BILIRUB SERPL-MCNC: 0.3 MG/DL (ref 0.2–1.3)
BUN BLD-MCNC: 52 MG/DL (ref 7–21)
BUN/CREAT SERPL: 19.5 (ref 7–25)
CALCIUM SPEC-SCNC: 9.2 MG/DL (ref 8.4–10.2)
CHLORIDE SERPL-SCNC: 100 MMOL/L (ref 95–110)
CO2 SERPL-SCNC: 25 MMOL/L (ref 22–31)
CREAT BLD-MCNC: 2.67 MG/DL (ref 0.5–1)
DEPRECATED RDW RBC AUTO: 46.7 FL (ref 36.4–46.3)
EOSINOPHIL # BLD AUTO: 0.1 10*3/MM3 (ref 0–0.7)
EOSINOPHIL NFR BLD AUTO: 3.1 % (ref 0–7)
ERYTHROCYTE [DISTWIDTH] IN BLOOD BY AUTOMATED COUNT: 15.6 % (ref 11.5–14.5)
FOLATE SERPL-MCNC: 8.26 NG/ML (ref 2.76–21)
GFR SERPL CREATININE-BSD FRML MDRD: 21 ML/MIN/1.73 (ref 39–90)
GLOBULIN UR ELPH-MCNC: 2.5 GM/DL (ref 2.3–3.5)
GLUCOSE BLD-MCNC: 125 MG/DL (ref 60–100)
GLUCOSE BLDC GLUCOMTR-MCNC: 135 MG/DL (ref 70–130)
GLUCOSE BLDC GLUCOMTR-MCNC: 173 MG/DL (ref 70–130)
GLUCOSE BLDC GLUCOMTR-MCNC: 194 MG/DL (ref 70–130)
GLUCOSE BLDC GLUCOMTR-MCNC: 87 MG/DL (ref 70–130)
HCT VFR BLD AUTO: 22.7 % (ref 35–45)
HGB BLD-MCNC: 7 G/DL (ref 12–15.5)
IMM GRANULOCYTES # BLD: 0.02 10*3/MM3 (ref 0–0.02)
IMM GRANULOCYTES NFR BLD: 0.6 % (ref 0–0.5)
LYMPHOCYTES # BLD AUTO: 0.58 10*3/MM3 (ref 0.6–4.2)
LYMPHOCYTES NFR BLD AUTO: 17.8 % (ref 10–50)
MCH RBC QN AUTO: 25.5 PG (ref 26.5–34)
MCHC RBC AUTO-ENTMCNC: 30.8 G/DL (ref 31.4–36)
MCV RBC AUTO: 82.5 FL (ref 80–98)
MONOCYTES # BLD AUTO: 0.46 10*3/MM3 (ref 0–0.9)
MONOCYTES NFR BLD AUTO: 14.1 % (ref 0–12)
NEUTROPHILS # BLD AUTO: 2.09 10*3/MM3 (ref 2–8.6)
NEUTROPHILS NFR BLD AUTO: 64.1 % (ref 37–80)
PLATELET # BLD AUTO: 192 10*3/MM3 (ref 150–450)
PMV BLD AUTO: 9.9 FL (ref 8–12)
POTASSIUM BLD-SCNC: 4 MMOL/L (ref 3.5–5.1)
PROT SERPL-MCNC: 5.9 G/DL (ref 6.3–8.6)
RBC # BLD AUTO: 2.75 10*6/MM3 (ref 3.77–5.16)
SODIUM BLD-SCNC: 135 MMOL/L (ref 137–145)
VIT B12 BLD-MCNC: 745 PG/ML (ref 239–931)
WBC NRBC COR # BLD: 3.26 10*3/MM3 (ref 3.2–9.8)

## 2017-12-03 PROCEDURE — 85025 COMPLETE CBC W/AUTO DIFF WBC: CPT | Performed by: FAMILY MEDICINE

## 2017-12-03 PROCEDURE — 63710000001 TACROLIMUS PER 1 MG: Performed by: FAMILY MEDICINE

## 2017-12-03 PROCEDURE — 80053 COMPREHEN METABOLIC PANEL: CPT | Performed by: FAMILY MEDICINE

## 2017-12-03 PROCEDURE — 63710000001 PREDNISONE PER 5 MG: Performed by: FAMILY MEDICINE

## 2017-12-03 PROCEDURE — G0378 HOSPITAL OBSERVATION PER HR: HCPCS

## 2017-12-03 PROCEDURE — 25010000002 CEFTRIAXONE PER 250 MG: Performed by: INTERNAL MEDICINE

## 2017-12-03 PROCEDURE — 82746 ASSAY OF FOLIC ACID SERUM: CPT | Performed by: INTERNAL MEDICINE

## 2017-12-03 PROCEDURE — 82607 VITAMIN B-12: CPT | Performed by: INTERNAL MEDICINE

## 2017-12-03 PROCEDURE — 99225 PR SBSQ OBSERVATION CARE/DAY 25 MINUTES: CPT | Performed by: INTERNAL MEDICINE

## 2017-12-03 PROCEDURE — 80197 ASSAY OF TACROLIMUS: CPT | Performed by: INTERNAL MEDICINE

## 2017-12-03 PROCEDURE — 87086 URINE CULTURE/COLONY COUNT: CPT | Performed by: INTERNAL MEDICINE

## 2017-12-03 PROCEDURE — 82962 GLUCOSE BLOOD TEST: CPT

## 2017-12-03 RX ORDER — HYDROXYZINE PAMOATE 25 MG/1
25 CAPSULE ORAL 2 TIMES DAILY PRN
Status: DISCONTINUED | OUTPATIENT
Start: 2017-12-03 | End: 2017-12-06 | Stop reason: HOSPADM

## 2017-12-03 RX ADMIN — MYCOPHENOLIC ACID 360 MG: 360 TABLET, DELAYED RELEASE ORAL at 08:29

## 2017-12-03 RX ADMIN — MYCOPHENOLIC ACID 360 MG: 360 TABLET, DELAYED RELEASE ORAL at 17:38

## 2017-12-03 RX ADMIN — SODIUM CHLORIDE 100 ML/HR: 900 INJECTION, SOLUTION INTRAVENOUS at 20:35

## 2017-12-03 RX ADMIN — TRAMADOL HYDROCHLORIDE 50 MG: 50 TABLET, FILM COATED ORAL at 17:37

## 2017-12-03 RX ADMIN — TACROLIMUS 4 MG: 0.5 CAPSULE ORAL at 06:12

## 2017-12-03 RX ADMIN — PANTOPRAZOLE SODIUM 40 MG: 40 TABLET, DELAYED RELEASE ORAL at 08:26

## 2017-12-03 RX ADMIN — HYDRALAZINE HYDROCHLORIDE 100 MG: 50 TABLET ORAL at 08:26

## 2017-12-03 RX ADMIN — LOSARTAN POTASSIUM 50 MG: 50 TABLET, FILM COATED ORAL at 08:26

## 2017-12-03 RX ADMIN — PREDNISONE 5 MG: 5 TABLET ORAL at 08:26

## 2017-12-03 RX ADMIN — CARVEDILOL 6.25 MG: 6.25 TABLET, FILM COATED ORAL at 08:27

## 2017-12-03 RX ADMIN — TACROLIMUS 3 MG: 0.5 CAPSULE ORAL at 20:34

## 2017-12-03 RX ADMIN — TRAMADOL HYDROCHLORIDE 50 MG: 50 TABLET, FILM COATED ORAL at 08:26

## 2017-12-03 RX ADMIN — ROSUVASTATIN CALCIUM 20 MG: 20 TABLET, FILM COATED ORAL at 08:26

## 2017-12-03 RX ADMIN — DILTIAZEM HYDROCHLORIDE 180 MG: 180 CAPSULE, COATED, EXTENDED RELEASE ORAL at 08:26

## 2017-12-03 RX ADMIN — HYDROXYZINE PAMOATE 25 MG: 25 CAPSULE ORAL at 12:10

## 2017-12-03 RX ADMIN — FAMOTIDINE 40 MG: 40 TABLET ORAL at 20:34

## 2017-12-03 RX ADMIN — CEFTRIAXONE SODIUM 1 G: 1 INJECTION, POWDER, FOR SOLUTION INTRAMUSCULAR; INTRAVENOUS at 13:19

## 2017-12-03 RX ADMIN — HYDRALAZINE HYDROCHLORIDE 100 MG: 50 TABLET ORAL at 17:37

## 2017-12-04 ENCOUNTER — ANESTHESIA EVENT (OUTPATIENT)
Dept: GASTROENTEROLOGY | Facility: HOSPITAL | Age: 77
End: 2017-12-04

## 2017-12-04 ENCOUNTER — ANESTHESIA (OUTPATIENT)
Dept: GASTROENTEROLOGY | Facility: HOSPITAL | Age: 77
End: 2017-12-04

## 2017-12-04 LAB
ALBUMIN SERPL-MCNC: 3.2 G/DL (ref 3.4–4.8)
ALBUMIN/GLOB SERPL: 1.3 G/DL (ref 1.1–1.8)
ALP SERPL-CCNC: 62 U/L (ref 38–126)
ALT SERPL W P-5'-P-CCNC: 15 U/L (ref 9–52)
ANION GAP SERPL CALCULATED.3IONS-SCNC: 10 MMOL/L (ref 5–15)
ANISOCYTOSIS BLD QL: NORMAL
AST SERPL-CCNC: 18 U/L (ref 14–36)
BACTERIA SPEC AEROBE CULT: NORMAL
BILIRUB SERPL-MCNC: 0.2 MG/DL (ref 0.2–1.3)
BUN BLD-MCNC: 49 MG/DL (ref 7–21)
BUN/CREAT SERPL: 23.7 (ref 7–25)
CALCIUM SPEC-SCNC: 8.7 MG/DL (ref 8.4–10.2)
CHLORIDE SERPL-SCNC: 105 MMOL/L (ref 95–110)
CO2 SERPL-SCNC: 21 MMOL/L (ref 22–31)
CREAT BLD-MCNC: 2.07 MG/DL (ref 0.5–1)
DEPRECATED RDW RBC AUTO: 48.3 FL (ref 36.4–46.3)
EOSINOPHIL # BLD MANUAL: 0.03 10*3/MM3 (ref 0–0.7)
EOSINOPHIL NFR BLD MANUAL: 1 % (ref 0–7)
ERYTHROCYTE [DISTWIDTH] IN BLOOD BY AUTOMATED COUNT: 15.7 % (ref 11.5–14.5)
GFR SERPL CREATININE-BSD FRML MDRD: 28 ML/MIN/1.73 (ref 39–90)
GLOBULIN UR ELPH-MCNC: 2.4 GM/DL (ref 2.3–3.5)
GLUCOSE BLD-MCNC: 140 MG/DL (ref 60–100)
GLUCOSE BLDC GLUCOMTR-MCNC: 101 MG/DL (ref 70–130)
GLUCOSE BLDC GLUCOMTR-MCNC: 127 MG/DL (ref 70–130)
GLUCOSE BLDC GLUCOMTR-MCNC: 207 MG/DL (ref 70–130)
GLUCOSE BLDC GLUCOMTR-MCNC: 91 MG/DL (ref 70–130)
HCT VFR BLD AUTO: 22.2 % (ref 35–45)
HCT VFR BLD AUTO: 28.3 % (ref 35–45)
HGB BLD-MCNC: 6.6 G/DL (ref 12–15.5)
HGB BLD-MCNC: 8.8 G/DL (ref 12–15.5)
HYPOCHROMIA BLD QL: NORMAL
LYMPHOCYTES # BLD MANUAL: 0.76 10*3/MM3 (ref 0.6–4.2)
LYMPHOCYTES NFR BLD MANUAL: 23 % (ref 10–50)
LYMPHOCYTES NFR BLD MANUAL: 8 % (ref 0–12)
MCH RBC QN AUTO: 24.9 PG (ref 26.5–34)
MCHC RBC AUTO-ENTMCNC: 29.7 G/DL (ref 31.4–36)
MCV RBC AUTO: 83.8 FL (ref 80–98)
MONOCYTES # BLD AUTO: 0.26 10*3/MM3 (ref 0–0.9)
NEUTROPHILS # BLD AUTO: 2.24 10*3/MM3 (ref 2–8.6)
NEUTROPHILS NFR BLD MANUAL: 68 % (ref 37–80)
OVALOCYTES BLD QL SMEAR: NORMAL
PLATELET # BLD AUTO: 183 10*3/MM3 (ref 150–450)
PMV BLD AUTO: 10.7 FL (ref 8–12)
POTASSIUM BLD-SCNC: 4.7 MMOL/L (ref 3.5–5.1)
PROT SERPL-MCNC: 5.6 G/DL (ref 6.3–8.6)
RBC # BLD AUTO: 2.65 10*6/MM3 (ref 3.77–5.16)
SMALL PLATELETS BLD QL SMEAR: ADEQUATE
SODIUM BLD-SCNC: 136 MMOL/L (ref 137–145)
WBC MORPH BLD: NORMAL
WBC NRBC COR # BLD: 3.3 10*3/MM3 (ref 3.2–9.8)

## 2017-12-04 PROCEDURE — 25010000002 PROPOFOL 10 MG/ML EMULSION: Performed by: NURSE ANESTHETIST, CERTIFIED REGISTERED

## 2017-12-04 PROCEDURE — 63710000001 PREDNISONE PER 5 MG: Performed by: FAMILY MEDICINE

## 2017-12-04 PROCEDURE — 0DB68ZX EXCISION OF STOMACH, VIA NATURAL OR ARTIFICIAL OPENING ENDOSCOPIC, DIAGNOSTIC: ICD-10-PCS | Performed by: INTERNAL MEDICINE

## 2017-12-04 PROCEDURE — 85025 COMPLETE CBC W/AUTO DIFF WBC: CPT | Performed by: FAMILY MEDICINE

## 2017-12-04 PROCEDURE — 85018 HEMOGLOBIN: CPT | Performed by: FAMILY MEDICINE

## 2017-12-04 PROCEDURE — 85007 BL SMEAR W/DIFF WBC COUNT: CPT | Performed by: FAMILY MEDICINE

## 2017-12-04 PROCEDURE — 43239 EGD BIOPSY SINGLE/MULTIPLE: CPT | Performed by: INTERNAL MEDICINE

## 2017-12-04 PROCEDURE — 25010000002 FENTANYL CITRATE (PF) 100 MCG/2ML SOLUTION: Performed by: NURSE ANESTHETIST, CERTIFIED REGISTERED

## 2017-12-04 PROCEDURE — 88305 TISSUE EXAM BY PATHOLOGIST: CPT | Performed by: PATHOLOGY

## 2017-12-04 PROCEDURE — 63710000001 INSULIN ASPART PER 5 UNITS: Performed by: FAMILY MEDICINE

## 2017-12-04 PROCEDURE — 80053 COMPREHEN METABOLIC PANEL: CPT | Performed by: FAMILY MEDICINE

## 2017-12-04 PROCEDURE — P9016 RBC LEUKOCYTES REDUCED: HCPCS

## 2017-12-04 PROCEDURE — 86900 BLOOD TYPING SEROLOGIC ABO: CPT

## 2017-12-04 PROCEDURE — 63710000001 TACROLIMUS PER 1 MG: Performed by: FAMILY MEDICINE

## 2017-12-04 PROCEDURE — 88305 TISSUE EXAM BY PATHOLOGIST: CPT | Performed by: INTERNAL MEDICINE

## 2017-12-04 PROCEDURE — 36430 TRANSFUSION BLD/BLD COMPNT: CPT

## 2017-12-04 PROCEDURE — 25010000002 CEFTRIAXONE PER 250 MG: Performed by: INTERNAL MEDICINE

## 2017-12-04 PROCEDURE — 85014 HEMATOCRIT: CPT | Performed by: FAMILY MEDICINE

## 2017-12-04 PROCEDURE — 82962 GLUCOSE BLOOD TEST: CPT

## 2017-12-04 PROCEDURE — 80197 ASSAY OF TACROLIMUS: CPT | Performed by: INTERNAL MEDICINE

## 2017-12-04 RX ORDER — DEXTROSE AND SODIUM CHLORIDE 5; .45 G/100ML; G/100ML
30 INJECTION, SOLUTION INTRAVENOUS CONTINUOUS PRN
Status: DISCONTINUED | OUTPATIENT
Start: 2017-12-04 | End: 2017-12-06 | Stop reason: HOSPADM

## 2017-12-04 RX ORDER — SODIUM CHLORIDE 0.9 % (FLUSH) 0.9 %
1-10 SYRINGE (ML) INJECTION AS NEEDED
Status: DISCONTINUED | OUTPATIENT
Start: 2017-12-04 | End: 2017-12-06 | Stop reason: HOSPADM

## 2017-12-04 RX ORDER — LIDOCAINE HYDROCHLORIDE 10 MG/ML
INJECTION, SOLUTION INFILTRATION; PERINEURAL AS NEEDED
Status: DISCONTINUED | OUTPATIENT
Start: 2017-12-04 | End: 2017-12-04 | Stop reason: SURG

## 2017-12-04 RX ORDER — PROPOFOL 10 MG/ML
VIAL (ML) INTRAVENOUS AS NEEDED
Status: DISCONTINUED | OUTPATIENT
Start: 2017-12-04 | End: 2017-12-04 | Stop reason: SURG

## 2017-12-04 RX ORDER — FENTANYL CITRATE 50 UG/ML
INJECTION, SOLUTION INTRAMUSCULAR; INTRAVENOUS AS NEEDED
Status: DISCONTINUED | OUTPATIENT
Start: 2017-12-04 | End: 2017-12-04 | Stop reason: SURG

## 2017-12-04 RX ADMIN — CEFTRIAXONE SODIUM 1 G: 1 INJECTION, POWDER, FOR SOLUTION INTRAMUSCULAR; INTRAVENOUS at 13:09

## 2017-12-04 RX ADMIN — LOSARTAN POTASSIUM 50 MG: 50 TABLET, FILM COATED ORAL at 09:14

## 2017-12-04 RX ADMIN — PREDNISONE 5 MG: 5 TABLET ORAL at 09:14

## 2017-12-04 RX ADMIN — ROSUVASTATIN CALCIUM 20 MG: 20 TABLET, FILM COATED ORAL at 09:15

## 2017-12-04 RX ADMIN — FAMOTIDINE 40 MG: 40 TABLET ORAL at 21:30

## 2017-12-04 RX ADMIN — MYCOPHENOLIC ACID 360 MG: 360 TABLET, DELAYED RELEASE ORAL at 09:16

## 2017-12-04 RX ADMIN — PANTOPRAZOLE SODIUM 40 MG: 40 TABLET, DELAYED RELEASE ORAL at 09:15

## 2017-12-04 RX ADMIN — TACROLIMUS 3 MG: 1 CAPSULE ORAL at 23:24

## 2017-12-04 RX ADMIN — TRAMADOL HYDROCHLORIDE 50 MG: 50 TABLET, FILM COATED ORAL at 18:34

## 2017-12-04 RX ADMIN — SODIUM CHLORIDE 100 ML/HR: 900 INJECTION, SOLUTION INTRAVENOUS at 05:10

## 2017-12-04 RX ADMIN — CARVEDILOL 6.25 MG: 6.25 TABLET, FILM COATED ORAL at 09:15

## 2017-12-04 RX ADMIN — CARVEDILOL 6.25 MG: 6.25 TABLET, FILM COATED ORAL at 18:34

## 2017-12-04 RX ADMIN — TACROLIMUS 4 MG: 0.5 CAPSULE ORAL at 09:14

## 2017-12-04 RX ADMIN — PROPOFOL 50 MG: 10 INJECTION, EMULSION INTRAVENOUS at 14:39

## 2017-12-04 RX ADMIN — HYDRALAZINE HYDROCHLORIDE 100 MG: 50 TABLET ORAL at 09:15

## 2017-12-04 RX ADMIN — MYCOPHENOLIC ACID 360 MG: 360 TABLET, DELAYED RELEASE ORAL at 18:35

## 2017-12-04 RX ADMIN — FENTANYL CITRATE 60 MCG: 50 INJECTION, SOLUTION INTRAMUSCULAR; INTRAVENOUS at 14:38

## 2017-12-04 RX ADMIN — TRAMADOL HYDROCHLORIDE 50 MG: 50 TABLET, FILM COATED ORAL at 09:15

## 2017-12-04 RX ADMIN — LIDOCAINE HYDROCHLORIDE 50 MG: 10 INJECTION, SOLUTION INFILTRATION; PERINEURAL at 14:39

## 2017-12-04 RX ADMIN — DILTIAZEM HYDROCHLORIDE 180 MG: 180 CAPSULE, COATED, EXTENDED RELEASE ORAL at 09:15

## 2017-12-04 NOTE — ANESTHESIA PREPROCEDURE EVALUATION
Anesthesia Evaluation     Patient summary reviewed and Nursing notes reviewed   NPO Solid Status: > 8 hours  NPO Liquid Status: > 2 hours     Airway   Mallampati: II  TM distance: >3 FB  Neck ROM: full  Dental    (+) edentulous    Pulmonary - normal exam   (+) asthma, recent URI resolved,   Cardiovascular     ECG reviewed    (+) hypertension poorly controlled, valvular problems/murmurs (Murmur), CAD, cardiac stents more than 12 months ago dysrhythmias Atrial Fib, angina, murmur, hyperlipidemia      Neuro/Psych- negative ROS  GI/Hepatic/Renal/Endo    (+)  renal disease (hx kidney transplant) CRI, diabetes mellitus (Insulin Pump at 1.4 units/Hr) type 2 well controlled using insulin,     Musculoskeletal (-) negative ROS    Abdominal  - normal exam   Substance History - negative use     OB/GYN          Other   (+) blood dyscrasia (Pancytopenia)                                     Anesthesia Plan    ASA 3     MAC     intravenous induction   Anesthetic plan and risks discussed with patient.

## 2017-12-04 NOTE — ANESTHESIA POSTPROCEDURE EVALUATION
Patient: Chanda Yan    Procedure Summary     Date Anesthesia Start Anesthesia Stop Room / Location    12/04/17 1436 1446 [facility] ENDOSCOPY 1 / [facility] ENDOSCOPY       Procedure Diagnosis Surgeon Provider    ESOPHAGOGASTRODUODENOSCOPY (N/A Esophagus) Chest pain, unspecified type  (Chest pain, unspecified type [R07.9]) MD Carol Banks CRNA          Anesthesia Type: MAC  Last vitals  BP   140/66 (12/04/17 1423)   Temp   97.5 °F (36.4 °C) (12/04/17 1423)   Pulse   59 (12/04/17 1423)   Resp   18 (12/04/17 1423)     SpO2   97 % (12/04/17 1423)     Post Anesthesia Care and Evaluation    Patient location during evaluation: bedside  Patient participation: complete - patient participated  Level of consciousness: awake and awake and alert  Pain score: 0  Pain management: satisfactory to patient  Airway patency: patent  Anesthetic complications: No anesthetic complications  PONV Status: none  Cardiovascular status: acceptable and stable  Respiratory status: acceptable, room air, unassisted and spontaneous ventilation  Hydration status: acceptable

## 2017-12-05 LAB
ABO + RH BLD: NORMAL
ABO + RH BLD: NORMAL
ALBUMIN SERPL-MCNC: 3.7 G/DL (ref 3.4–4.8)
ALBUMIN/GLOB SERPL: 1.3 G/DL (ref 1.1–1.8)
ALP SERPL-CCNC: 78 U/L (ref 38–126)
ALT SERPL W P-5'-P-CCNC: 42 U/L (ref 9–52)
ANION GAP SERPL CALCULATED.3IONS-SCNC: 11 MMOL/L (ref 5–15)
AST SERPL-CCNC: 57 U/L (ref 14–36)
BASOPHILS # BLD AUTO: 0.01 10*3/MM3 (ref 0–0.2)
BASOPHILS NFR BLD AUTO: 0.2 % (ref 0–2)
BH BB BLOOD EXPIRATION DATE: NORMAL
BH BB BLOOD EXPIRATION DATE: NORMAL
BH BB BLOOD TYPE BARCODE: 6200
BH BB BLOOD TYPE BARCODE: 6200
BH BB DISPENSE STATUS: NORMAL
BH BB DISPENSE STATUS: NORMAL
BH BB PRODUCT CODE: NORMAL
BH BB PRODUCT CODE: NORMAL
BH BB UNIT NUMBER: NORMAL
BH BB UNIT NUMBER: NORMAL
BILIRUB SERPL-MCNC: 0.5 MG/DL (ref 0.2–1.3)
BUN BLD-MCNC: 39 MG/DL (ref 7–21)
BUN/CREAT SERPL: 20.7 (ref 7–25)
CALCIUM SPEC-SCNC: 9.2 MG/DL (ref 8.4–10.2)
CHLORIDE SERPL-SCNC: 105 MMOL/L (ref 95–110)
CO2 SERPL-SCNC: 24 MMOL/L (ref 22–31)
CREAT BLD-MCNC: 1.88 MG/DL (ref 0.5–1)
DEPRECATED RDW RBC AUTO: 47.7 FL (ref 36.4–46.3)
EOSINOPHIL # BLD AUTO: 0.15 10*3/MM3 (ref 0–0.7)
EOSINOPHIL NFR BLD AUTO: 3.5 % (ref 0–7)
ERYTHROCYTE [DISTWIDTH] IN BLOOD BY AUTOMATED COUNT: 15.6 % (ref 11.5–14.5)
GFR SERPL CREATININE-BSD FRML MDRD: 32 ML/MIN/1.73 (ref 39–90)
GLOBULIN UR ELPH-MCNC: 2.8 GM/DL (ref 2.3–3.5)
GLUCOSE BLD-MCNC: 51 MG/DL (ref 60–100)
GLUCOSE BLDC GLUCOMTR-MCNC: 169 MG/DL (ref 70–130)
GLUCOSE BLDC GLUCOMTR-MCNC: 184 MG/DL (ref 70–130)
GLUCOSE BLDC GLUCOMTR-MCNC: 56 MG/DL (ref 70–130)
GLUCOSE BLDC GLUCOMTR-MCNC: 58 MG/DL (ref 70–130)
GLUCOSE BLDC GLUCOMTR-MCNC: 62 MG/DL (ref 70–130)
GLUCOSE BLDC GLUCOMTR-MCNC: 79 MG/DL (ref 70–130)
HCT VFR BLD AUTO: 28.8 % (ref 35–45)
HCT VFR BLD AUTO: 29 % (ref 35–45)
HGB BLD-MCNC: 9.1 G/DL (ref 12–15.5)
HGB BLD-MCNC: 9.2 G/DL (ref 12–15.5)
IMM GRANULOCYTES # BLD: 0.02 10*3/MM3 (ref 0–0.02)
IMM GRANULOCYTES NFR BLD: 0.5 % (ref 0–0.5)
LYMPHOCYTES # BLD AUTO: 0.63 10*3/MM3 (ref 0.6–4.2)
LYMPHOCYTES NFR BLD AUTO: 14.7 % (ref 10–50)
MCH RBC QN AUTO: 26.9 PG (ref 26.5–34)
MCHC RBC AUTO-ENTMCNC: 31.9 G/DL (ref 31.4–36)
MCV RBC AUTO: 84.2 FL (ref 80–98)
MONOCYTES # BLD AUTO: 0.54 10*3/MM3 (ref 0–0.9)
MONOCYTES NFR BLD AUTO: 12.6 % (ref 0–12)
NEUTROPHILS # BLD AUTO: 2.93 10*3/MM3 (ref 2–8.6)
NEUTROPHILS NFR BLD AUTO: 68.5 % (ref 37–80)
PLATELET # BLD AUTO: 178 10*3/MM3 (ref 150–450)
PMV BLD AUTO: 10.1 FL (ref 8–12)
POTASSIUM BLD-SCNC: 4 MMOL/L (ref 3.5–5.1)
PROT SERPL-MCNC: 6.5 G/DL (ref 6.3–8.6)
RBC # BLD AUTO: 3.42 10*6/MM3 (ref 3.77–5.16)
SODIUM BLD-SCNC: 140 MMOL/L (ref 137–145)
TACROLIMUS BLD-MCNC: 6.5 NG/ML (ref 2–20)
UNIT  ABO: NORMAL
UNIT  ABO: NORMAL
UNIT  RH: NORMAL
UNIT  RH: NORMAL
WBC NRBC COR # BLD: 4.28 10*3/MM3 (ref 3.2–9.8)

## 2017-12-05 PROCEDURE — 99222 1ST HOSP IP/OBS MODERATE 55: CPT | Performed by: INTERNAL MEDICINE

## 2017-12-05 PROCEDURE — 85018 HEMOGLOBIN: CPT | Performed by: FAMILY MEDICINE

## 2017-12-05 PROCEDURE — 63710000001 PREDNISONE PER 5 MG: Performed by: FAMILY MEDICINE

## 2017-12-05 PROCEDURE — 85014 HEMATOCRIT: CPT | Performed by: FAMILY MEDICINE

## 2017-12-05 PROCEDURE — 80053 COMPREHEN METABOLIC PANEL: CPT | Performed by: FAMILY MEDICINE

## 2017-12-05 PROCEDURE — 82962 GLUCOSE BLOOD TEST: CPT

## 2017-12-05 PROCEDURE — 85025 COMPLETE CBC W/AUTO DIFF WBC: CPT | Performed by: FAMILY MEDICINE

## 2017-12-05 PROCEDURE — 99231 SBSQ HOSP IP/OBS SF/LOW 25: CPT | Performed by: NURSE PRACTITIONER

## 2017-12-05 PROCEDURE — 80197 ASSAY OF TACROLIMUS: CPT | Performed by: INTERNAL MEDICINE

## 2017-12-05 PROCEDURE — 63710000001 TACROLIMUS PER 1 MG: Performed by: FAMILY MEDICINE

## 2017-12-05 RX ORDER — HYDRALAZINE HYDROCHLORIDE 50 MG/1
50 TABLET, FILM COATED ORAL EVERY 8 HOURS SCHEDULED
Status: DISCONTINUED | OUTPATIENT
Start: 2017-12-05 | End: 2017-12-06 | Stop reason: HOSPADM

## 2017-12-05 RX ORDER — CARVEDILOL 3.12 MG/1
3.12 TABLET ORAL 2 TIMES DAILY WITH MEALS
Status: DISCONTINUED | OUTPATIENT
Start: 2017-12-05 | End: 2017-12-06 | Stop reason: HOSPADM

## 2017-12-05 RX ADMIN — MYCOPHENOLIC ACID 360 MG: 360 TABLET, DELAYED RELEASE ORAL at 17:14

## 2017-12-05 RX ADMIN — FAMOTIDINE 40 MG: 40 TABLET ORAL at 22:08

## 2017-12-05 RX ADMIN — TACROLIMUS 3 MG: 1 CAPSULE ORAL at 22:08

## 2017-12-05 RX ADMIN — ROSUVASTATIN CALCIUM 20 MG: 20 TABLET, FILM COATED ORAL at 09:04

## 2017-12-05 RX ADMIN — HYDRALAZINE HYDROCHLORIDE 50 MG: 50 TABLET ORAL at 22:09

## 2017-12-05 RX ADMIN — TRAMADOL HYDROCHLORIDE 50 MG: 50 TABLET, FILM COATED ORAL at 17:12

## 2017-12-05 RX ADMIN — LOSARTAN POTASSIUM 50 MG: 50 TABLET, FILM COATED ORAL at 09:04

## 2017-12-05 RX ADMIN — PANTOPRAZOLE SODIUM 40 MG: 40 TABLET, DELAYED RELEASE ORAL at 09:04

## 2017-12-05 RX ADMIN — PREDNISONE 5 MG: 5 TABLET ORAL at 09:04

## 2017-12-05 RX ADMIN — MYCOPHENOLIC ACID 360 MG: 360 TABLET, DELAYED RELEASE ORAL at 09:06

## 2017-12-05 RX ADMIN — TACROLIMUS 4 MG: 1 CAPSULE ORAL at 05:59

## 2017-12-06 VITALS
OXYGEN SATURATION: 95 % | BODY MASS INDEX: 30.25 KG/M2 | HEART RATE: 60 BPM | WEIGHT: 154.1 LBS | TEMPERATURE: 98.4 F | HEIGHT: 60 IN | SYSTOLIC BLOOD PRESSURE: 162 MMHG | DIASTOLIC BLOOD PRESSURE: 54 MMHG | RESPIRATION RATE: 18 BRPM

## 2017-12-06 LAB
ALBUMIN SERPL-MCNC: 3.5 G/DL (ref 3.4–4.8)
ALBUMIN/GLOB SERPL: 1.4 G/DL (ref 1.1–1.8)
ALP SERPL-CCNC: 87 U/L (ref 38–126)
ALT SERPL W P-5'-P-CCNC: 56 U/L (ref 9–52)
ANION GAP SERPL CALCULATED.3IONS-SCNC: 9 MMOL/L (ref 5–15)
AST SERPL-CCNC: 43 U/L (ref 14–36)
BASOPHILS # BLD AUTO: 0.01 10*3/MM3 (ref 0–0.2)
BASOPHILS NFR BLD AUTO: 0.3 % (ref 0–2)
BILIRUB SERPL-MCNC: 0.4 MG/DL (ref 0.2–1.3)
BUN BLD-MCNC: 35 MG/DL (ref 7–21)
BUN/CREAT SERPL: 21.2 (ref 7–25)
CALCIUM SPEC-SCNC: 9.4 MG/DL (ref 8.4–10.2)
CHLORIDE SERPL-SCNC: 107 MMOL/L (ref 95–110)
CO2 SERPL-SCNC: 23 MMOL/L (ref 22–31)
CREAT BLD-MCNC: 1.65 MG/DL (ref 0.5–1)
DEPRECATED RDW RBC AUTO: 46.9 FL (ref 36.4–46.3)
EOSINOPHIL # BLD AUTO: 0.11 10*3/MM3 (ref 0–0.7)
EOSINOPHIL NFR BLD AUTO: 2.8 % (ref 0–7)
ERYTHROCYTE [DISTWIDTH] IN BLOOD BY AUTOMATED COUNT: 15.4 % (ref 11.5–14.5)
GFR SERPL CREATININE-BSD FRML MDRD: 37 ML/MIN/1.73 (ref 39–90)
GLOBULIN UR ELPH-MCNC: 2.5 GM/DL (ref 2.3–3.5)
GLUCOSE BLD-MCNC: 111 MG/DL (ref 60–100)
GLUCOSE BLDC GLUCOMTR-MCNC: 108 MG/DL (ref 70–130)
GLUCOSE BLDC GLUCOMTR-MCNC: 132 MG/DL (ref 70–130)
GLUCOSE BLDC GLUCOMTR-MCNC: 276 MG/DL (ref 70–130)
HCT VFR BLD AUTO: 27.2 % (ref 35–45)
HCT VFR BLD AUTO: 30 % (ref 35–45)
HGB BLD-MCNC: 8.7 G/DL (ref 12–15.5)
HGB BLD-MCNC: 9.4 G/DL (ref 12–15.5)
IMM GRANULOCYTES # BLD: 0.01 10*3/MM3 (ref 0–0.02)
IMM GRANULOCYTES NFR BLD: 0.3 % (ref 0–0.5)
LAB AP CASE REPORT: NORMAL
LYMPHOCYTES # BLD AUTO: 0.6 10*3/MM3 (ref 0.6–4.2)
LYMPHOCYTES NFR BLD AUTO: 15.4 % (ref 10–50)
Lab: NORMAL
MCH RBC QN AUTO: 26.6 PG (ref 26.5–34)
MCHC RBC AUTO-ENTMCNC: 32 G/DL (ref 31.4–36)
MCV RBC AUTO: 83.2 FL (ref 80–98)
MONOCYTES # BLD AUTO: 0.4 10*3/MM3 (ref 0–0.9)
MONOCYTES NFR BLD AUTO: 10.3 % (ref 0–12)
NEUTROPHILS # BLD AUTO: 2.76 10*3/MM3 (ref 2–8.6)
NEUTROPHILS NFR BLD AUTO: 70.9 % (ref 37–80)
PATH REPORT.FINAL DX SPEC: NORMAL
PATH REPORT.GROSS SPEC: NORMAL
PLATELET # BLD AUTO: 166 10*3/MM3 (ref 150–450)
PMV BLD AUTO: 10.2 FL (ref 8–12)
POTASSIUM BLD-SCNC: 4.2 MMOL/L (ref 3.5–5.1)
PROT SERPL-MCNC: 6 G/DL (ref 6.3–8.6)
RBC # BLD AUTO: 3.27 10*6/MM3 (ref 3.77–5.16)
SODIUM BLD-SCNC: 139 MMOL/L (ref 137–145)
TACROLIMUS BLD-MCNC: 4.9 NG/ML (ref 2–20)
WBC NRBC COR # BLD: 3.89 10*3/MM3 (ref 3.2–9.8)

## 2017-12-06 PROCEDURE — 85018 HEMOGLOBIN: CPT | Performed by: FAMILY MEDICINE

## 2017-12-06 PROCEDURE — 63710000001 TACROLIMUS PER 1 MG: Performed by: FAMILY MEDICINE

## 2017-12-06 PROCEDURE — 86341 ISLET CELL ANTIBODY: CPT | Performed by: INTERNAL MEDICINE

## 2017-12-06 PROCEDURE — 82962 GLUCOSE BLOOD TEST: CPT

## 2017-12-06 PROCEDURE — 80053 COMPREHEN METABOLIC PANEL: CPT | Performed by: FAMILY MEDICINE

## 2017-12-06 PROCEDURE — 84681 ASSAY OF C-PEPTIDE: CPT | Performed by: INTERNAL MEDICINE

## 2017-12-06 PROCEDURE — 85025 COMPLETE CBC W/AUTO DIFF WBC: CPT | Performed by: FAMILY MEDICINE

## 2017-12-06 PROCEDURE — 63710000001 PREDNISONE PER 5 MG: Performed by: FAMILY MEDICINE

## 2017-12-06 PROCEDURE — 80197 ASSAY OF TACROLIMUS: CPT | Performed by: INTERNAL MEDICINE

## 2017-12-06 PROCEDURE — 85014 HEMATOCRIT: CPT | Performed by: FAMILY MEDICINE

## 2017-12-06 RX ORDER — CARVEDILOL 3.12 MG/1
3.12 TABLET ORAL 2 TIMES DAILY WITH MEALS
Qty: 30 TABLET | Refills: 0 | Status: SHIPPED | OUTPATIENT
Start: 2017-12-06 | End: 2018-06-25 | Stop reason: SDUPTHER

## 2017-12-06 RX ADMIN — PREDNISONE 5 MG: 5 TABLET ORAL at 10:09

## 2017-12-06 RX ADMIN — HYDRALAZINE HYDROCHLORIDE 50 MG: 50 TABLET ORAL at 05:25

## 2017-12-06 RX ADMIN — CARVEDILOL 3.12 MG: 3.12 TABLET, FILM COATED ORAL at 10:09

## 2017-12-06 RX ADMIN — ROSUVASTATIN CALCIUM 20 MG: 20 TABLET, FILM COATED ORAL at 10:10

## 2017-12-06 RX ADMIN — MYCOPHENOLIC ACID 360 MG: 360 TABLET, DELAYED RELEASE ORAL at 10:12

## 2017-12-06 RX ADMIN — DILTIAZEM HYDROCHLORIDE 180 MG: 180 CAPSULE, COATED, EXTENDED RELEASE ORAL at 10:09

## 2017-12-06 RX ADMIN — HYDRALAZINE HYDROCHLORIDE 50 MG: 50 TABLET ORAL at 15:10

## 2017-12-06 RX ADMIN — HYDROCORTISONE: 1 CREAM TOPICAL at 12:14

## 2017-12-06 RX ADMIN — TACROLIMUS 4 MG: 1 CAPSULE ORAL at 06:09

## 2017-12-06 RX ADMIN — PANTOPRAZOLE SODIUM 40 MG: 40 TABLET, DELAYED RELEASE ORAL at 10:09

## 2017-12-06 RX ADMIN — LOSARTAN POTASSIUM 50 MG: 50 TABLET, FILM COATED ORAL at 10:09

## 2017-12-07 ENCOUNTER — EPISODE CHANGES (OUTPATIENT)
Dept: CASE MANAGEMENT | Facility: OTHER | Age: 77
End: 2017-12-07

## 2017-12-07 LAB
C PEPTIDE SERPL-MCNC: 2 NG/ML (ref 1.1–4.4)
TACROLIMUS BLD-MCNC: 2.6 NG/ML (ref 2–20)

## 2017-12-08 ENCOUNTER — TRANSITIONAL CARE MANAGEMENT TELEPHONE ENCOUNTER (OUTPATIENT)
Dept: FAMILY MEDICINE CLINIC | Facility: CLINIC | Age: 77
End: 2017-12-08

## 2017-12-08 NOTE — OUTREACH NOTE
"Spoke with patient and she stated that she was \"feeling great\".  She is not having any problems with eating, nausea, or bowel issues.  No further chest pain.  She stated that she declined the Home Health services. Patient stated that she was told that she had ulcers and wanted to know what she was suppose to do for them. She is currently taking Protonix 40 mg daily and stated that she was going to get some Mylanta today and use it.  I explained that I would check with Dr Swenson and she needed to call Gastro and check with them also.  Patient verbalized understanding.  "

## 2017-12-09 LAB — TACROLIMUS BLD-MCNC: 3.2 NG/ML (ref 2–20)

## 2017-12-11 LAB — GAD65 AB SER-ACNC: NORMAL U/ML

## 2017-12-13 RX ORDER — MYCOPHENOLIC ACID 360 MG/1
360 TABLET, DELAYED RELEASE ORAL 2 TIMES DAILY
Qty: 180 TABLET | Refills: 6 | Status: SHIPPED | OUTPATIENT
Start: 2017-12-13

## 2017-12-13 RX ORDER — TACROLIMUS 1 MG/1
1 CAPSULE ORAL 2 TIMES DAILY
Qty: 180 CAPSULE | Refills: 6 | Status: SHIPPED | OUTPATIENT
Start: 2017-12-13 | End: 2018-03-28 | Stop reason: SDUPTHER

## 2017-12-14 ENCOUNTER — OFFICE VISIT (OUTPATIENT)
Dept: GASTROENTEROLOGY | Facility: CLINIC | Age: 77
End: 2017-12-14

## 2017-12-14 VITALS
HEIGHT: 60 IN | BODY MASS INDEX: 31.33 KG/M2 | WEIGHT: 159.6 LBS | HEART RATE: 63 BPM | SYSTOLIC BLOOD PRESSURE: 163 MMHG | DIASTOLIC BLOOD PRESSURE: 88 MMHG

## 2017-12-14 DIAGNOSIS — R53.81 MALAISE AND FATIGUE: ICD-10-CM

## 2017-12-14 DIAGNOSIS — R53.83 MALAISE AND FATIGUE: ICD-10-CM

## 2017-12-14 DIAGNOSIS — D50.9 IRON DEFICIENCY ANEMIA, UNSPECIFIED IRON DEFICIENCY ANEMIA TYPE: Primary | ICD-10-CM

## 2017-12-14 PROCEDURE — 99214 OFFICE O/P EST MOD 30 MIN: CPT | Performed by: NURSE PRACTITIONER

## 2017-12-14 RX ORDER — SODIUM, POTASSIUM,MAG SULFATES 17.5-3.13G
1 SOLUTION, RECONSTITUTED, ORAL ORAL EVERY 12 HOURS
Qty: 2 BOTTLE | Refills: 0 | Status: ON HOLD | OUTPATIENT
Start: 2017-12-14 | End: 2017-12-20

## 2017-12-14 RX ORDER — DEXTROSE AND SODIUM CHLORIDE 5; .45 G/100ML; G/100ML
30 INJECTION, SOLUTION INTRAVENOUS CONTINUOUS PRN
Status: CANCELLED | OUTPATIENT
Start: 2017-12-20

## 2017-12-15 ENCOUNTER — OFFICE VISIT (OUTPATIENT)
Dept: FAMILY MEDICINE CLINIC | Facility: CLINIC | Age: 77
End: 2017-12-15

## 2017-12-15 VITALS
RESPIRATION RATE: 16 BRPM | TEMPERATURE: 98.6 F | SYSTOLIC BLOOD PRESSURE: 144 MMHG | HEIGHT: 60 IN | HEART RATE: 69 BPM | BODY MASS INDEX: 30.98 KG/M2 | WEIGHT: 157.8 LBS | DIASTOLIC BLOOD PRESSURE: 64 MMHG

## 2017-12-15 DIAGNOSIS — E78.2 MIXED HYPERLIPIDEMIA: ICD-10-CM

## 2017-12-15 DIAGNOSIS — K29.70 GASTRITIS, PRESENCE OF BLEEDING UNSPECIFIED, UNSPECIFIED CHRONICITY, UNSPECIFIED GASTRITIS TYPE: ICD-10-CM

## 2017-12-15 DIAGNOSIS — IMO0002 UNCONTROLLED TYPE 2 DIABETES MELLITUS WITH COMPLICATION, WITH LONG-TERM CURRENT USE OF INSULIN: ICD-10-CM

## 2017-12-15 DIAGNOSIS — E10.649 TYPE 1 DIABETES MELLITUS WITH HYPOGLYCEMIA AND WITHOUT COMA (HCC): ICD-10-CM

## 2017-12-15 DIAGNOSIS — E66.9 OBESITY, UNSPECIFIED CLASSIFICATION, UNSPECIFIED OBESITY TYPE, UNSPECIFIED WHETHER SERIOUS COMORBIDITY PRESENT: ICD-10-CM

## 2017-12-15 DIAGNOSIS — K31.89 EROSIVE GASTROPATHY: Primary | ICD-10-CM

## 2017-12-15 DIAGNOSIS — N18.30 STAGE 3 CHRONIC KIDNEY DISEASE (HCC): ICD-10-CM

## 2017-12-15 DIAGNOSIS — I48.0 PAROXYSMAL ATRIAL FIBRILLATION (HCC): ICD-10-CM

## 2017-12-15 DIAGNOSIS — I10 UNCONTROLLED HYPERTENSION: ICD-10-CM

## 2017-12-15 DIAGNOSIS — Z94.0 HISTORY OF KIDNEY TRANSPLANT: ICD-10-CM

## 2017-12-15 DIAGNOSIS — D64.9 ANEMIA, UNSPECIFIED TYPE: ICD-10-CM

## 2017-12-15 PROCEDURE — 99495 TRANSJ CARE MGMT MOD F2F 14D: CPT | Performed by: FAMILY MEDICINE

## 2017-12-15 RX ORDER — LOSARTAN POTASSIUM 50 MG/1
50 TABLET ORAL DAILY
COMMUNITY
End: 2018-05-31 | Stop reason: SDUPTHER

## 2017-12-15 NOTE — PATIENT INSTRUCTIONS
Stop taking Pradaxa 3 days prior to colonoscopy or on December 17th 2017   Gastritis, Adult  Gastritis is soreness and swelling (inflammation) of the lining of the stomach. Gastritis can develop as a sudden onset (acute) or long-term (chronic) condition. If gastritis is not treated, it can lead to stomach bleeding and ulcers.  CAUSES   Gastritis occurs when the stomach lining is weak or damaged. Digestive juices from the stomach then inflame the weakened stomach lining. The stomach lining may be weak or damaged due to viral or bacterial infections. One common bacterial infection is the Helicobacter pylori infection. Gastritis can also result from excessive alcohol consumption, taking certain medicines, or having too much acid in the stomach.   SYMPTOMS   In some cases, there are no symptoms. When symptoms are present, they may include:  · Pain or a burning sensation in the upper abdomen.  · Nausea.  · Vomiting.  · An uncomfortable feeling of fullness after eating.  DIAGNOSIS   Your caregiver may suspect you have gastritis based on your symptoms and a physical exam. To determine the cause of your gastritis, your caregiver may perform the following:  · Blood or stool tests to check for the H pylori bacterium.  · Gastroscopy. A thin, flexible tube (endoscope) is passed down the esophagus and into the stomach. The endoscope has a light and camera on the end. Your caregiver uses the endoscope to view the inside of the stomach.  · Taking a tissue sample (biopsy) from the stomach to examine under a microscope.  TREATMENT   Depending on the cause of your gastritis, medicines may be prescribed. If you have a bacterial infection, such as an H pylori infection, antibiotics may be given. If your gastritis is caused by too much acid in the stomach, H2 blockers or antacids may be given. Your caregiver may recommend that you stop taking aspirin, ibuprofen, or other nonsteroidal anti-inflammatory drugs (NSAIDs).  HOME CARE  INSTRUCTIONS  · Only take over-the-counter or prescription medicines as directed by your caregiver.  · If you were given antibiotic medicines, take them as directed. Finish them even if you start to feel better.  · Drink enough fluids to keep your urine clear or pale yellow.  · Avoid foods and drinks that make your symptoms worse, such as:    Caffeine or alcoholic drinks.    Chocolate.    Peppermint or mint flavorings.    Garlic and onions.    Spicy foods.    Citrus fruits, such as oranges, claire, or limes.    Tomato-based foods such as sauce, chili, salsa, and pizza.    Fried and fatty foods.  · Eat small, frequent meals instead of large meals.  SEEK IMMEDIATE MEDICAL CARE IF:   · You have black or dark red stools.  · You vomit blood or material that looks like coffee grounds.  · You are unable to keep fluids down.  · Your abdominal pain gets worse.  · You have a fever.  · You do not feel better after 1 week.  · You have any other questions or concerns.  MAKE SURE YOU:  · Understand these instructions.  · Will watch your condition.  · Will get help right away if you are not doing well or get worse.     This information is not intended to replace advice given to you by your health care provider. Make sure you discuss any questions you have with your health care provider.     Document Released: 12/12/2002 Document Revised: 06/18/2013 Document Reviewed: 09/10/2016  [employer] Interactive Patient Education ©2017 [employer]

## 2017-12-15 NOTE — PROGRESS NOTES
Transitional Care Follow Up Visit  Subjective      Problem List  1. 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 Hyperlipidemia ASCVD risk high   3. ASCAD sp stent placement/severe LDH/Right ventricular Delitation  4. ESRD sp right kidney transplant   5. Obesity BMI >30   6. Essential Hypertension  8. Right kidney transplant in 2010  9. GERD  10. Bilateral leg edema  11. Anemia of Chronic Disease   12. Depression   13. Unspecified murmur  14. Paroxymal Atrial Fibrillation     Chanda Yan is a 77 y.o. female who presents for a transitional care management visit.    Within 48 business hours after discharge our office contacted her via telephone to coordinate her care and needs.      I reviewed and discussed the details of that call along with the discharge summary, hospital problems, inpatient lab results, inpatient diagnostic studies, and consultation reports with Chanda.     Current outpatient and discharge medications have been reconciled for the patient.    Date of TCM Phone Call 12/8/2017   [facility]   Date of Admission 12/1/2017   Date of Discharge 12/6/2017   [place] [facility] [place]     Risk for Readmission (LACE) Score: 12    History of Present Illness   Course During Hospital Stay:  Pt is 78 yo WF with the above medical issues of DM type 2 insulin dependent.  ESRD sp right kidney trasnplatn, CAD, hyperlipidemia  Pt presented with chest pain on 12/8/17 when she was at a [employer].  She started having pain in car around right sided epigastric area that was burning in nature.  Pt was admitted to the cardiac floor at [facility] and 3 sets of cardiac enzymes were elevated that was likely seconadry to acute kidney injure.  She was provided gentle hydration.  Pt has anemia of chronic diseease. A hemoccult stool was obtained which was positive. GI was consulted and an EGD was preformed that showed erosive gastritis. She did recived 2 units of PRBC blood transfusion.  "Cardiology was asked whether to continue Pradaxaf or her atrial fibrillation  and that was on hold until next visit.  Pt will have colonoscopy outpatient. Pt stabilized and pain became better. She denied any chest pain, dizziness, shortness of breath, abdominal pain, diarrhea or constipation prior to discharge.  She also has DM type 2 and her last hga1c was >7.0. She currently uses an insulin pump.  Dr. Levine was consulted and insulin pump was adjusted. Her sugars have been better controlled. She continues to see Endocrinologist in [place] but pt would rather see Dr. Levine at [facility]. She does not want to drive all the way to [place]. She is due for hga1c check as well as lipid panel check. She takes crestor 20 mg PO q daily.  Pt states she is doing well and that her epigastric pain/chest pain is better. She continues to take protonix 40 mg PO q daily. She was advised to stop pradaxa 3 days prior to her colonoscopy with Dr. Zee on 12/20/17      The following portions of the patient's history were reviewed and updated as appropriate: allergies, current medications, past family history, past medical history, past social history, past surgical history and problem list.    Review of Systems    Objective    /64  Pulse 69  Temp 98.6 °F (37 °C)  Resp 16  Ht 152.4 cm (60\")  Wt 71.6 kg (157 lb 12.8 oz)  BMI 30.82 kg/m2    Admission on 12/01/2017, Discharged on 12/06/2017   No results displayed because visit has over 200 results.        Lab Results   Component Value Date    WBC 3.89 12/06/2017    HGB 9.4 (L) 12/06/2017    HCT 30.0 (L) 12/06/2017    MCV 83.2 12/06/2017     12/06/2017     .lastipid  Lab Results   Component Value Date    HGBA1C 8.5 (H) 09/12/2017     Lab Results   Component Value Date    TSH 0.270 (L) 12/01/2017     Lab Results   Component Value Date    GLUCOSE 111 (H) 12/06/2017    BUN 35 (H) 12/06/2017    CREATININE 1.65 (H) 12/06/2017    EGFRIFAFRI 37 (L) 12/06/2017    BCR 21.2 " 12/06/2017    K 4.2 12/06/2017    CO2 23.0 12/06/2017    CALCIUM 9.4 12/06/2017    ALBUMIN 3.50 12/06/2017    LABIL2 1.4 12/06/2017    AST 43 (H) 12/06/2017    ALT 56 (H) 12/06/2017   ]    Physical Exam   Constitutional: She is oriented to person, place, and time. She appears well-developed and well-nourished. No distress.   HENT:   Head: Normocephalic and atraumatic.   Right Ear: External ear normal.   Left Ear: External ear normal.   Eyes: Conjunctivae and EOM are normal. Pupils are equal, round, and reactive to light. Right eye exhibits no discharge. Left eye exhibits no discharge. No scleral icterus.   Neck: Normal range of motion. Neck supple. No JVD present. No tracheal deviation present. No thyromegaly present.   Cardiovascular: Normal rate, regular rhythm and normal heart sounds.    Pulmonary/Chest: Effort normal. No stridor. No respiratory distress. She has no wheezes.   Abdominal: Soft. She exhibits no distension and no mass. There is no tenderness. There is no rebound and no guarding. No hernia.   Musculoskeletal: Normal range of motion. She exhibits no edema, tenderness or deformity.   Lymphadenopathy:     She has no cervical adenopathy.   Neurological: She is alert and oriented to person, place, and time. No cranial nerve deficit. Coordination normal.   Skin: Skin is warm and dry. No rash noted. She is not diaphoretic. No erythema. No pallor.   Psychiatric: She has a normal mood and affect. Her behavior is normal.   Nursing note and vitals reviewed.      Assessment/Plan   Problems Addressed this Visit        Cardiovascular and Mediastinum    Mixed hyperlipidemia    Uncontrolled hypertension    Paroxysmal atrial fibrillation       Digestive    Obesity    Gastritis    Erosive gastropathy - Primary       Endocrine    Type 1 diabetes mellitus with hypoglycemia       Hematopoietic and Hemostatic    Anemia       Other    Chronic kidney disease    History of kidney transplant      Other Visit Diagnoses      Uncontrolled type 2 diabetes mellitus with complication, with long-term current use of insulin        Relevant Orders    Ambulatory Referral to Endocrinology    Hemoglobin A1c    Lipid Panel        - hyperilpidemia - continue crestor and zetia/ recheck lipid panel  - BP at goal continue antihypertensive medicaitons   -paroxymal atrial fibrillatin - coreg, pradaxa. Cardiology appt in February.  Pt advised to hold Pradaxa 3 days prior to outpatient colonoscopy  - gastritis/erosive gastropathy - protonix 40 mg PO q daily. Advised pt to avoid food that would make stomach upset. Gave information to go home  - CKD/history of kidney transplant- continue with myfortic and tracrolimus.  Nephrology following  - Anemia - sp blood transfusion - recheck CBC. Pt asymptomatic currently denies any fatigue, dizziness  - DM type 2 - on insulin pump.  Pt will be referred to Dr. Levine Endocrinologist (conslutation appreciated). Recheck hga1c.  Pt states she does now want to drive to [employer] if possible  - Obesity - DASH diet, weight loss information provided  - recheck in 2 weeks  - advised pt to go to ER or call 911 if symptoms worrisome or severe

## 2017-12-19 NOTE — PROGRESS NOTES
Chief Complaint   Patient presents with   • EGD     f/u       Subjective    Chanda Yan is a 77 y.o. female. she is here today for follow-up.    History of Present Illness  77-year-old female presents for hospital follow-up.  She was hospitalized December 1 of December 6 of this year after sudden onset of epigastric pain.  She describes pain as sudden onset of reflux.  She is currently on Protonix 40 mg per day.  Labwork was done when she was found to be anemic,  Hemoccult was obtained which is positive and GI was consulted.  Patient has history of kidney transplant completed in 2012 at [facility] in [place].  She is on aspirin and Pradaxa.  Patient denies any excessive fatigue or weakness today.  She denies any nausea vomiting or changes in her bowel habits.  Reports she has been eating regularly and she denies any abdominal pain.   While hospitalized she underwent EGD which noted a small mildly friable mucosa without active bleeding and the duodenal bulb, multiple localized small bleeding nonbleeding erosions were found in the antrum.  Duodenum and esophagus appeared normal.  Patchy melanosis is noted in the esophagus.  While hospitalized she received 2 units of packed red blood cells and her hemoglobin improved and colonoscopy was not completed.  Plan; we'll recheck CBC today and schedule patient for colonoscopy to rule out any other source of GI bleeding or  malignancy.  Follow-up after test return to office sooner if needed.     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   • Hyperlipidemia 06/22/2016   • Low back pain 02/29/2016   • Sinusitis    • Upper respiratory infection 12/21/2015     Past Surgical History:   Procedure Laterality Date   • COLONOSCOPY  02/12/2014    Solid stool through out the colon.I biopsies the colonic mucosa given her diarrhea.Exam markedly limited by poor prep.[facility].   • ENDOSCOPY N/A 12/4/2017    Procedure: ESOPHAGOGASTRODUODENOSCOPY;  Surgeon: Nikko Zee MD;  Location: [facility] ENDOSCOPY;  Service:    • INJECTION OF MEDICATION  12/21/2015    Kenalog (1)     • INJECTION OF MEDICATION  06/30/2016    PNEUMOC VAC/ADMIN/RCVD 4040F (1)      • INJECTION OF MEDICATION  12/21/2015    Rocephin (1)     • TRANSPLANTATION RENAL       Family History   Problem Relation Age of Onset   • Lung cancer Mother    • Heart disease Father    • Lupus Other    • Diabetes Other      OB History     No data available        Current Outpatient Prescriptions   Medication Sig Dispense Refill   • amitriptyline (ELAVIL) 10 MG tablet Take 10 mg by mouth Every Night.  0   • aspirin 81 MG tablet Take 1 tablet by mouth Daily. 30 tablet 11   • Ca Carb-FA-D-B6-B12-Boron-Mg 1342-1 MG wafer Take 1 tablet by mouth daily.     • Calcium Carb-Cholecalciferol (CALCIUM 1000 + D PO) Take 1 tablet by mouth Daily.     • carvedilol (COREG) 3.125 MG tablet Take 1 tablet by mouth 2 (Two) Times a Day With Meals. 30 tablet 0   • cetirizine (ZyrTEC) 10 MG tablet Take 10 mg by mouth daily.     • Cholecalciferol (VITAMIN D3) 5000 UNITS capsule capsule Take 5,000 Units by mouth Daily.     • dabigatran etexilate (PRADAXA) 75 MG capsule Take 1 capsule by mouth 2 (Two) Times a Day. 60 capsule 6   • diltiaZEM SR (CARDIZEM SR) 90 MG 12 hr capsule Take 1  capsule by mouth 2 (Two) Times a Day. 60 capsule 11   • ezetimibe (ZETIA) 10 MG tablet Take 1 tablet by mouth Daily. (Patient taking differently: Take 10 mg by mouth Every Night.) 31 tablet 11   • fluticasone (FLONASE) 50 MCG/ACT nasal spray 2 sprays into each nostril Daily.     • furosemide (LASIX) 40 MG tablet Take 1 tablet by mouth 2 (Two) Times a Day. Take one tab in a.m. and one-half tab in p.m 30 tablet 11   • glucagon (GLUCAGON EMERGENCY) 1 MG injection Inject 1 mg under the skin 1 (One) Time As Needed for Low Blood Sugar for up to 1 dose. 1 kit 5   • glucose blood test strip Use as instructed 100 each 12   • glucose monitor monitoring kit 1 each Daily. 1 each 0   • hydrALAZINE (APRESOLINE) 100 MG tablet Take 1 tablet by mouth 3 (Three) Times a Day. 90 tablet 11   • insulin aspart (NOVOLOG) 100 UNIT/ML injection Inject 5 Units under the skin 3 (Three) Times a Day Before Meals. (Patient taking differently: Inject 5 Units under the skin Every Night.) 300 Units 11   • ipratropium (ATROVENT) 0.06 % nasal spray 2 sprays into each nostril 2 (Two) Times a Day. 15 mL 11   • LANTUS SOLOSTAR 100 UNIT/ML injection pen INJECT 10 UNITS SUBCUTANEOUSLY UTD AS A BACK UP FOR PUMP  4   • mupirocin (BACTROBAN) 2 % ointment Apply  topically 3 (Three) Times a Day. 15 g 0   • mycophenolate (MYFORTIC) 360 MG tablet delayed-release EC tablet Take 1 tablet by mouth 2 (Two) Times a Day. 180 tablet 6   • pantoprazole (PROTONIX) 40 MG EC tablet Take 40 mg by mouth Daily.     • predniSONE (DELTASONE) 5 MG tablet Take 1 tablet by mouth daily. 90 tablet 11   • rosuvastatin (CRESTOR) 20 MG tablet Take 1 tablet by mouth Daily. 90 tablet 11   • tacrolimus (PROGRAF) 1 MG capsule Take 1 capsule by mouth 2 (Two) Times a Day. 180 capsule 6   • traMADol (ULTRAM) 50 MG tablet Take 1 tablet by mouth 2 (Two) Times a Day. 28 tablet 0   • losartan (COZAAR) 50 MG tablet Take 50 mg by mouth Daily.     • sodium-potassium-magnesium sulfates (SUPREP BOWEL  "PREP KIT) 17.5-3.13-1.6 GM/180ML solution oral solution Take 1 bottle by mouth Every 12 (Twelve) Hours. 2 bottle 0     No current facility-administered medications for this visit.      Allergies   Allergen Reactions   • Clonidine Derivatives Hallucinations   • Latex Itching   • Naproxen Other (See Comments)     MAKES NUMB ALL OVER   • Penicillins Itching     Social History     Social History   • Marital status:      Spouse name: N/A   • Number of children: N/A   • Years of education: N/A     Social History Main Topics   • Smoking status: Never Smoker   • Smokeless tobacco: Never Used   • Alcohol use No   • Drug use: No   • Sexual activity: Defer     Other Topics Concern   • None     Social History Narrative       Review of Systems  Review of Systems   Constitutional: Negative for activity change, appetite change, chills, diaphoresis, fatigue, fever and unexpected weight change.   HENT: Negative for sore throat and trouble swallowing.    Respiratory: Negative for shortness of breath.    Gastrointestinal: Negative for abdominal distention, abdominal pain, anal bleeding, blood in stool, constipation, diarrhea, nausea, rectal pain and vomiting.   Musculoskeletal: Negative for arthralgias.   Skin: Negative for pallor.   Neurological: Negative for light-headedness.        /88 (BP Location: Left arm, Patient Position: Sitting, Cuff Size: Adult)  Pulse 63  Ht 152.4 cm (60\")  Wt 72.4 kg (159 lb 9.6 oz)  BMI 31.17 kg/m2    Objective    Physical Exam   Constitutional: She is oriented to person, place, and time. She appears well-developed and well-nourished. She is cooperative. No distress.   HENT:   Head: Normocephalic and atraumatic.   Neck: Normal range of motion. Neck supple. No thyromegaly present.   Cardiovascular: Normal rate, regular rhythm and normal heart sounds.    Pulmonary/Chest: Effort normal and breath sounds normal. She has no wheezes. She has no rhonchi. She has no rales.   Abdominal: Soft. " Normal appearance and bowel sounds are normal. She exhibits no shifting dullness and no distension. There is no hepatosplenomegaly. There is no tenderness. There is no rigidity and no guarding. No hernia.   Lymphadenopathy:     She has no cervical adenopathy.   Neurological: She is alert and oriented to person, place, and time.   Skin: Skin is warm, dry and intact. No rash noted. No pallor.   Psychiatric: She has a normal mood and affect. Her speech is normal.     Admission on 12/01/2017, Discharged on 12/06/2017   No results displayed because visit has over 200 results.        Assessment/Plan      1. Iron deficiency anemia, unspecified iron deficiency anemia type    2. Malaise and fatigue    .       Orders placed during this encounter include:  Orders Placed This Encounter   Procedures   • CBC & Differential     Order Specific Question:   Manual Differential     Answer:   No       COLONOSCOPY  (N/A)    Review and/or summary of lab tests, radiology, procedures, medications. Review and summary of old records and obtaining of history. The risks and benefits of my recommendations, as well as other treatment options were discussed with the patient today. Questions were answered.    New Medications Ordered This Visit   Medications   • sodium-potassium-magnesium sulfates (SUPREP BOWEL PREP KIT) 17.5-3.13-1.6 GM/180ML solution oral solution     Sig: Take 1 bottle by mouth Every 12 (Twelve) Hours.     Dispense:  2 bottle     Refill:  0       Follow-up: Return in about 4 weeks (around 1/11/2018).          This document has been electronically signed by ROSEANNA Philip on December 19, 2017 5:34 PM             Results for orders placed or performed during the hospital encounter of 12/01/17   PREVIOUS HISTORY   Result Value Ref Range    Previous History No record on File    Scan Slide   Result Value Ref Range    Anisocytosis Slight/1+ None Seen    Hypochromia Large/3+ None Seen    Polychromasia Slight/1+ None Seen    WBC  Morphology Normal Normal    Platelet Estimate Adequate Normal   Urinalysis, Microscopic Only - Urine, Clean Catch   Result Value Ref Range    RBC, UA 0-2 (A) None Seen /HPF    WBC, UA 31-50 (A) None Seen, 0-2, 3-5 /HPF    Bacteria, UA None Seen None Seen /HPF    Squamous Epithelial Cells, UA None Seen None Seen, 0-2 /HPF    Hyaline Casts, UA 3-6 None Seen /LPF    Methodology Automated Microscopy    CBC Auto Differential   Result Value Ref Range    WBC 3.89 3.20 - 9.80 10*3/mm3    RBC 3.27 (L) 3.77 - 5.16 10*6/mm3    Hemoglobin 8.7 (L) 12.0 - 15.5 g/dL    Hematocrit 27.2 (L) 35.0 - 45.0 %    MCV 83.2 80.0 - 98.0 fL    MCH 26.6 26.5 - 34.0 pg    MCHC 32.0 31.4 - 36.0 g/dL    RDW 15.4 (H) 11.5 - 14.5 %    RDW-SD 46.9 (H) 36.4 - 46.3 fl    MPV 10.2 8.0 - 12.0 fL    Platelets 166 150 - 450 10*3/mm3    Neutrophil % 70.9 37.0 - 80.0 %    Lymphocyte % 15.4 10.0 - 50.0 %    Monocyte % 10.3 0.0 - 12.0 %    Eosinophil % 2.8 0.0 - 7.0 %    Basophil % 0.3 0.0 - 2.0 %    Immature Grans % 0.3 0.0 - 0.5 %    Neutrophils, Absolute 2.76 2.00 - 8.60 10*3/mm3    Lymphocytes, Absolute 0.60 0.60 - 4.20 10*3/mm3    Monocytes, Absolute 0.40 0.00 - 0.90 10*3/mm3    Eosinophils, Absolute 0.11 0.00 - 0.70 10*3/mm3    Basophils, Absolute 0.01 0.00 - 0.20 10*3/mm3    Immature Grans, Absolute 0.01 0.00 - 0.02 10*3/mm3   CBC Auto Differential   Result Value Ref Range    WBC 4.28 3.20 - 9.80 10*3/mm3    RBC 3.42 (L) 3.77 - 5.16 10*6/mm3    Hemoglobin 9.2 (L) 12.0 - 15.5 g/dL    Hematocrit 28.8 (L) 35.0 - 45.0 %    MCV 84.2 80.0 - 98.0 fL    MCH 26.9 26.5 - 34.0 pg    MCHC 31.9 31.4 - 36.0 g/dL    RDW 15.6 (H) 11.5 - 14.5 %    RDW-SD 47.7 (H) 36.4 - 46.3 fl    MPV 10.1 8.0 - 12.0 fL    Platelets 178 150 - 450 10*3/mm3    Neutrophil % 68.5 37.0 - 80.0 %    Lymphocyte % 14.7 10.0 - 50.0 %    Monocyte % 12.6 (H) 0.0 - 12.0 %    Eosinophil % 3.5 0.0 - 7.0 %    Basophil % 0.2 0.0 - 2.0 %    Immature Grans % 0.5 0.0 - 0.5 %    Neutrophils, Absolute 2.93  2.00 - 8.60 10*3/mm3    Lymphocytes, Absolute 0.63 0.60 - 4.20 10*3/mm3    Monocytes, Absolute 0.54 0.00 - 0.90 10*3/mm3    Eosinophils, Absolute 0.15 0.00 - 0.70 10*3/mm3    Basophils, Absolute 0.01 0.00 - 0.20 10*3/mm3    Immature Grans, Absolute 0.02 0.00 - 0.02 10*3/mm3   CBC Auto Differential   Result Value Ref Range    WBC 3.30 3.20 - 9.80 10*3/mm3    RBC 2.65 (L) 3.77 - 5.16 10*6/mm3    Hemoglobin 6.6 (L) 12.0 - 15.5 g/dL    Hematocrit 22.2 (L) 35.0 - 45.0 %    MCV 83.8 80.0 - 98.0 fL    MCH 24.9 (L) 26.5 - 34.0 pg    MCHC 29.7 (L) 31.4 - 36.0 g/dL    RDW 15.7 (H) 11.5 - 14.5 %    RDW-SD 48.3 (H) 36.4 - 46.3 fl    MPV 10.7 8.0 - 12.0 fL    Platelets 183 150 - 450 10*3/mm3   CBC Auto Differential   Result Value Ref Range    WBC 3.26 3.20 - 9.80 10*3/mm3    RBC 2.75 (L) 3.77 - 5.16 10*6/mm3    Hemoglobin 7.0 (L) 12.0 - 15.5 g/dL    Hematocrit 22.7 (L) 35.0 - 45.0 %    MCV 82.5 80.0 - 98.0 fL    MCH 25.5 (L) 26.5 - 34.0 pg    MCHC 30.8 (L) 31.4 - 36.0 g/dL    RDW 15.6 (H) 11.5 - 14.5 %    RDW-SD 46.7 (H) 36.4 - 46.3 fl    MPV 9.9 8.0 - 12.0 fL    Platelets 192 150 - 450 10*3/mm3    Neutrophil % 64.1 37.0 - 80.0 %    Lymphocyte % 17.8 10.0 - 50.0 %    Monocyte % 14.1 (H) 0.0 - 12.0 %    Eosinophil % 3.1 0.0 - 7.0 %    Basophil % 0.3 0.0 - 2.0 %    Immature Grans % 0.6 (H) 0.0 - 0.5 %    Neutrophils, Absolute 2.09 2.00 - 8.60 10*3/mm3    Lymphocytes, Absolute 0.58 (L) 0.60 - 4.20 10*3/mm3    Monocytes, Absolute 0.46 0.00 - 0.90 10*3/mm3    Eosinophils, Absolute 0.10 0.00 - 0.70 10*3/mm3    Basophils, Absolute 0.01 0.00 - 0.20 10*3/mm3    Immature Grans, Absolute 0.02 0.00 - 0.02 10*3/mm3   CBC Auto Differential   Result Value Ref Range    WBC 3.53 3.20 - 9.80 10*3/mm3    RBC 2.71 (L) 3.77 - 5.16 10*6/mm3    Hemoglobin 7.0 (L) 12.0 - 15.5 g/dL    Hematocrit 22.2 (L) 35.0 - 45.0 %    MCV 81.9 80.0 - 98.0 fL    MCH 25.8 (L) 26.5 - 34.0 pg    MCHC 31.5 31.4 - 36.0 g/dL    RDW 15.3 (H) 11.5 - 14.5 %    RDW-SD 45.5 36.4  - 46.3 fl    MPV 10.7 8.0 - 12.0 fL    Platelets 192 150 - 450 10*3/mm3    Neutrophil % 64.4 37.0 - 80.0 %    Lymphocyte % 21.5 10.0 - 50.0 %    Monocyte % 12.7 (H) 0.0 - 12.0 %    Eosinophil % 0.8 0.0 - 7.0 %    Basophil % 0.3 0.0 - 2.0 %    Immature Grans % 0.3 0.0 - 0.5 %    Neutrophils, Absolute 2.27 2.00 - 8.60 10*3/mm3    Lymphocytes, Absolute 0.76 0.60 - 4.20 10*3/mm3    Monocytes, Absolute 0.45 0.00 - 0.90 10*3/mm3    Eosinophils, Absolute 0.03 0.00 - 0.70 10*3/mm3    Basophils, Absolute 0.01 0.00 - 0.20 10*3/mm3    Immature Grans, Absolute 0.01 0.00 - 0.02 10*3/mm3     *Note: Due to a large number of results and/or encounters for the requested time period, some results have not been displayed. A complete set of results can be found in Results Review.

## 2017-12-20 ENCOUNTER — ANESTHESIA (OUTPATIENT)
Dept: GASTROENTEROLOGY | Facility: HOSPITAL | Age: 77
End: 2017-12-20

## 2017-12-20 ENCOUNTER — ANESTHESIA EVENT (OUTPATIENT)
Dept: GASTROENTEROLOGY | Facility: HOSPITAL | Age: 77
End: 2017-12-20

## 2017-12-20 ENCOUNTER — HOSPITAL ENCOUNTER (OUTPATIENT)
Facility: HOSPITAL | Age: 77
Setting detail: HOSPITAL OUTPATIENT SURGERY
Discharge: HOME | End: 2017-12-20
Attending: INTERNAL MEDICINE | Admitting: INTERNAL MEDICINE

## 2017-12-20 VITALS
OXYGEN SATURATION: 99 % | HEART RATE: 69 BPM | DIASTOLIC BLOOD PRESSURE: 83 MMHG | WEIGHT: 158 LBS | TEMPERATURE: 98.3 F | RESPIRATION RATE: 15 BRPM | SYSTOLIC BLOOD PRESSURE: 175 MMHG | HEIGHT: 60 IN | BODY MASS INDEX: 31.02 KG/M2

## 2017-12-20 DIAGNOSIS — D50.9 IRON DEFICIENCY ANEMIA, UNSPECIFIED IRON DEFICIENCY ANEMIA TYPE: ICD-10-CM

## 2017-12-20 DIAGNOSIS — R53.81 MALAISE AND FATIGUE: ICD-10-CM

## 2017-12-20 DIAGNOSIS — R53.83 MALAISE AND FATIGUE: ICD-10-CM

## 2017-12-20 LAB — GLUCOSE BLDC GLUCOMTR-MCNC: 76 MG/DL (ref 70–130)

## 2017-12-20 PROCEDURE — 82962 GLUCOSE BLOOD TEST: CPT

## 2017-12-20 PROCEDURE — 45378 DIAGNOSTIC COLONOSCOPY: CPT | Performed by: INTERNAL MEDICINE

## 2017-12-20 PROCEDURE — 25010000002 PROPOFOL 10 MG/ML EMULSION: Performed by: NURSE ANESTHETIST, CERTIFIED REGISTERED

## 2017-12-20 RX ORDER — PROPOFOL 10 MG/ML
VIAL (ML) INTRAVENOUS AS NEEDED
Status: DISCONTINUED | OUTPATIENT
Start: 2017-12-20 | End: 2017-12-20 | Stop reason: SURG

## 2017-12-20 RX ORDER — PROMETHAZINE HYDROCHLORIDE 25 MG/1
25 TABLET ORAL ONCE AS NEEDED
Status: DISCONTINUED | OUTPATIENT
Start: 2017-12-20 | End: 2017-12-20 | Stop reason: HOSPADM

## 2017-12-20 RX ORDER — DEXAMETHASONE SODIUM PHOSPHATE 4 MG/ML
8 INJECTION, SOLUTION INTRA-ARTICULAR; INTRALESIONAL; INTRAMUSCULAR; INTRAVENOUS; SOFT TISSUE ONCE AS NEEDED
Status: DISCONTINUED | OUTPATIENT
Start: 2017-12-20 | End: 2017-12-20 | Stop reason: HOSPADM

## 2017-12-20 RX ORDER — ONDANSETRON 2 MG/ML
4 INJECTION INTRAMUSCULAR; INTRAVENOUS ONCE AS NEEDED
Status: DISCONTINUED | OUTPATIENT
Start: 2017-12-20 | End: 2017-12-20 | Stop reason: HOSPADM

## 2017-12-20 RX ORDER — PROMETHAZINE HYDROCHLORIDE 25 MG/ML
12.5 INJECTION, SOLUTION INTRAMUSCULAR; INTRAVENOUS ONCE AS NEEDED
Status: DISCONTINUED | OUTPATIENT
Start: 2017-12-20 | End: 2017-12-20 | Stop reason: HOSPADM

## 2017-12-20 RX ORDER — PROMETHAZINE HYDROCHLORIDE 25 MG/1
25 SUPPOSITORY RECTAL ONCE AS NEEDED
Status: DISCONTINUED | OUTPATIENT
Start: 2017-12-20 | End: 2017-12-20 | Stop reason: HOSPADM

## 2017-12-20 RX ORDER — DEXTROSE AND SODIUM CHLORIDE 5; .45 G/100ML; G/100ML
30 INJECTION, SOLUTION INTRAVENOUS CONTINUOUS PRN
Status: DISCONTINUED | OUTPATIENT
Start: 2017-12-20 | End: 2017-12-20 | Stop reason: HOSPADM

## 2017-12-20 RX ADMIN — DEXTROSE AND SODIUM CHLORIDE: 5; 450 INJECTION, SOLUTION INTRAVENOUS at 13:15

## 2017-12-20 RX ADMIN — DEXTROSE AND SODIUM CHLORIDE 30 ML/HR: 5; 450 INJECTION, SOLUTION INTRAVENOUS at 13:02

## 2017-12-20 RX ADMIN — PROPOFOL 40 MG: 10 INJECTION, EMULSION INTRAVENOUS at 14:07

## 2017-12-20 RX ADMIN — PROPOFOL 60 MG: 10 INJECTION, EMULSION INTRAVENOUS at 14:05

## 2017-12-20 NOTE — ANESTHESIA PREPROCEDURE EVALUATION
Anesthesia Evaluation     Patient summary reviewed   NPO Solid Status: > 8 hours  NPO Liquid Status: > 4 hours     Airway   Mallampati: II  TM distance: >3 FB  Neck ROM: limited  no difficulty expected  Dental    (+) edentulous    Pulmonary - normal exam   (+) asthma, recent URI resolved,   Cardiovascular - normal exam    Patient on routine beta blocker    (+) hypertension poorly controlled, CAD, dysrhythmias Atrial Fib, hyperlipidemia      Neuro/Psych  (+) CVA,    GI/Hepatic/Renal/Endo    (+) obesity,  renal disease CRI, diabetes mellitus type 2 poorly controlled,     ROS Comment: Hx kidney transplant 2000    Musculoskeletal     Abdominal   (+) obese,    Substance History      OB/GYN          Other          Other Comment: Fe deficiency anemia                                Anesthesia Plan    ASA 3     MAC     intravenous induction   Anesthetic plan and risks discussed with patient.

## 2017-12-20 NOTE — ANESTHESIA POSTPROCEDURE EVALUATION
Patient: Chanda Yna    Procedure Summary     Date Anesthesia Start Anesthesia Stop Room / Location    12/20/17 1404 1412 [facility] ENDOSCOPY 3 / [facility] ENDOSCOPY       Procedure Diagnosis Surgeon Provider    COLONOSCOPY  (N/A ) Malaise and fatigue; Iron deficiency anemia, unspecified iron deficiency anemia type  (Malaise and fatigue [R53.81, R53.83]; Iron deficiency anemia, unspecified iron deficiency anemia type [D50.9]) MD Ofelia Banks, GRIS          Anesthesia Type: MAC  Last vitals  BP   (!) 200/76 (12/20/17 1135)   Temp   98.2 °F (36.8 °C) (12/20/17 1135)   Pulse   66 (12/20/17 1135)   Resp   18 (12/20/17 1135)     SpO2   100 % (12/20/17 1135)     Post Anesthesia Care and Evaluation    Patient location during evaluation: bedside  Patient participation: complete - patient participated  Level of consciousness: sleepy but conscious  Pain score: 0  Pain management: adequate  Airway patency: patent  Anesthetic complications: No anesthetic complications  PONV Status: none  Cardiovascular status: acceptable  Respiratory status: acceptable  Hydration status: acceptable

## 2017-12-20 NOTE — PLAN OF CARE
Problem: Patient Care Overview (Adult)  Goal: Plan of Care Review  Outcome: Outcome(s) achieved Date Met: 12/20/17

## 2017-12-20 NOTE — PLAN OF CARE
Problem: Patient Care Overview (Adult)  Goal: Plan of Care Review  Outcome: Ongoing (interventions implemented as appropriate)   12/20/17 1413   Coping/Psychosocial Response Interventions   Plan Of Care Reviewed With patient   Patient Care Overview   Progress no change       Problem: GI Endoscopy (Adult)  Goal: Signs and Symptoms of Listed Potential Problems Will be Absent or Manageable (GI Endoscopy)  Outcome: Ongoing (interventions implemented as appropriate)   12/20/17 1413   GI Endoscopy   Problems Assessed (GI Endoscopy) all   Problems Present (GI Endoscopy) none

## 2017-12-20 NOTE — H&P (VIEW-ONLY)
Chief Complaint   Patient presents with   • EGD     f/u       Subjective    Chanda Yan is a 77 y.o. female. she is here today for follow-up.    History of Present Illness  77-year-old female presents for hospital follow-up.  She was hospitalized December 1 of December 6 of this year after sudden onset of epigastric pain.  She describes pain as sudden onset of reflux.  She is currently on Protonix 40 mg per day.  Labwork was done when she was found to be anemic,  Hemoccult was obtained which is positive and GI was consulted.  Patient has history of kidney transplant completed in 2012 at [facility] in [place].  She is on aspirin and Pradaxa.  Patient denies any excessive fatigue or weakness today.  She denies any nausea vomiting or changes in her bowel habits.  Reports she has been eating regularly and she denies any abdominal pain.   While hospitalized she underwent EGD which noted a small mildly friable mucosa without active bleeding and the duodenal bulb, multiple localized small bleeding nonbleeding erosions were found in the antrum.  Duodenum and esophagus appeared normal.  Patchy melanosis is noted in the esophagus.  While hospitalized she received 2 units of packed red blood cells and her hemoglobin improved and colonoscopy was not completed.  Plan; we'll recheck CBC today and schedule patient for colonoscopy to rule out any other source of GI bleeding or  malignancy.  Follow-up after test return to office sooner if needed.     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   • Hyperlipidemia 06/22/2016   • Low back pain 02/29/2016   • Sinusitis    • Upper respiratory infection 12/21/2015     Past Surgical History:   Procedure Laterality Date   • COLONOSCOPY  02/12/2014    Solid stool through out the colon.I biopsies the colonic mucosa given her diarrhea.Exam markedly limited by poor prep.[facility].   • ENDOSCOPY N/A 12/4/2017    Procedure: ESOPHAGOGASTRODUODENOSCOPY;  Surgeon: Nikko Zee MD;  Location: [facility] ENDOSCOPY;  Service:    • INJECTION OF MEDICATION  12/21/2015    Kenalog (1)     • INJECTION OF MEDICATION  06/30/2016    PNEUMOC VAC/ADMIN/RCVD 4040F (1)      • INJECTION OF MEDICATION  12/21/2015    Rocephin (1)     • TRANSPLANTATION RENAL       Family History   Problem Relation Age of Onset   • Lung cancer Mother    • Heart disease Father    • Lupus Other    • Diabetes Other      OB History     No data available        Current Outpatient Prescriptions   Medication Sig Dispense Refill   • amitriptyline (ELAVIL) 10 MG tablet Take 10 mg by mouth Every Night.  0   • aspirin 81 MG tablet Take 1 tablet by mouth Daily. 30 tablet 11   • Ca Carb-FA-D-B6-B12-Boron-Mg 1342-1 MG wafer Take 1 tablet by mouth daily.     • Calcium Carb-Cholecalciferol (CALCIUM 1000 + D PO) Take 1 tablet by mouth Daily.     • carvedilol (COREG) 3.125 MG tablet Take 1 tablet by mouth 2 (Two) Times a Day With Meals. 30 tablet 0   • cetirizine (ZyrTEC) 10 MG tablet Take 10 mg by mouth daily.     • Cholecalciferol (VITAMIN D3) 5000 UNITS capsule capsule Take 5,000 Units by mouth Daily.     • dabigatran etexilate (PRADAXA) 75 MG capsule Take 1 capsule by mouth 2 (Two) Times a Day. 60 capsule 6   • diltiaZEM SR (CARDIZEM SR) 90 MG 12 hr capsule Take 1  capsule by mouth 2 (Two) Times a Day. 60 capsule 11   • ezetimibe (ZETIA) 10 MG tablet Take 1 tablet by mouth Daily. (Patient taking differently: Take 10 mg by mouth Every Night.) 31 tablet 11   • fluticasone (FLONASE) 50 MCG/ACT nasal spray 2 sprays into each nostril Daily.     • furosemide (LASIX) 40 MG tablet Take 1 tablet by mouth 2 (Two) Times a Day. Take one tab in a.m. and one-half tab in p.m 30 tablet 11   • glucagon (GLUCAGON EMERGENCY) 1 MG injection Inject 1 mg under the skin 1 (One) Time As Needed for Low Blood Sugar for up to 1 dose. 1 kit 5   • glucose blood test strip Use as instructed 100 each 12   • glucose monitor monitoring kit 1 each Daily. 1 each 0   • hydrALAZINE (APRESOLINE) 100 MG tablet Take 1 tablet by mouth 3 (Three) Times a Day. 90 tablet 11   • insulin aspart (NOVOLOG) 100 UNIT/ML injection Inject 5 Units under the skin 3 (Three) Times a Day Before Meals. (Patient taking differently: Inject 5 Units under the skin Every Night.) 300 Units 11   • ipratropium (ATROVENT) 0.06 % nasal spray 2 sprays into each nostril 2 (Two) Times a Day. 15 mL 11   • LANTUS SOLOSTAR 100 UNIT/ML injection pen INJECT 10 UNITS SUBCUTANEOUSLY UTD AS A BACK UP FOR PUMP  4   • mupirocin (BACTROBAN) 2 % ointment Apply  topically 3 (Three) Times a Day. 15 g 0   • mycophenolate (MYFORTIC) 360 MG tablet delayed-release EC tablet Take 1 tablet by mouth 2 (Two) Times a Day. 180 tablet 6   • pantoprazole (PROTONIX) 40 MG EC tablet Take 40 mg by mouth Daily.     • predniSONE (DELTASONE) 5 MG tablet Take 1 tablet by mouth daily. 90 tablet 11   • rosuvastatin (CRESTOR) 20 MG tablet Take 1 tablet by mouth Daily. 90 tablet 11   • tacrolimus (PROGRAF) 1 MG capsule Take 1 capsule by mouth 2 (Two) Times a Day. 180 capsule 6   • traMADol (ULTRAM) 50 MG tablet Take 1 tablet by mouth 2 (Two) Times a Day. 28 tablet 0   • losartan (COZAAR) 50 MG tablet Take 50 mg by mouth Daily.     • sodium-potassium-magnesium sulfates (SUPREP BOWEL  "PREP KIT) 17.5-3.13-1.6 GM/180ML solution oral solution Take 1 bottle by mouth Every 12 (Twelve) Hours. 2 bottle 0     No current facility-administered medications for this visit.      Allergies   Allergen Reactions   • Clonidine Derivatives Hallucinations   • Latex Itching   • Naproxen Other (See Comments)     MAKES NUMB ALL OVER   • Penicillins Itching     Social History     Social History   • Marital status:      Spouse name: N/A   • Number of children: N/A   • Years of education: N/A     Social History Main Topics   • Smoking status: Never Smoker   • Smokeless tobacco: Never Used   • Alcohol use No   • Drug use: No   • Sexual activity: Defer     Other Topics Concern   • None     Social History Narrative       Review of Systems  Review of Systems   Constitutional: Negative for activity change, appetite change, chills, diaphoresis, fatigue, fever and unexpected weight change.   HENT: Negative for sore throat and trouble swallowing.    Respiratory: Negative for shortness of breath.    Gastrointestinal: Negative for abdominal distention, abdominal pain, anal bleeding, blood in stool, constipation, diarrhea, nausea, rectal pain and vomiting.   Musculoskeletal: Negative for arthralgias.   Skin: Negative for pallor.   Neurological: Negative for light-headedness.        /88 (BP Location: Left arm, Patient Position: Sitting, Cuff Size: Adult)  Pulse 63  Ht 152.4 cm (60\")  Wt 72.4 kg (159 lb 9.6 oz)  BMI 31.17 kg/m2    Objective    Physical Exam   Constitutional: She is oriented to person, place, and time. She appears well-developed and well-nourished. She is cooperative. No distress.   HENT:   Head: Normocephalic and atraumatic.   Neck: Normal range of motion. Neck supple. No thyromegaly present.   Cardiovascular: Normal rate, regular rhythm and normal heart sounds.    Pulmonary/Chest: Effort normal and breath sounds normal. She has no wheezes. She has no rhonchi. She has no rales.   Abdominal: Soft. " Normal appearance and bowel sounds are normal. She exhibits no shifting dullness and no distension. There is no hepatosplenomegaly. There is no tenderness. There is no rigidity and no guarding. No hernia.   Lymphadenopathy:     She has no cervical adenopathy.   Neurological: She is alert and oriented to person, place, and time.   Skin: Skin is warm, dry and intact. No rash noted. No pallor.   Psychiatric: She has a normal mood and affect. Her speech is normal.     Admission on 12/01/2017, Discharged on 12/06/2017   No results displayed because visit has over 200 results.        Assessment/Plan      1. Iron deficiency anemia, unspecified iron deficiency anemia type    2. Malaise and fatigue    .       Orders placed during this encounter include:  Orders Placed This Encounter   Procedures   • CBC & Differential     Order Specific Question:   Manual Differential     Answer:   No       COLONOSCOPY  (N/A)    Review and/or summary of lab tests, radiology, procedures, medications. Review and summary of old records and obtaining of history. The risks and benefits of my recommendations, as well as other treatment options were discussed with the patient today. Questions were answered.    New Medications Ordered This Visit   Medications   • sodium-potassium-magnesium sulfates (SUPREP BOWEL PREP KIT) 17.5-3.13-1.6 GM/180ML solution oral solution     Sig: Take 1 bottle by mouth Every 12 (Twelve) Hours.     Dispense:  2 bottle     Refill:  0       Follow-up: Return in about 4 weeks (around 1/11/2018).          This document has been electronically signed by ROSEANNA Philip on December 19, 2017 5:34 PM             Results for orders placed or performed during the hospital encounter of 12/01/17   PREVIOUS HISTORY   Result Value Ref Range    Previous History No record on File    Scan Slide   Result Value Ref Range    Anisocytosis Slight/1+ None Seen    Hypochromia Large/3+ None Seen    Polychromasia Slight/1+ None Seen    WBC  Morphology Normal Normal    Platelet Estimate Adequate Normal   Urinalysis, Microscopic Only - Urine, Clean Catch   Result Value Ref Range    RBC, UA 0-2 (A) None Seen /HPF    WBC, UA 31-50 (A) None Seen, 0-2, 3-5 /HPF    Bacteria, UA None Seen None Seen /HPF    Squamous Epithelial Cells, UA None Seen None Seen, 0-2 /HPF    Hyaline Casts, UA 3-6 None Seen /LPF    Methodology Automated Microscopy    CBC Auto Differential   Result Value Ref Range    WBC 3.89 3.20 - 9.80 10*3/mm3    RBC 3.27 (L) 3.77 - 5.16 10*6/mm3    Hemoglobin 8.7 (L) 12.0 - 15.5 g/dL    Hematocrit 27.2 (L) 35.0 - 45.0 %    MCV 83.2 80.0 - 98.0 fL    MCH 26.6 26.5 - 34.0 pg    MCHC 32.0 31.4 - 36.0 g/dL    RDW 15.4 (H) 11.5 - 14.5 %    RDW-SD 46.9 (H) 36.4 - 46.3 fl    MPV 10.2 8.0 - 12.0 fL    Platelets 166 150 - 450 10*3/mm3    Neutrophil % 70.9 37.0 - 80.0 %    Lymphocyte % 15.4 10.0 - 50.0 %    Monocyte % 10.3 0.0 - 12.0 %    Eosinophil % 2.8 0.0 - 7.0 %    Basophil % 0.3 0.0 - 2.0 %    Immature Grans % 0.3 0.0 - 0.5 %    Neutrophils, Absolute 2.76 2.00 - 8.60 10*3/mm3    Lymphocytes, Absolute 0.60 0.60 - 4.20 10*3/mm3    Monocytes, Absolute 0.40 0.00 - 0.90 10*3/mm3    Eosinophils, Absolute 0.11 0.00 - 0.70 10*3/mm3    Basophils, Absolute 0.01 0.00 - 0.20 10*3/mm3    Immature Grans, Absolute 0.01 0.00 - 0.02 10*3/mm3   CBC Auto Differential   Result Value Ref Range    WBC 4.28 3.20 - 9.80 10*3/mm3    RBC 3.42 (L) 3.77 - 5.16 10*6/mm3    Hemoglobin 9.2 (L) 12.0 - 15.5 g/dL    Hematocrit 28.8 (L) 35.0 - 45.0 %    MCV 84.2 80.0 - 98.0 fL    MCH 26.9 26.5 - 34.0 pg    MCHC 31.9 31.4 - 36.0 g/dL    RDW 15.6 (H) 11.5 - 14.5 %    RDW-SD 47.7 (H) 36.4 - 46.3 fl    MPV 10.1 8.0 - 12.0 fL    Platelets 178 150 - 450 10*3/mm3    Neutrophil % 68.5 37.0 - 80.0 %    Lymphocyte % 14.7 10.0 - 50.0 %    Monocyte % 12.6 (H) 0.0 - 12.0 %    Eosinophil % 3.5 0.0 - 7.0 %    Basophil % 0.2 0.0 - 2.0 %    Immature Grans % 0.5 0.0 - 0.5 %    Neutrophils, Absolute 2.93  2.00 - 8.60 10*3/mm3    Lymphocytes, Absolute 0.63 0.60 - 4.20 10*3/mm3    Monocytes, Absolute 0.54 0.00 - 0.90 10*3/mm3    Eosinophils, Absolute 0.15 0.00 - 0.70 10*3/mm3    Basophils, Absolute 0.01 0.00 - 0.20 10*3/mm3    Immature Grans, Absolute 0.02 0.00 - 0.02 10*3/mm3   CBC Auto Differential   Result Value Ref Range    WBC 3.30 3.20 - 9.80 10*3/mm3    RBC 2.65 (L) 3.77 - 5.16 10*6/mm3    Hemoglobin 6.6 (L) 12.0 - 15.5 g/dL    Hematocrit 22.2 (L) 35.0 - 45.0 %    MCV 83.8 80.0 - 98.0 fL    MCH 24.9 (L) 26.5 - 34.0 pg    MCHC 29.7 (L) 31.4 - 36.0 g/dL    RDW 15.7 (H) 11.5 - 14.5 %    RDW-SD 48.3 (H) 36.4 - 46.3 fl    MPV 10.7 8.0 - 12.0 fL    Platelets 183 150 - 450 10*3/mm3   CBC Auto Differential   Result Value Ref Range    WBC 3.26 3.20 - 9.80 10*3/mm3    RBC 2.75 (L) 3.77 - 5.16 10*6/mm3    Hemoglobin 7.0 (L) 12.0 - 15.5 g/dL    Hematocrit 22.7 (L) 35.0 - 45.0 %    MCV 82.5 80.0 - 98.0 fL    MCH 25.5 (L) 26.5 - 34.0 pg    MCHC 30.8 (L) 31.4 - 36.0 g/dL    RDW 15.6 (H) 11.5 - 14.5 %    RDW-SD 46.7 (H) 36.4 - 46.3 fl    MPV 9.9 8.0 - 12.0 fL    Platelets 192 150 - 450 10*3/mm3    Neutrophil % 64.1 37.0 - 80.0 %    Lymphocyte % 17.8 10.0 - 50.0 %    Monocyte % 14.1 (H) 0.0 - 12.0 %    Eosinophil % 3.1 0.0 - 7.0 %    Basophil % 0.3 0.0 - 2.0 %    Immature Grans % 0.6 (H) 0.0 - 0.5 %    Neutrophils, Absolute 2.09 2.00 - 8.60 10*3/mm3    Lymphocytes, Absolute 0.58 (L) 0.60 - 4.20 10*3/mm3    Monocytes, Absolute 0.46 0.00 - 0.90 10*3/mm3    Eosinophils, Absolute 0.10 0.00 - 0.70 10*3/mm3    Basophils, Absolute 0.01 0.00 - 0.20 10*3/mm3    Immature Grans, Absolute 0.02 0.00 - 0.02 10*3/mm3   CBC Auto Differential   Result Value Ref Range    WBC 3.53 3.20 - 9.80 10*3/mm3    RBC 2.71 (L) 3.77 - 5.16 10*6/mm3    Hemoglobin 7.0 (L) 12.0 - 15.5 g/dL    Hematocrit 22.2 (L) 35.0 - 45.0 %    MCV 81.9 80.0 - 98.0 fL    MCH 25.8 (L) 26.5 - 34.0 pg    MCHC 31.5 31.4 - 36.0 g/dL    RDW 15.3 (H) 11.5 - 14.5 %    RDW-SD 45.5 36.4  - 46.3 fl    MPV 10.7 8.0 - 12.0 fL    Platelets 192 150 - 450 10*3/mm3    Neutrophil % 64.4 37.0 - 80.0 %    Lymphocyte % 21.5 10.0 - 50.0 %    Monocyte % 12.7 (H) 0.0 - 12.0 %    Eosinophil % 0.8 0.0 - 7.0 %    Basophil % 0.3 0.0 - 2.0 %    Immature Grans % 0.3 0.0 - 0.5 %    Neutrophils, Absolute 2.27 2.00 - 8.60 10*3/mm3    Lymphocytes, Absolute 0.76 0.60 - 4.20 10*3/mm3    Monocytes, Absolute 0.45 0.00 - 0.90 10*3/mm3    Eosinophils, Absolute 0.03 0.00 - 0.70 10*3/mm3    Basophils, Absolute 0.01 0.00 - 0.20 10*3/mm3    Immature Grans, Absolute 0.01 0.00 - 0.02 10*3/mm3     *Note: Due to a large number of results and/or encounters for the requested time period, some results have not been displayed. A complete set of results can be found in Results Review.

## 2017-12-20 NOTE — PLAN OF CARE
Problem: GI Endoscopy (Adult)  Goal: Signs and Symptoms of Listed Potential Problems Will be Absent or Manageable (GI Endoscopy)  Outcome: Outcome(s) achieved Date Met: 12/20/17

## 2017-12-22 LAB
GLUCOSE BLDC GLUCOMTR-MCNC: 46 MG/DL (ref 70–130)
GLUCOSE BLDC GLUCOMTR-MCNC: 47 MG/DL (ref 70–130)

## 2017-12-27 RX ORDER — PANTOPRAZOLE SODIUM 40 MG/1
TABLET, DELAYED RELEASE ORAL
Qty: 30 TABLET | Refills: 11 | Status: SHIPPED | OUTPATIENT
Start: 2017-12-27 | End: 2018-01-10 | Stop reason: SDUPTHER

## 2017-12-29 ENCOUNTER — OFFICE VISIT (OUTPATIENT)
Dept: FAMILY MEDICINE CLINIC | Facility: CLINIC | Age: 77
End: 2017-12-29

## 2017-12-29 ENCOUNTER — LAB (OUTPATIENT)
Dept: LAB | Facility: CLINIC | Age: 77
End: 2017-12-29

## 2017-12-29 VITALS
SYSTOLIC BLOOD PRESSURE: 194 MMHG | RESPIRATION RATE: 16 BRPM | BODY MASS INDEX: 29.49 KG/M2 | HEART RATE: 69 BPM | HEIGHT: 60 IN | DIASTOLIC BLOOD PRESSURE: 82 MMHG | WEIGHT: 150.2 LBS | TEMPERATURE: 98.6 F

## 2017-12-29 DIAGNOSIS — I10 UNCONTROLLED HYPERTENSION: ICD-10-CM

## 2017-12-29 DIAGNOSIS — N18.30 STAGE 3 CHRONIC KIDNEY DISEASE (HCC): ICD-10-CM

## 2017-12-29 DIAGNOSIS — Z94.0 HISTORY OF KIDNEY TRANSPLANT: ICD-10-CM

## 2017-12-29 DIAGNOSIS — E78.2 MIXED HYPERLIPIDEMIA: Primary | ICD-10-CM

## 2017-12-29 DIAGNOSIS — D50.9 IRON DEFICIENCY ANEMIA, UNSPECIFIED IRON DEFICIENCY ANEMIA TYPE: ICD-10-CM

## 2017-12-29 DIAGNOSIS — Z79.4 TYPE 2 DIABETES MELLITUS WITH COMPLICATION, WITH LONG-TERM CURRENT USE OF INSULIN (HCC): ICD-10-CM

## 2017-12-29 DIAGNOSIS — E11.8 TYPE 2 DIABETES MELLITUS WITH COMPLICATION, WITH LONG-TERM CURRENT USE OF INSULIN (HCC): ICD-10-CM

## 2017-12-29 DIAGNOSIS — K29.70 GASTRITIS, PRESENCE OF BLEEDING UNSPECIFIED, UNSPECIFIED CHRONICITY, UNSPECIFIED GASTRITIS TYPE: ICD-10-CM

## 2017-12-29 DIAGNOSIS — I48.0 PAROXYSMAL ATRIAL FIBRILLATION (HCC): ICD-10-CM

## 2017-12-29 DIAGNOSIS — D64.9 ANEMIA, UNSPECIFIED TYPE: ICD-10-CM

## 2017-12-29 LAB
ARTICHOKE IGE QN: 140 MG/DL (ref 1–129)
BASOPHILS # BLD AUTO: 0.01 10*3/MM3 (ref 0–0.2)
BASOPHILS NFR BLD AUTO: 0.3 % (ref 0–2)
CHOLEST SERPL-MCNC: 269 MG/DL (ref 0–199)
DEPRECATED RDW RBC AUTO: 50.5 FL (ref 36.4–46.3)
EOSINOPHIL # BLD AUTO: 0.07 10*3/MM3 (ref 0–0.7)
EOSINOPHIL NFR BLD AUTO: 2.1 % (ref 0–7)
ERYTHROCYTE [DISTWIDTH] IN BLOOD BY AUTOMATED COUNT: 15.9 % (ref 11.5–14.5)
HBA1C MFR BLD: 6.1 % (ref 4–5.6)
HCT VFR BLD AUTO: 29.3 % (ref 35–45)
HDLC SERPL-MCNC: 60 MG/DL (ref 60–200)
HGB BLD-MCNC: 8.8 G/DL (ref 12–15.5)
IMM GRANULOCYTES # BLD: 0.01 10*3/MM3 (ref 0–0.02)
IMM GRANULOCYTES NFR BLD: 0.3 % (ref 0–0.5)
LDLC/HDLC SERPL: 2.95 {RATIO} (ref 0–3.22)
LYMPHOCYTES # BLD AUTO: 0.51 10*3/MM3 (ref 0.6–4.2)
LYMPHOCYTES NFR BLD AUTO: 15.6 % (ref 10–50)
MCH RBC QN AUTO: 26.1 PG (ref 26.5–34)
MCHC RBC AUTO-ENTMCNC: 30 G/DL (ref 31.4–36)
MCV RBC AUTO: 86.9 FL (ref 80–98)
MONOCYTES # BLD AUTO: 0.45 10*3/MM3 (ref 0–0.9)
MONOCYTES NFR BLD AUTO: 13.8 % (ref 0–12)
NEUTROPHILS # BLD AUTO: 2.22 10*3/MM3 (ref 2–8.6)
NEUTROPHILS NFR BLD AUTO: 67.9 % (ref 37–80)
PLATELET # BLD AUTO: 208 10*3/MM3 (ref 150–450)
PMV BLD AUTO: 11.6 FL (ref 8–12)
RBC # BLD AUTO: 3.37 10*6/MM3 (ref 3.77–5.16)
TRIGL SERPL-MCNC: 161 MG/DL (ref 20–199)
WBC NRBC COR # BLD: 3.27 10*3/MM3 (ref 3.2–9.8)

## 2017-12-29 PROCEDURE — 36415 COLL VENOUS BLD VENIPUNCTURE: CPT | Performed by: FAMILY MEDICINE

## 2017-12-29 PROCEDURE — 99214 OFFICE O/P EST MOD 30 MIN: CPT | Performed by: FAMILY MEDICINE

## 2017-12-29 PROCEDURE — 83036 HEMOGLOBIN GLYCOSYLATED A1C: CPT | Performed by: FAMILY MEDICINE

## 2017-12-29 PROCEDURE — 85025 COMPLETE CBC W/AUTO DIFF WBC: CPT | Performed by: NURSE PRACTITIONER

## 2017-12-29 PROCEDURE — 80061 LIPID PANEL: CPT | Performed by: FAMILY MEDICINE

## 2017-12-29 NOTE — PROGRESS NOTES
Subjective   Chanda Yan is a 77 y.o. female.     History of Present Illness       Problem List  1. 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 Hyperlipidemia ASCVD risk high   3. ASCAD sp stent placement/severe LDH/Right ventricular Delitation  4. ESRD sp right kidney transplant   5. Obesity BMI >30   6. Essential Hypertension  8. Right kidney transplant in 2010  9. GERD  10. Bilateral leg edema  11. Anemia of Chronic Disease   12. Depression   13. Unspecified murmur  14. Paroxymal Atrial Fibrillation   15. Diverticulosis  16. Internal and Exeternal Hemorrhoids    Pt is 78 yo AAF with the above medical issues. Is here for followup.  Sees Nephrologist. Dr. Morley for CKD/ESRD and sp right kidney transplant. Pt had labwork recently. States her sugars have been well controlled lately. Pt is on insulin pump. Last hga1c was <9.0.  States morning sugars running <130 in am and <180 2 hours after meals.  Has yet to hear with appt with Dr. eLvine (Endocrinologist). BP is also elevated today but it is well controlled at home. Did bring BP readings and is runing <140/90.  Pt continue st o take hydralazine, losartan, coreg. For hyperlipidemi pt is on zetia and crestor.  Last LDL was not at goal     Pt is 78 yo WF with the above medical issues of DM type 2 insulin dependent.  ESRD sp right kidney trasnplatn, CAD, hyperlipidemia  Pt presented with chest pain on 12/8/17 when she was at a [employer].  She started having pain in car around right sided epigastric area that was burning in nature.  Pt was admitted to the cardiac floor at [facility] and 3 sets of cardiac enzymes were elevated that was likely seconadry to acute kidney injure.  She was provided gentle hydration.  Pt has anemia of chronic diseease. A hemoccult stool was obtained which was positive. GI was consulted and an EGD was preformed that showed erosive gastritis. She did recived 2 units of PRBC blood transfusion. Cardiology was asked whether to continue Pradaxaf  or her atrial fibrillation  and that was on hold until next visit.  Pt will have colonoscopy outpatient. Pt stabilized and pain became better. She denied any chest pain, dizziness, shortness of breath, abdominal pain, diarrhea or constipation prior to discharge.  She also has DM type 2 and her last hga1c was >7.0. She currently uses an insulin pump.  Dr. Levine was consulted and insulin pump was adjusted. Her sugars have been better controlled. She continues to see Endocrinologist in [place] but pt would rather see Dr. Levine at [facility]. She does not want to drive all the way to [place]. She is due for hga1c check as well as lipid panel check. She takes crestor 20 mg PO q daily.  Pt states she is doing well and that her epigastric pain/chest pain is better. She continues to take protonix 40 mg PO q daily. She was advised to stop    Pt is here for recheck again today.  She has yet to get appt with Dr. Levine Endocrinologist. She has one scheduled next month. Pt since last visit had Colonoscopy screening that showed diverticulosis along with internal and external hemorrhoids. Overall it was poor preperation.      Pt also has high blood pressure today. She did not take her medications this morning.  Usually at home it runs <130/90.  Today she denies any chest pain, heartburn or dizziness. She continues to take losartan 50 mg daily along with cardizem 90 mg every 12 hours  Coreg. 3.12 mg PO BID and  Hydralazine 100 mg PO TID. Did not bring BP readings today     Pt states her sugars have been better controlled since having her insulin pump adjsuted.  She has yet to get labwork today to recheck lipid panel and hga1c     The following portions of the patient's history were reviewed and updated as appropriate: allergies, current medications, past family history, past medical history, past social history, past surgical history and problem list.    Review of Systems   Constitutional: Negative.    HENT: Negative.    Eyes:  "Negative.    Respiratory: Negative.    Cardiovascular: Negative.    Gastrointestinal: Negative.    Endocrine: Negative.    Genitourinary: Negative.    Musculoskeletal: Negative.    Skin: Negative.    Allergic/Immunologic: Negative.    Neurological: Negative.    Hematological: Negative.    Psychiatric/Behavioral: Negative.        Objective    BP (!) 194/82  Pulse 69  Temp 98.6 °F (37 °C)  Resp 16  Ht 152.4 cm (60\")  Wt 68.1 kg (150 lb 3.2 oz)  BMI 29.33 kg/m2    BP (!) 194/82  Pulse 69  Temp 98.6 °F (37 °C)  Resp 16  Ht 152.4 cm (60\")  Wt 68.1 kg (150 lb 3.2 oz)  BMI 29.33 kg/m2      Chemistry        Component Value Date/Time     12/06/2017 0531    K 4.2 12/06/2017 0531     12/06/2017 0531    CO2 23.0 12/06/2017 0531    BUN 35 (H) 12/06/2017 0531    CREATININE 1.65 (H) 12/06/2017 0531        Component Value Date/Time    CALCIUM 9.4 12/06/2017 0531    ALKPHOS 87 12/06/2017 0531    AST 43 (H) 12/06/2017 0531    ALT 56 (H) 12/06/2017 0531    BILITOT 0.4 12/06/2017 0531        Lab Results   Component Value Date    WBC 3.89 12/06/2017    HGB 9.4 (L) 12/06/2017    HCT 30.0 (L) 12/06/2017    MCV 83.2 12/06/2017     12/06/2017     Lab Results   Component Value Date    CHOL 175 09/12/2017    CHOL 158 07/03/2017    CHOL 183 03/22/2017     Lab Results   Component Value Date    TRIG 134 09/12/2017    TRIG 74 07/03/2017    TRIG 130 03/22/2017     Lab Results   Component Value Date    HDL 46 (L) 09/12/2017    HDL 57 (L) 07/03/2017    HDL 59 (L) 03/22/2017     Lab Results   Component Value Date    LDLCALC 138 (H) 01/05/2017    LDLCALC 103 09/27/2016    LDLCALC 111 04/25/2016     No results found for: LDL  No results found for: HDLLDLRATIO  No components found for: CHOLHDL  Lab Results   Component Value Date    HGBA1C 8.5 (H) 09/12/2017     Lab Results   Component Value Date    TSH 0.270 (L) 12/01/2017       Admission on 12/20/2017, Discharged on 12/20/2017   Component Date Value Ref Range Status   • " Glucose 12/20/2017 76  70 - 130 mg/dL Final    Meter: [id number]: [id number] HARLEY NUR   • Glucose 12/20/2017 46* 70 - 130 mg/dL Final    Meter: [id number]: [id number] HARLEY NUR   • Glucose 12/20/2017 47* 70 - 130 mg/dL Final    RN NotifiedMeter: [id number]: [id number] JONATHAN CLAYTON       Physical Exam   Constitutional: She is oriented to person, place, and time. She appears well-developed and well-nourished. No distress.   HENT:   Head: Normocephalic and atraumatic.   Right Ear: External ear normal.   Left Ear: External ear normal.   Eyes: Conjunctivae and EOM are normal. Pupils are equal, round, and reactive to light. Right eye exhibits no discharge. Left eye exhibits no discharge. No scleral icterus.   Neck: Normal range of motion. Neck supple. No JVD present. No tracheal deviation present. No thyromegaly present.   Cardiovascular: Normal rate, regular rhythm and normal heart sounds.    Pulmonary/Chest: Effort normal. No stridor. No respiratory distress. She has no wheezes.   Abdominal: Soft. She exhibits no distension and no mass. There is no tenderness. There is no rebound and no guarding. No hernia.   Obese abdomen    Musculoskeletal: Normal range of motion. She exhibits no edema or deformity.   Lymphadenopathy:     She has no cervical adenopathy.   Neurological: She is alert and oriented to person, place, and time. She has normal reflexes. No cranial nerve deficit. Coordination normal.   Skin: Skin is warm and dry. No rash noted. No erythema. No pallor.   Psychiatric: She has a normal mood and affect. Her behavior is normal.   Nursing note and vitals reviewed.      Assessment/Plan   Problems Addressed this Visit        Cardiovascular and Mediastinum    Mixed hyperlipidemia - Primary    Uncontrolled hypertension    Paroxysmal atrial fibrillation       Digestive    Gastritis       Hematopoietic and Hemostatic    Anemia    Iron deficiency anemia       Other    Chronic  kidney disease    History of kidney transplant      Other Visit Diagnoses     Type 2 diabetes mellitus with complication, with long-term current use of insulin            - CKD/history of kidney transplant. Nephrology following - is on prednisone and tacrolimus curerntly.   - essential hypertension - currently elevated today. May have White Coat Syndrome.  Have BP readings today from home and will put on file.  Did not take medications this morning. She is advised to bring BP logs on next visit . Pt asymptomatic today. Denies any chest pain, dizziness or headaches   - kidney transplant-  Nephrology following. Is on Tacrolimus and myfortiq  - atrial fibrillation - currently rate controlled on coreg,ditilazem. On Pradaxa for anticoagulation  - hyperlipidemia - continue crestor and zetia. Pt is due for lipid panel today   - DM type 2 - will look into appt with Dr. Levine (Endocrinologist).  PT would like to establish with Endocrinologist at [facility]. Has appt with Endocrinologist soon. Pt is on insulin pump. Recheck hga1c Sugars better controlled  -will Schedule Medicare Wellness Exam  - iron deficiency anemia/anemia - check cbc   - recheck in 1  months

## 2018-01-02 DIAGNOSIS — D50.9 IRON DEFICIENCY ANEMIA, UNSPECIFIED IRON DEFICIENCY ANEMIA TYPE: Primary | ICD-10-CM

## 2018-01-04 PROCEDURE — 85018 HEMOGLOBIN: CPT | Performed by: NURSE PRACTITIONER

## 2018-01-04 PROCEDURE — 36415 COLL VENOUS BLD VENIPUNCTURE: CPT | Performed by: FAMILY MEDICINE

## 2018-01-04 PROCEDURE — 85014 HEMATOCRIT: CPT | Performed by: NURSE PRACTITIONER

## 2018-01-05 LAB
HCT VFR BLD AUTO: 31.2 % (ref 35–45)
HGB BLD-MCNC: 9.4 G/DL (ref 12–15.5)

## 2018-01-08 RX ORDER — FERROUS SULFATE 325(65) MG
325 TABLET ORAL
Qty: 30 TABLET | Refills: 5 | Status: SHIPPED | OUTPATIENT
Start: 2018-01-08

## 2018-01-10 ENCOUNTER — OFFICE VISIT (OUTPATIENT)
Dept: GASTROENTEROLOGY | Facility: CLINIC | Age: 78
End: 2018-01-10

## 2018-01-10 VITALS
HEIGHT: 60 IN | SYSTOLIC BLOOD PRESSURE: 162 MMHG | DIASTOLIC BLOOD PRESSURE: 88 MMHG | BODY MASS INDEX: 29.48 KG/M2 | WEIGHT: 150.13 LBS | HEART RATE: 74 BPM

## 2018-01-10 DIAGNOSIS — D50.9 IRON DEFICIENCY ANEMIA, UNSPECIFIED IRON DEFICIENCY ANEMIA TYPE: Primary | ICD-10-CM

## 2018-01-10 DIAGNOSIS — K64.9 HEMORRHOIDS, UNSPECIFIED HEMORRHOID TYPE: ICD-10-CM

## 2018-01-10 DIAGNOSIS — K57.30 DIVERTICULOSIS OF LARGE INTESTINE WITHOUT HEMORRHAGE: ICD-10-CM

## 2018-01-10 PROCEDURE — 99213 OFFICE O/P EST LOW 20 MIN: CPT | Performed by: NURSE PRACTITIONER

## 2018-01-10 NOTE — PROGRESS NOTES
Chief Complaint   Patient presents with   • Colonoscopy     results       Subjective    Chanda Yan is a 77 y.o. female. she is here today for follow-up.    History of Present Illness  37-year-old female seen for colonoscopy follow-up.   Patient has history of kidney transplant completed in 2012 at [facility] in [place].  She is on aspirin and Pradaxa.  Patient denies any excessive fatigue or weakness today.  She denies any nausea vomiting or changes in her bowel habits.  Reports she has been eating regularly and she denies any abdominal pain.   While hospitalized she underwent EGD which noted a small mildly friable mucosa without active bleeding and the duodenal bulb, multiple localized small bleeding nonbleeding erosions were found in the antrum.  Duodenum and esophagus appeared normal.  Patchy melanosis is noted in the esophagus.  While hospitalized she received 2 units of packed red blood cells and her hemoglobin improved and colonoscopy was not completed.  Colonoscopy was completed 12/20/17 and had a poor prep.  Perianal digital rectal exams were normal.  External and internal hemorrhoids were found.  Multiple small and large mouth diverticula were found the sigmoid, descending and transverse colon.  No specimens were collected.  Plan; we'll recheck CBC today if there is further decline will plan capsule endoscopy at that point. Repeat colonoscopy in 1 year for surveillance.       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   • History of transfusion    • Hyperlipidemia 06/22/2016   • Low back pain 02/29/2016   • Sinusitis    • Stroke     mini stroke   • Upper respiratory infection 12/21/2015     Past Surgical History:   Procedure Laterality Date   • COLONOSCOPY  02/12/2014    Solid stool through out the colon.I biopsies the colonic mucosa given her diarrhea.Exam markedly limited by poor prep.[facility].   • COLONOSCOPY N/A 12/20/2017    Procedure: COLONOSCOPY ;  Surgeon: Nikok Zee MD;  Location: [facility] ENDOSCOPY;  Service:    • ENDOSCOPY N/A 12/4/2017    Procedure: ESOPHAGOGASTRODUODENOSCOPY;  Surgeon: Nikko Zee MD;  Location: [facility] ENDOSCOPY;  Service:    • INJECTION OF MEDICATION  12/21/2015    Kenalog (1)     • INJECTION OF MEDICATION  06/30/2016    PNEUMOC VAC/ADMIN/RCVD 4040F (1)      • INJECTION OF MEDICATION  12/21/2015    Rocephin (1)     • TRANSPLANTATION RENAL       Family History   Problem Relation Age of Onset   • Lung cancer Mother    • Heart disease Father    • Lupus Other    • Diabetes Other      OB History     No data available        Current Outpatient Prescriptions   Medication Sig Dispense Refill   • amitriptyline (ELAVIL) 10 MG tablet Take 10 mg by mouth Every Night.  0   • aspirin 81 MG tablet Take 1 tablet by mouth Daily. 30 tablet 11   • Ca Carb-FA-D-B6-B12-Boron-Mg 1342-1 MG wafer Take 1 tablet by mouth daily.     • Calcium Carb-Cholecalciferol (CALCIUM 1000 + D PO) Take 1 tablet by mouth Daily.     • carvedilol (COREG) 3.125 MG tablet Take 1 tablet by mouth 2 (Two) Times a Day With Meals. 30 tablet 0   • cetirizine (ZyrTEC) 10 MG tablet Take 10 mg by mouth daily.     • Cholecalciferol (VITAMIN D3) 5000 UNITS capsule capsule Take 5,000 Units by mouth Daily.     • dabigatran etexilate  (PRADAXA) 75 MG capsule Take 1 capsule by mouth 2 (Two) Times a Day. 60 capsule 6   • diltiaZEM SR (CARDIZEM SR) 90 MG 12 hr capsule Take 1 capsule by mouth 2 (Two) Times a Day. 60 capsule 11   • ezetimibe (ZETIA) 10 MG tablet Take 1 tablet by mouth Daily. (Patient taking differently: Take 10 mg by mouth Every Night.) 31 tablet 11   • ferrous sulfate 325 (65 FE) MG tablet Take 1 tablet by mouth Daily With Breakfast. 30 tablet 5   • fluticasone (FLONASE) 50 MCG/ACT nasal spray 2 sprays into each nostril Daily.     • furosemide (LASIX) 40 MG tablet Take 1 tablet by mouth 2 (Two) Times a Day. Take one tab in a.m. and one-half tab in p.m 30 tablet 11   • glucagon (GLUCAGON EMERGENCY) 1 MG injection Inject 1 mg under the skin 1 (One) Time As Needed for Low Blood Sugar for up to 1 dose. 1 kit 5   • glucose blood test strip Use as instructed 100 each 12   • glucose monitor monitoring kit 1 each Daily. 1 each 0   • hydrALAZINE (APRESOLINE) 100 MG tablet Take 1 tablet by mouth 3 (Three) Times a Day. 90 tablet 11   • insulin aspart (NOVOLOG) 100 UNIT/ML injection Put 240ml in pump 240 mL 11   • ipratropium (ATROVENT) 0.06 % nasal spray 2 sprays into each nostril 2 (Two) Times a Day. 15 mL 11   • LANTUS SOLOSTAR 100 UNIT/ML injection pen INJECT 10 UNITS SUBCUTANEOUSLY UTD AS A BACK UP FOR PUMP  4   • losartan (COZAAR) 50 MG tablet Take 50 mg by mouth Daily.     • mupirocin (BACTROBAN) 2 % ointment Apply  topically 3 (Three) Times a Day. 15 g 0   • mycophenolate (MYFORTIC) 360 MG tablet delayed-release EC tablet Take 1 tablet by mouth 2 (Two) Times a Day. 180 tablet 6   • pantoprazole (PROTONIX) 40 MG EC tablet Take 40 mg by mouth Daily.     • predniSONE (DELTASONE) 5 MG tablet Take 1 tablet by mouth daily. 90 tablet 11   • rosuvastatin (CRESTOR) 20 MG tablet Take 1 tablet by mouth Daily. 90 tablet 11   • tacrolimus (PROGRAF) 1 MG capsule Take 1 capsule by mouth 2 (Two) Times a Day. 180 capsule 6   • traMADol (ULTRAM) 50 MG  "tablet Take 1 tablet by mouth 2 (Two) Times a Day. 28 tablet 0     No current facility-administered medications for this visit.      Allergies   Allergen Reactions   • Clonidine Derivatives Hallucinations   • Latex Itching   • Naproxen Other (See Comments)     MAKES NUMB ALL OVER   • Penicillins Itching     Social History     Social History   • Marital status:      Spouse name: N/A   • Number of children: N/A   • Years of education: N/A     Social History Main Topics   • Smoking status: Never Smoker   • Smokeless tobacco: Never Used   • Alcohol use No   • Drug use: No   • Sexual activity: Defer     Other Topics Concern   • None     Social History Narrative       Review of Systems  Review of Systems   Constitutional: Negative for activity change, appetite change, chills, diaphoresis, fatigue, fever and unexpected weight change.   HENT: Negative for sore throat and trouble swallowing.    Respiratory: Negative for shortness of breath.    Gastrointestinal: Negative for abdominal distention, abdominal pain, anal bleeding, blood in stool, constipation, diarrhea, nausea, rectal pain and vomiting.   Musculoskeletal: Negative for arthralgias.   Skin: Negative for pallor.   Neurological: Negative for light-headedness.        /88 (BP Location: Right arm, Patient Position: Sitting, Cuff Size: Adult)  Pulse 74  Ht 152.4 cm (60\")  Wt 68.1 kg (150 lb 2.1 oz)  BMI 29.32 kg/m2    Objective    Physical Exam   Constitutional: She is oriented to person, place, and time. She appears well-developed and well-nourished. She is cooperative. No distress.   HENT:   Head: Normocephalic and atraumatic.   Neck: Normal range of motion. Neck supple. No thyromegaly present.   Cardiovascular: Normal rate, regular rhythm and normal heart sounds.    Pulmonary/Chest: Effort normal and breath sounds normal. She has no wheezes. She has no rhonchi. She has no rales.   Abdominal: Soft. Normal appearance and bowel sounds are normal. She " exhibits no shifting dullness, no distension, no fluid wave and no ascites. There is no hepatosplenomegaly. There is no tenderness. There is no rigidity and no guarding. No hernia.   Lymphadenopathy:     She has no cervical adenopathy.   Neurological: She is alert and oriented to person, place, and time.   Skin: Skin is warm, dry and intact. No rash noted. No pallor.   Psychiatric: She has a normal mood and affect. Her speech is normal.     Lab on 12/29/2017   Component Date Value Ref Range Status   • Hemoglobin 01/04/2018 9.4* 12.0 - 15.5 g/dL Final   • Hematocrit 01/04/2018 31.2* 35.0 - 45.0 % Final     Assessment/Plan      1. Iron deficiency anemia, unspecified iron deficiency anemia type    2. Diverticulosis of large intestine without hemorrhage    3. Hemorrhoids, unspecified hemorrhoid type    .       Orders placed during this encounter include:  Orders Placed This Encounter   Procedures   • CBC & Differential     Standing Status:   Future     Standing Expiration Date:   1/10/2019     Order Specific Question:   Manual Differential     Answer:   No       * Surgery not found *    Review and/or summary of lab tests, radiology, procedures, medications. Review and summary of old records and obtaining of history. The risks and benefits of my recommendations, as well as other treatment options were discussed with the patient today. Questions were answered.    No orders of the defined types were placed in this encounter.      Follow-up: Return in about 4 weeks (around 2/7/2018).          This document has been electronically signed by ROSEANNA Philip on January 15, 2018 3:37 PM             Results for orders placed or performed in visit on 12/29/17   Hemoglobin & Hematocrit, Blood   Result Value Ref Range    Hemoglobin 9.4 (L) 12.0 - 15.5 g/dL    Hematocrit 31.2 (L) 35.0 - 45.0 %   Results for orders placed or performed during the hospital encounter of 12/20/17   POC Glucose Once   Result Value Ref Range    Glucose  47 (C) 70 - 130 mg/dL   POC Glucose Once   Result Value Ref Range    Glucose 46 (C) 70 - 130 mg/dL   POC Glucose Once   Result Value Ref Range    Glucose 76 70 - 130 mg/dL   Results for orders placed or performed in visit on 12/15/17   Hemoglobin A1c   Result Value Ref Range    Hemoglobin A1C 6.1 (H) 4 - 5.6 %   Lipid Panel   Result Value Ref Range    Total Cholesterol 269 (H) 0 - 199 mg/dL    Triglycerides 161 20 - 199 mg/dL    HDL Cholesterol 60 60 - 200 mg/dL    LDL Cholesterol  140 (H) 1 - 129 mg/dL    LDL/HDL Ratio 2.95 0.00 - 3.22   Results for orders placed or performed in visit on 12/14/17   CBC Auto Differential   Result Value Ref Range    WBC 3.27 3.20 - 9.80 10*3/mm3    RBC 3.37 (L) 3.77 - 5.16 10*6/mm3    Hemoglobin 8.8 (L) 12.0 - 15.5 g/dL    Hematocrit 29.3 (L) 35.0 - 45.0 %    MCV 86.9 80.0 - 98.0 fL    MCH 26.1 (L) 26.5 - 34.0 pg    MCHC 30.0 (L) 31.4 - 36.0 g/dL    RDW 15.9 (H) 11.5 - 14.5 %    RDW-SD 50.5 (H) 36.4 - 46.3 fl    MPV 11.6 8.0 - 12.0 fL    Platelets 208 150 - 450 10*3/mm3    Neutrophil % 67.9 37.0 - 80.0 %    Lymphocyte % 15.6 10.0 - 50.0 %    Monocyte % 13.8 (H) 0.0 - 12.0 %    Eosinophil % 2.1 0.0 - 7.0 %    Basophil % 0.3 0.0 - 2.0 %    Immature Grans % 0.3 0.0 - 0.5 %    Neutrophils, Absolute 2.22 2.00 - 8.60 10*3/mm3    Lymphocytes, Absolute 0.51 (L) 0.60 - 4.20 10*3/mm3    Monocytes, Absolute 0.45 0.00 - 0.90 10*3/mm3    Eosinophils, Absolute 0.07 0.00 - 0.70 10*3/mm3    Basophils, Absolute 0.01 0.00 - 0.20 10*3/mm3    Immature Grans, Absolute 0.01 0.00 - 0.02 10*3/mm3   Results for orders placed or performed during the hospital encounter of 12/01/17   PREVIOUS HISTORY   Result Value Ref Range    Previous History No record on File    Scan Slide   Result Value Ref Range    Anisocytosis Slight/1+ None Seen    Hypochromia Large/3+ None Seen    Polychromasia Slight/1+ None Seen    WBC Morphology Normal Normal    Platelet Estimate Adequate Normal   Urinalysis, Microscopic Only -  Urine, Clean Catch   Result Value Ref Range    RBC, UA 0-2 (A) None Seen /HPF    WBC, UA 31-50 (A) None Seen, 0-2, 3-5 /HPF    Bacteria, UA None Seen None Seen /HPF    Squamous Epithelial Cells, UA None Seen None Seen, 0-2 /HPF    Hyaline Casts, UA 3-6 None Seen /LPF    Methodology Automated Microscopy    CBC Auto Differential   Result Value Ref Range    WBC 3.89 3.20 - 9.80 10*3/mm3    RBC 3.27 (L) 3.77 - 5.16 10*6/mm3    Hemoglobin 8.7 (L) 12.0 - 15.5 g/dL    Hematocrit 27.2 (L) 35.0 - 45.0 %    MCV 83.2 80.0 - 98.0 fL    MCH 26.6 26.5 - 34.0 pg    MCHC 32.0 31.4 - 36.0 g/dL    RDW 15.4 (H) 11.5 - 14.5 %    RDW-SD 46.9 (H) 36.4 - 46.3 fl    MPV 10.2 8.0 - 12.0 fL    Platelets 166 150 - 450 10*3/mm3    Neutrophil % 70.9 37.0 - 80.0 %    Lymphocyte % 15.4 10.0 - 50.0 %    Monocyte % 10.3 0.0 - 12.0 %    Eosinophil % 2.8 0.0 - 7.0 %    Basophil % 0.3 0.0 - 2.0 %    Immature Grans % 0.3 0.0 - 0.5 %    Neutrophils, Absolute 2.76 2.00 - 8.60 10*3/mm3    Lymphocytes, Absolute 0.60 0.60 - 4.20 10*3/mm3    Monocytes, Absolute 0.40 0.00 - 0.90 10*3/mm3    Eosinophils, Absolute 0.11 0.00 - 0.70 10*3/mm3    Basophils, Absolute 0.01 0.00 - 0.20 10*3/mm3    Immature Grans, Absolute 0.01 0.00 - 0.02 10*3/mm3   CBC Auto Differential   Result Value Ref Range    WBC 4.28 3.20 - 9.80 10*3/mm3    RBC 3.42 (L) 3.77 - 5.16 10*6/mm3    Hemoglobin 9.2 (L) 12.0 - 15.5 g/dL    Hematocrit 28.8 (L) 35.0 - 45.0 %    MCV 84.2 80.0 - 98.0 fL    MCH 26.9 26.5 - 34.0 pg    MCHC 31.9 31.4 - 36.0 g/dL    RDW 15.6 (H) 11.5 - 14.5 %    RDW-SD 47.7 (H) 36.4 - 46.3 fl    MPV 10.1 8.0 - 12.0 fL    Platelets 178 150 - 450 10*3/mm3    Neutrophil % 68.5 37.0 - 80.0 %    Lymphocyte % 14.7 10.0 - 50.0 %    Monocyte % 12.6 (H) 0.0 - 12.0 %    Eosinophil % 3.5 0.0 - 7.0 %    Basophil % 0.2 0.0 - 2.0 %    Immature Grans % 0.5 0.0 - 0.5 %    Neutrophils, Absolute 2.93 2.00 - 8.60 10*3/mm3    Lymphocytes, Absolute 0.63 0.60 - 4.20 10*3/mm3    Monocytes, Absolute  0.54 0.00 - 0.90 10*3/mm3    Eosinophils, Absolute 0.15 0.00 - 0.70 10*3/mm3    Basophils, Absolute 0.01 0.00 - 0.20 10*3/mm3    Immature Grans, Absolute 0.02 0.00 - 0.02 10*3/mm3   CBC Auto Differential   Result Value Ref Range    WBC 3.30 3.20 - 9.80 10*3/mm3    RBC 2.65 (L) 3.77 - 5.16 10*6/mm3    Hemoglobin 6.6 (L) 12.0 - 15.5 g/dL    Hematocrit 22.2 (L) 35.0 - 45.0 %    MCV 83.8 80.0 - 98.0 fL    MCH 24.9 (L) 26.5 - 34.0 pg    MCHC 29.7 (L) 31.4 - 36.0 g/dL    RDW 15.7 (H) 11.5 - 14.5 %    RDW-SD 48.3 (H) 36.4 - 46.3 fl    MPV 10.7 8.0 - 12.0 fL    Platelets 183 150 - 450 10*3/mm3     *Note: Due to a large number of results and/or encounters for the requested time period, some results have not been displayed. A complete set of results can be found in Results Review.

## 2018-01-30 RX ORDER — HYDRALAZINE HYDROCHLORIDE 100 MG/1
TABLET, FILM COATED ORAL
Qty: 90 TABLET | Refills: 0 | Status: SHIPPED | OUTPATIENT
Start: 2018-01-30 | End: 2018-03-13 | Stop reason: SDUPTHER

## 2018-02-07 ENCOUNTER — EPISODE CHANGES (OUTPATIENT)
Dept: CASE MANAGEMENT | Facility: OTHER | Age: 78
End: 2018-02-07

## 2018-03-02 ENCOUNTER — TELEPHONE (OUTPATIENT)
Dept: FAMILY MEDICINE CLINIC | Facility: CLINIC | Age: 78
End: 2018-03-02

## 2018-03-02 RX ORDER — ASPIRIN 81 MG
TABLET, DELAYED RELEASE (ENTERIC COATED) ORAL
Qty: 30 TABLET | Refills: 0 | Status: SHIPPED | OUTPATIENT
Start: 2018-03-02

## 2018-03-02 RX ORDER — DABIGATRAN ETEXILATE 75 MG/1
75 CAPSULE ORAL EVERY 12 HOURS SCHEDULED
Qty: 180 CAPSULE | Refills: 3 | Status: SHIPPED | OUTPATIENT
Start: 2018-03-02 | End: 2018-03-05 | Stop reason: SDUPTHER

## 2018-03-05 RX ORDER — DABIGATRAN ETEXILATE 75 MG/1
75 CAPSULE ORAL EVERY 12 HOURS SCHEDULED
Qty: 180 CAPSULE | Refills: 0 | Status: SHIPPED | OUTPATIENT
Start: 2018-03-05

## 2018-03-13 RX ORDER — HYDRALAZINE HYDROCHLORIDE 100 MG/1
TABLET, FILM COATED ORAL
Qty: 270 TABLET | Refills: 0 | Status: SHIPPED | OUTPATIENT
Start: 2018-03-13 | End: 2018-06-30 | Stop reason: SDUPTHER

## 2018-03-28 ENCOUNTER — TELEPHONE (OUTPATIENT)
Dept: FAMILY MEDICINE CLINIC | Facility: CLINIC | Age: 78
End: 2018-03-28

## 2018-03-28 RX ORDER — TACROLIMUS 1 MG/1
1 CAPSULE ORAL 2 TIMES DAILY
Qty: 180 CAPSULE | Refills: 3 | Status: ON HOLD | OUTPATIENT
Start: 2018-03-28 | End: 2019-02-15 | Stop reason: SDUPTHER

## 2018-03-28 RX ORDER — TACROLIMUS 1 MG/1
1 CAPSULE ORAL 2 TIMES DAILY
Qty: 180 CAPSULE | Refills: 3 | Status: SHIPPED | OUTPATIENT
Start: 2018-03-28 | End: 2018-03-28 | Stop reason: SDUPTHER

## 2018-03-28 NOTE — TELEPHONE ENCOUNTER
----- Message from Ayaan Swenson MD sent at 3/28/2018 12:02 PM CDT -----  Regarding: medication   Call pt. Medication sent to pharmacy. Thanks   ----- Message -----  From: Jody Rust MA  Sent: 3/28/2018   9:59 AM  To: Ayaan Swenson MD    PT NEEDS HER NOVALOG AND PROGRAF    CALLED PT

## 2018-04-30 ENCOUNTER — OFFICE VISIT (OUTPATIENT)
Dept: FAMILY MEDICINE CLINIC | Facility: CLINIC | Age: 78
End: 2018-04-30

## 2018-04-30 VITALS
WEIGHT: 153.6 LBS | RESPIRATION RATE: 16 BRPM | SYSTOLIC BLOOD PRESSURE: 120 MMHG | DIASTOLIC BLOOD PRESSURE: 80 MMHG | HEIGHT: 60 IN | TEMPERATURE: 98.6 F | BODY MASS INDEX: 30.15 KG/M2 | HEART RATE: 67 BPM

## 2018-04-30 DIAGNOSIS — E55.9 VITAMIN D DEFICIENCY: ICD-10-CM

## 2018-04-30 DIAGNOSIS — Z00.00 GENERAL MEDICAL EXAMINATION: Primary | ICD-10-CM

## 2018-04-30 DIAGNOSIS — I48.0 PAROXYSMAL ATRIAL FIBRILLATION (HCC): ICD-10-CM

## 2018-04-30 DIAGNOSIS — Z94.0 HISTORY OF KIDNEY TRANSPLANT: ICD-10-CM

## 2018-04-30 DIAGNOSIS — E78.2 MIXED HYPERLIPIDEMIA: ICD-10-CM

## 2018-04-30 DIAGNOSIS — N18.30 STAGE 3 CHRONIC KIDNEY DISEASE (HCC): ICD-10-CM

## 2018-04-30 DIAGNOSIS — D50.9 IRON DEFICIENCY ANEMIA, UNSPECIFIED IRON DEFICIENCY ANEMIA TYPE: ICD-10-CM

## 2018-04-30 DIAGNOSIS — E11.8 CONTROLLED TYPE 2 DIABETES MELLITUS WITH COMPLICATION, WITH LONG-TERM CURRENT USE OF INSULIN (HCC): ICD-10-CM

## 2018-04-30 DIAGNOSIS — E66.9 OBESITY, UNSPECIFIED CLASSIFICATION, UNSPECIFIED OBESITY TYPE, UNSPECIFIED WHETHER SERIOUS COMORBIDITY PRESENT: ICD-10-CM

## 2018-04-30 DIAGNOSIS — Z79.4 CONTROLLED TYPE 2 DIABETES MELLITUS WITH COMPLICATION, WITH LONG-TERM CURRENT USE OF INSULIN (HCC): ICD-10-CM

## 2018-04-30 LAB
25(OH)D3 SERPL-MCNC: 65.5 NG/ML (ref 30–100)
ALBUMIN SERPL-MCNC: 4 G/DL (ref 3.4–4.8)
ALBUMIN/GLOB SERPL: 1.6 G/DL (ref 1.1–1.8)
ALP SERPL-CCNC: 104 U/L (ref 38–126)
ALT SERPL W P-5'-P-CCNC: 25 U/L (ref 9–52)
ANION GAP SERPL CALCULATED.3IONS-SCNC: 14 MMOL/L (ref 5–15)
ARTICHOKE IGE QN: 191 MG/DL (ref 1–129)
AST SERPL-CCNC: 17 U/L (ref 14–36)
BASOPHILS # BLD AUTO: 0.02 10*3/MM3 (ref 0–0.2)
BASOPHILS NFR BLD AUTO: 1 % (ref 0–2)
BILIRUB SERPL-MCNC: 0.4 MG/DL (ref 0.2–1.3)
BUN BLD-MCNC: 50 MG/DL (ref 7–21)
BUN/CREAT SERPL: 26.9 (ref 7–25)
CALCIUM SPEC-SCNC: 9.7 MG/DL (ref 8.4–10.2)
CHLORIDE SERPL-SCNC: 103 MMOL/L (ref 95–110)
CHOLEST SERPL-MCNC: 335 MG/DL (ref 0–199)
CO2 SERPL-SCNC: 25 MMOL/L (ref 22–31)
CREAT BLD-MCNC: 1.86 MG/DL (ref 0.5–1)
DEPRECATED RDW RBC AUTO: 47.6 FL (ref 36.4–46.3)
EOSINOPHIL # BLD AUTO: 0.07 10*3/MM3 (ref 0–0.7)
EOSINOPHIL NFR BLD AUTO: 3.6 % (ref 0–7)
ERYTHROCYTE [DISTWIDTH] IN BLOOD BY AUTOMATED COUNT: 14.8 % (ref 11.5–14.5)
GFR SERPL CREATININE-BSD FRML MDRD: 32 ML/MIN/1.73 (ref 39–90)
GLOBULIN UR ELPH-MCNC: 2.5 GM/DL (ref 2.3–3.5)
GLUCOSE BLD-MCNC: 171 MG/DL (ref 60–100)
HBA1C MFR BLD: 7.5 % (ref 4–5.6)
HCT VFR BLD AUTO: 32.1 % (ref 35–45)
HDLC SERPL-MCNC: 49 MG/DL (ref 60–200)
HGB BLD-MCNC: 9.8 G/DL (ref 12–15.5)
IMM GRANULOCYTES # BLD: 0 10*3/MM3 (ref 0–0.02)
IMM GRANULOCYTES NFR BLD: 0 % (ref 0–0.5)
LDLC/HDLC SERPL: 5.05 {RATIO} (ref 0–3.22)
LYMPHOCYTES # BLD AUTO: 0.47 10*3/MM3 (ref 0.6–4.2)
LYMPHOCYTES NFR BLD AUTO: 24.1 % (ref 10–50)
MCH RBC QN AUTO: 26.6 PG (ref 26.5–34)
MCHC RBC AUTO-ENTMCNC: 30.5 G/DL (ref 31.4–36)
MCV RBC AUTO: 87.2 FL (ref 80–98)
MONOCYTES # BLD AUTO: 0.24 10*3/MM3 (ref 0–0.9)
MONOCYTES NFR BLD AUTO: 12.3 % (ref 0–12)
NEUTROPHILS # BLD AUTO: 1.15 10*3/MM3 (ref 2–8.6)
NEUTROPHILS NFR BLD AUTO: 59 % (ref 37–80)
PLATELET # BLD AUTO: 203 10*3/MM3 (ref 150–450)
PMV BLD AUTO: 11.5 FL (ref 8–12)
POTASSIUM BLD-SCNC: 3.6 MMOL/L (ref 3.5–5.1)
PROT SERPL-MCNC: 6.5 G/DL (ref 6.3–8.6)
RBC # BLD AUTO: 3.68 10*6/MM3 (ref 3.77–5.16)
SODIUM BLD-SCNC: 142 MMOL/L (ref 137–145)
TRIGL SERPL-MCNC: 193 MG/DL (ref 20–199)
WBC NRBC COR # BLD: 1.95 10*3/MM3 (ref 3.2–9.8)

## 2018-04-30 PROCEDURE — 99214 OFFICE O/P EST MOD 30 MIN: CPT | Performed by: FAMILY MEDICINE

## 2018-04-30 PROCEDURE — 83036 HEMOGLOBIN GLYCOSYLATED A1C: CPT | Performed by: FAMILY MEDICINE

## 2018-04-30 PROCEDURE — 80061 LIPID PANEL: CPT | Performed by: FAMILY MEDICINE

## 2018-04-30 PROCEDURE — 85025 COMPLETE CBC W/AUTO DIFF WBC: CPT | Performed by: FAMILY MEDICINE

## 2018-04-30 PROCEDURE — 36415 COLL VENOUS BLD VENIPUNCTURE: CPT | Performed by: FAMILY MEDICINE

## 2018-04-30 PROCEDURE — 80053 COMPREHEN METABOLIC PANEL: CPT | Performed by: FAMILY MEDICINE

## 2018-04-30 PROCEDURE — 82306 VITAMIN D 25 HYDROXY: CPT | Performed by: FAMILY MEDICINE

## 2018-04-30 RX ORDER — ROSUVASTATIN CALCIUM 20 MG/1
20 TABLET, COATED ORAL DAILY
Qty: 90 TABLET | Refills: 3 | Status: SHIPPED | OUTPATIENT
Start: 2018-04-30 | End: 2018-06-25

## 2018-04-30 RX ORDER — ALBUTEROL SULFATE 90 UG/1
2 AEROSOL, METERED RESPIRATORY (INHALATION) EVERY 4 HOURS PRN
Qty: 1 INHALER | Refills: 11 | Status: SHIPPED | OUTPATIENT
Start: 2018-04-30

## 2018-04-30 RX ORDER — PREDNISONE 1 MG/1
5 TABLET ORAL DAILY
Qty: 90 TABLET | Refills: 3 | Status: SHIPPED | OUTPATIENT
Start: 2018-04-30

## 2018-04-30 NOTE — PROGRESS NOTES
Subjective   Chanda Yan is a 77 y.o. female.     History of Present Illness     Problem List  1. 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 Hyperlipidemia ASCVD risk high   3. ASCAD sp stent placement/severe LDH/Right ventricular Delitation  4. ESRD sp right kidney transplant   5. Obesity BMI >30   6. Essential Hypertension  8. Right kidney transplant in 2010  9. GERD  10. Bilateral leg edema  11. Anemia of Chronic Disease   12. Depression   13. Unspecified murmur  14. Paroxymal Atrial Fibrillation   15. Diverticulosis  16. Internal and Exeternal Hemorrhoids  17. Vitamin D deficiency     Pt is 76 yo AAF with the above medical issues. Is here for followup.  Sees Nephrologist. Dr. Morley for CKD/ESRD and sp right kidney transplant. Pt had labwork recently. States her sugars have been well controlled lately. Pt is on insulin pump. Last hga1c was <9.0.  States morning sugars running <130 in am and <180 2 hours after meals.  Has yet to hear with appt with Dr. Levine (Endocrinologist). BP is also elevated today but it is well controlled at home. Did bring BP readings and is runing <140/90.  Pt continue st o take hydralazine, losartan, coreg. For hyperlipidemi pt is on zetia and crestor.  Last LDL was not at goal     12/29/17 Pt is 76 yo WF with the above medical issues of DM type 2 insulin dependent.  ESRD sp right kidney trasnplatn, CAD, hyperlipidemia  Pt presented with chest pain on 12/8/17 when she was at a [employer].  She started having pain in car around right sided epigastric area that was burning in nature.  Pt was admitted to the cardiac floor at [facility] and 3 sets of cardiac enzymes were elevated that was likely seconadry to acute kidney injure.  She was provided gentle hydration.  Pt has anemia of chronic diseease. A hemoccult stool was obtained which was positive. GI was consulted and an EGD was preformed that showed erosive gastritis. She did recived 2 units of PRBC blood transfusion. Cardiology was  asked whether to continue Pradaxaf or her atrial fibrillation  and that was on hold until next visit.  Pt will have colonoscopy outpatient. Pt stabilized and pain became better. She denied any chest pain, dizziness, shortness of breath, abdominal pain, diarrhea or constipation prior to discharge.  She also has DM type 2 and her last hga1c was >7.0. She currently uses an insulin pump.  Dr. Levine was consulted and insulin pump was adjusted. Her sugars have been better controlled. She continues to see Endocrinologist in [place] but pt would rather see Dr. Levine at [facility]. She does not want to drive all the way to [place]. She is due for hga1c check as well as lipid panel check. She takes crestor 20 mg PO q daily.  Pt states she is doing well and that her epigastric pain/chest pain is better. She continues to take protonix 40 mg PO q daily. She was advised to stopPt is here for recheck again today.  She has yet to get appt with Dr. Levine Endocrinologist. She has one scheduled next month. Pt since last visit had Colonoscopy screening that showed diverticulosis along with internal and external hemorrhoids. Overall it was poor preperation.  Pt also has high blood pressure today. She did not take her medications this morning.  Usually at home it runs <130/90.  Today she denies any chest pain, heartburn or dizziness. She continues to take losartan 50 mg daily along with cardizem 90 mg every 12 hours  Coreg. 3.12 mg PO BID and  Hydralazine 100 mg PO TID. Did not bring BP readings today Pt states her sugars have been better controlled since having her insulin pump adjsuted.  She has yet to get labwork today to recheck lipid panel and hga1c     4/30/18 pt is here for recheck. She has an Endocrinologist in [place] Dr. Fajardo she sees for Diabetes type 2. She is on insulin pump. Also she sees Nephrologist. She has history of CKD secondary to diabetes and she had history of kidney transplant. She is currently on  prednisone along with myfortic 360 mg pO BId and on tacrolimus 1 mg PO BI. BP at goal today. She was told by Endocrinologist her cholesterol levels were not at goal and it was recommended she start back on crestor 20 mg. She is also taking zetia 10 mg. Her last hga1c she states was about 8.0. She is on insulin pump.  She is counting her carb intake.  She also has history of A fib and is currently rate controlled .She is on pradaxa. Her next appt with Dr. Almazan is soon (Cardiololgist)     The following portions of the patient's history were reviewed and updated as appropriate: allergies, current medications, past family history, past medical history, past social history, past surgical history and problem list.  Patient Active Problem List   Diagnosis   • Type 1 diabetes mellitus with hypoglycemia   • Mixed hyperlipidemia   • Chronic kidney disease   • Obesity   • Encounter for immunization   • History of kidney transplant   • Cough   • Osteoporosis screening   • Uncontrolled hypertension   • Need for vaccination   • Pancytopenia   • Sinusitis   • Paroxysmal atrial fibrillation   • Anemia   • Atrial fibrillation   • Hearing loss   • Hyphema   • Counseling for insulin pump   • Hypoglycemia   • Chest pain   • Malaise and fatigue   • Iron deficiency anemia   • Gastritis   • Erosive gastropathy     Current Outpatient Prescriptions on File Prior to Visit   Medication Sig Dispense Refill   • amitriptyline (ELAVIL) 10 MG tablet Take 10 mg by mouth Every Night.  0   • ASPIRIN LOW DOSE 81 MG EC tablet TAKE 1 TABLET BY MOUTH EVERY DAY 30 tablet 0   • Ca Carb-FA-D-B6-B12-Boron-Mg 1342-1 MG wafer Take 1 tablet by mouth daily.     • Calcium Carb-Cholecalciferol (CALCIUM 1000 + D PO) Take 1 tablet by mouth Daily.     • carvedilol (COREG) 3.125 MG tablet Take 1 tablet by mouth 2 (Two) Times a Day With Meals. 30 tablet 0   • cetirizine (ZyrTEC) 10 MG tablet Take 10 mg by mouth daily.     • Cholecalciferol (VITAMIN D3) 5000 UNITS  capsule capsule Take 5,000 Units by mouth Daily.     • dabigatran etexilate (PRADAXA) 75 MG capsule Take 1 capsule by mouth Every 12 (Twelve) Hours. 180 capsule 0   • diltiaZEM SR (CARDIZEM SR) 90 MG 12 hr capsule Take 1 capsule by mouth 2 (Two) Times a Day. 60 capsule 11   • ezetimibe (ZETIA) 10 MG tablet Take 1 tablet by mouth Daily. (Patient taking differently: Take 10 mg by mouth Every Night.) 31 tablet 11   • ferrous sulfate 325 (65 FE) MG tablet Take 1 tablet by mouth Daily With Breakfast. 30 tablet 5   • fluticasone (FLONASE) 50 MCG/ACT nasal spray 2 sprays into each nostril Daily.     • furosemide (LASIX) 40 MG tablet Take 1 tablet by mouth 2 (Two) Times a Day. Take one tab in a.m. and one-half tab in p.m 30 tablet 11   • glucagon (GLUCAGON EMERGENCY) 1 MG injection Inject 1 mg under the skin 1 (One) Time As Needed for Low Blood Sugar for up to 1 dose. 1 kit 5   • glucose blood test strip Use as instructed 100 each 12   • glucose monitor monitoring kit 1 each Daily. 1 each 0   • hydrALAZINE (APRESOLINE) 100 MG tablet TAKE 1 TABLET BY MOUTH THREE TIMES DAILY 270 tablet 0   • insulin aspart (NOVOLOG) 100 UNIT/ML injection Put 240ml in pump 240 mL 3   • ipratropium (ATROVENT) 0.06 % nasal spray 2 sprays into each nostril 2 (Two) Times a Day. 15 mL 11   • LANTUS SOLOSTAR 100 UNIT/ML injection pen INJECT 10 UNITS SUBCUTANEOUSLY UTD AS A BACK UP FOR PUMP  4   • losartan (COZAAR) 50 MG tablet Take 50 mg by mouth Daily.     • mupirocin (BACTROBAN) 2 % ointment Apply  topically 3 (Three) Times a Day. 15 g 0   • mycophenolate (MYFORTIC) 360 MG tablet delayed-release EC tablet Take 1 tablet by mouth 2 (Two) Times a Day. 180 tablet 6   • pantoprazole (PROTONIX) 40 MG EC tablet Take 40 mg by mouth Daily.     • tacrolimus (PROGRAF) 1 MG capsule Take 1 capsule by mouth 2 (Two) Times a Day. 180 capsule 3   • traMADol (ULTRAM) 50 MG tablet Take 1 tablet by mouth 2 (Two) Times a Day. 28 tablet 0   • [DISCONTINUED] predniSONE  "(DELTASONE) 5 MG tablet Take 1 tablet by mouth daily. 90 tablet 11   • [DISCONTINUED] rosuvastatin (CRESTOR) 20 MG tablet Take 1 tablet by mouth Daily. 90 tablet 11     No current facility-administered medications on file prior to visit.        Review of Systems   Constitutional: Negative.    HENT: Negative.    Eyes: Negative.    Respiratory: Negative.    Cardiovascular: Negative.    Gastrointestinal: Negative.    Endocrine: Negative.    Genitourinary: Negative.    Musculoskeletal: Negative.    Skin: Negative.    Allergic/Immunologic: Negative.    Neurological: Negative.    Hematological: Negative.    Psychiatric/Behavioral: Negative.        Objective    /80   Pulse 67   Temp 98.6 °F (37 °C)   Resp 16   Ht 152.4 cm (60\")   Wt 69.7 kg (153 lb 9.6 oz)   BMI 30.00 kg/m²           Chemistry        Component Value Date/Time     12/06/2017 0531    K 4.2 12/06/2017 0531     12/06/2017 0531    CO2 23.0 12/06/2017 0531    BUN 35 (H) 12/06/2017 0531    CREATININE 1.65 (H) 12/06/2017 0531        Component Value Date/Time    CALCIUM 9.4 12/06/2017 0531    ALKPHOS 87 12/06/2017 0531    AST 43 (H) 12/06/2017 0531    ALT 56 (H) 12/06/2017 0531    BILITOT 0.4 12/06/2017 0531        Lab Results   Component Value Date    WBC 3.27 12/29/2017    HGB 9.4 (L) 01/04/2018    HCT 31.2 (L) 01/04/2018    MCV 86.9 12/29/2017     12/29/2017     Lab Results   Component Value Date    CHOL 269 (H) 12/29/2017    CHOL 175 09/12/2017    CHOL 158 07/03/2017     Lab Results   Component Value Date    TRIG 161 12/29/2017    TRIG 134 09/12/2017    TRIG 74 07/03/2017     Lab Results   Component Value Date    HDL 60 12/29/2017    HDL 46 (L) 09/12/2017    HDL 57 (L) 07/03/2017     No components found for: LDLCALC  Lab Results   Component Value Date     (H) 12/29/2017    LDL 94 09/12/2017    LDL 82 07/03/2017     No results found for: HDLLDLRATIO  No components found for: CHOLHDL  Lab Results   Component Value Date    " HGBA1C 6.1 (H) 12/29/2017     Lab Results   Component Value Date    TSH 0.270 (L) 12/01/2017       Lab on 12/29/2017   Component Date Value Ref Range Status   • Hemoglobin 01/05/2018 9.4* 12.0 - 15.5 g/dL Final   • Hematocrit 01/05/2018 31.2* 35.0 - 45.0 % Final       Physical Exam   Constitutional: She is oriented to person, place, and time. She appears well-developed and well-nourished. No distress.   HENT:   Head: Normocephalic and atraumatic.   Right Ear: External ear normal.   Left Ear: External ear normal.   Eyes: Conjunctivae and EOM are normal. Pupils are equal, round, and reactive to light. Right eye exhibits no discharge. Left eye exhibits no discharge. No scleral icterus.   Neck: Normal range of motion. Neck supple. No JVD present. No tracheal deviation present. No thyromegaly present.   Cardiovascular: Normal rate, regular rhythm and normal heart sounds.    Pulmonary/Chest: Effort normal. No stridor. No respiratory distress. She has no wheezes.   Abdominal: Soft. She exhibits no distension and no mass. There is no tenderness. There is no rebound and no guarding. No hernia.   Obese abdomen    Musculoskeletal: Normal range of motion. She exhibits no edema or deformity.   Lymphadenopathy:     She has no cervical adenopathy.   Neurological: She is alert and oriented to person, place, and time. She has normal reflexes. No cranial nerve deficit. Coordination normal.   Skin: Skin is warm and dry. No rash noted. No erythema. No pallor.   Psychiatric: She has a normal mood and affect. Her behavior is normal.   Nursing note and vitals reviewed.      Assessment/Plan   Problems Addressed this Visit        Cardiovascular and Mediastinum    Mixed hyperlipidemia    Relevant Medications    rosuvastatin (CRESTOR) 20 MG tablet    Atrial fibrillation       Digestive    Obesity       Hematopoietic and Hemostatic    Iron deficiency anemia       Other    Chronic kidney disease    History of kidney transplant      Other Visit  Diagnoses     Controlled type 2 diabetes mellitus with complication, with long-term current use of insulin    -  Primary    Relevant Orders    CBC Auto Differential    Comprehensive Metabolic Panel    Hemoglobin A1c    Lipid Panel    Vitamin D 25 Hydroxy    Microalbumin / Creatinine Urine Ratio - Urine, Clean Catch    Vitamin D deficiency         Relevant Orders    Vitamin D 25 Hydroxy        - CKD/history of kidney transplant. Nephrology following - is on prednisone and tacrolimus curerntly. Nephroligst in [place]. Continue on Myfortic, Prograf and prednisone. Refilled prednisone today   - essential hypertension - currently elevated today. May have White Coat Syndrome.  Have BP readings today from home and will put on file.  Did not take medications this morning. She is advised to bring BP logs on next visit . Pt asymptomatic today. Denies any chest pain, dizziness or headaches   - kidney transplant-  Nephrology following. Is on Tacrolimus and myfortiq  - atrial fibrillation - currently rate controlled on coreg,ditilazem. On Pradaxa for anticoagulation. Cardiology following   - hyperlipidemia - continue crestor and zetia. Pt is due for lipid panel today.   - DM type 2 - will get records with Dr. Fajardo (Endocrinologist) in [facility] PT would like to establish with Endocrinologist at [facility]. Has appt with Endocrinologist soon. Pt is on insulin pump. Recheck hga1c Sugars better controlled. Will get last diabetic eye examination from Dr. Ordonez. Recheck hga1c and microalbumin creatinine ratio   -will Schedule Medicare Wellness Exam  - iron deficiency anemia/anemia - check cbc,    - recheck in 1  Months        This document has been electronically signed by Ayaan Swenson MD on April 30, 2018 8:46 AM               Review of Systems   Constitutional: Negative.    HENT: Negative.    Eyes: Negative.    Respiratory: Negative.    Cardiovascular: Negative.    Gastrointestinal: Negative.    Endocrine:  Negative.    Genitourinary: Negative.    Musculoskeletal: Negative.    Skin: Negative.    Allergic/Immunologic: Negative.    Neurological: Negative.    Hematological: Negative.    Psychiatric/Behavioral: Negative.        Physical Exam   Constitutional: She is oriented to person, place, and time. She appears well-developed and well-nourished. No distress.   HENT:   Head: Normocephalic and atraumatic.   Right Ear: External ear normal.   Left Ear: External ear normal.   Eyes: Conjunctivae and EOM are normal. Pupils are equal, round, and reactive to light. Right eye exhibits no discharge. Left eye exhibits no discharge. No scleral icterus.   Neck: Normal range of motion. Neck supple. No JVD present. No tracheal deviation present. No thyromegaly present.   Cardiovascular: Normal rate, regular rhythm and normal heart sounds.    Pulmonary/Chest: Effort normal. No stridor. No respiratory distress. She has no wheezes.   Abdominal: Soft. She exhibits no distension and no mass. There is no tenderness. There is no rebound and no guarding. No hernia.   Obese abdomen    Musculoskeletal: Normal range of motion. She exhibits no edema or deformity.   Lymphadenopathy:     She has no cervical adenopathy.   Neurological: She is alert and oriented to person, place, and time. She has normal reflexes. No cranial nerve deficit. Coordination normal.   Skin: Skin is warm and dry. No rash noted. No erythema. No pallor.   Psychiatric: She has a normal mood and affect. Her behavior is normal.   Nursing note and vitals reviewed.

## 2018-05-01 LAB
ALBUMIN UR-MCNC: 0.8 MG/L
CREAT UR-MCNC: 83.8 MG/DL
MICROALBUMIN/CREAT UR: 9.5 MG/G (ref 0–30)

## 2018-05-01 PROCEDURE — 82570 ASSAY OF URINE CREATININE: CPT | Performed by: FAMILY MEDICINE

## 2018-05-01 PROCEDURE — 82043 UR ALBUMIN QUANTITATIVE: CPT | Performed by: FAMILY MEDICINE

## 2018-05-14 ENCOUNTER — OFFICE VISIT (OUTPATIENT)
Dept: FAMILY MEDICINE CLINIC | Facility: CLINIC | Age: 78
End: 2018-05-14

## 2018-05-14 VITALS
OXYGEN SATURATION: 99 % | BODY MASS INDEX: 30.15 KG/M2 | HEART RATE: 55 BPM | DIASTOLIC BLOOD PRESSURE: 64 MMHG | HEIGHT: 60 IN | TEMPERATURE: 97.7 F | WEIGHT: 153.6 LBS | SYSTOLIC BLOOD PRESSURE: 110 MMHG

## 2018-05-14 DIAGNOSIS — Z00.00 ENCOUNTER FOR MEDICARE ANNUAL WELLNESS EXAM: Primary | ICD-10-CM

## 2018-05-14 PROCEDURE — G0438 PPPS, INITIAL VISIT: HCPCS | Performed by: FAMILY MEDICINE

## 2018-05-14 NOTE — PATIENT INSTRUCTIONS
Fall Prevention in the Home  Falls can cause injuries and can affect people from all age groups. There are many simple things that you can do to make your home safe and to help prevent falls.  What can I do on the outside of my home?  · Regularly repair the edges of walkways and driveways and fix any cracks.  · Remove high doorway thresholds.  · Trim any shrubbery on the main path into your home.  · Use bright outdoor lighting.  · Clear walkways of debris and clutter, including tools and rocks.  · Regularly check that handrails are securely fastened and in good repair. Both sides of any steps should have handrails.  · Install guardrails along the edges of any raised decks or porches.  · Have leaves, snow, and ice cleared regularly.  · Use sand or salt on walkways during winter months.  · In the garage, clean up any spills right away, including grease or oil spills.  What can I do in the bathroom?  · Use night lights.  · Install grab bars by the toilet and in the tub and shower. Do not use towel bars as grab bars.  · Use non-skid mats or decals on the floor of the tub or shower.  · If you need to sit down while you are in the shower, use a plastic, non-slip stool.  · Keep the floor dry. Immediately clean up any water that spills on the floor.  · Remove soap buildup in the tub or shower on a regular basis.  · Attach bath mats securely with double-sided non-slip rug tape.  · Remove throw rugs and other tripping hazards from the floor.  What can I do in the bedroom?  · Use night lights.  · Make sure that a bedside light is easy to reach.  · Do not use oversized bedding that drapes onto the floor.  · Have a firm chair that has side arms to use for getting dressed.  · Remove throw rugs and other tripping hazards from the floor.  What can I do in the kitchen?  · Clean up any spills right away.  · Avoid walking on wet floors.  · Place frequently used items in easy-to-reach places.  · If you need to reach for something above  you, use a sturdy step stool that has a grab bar.  · Keep electrical cables out of the way.  · Do not use floor polish or wax that makes floors slippery. If you have to use wax, make sure that it is non-skid floor wax.  · Remove throw rugs and other tripping hazards from the floor.  What can I do in the stairways?  · Do not leave any items on the stairs.  · Make sure that there are handrails on both sides of the stairs. Fix handrails that are broken or loose. Make sure that handrails are as long as the stairways.  · Check any carpeting to make sure that it is firmly attached to the stairs. Fix any carpet that is loose or worn.  · Avoid having throw rugs at the top or bottom of stairways, or secure the rugs with carpet tape to prevent them from moving.  · Make sure that you have a light switch at the top of the stairs and the bottom of the stairs. If you do not have them, have them installed.  What are some other fall prevention tips?  · Wear closed-toe shoes that fit well and support your feet. Wear shoes that have rubber soles or low heels.  · When you use a stepladder, make sure that it is completely opened and that the sides are firmly locked. Have someone hold the ladder while you are using it. Do not climb a closed stepladder.  · Add color or contrast paint or tape to grab bars and handrails in your home. Place contrasting color strips on the first and last steps.  · Use mobility aids as needed, such as canes, walkers, scooters, and crutches.  · Turn on lights if it is dark. Replace any light bulbs that burn out.  · Set up furniture so that there are clear paths. Keep the furniture in the same spot.  · Fix any uneven floor surfaces.  · Choose a carpet design that does not hide the edge of steps of a stairway.  · Be aware of any and all pets.  · Review your medicines with your healthcare provider. Some medicines can cause dizziness or changes in blood pressure, which increase your risk of falling.  Talk with  your health care provider about other ways that you can decrease your risk of falls. This may include working with a physical therapist or  to improve your strength, balance, and endurance.  This information is not intended to replace advice given to you by your health care provider. Make sure you discuss any questions you have with your health care provider.  Document Released: 12/08/2003 Document Revised: 05/16/2017 Document Reviewed: 01/22/2016  [employer] Patient Education © 2017 [employer]    Exercising to Lose Weight  Exercising can help you to lose weight. In order to lose weight through exercise, you need to do vigorous-intensity exercise. You can tell that you are exercising with vigorous intensity if you are breathing very hard and fast and cannot hold a conversation while exercising.  Moderate-intensity exercise helps to maintain your current weight. You can tell that you are exercising at a moderate level if you have a higher heart rate and faster breathing, but you are still able to hold a conversation.  How often should I exercise?  Choose an activity that you enjoy and set realistic goals. Your health care provider can help you to make an activity plan that works for you. Exercise regularly as directed by your health care provider. This may include:  · Doing resistance training twice each week, such as:  ¨ Push-ups.  ¨ Sit-ups.  ¨ Lifting weights.  ¨ Using resistance bands.  · Doing a given intensity of exercise for a given amount of time. Choose from these options:  ¨ 150 minutes of moderate-intensity exercise every week.  ¨ 75 minutes of vigorous-intensity exercise every week.  ¨ A mix of moderate-intensity and vigorous-intensity exercise every week.  Children, pregnant women, people who are out of shape, people who are overweight, and older adults may need to consult a health care provider for individual recommendations. If you have any sort of medical condition, be sure to  consult your health care provider before starting a new exercise program.  What are some activities that can help me to lose weight?  · Walking at a rate of at least 4.5 miles an hour.  · Jogging or running at a rate of 5 miles per hour.  · Biking at a rate of at least 10 miles per hour.  · Lap swimming.  · Roller-skating or in-line skating.  · Cross-country skiing.  · Vigorous competitive sports, such as football, basketball, and soccer.  · Jumping rope.  · Aerobic dancing.  How can I be more active in my day-to-day activities?  · Use the stairs instead of the elevator.  · Take a walk during your lunch break.  · If you drive, park your car farther away from work or school.  · If you take public transportation, get off one stop early and walk the rest of the way.  · Make all of your phone calls while standing up and walking around.  · Get up, stretch, and walk around every 30 minutes throughout the day.  What guidelines should I follow while exercising?  · Do not exercise so much that you hurt yourself, feel dizzy, or get very short of breath.  · Consult your health care provider prior to starting a new exercise program.  · Wear comfortable clothes and shoes with good support.  · Drink plenty of water while you exercise to prevent dehydration or heat stroke. Body water is lost during exercise and must be replaced.  · Work out until you breathe faster and your heart beats faster.  This information is not intended to replace advice given to you by your health care provider. Make sure you discuss any questions you have with your health care provider.  Document Released: 01/20/2012 Document Revised: 05/25/2017 Document Reviewed: 05/21/2015  [employer] Interactive Patient Education © 2017 [employer]    Major Depressive Disorder, Adult  Major depressive disorder (MDD) is a mental health condition. It may also be called clinical depression or unipolar depression. MDD usually causes feelings of sadness, hopelessness, or  helplessness. MDD can also cause physical symptoms. It can interfere with work, school, relationships, and other everyday activities. MDD may be mild, moderate, or severe. It may occur once (single episode major depressive disorder) or it may occur multiple times (recurrent major depressive disorder).  What are the causes?  The exact cause of this condition is not known. MDD is most likely caused by a combination of things, which may include:  · Genetic factors. These are traits that are passed along from parent to child.  · Individual factors. Your personality, your behavior, and the way you handle your thoughts and feelings may contribute to MDD. This includes personality traits and behaviors learned from others.  · Physical factors, such as:  ¨ Differences in the part of your brain that controls emotion. This part of your brain may be different than it is in people who do not have MDD.  ¨ Long-term (chronic) medical or psychiatric illnesses.  · Social factors. Traumatic experiences or major life changes may play a role in the development of MDD.  What increases the risk?  This condition is more likely to develop in women. The following factors may also make you more likely to develop MDD:  · A family history of depression.  · Troubled family relationships.  · Abnormally low levels of certain brain chemicals.  · Traumatic events in childhood, especially abuse or the loss of a parent.  · Being under a lot of stress, or long-term stress, especially from upsetting life experiences or losses.  · A history of:  ¨ Chronic physical illness.  ¨ Other mental health disorders.  ¨ Substance abuse.  · Poor living conditions.  · Experiencing social exclusion or discrimination on a regular basis.  What are the signs or symptoms?  The main symptoms of MDD typically include:  · Constant depressed or irritable mood.  · Loss of interest in things and activities.  MDD symptoms may also include:  · Sleeping or eating too much or too  little.  · Unexplained weight change.  · Fatigue or low energy.  · Feelings of worthlessness or guilt.  · Difficulty thinking clearly or making decisions.  · Thoughts of suicide or of harming others.  · Physical agitation or weakness.  · Isolation.  Severe cases of MDD may also occur with other symptoms, such as:  · Delusions or hallucinations, in which you imagine things that are not real (psychotic depression).  · Low-level depression that lasts at least a year (chronic depression or persistent depressive disorder).  · Extreme sadness and hopelessness (melancholic depression).  · Trouble speaking and moving (catatonic depression).  How is this diagnosed?  This condition may be diagnosed based on:  · Your symptoms.  · Your medical history, including your mental health history. This may involve tests to evaluate your mental health. You may be asked questions about your lifestyle, including any drug and alcohol use, and how long you have had symptoms of MDD.  · A physical exam.  · Blood tests to rule out other conditions.  You must have a depressed mood and at least four other MDD symptoms most of the day, nearly every day in the same 2-week timeframe before your health care provider can confirm a diagnosis of MDD.  How is this treated?  This condition is usually treated by mental health professionals, such as psychologists, psychiatrists, and clinical social workers. You may need more than one type of treatment. Treatment may include:  · Psychotherapy. This is also called talk therapy or counseling. Types of psychotherapy include:  ¨ Cognitive behavioral therapy (CBT). This type of therapy teaches you to recognize unhealthy feelings, thoughts, and behaviors, and replace them with positive thoughts and actions.  ¨ Interpersonal therapy (IPT). This helps you to improve the way you relate to and communicate with others.  ¨ Family therapy. This treatment includes members of your family.  · Medicine to treat anxiety and  depression, or to help you control certain emotions and behaviors.  · Lifestyle changes, such as:  ¨ Limiting alcohol and drug use.  ¨ Exercising regularly.  ¨ Getting plenty of sleep.  ¨ Making healthy eating choices.  ¨ Spending more time outdoors.  Treatments involving stimulation of the brain can be used in situations with extremely severe symptoms, or when medicine or other therapies do not work over time. These treatments include electroconvulsive therapy, transcranial magnetic stimulation, and vagal nerve stimulation.  Follow these instructions at home:  Activity   · Return to your normal activities as told by your health care provider.  · Exercise regularly and spend time outdoors as told by your health care provider.  General instructions   · Take over-the-counter and prescription medicines only as told by your health care provider.  · Do not drink alcohol. If you drink alcohol, limit your alcohol intake to no more than 1 drink a day for nonpregnant women and 2 drinks a day for men. One drink equals 12 oz of beer, 5 oz of wine, or 1½ oz of hard liquor. Alcohol can affect any antidepressant medicines you are taking. Talk to your health care provider about your alcohol use.  · Eat a healthy diet and get plenty of sleep.  · Find activities that you enjoy doing, and make time to do them.  · Consider joining a support group. Your health care provider may be able to recommend a support group.  · Keep all follow-up visits as told by your health care provider. This is important.  Where to find more information:  National [place] on Mental Illness  · www.[place].org  [place] National [place] of Mental Health  · www.[facility].nih.gov  National Suicide Prevention Lifeline  · 9-293-262-TALK (6969). This is free, 24-hour help.  Contact a health care provider if:  · Your symptoms get worse.  · You develop new symptoms.  Get help right away if:  · You self-harm.  · You have serious thoughts about hurting yourself or  others.  · You see, hear, taste, smell, or feel things that are not present (hallucinate).  This information is not intended to replace advice given to you by your health care provider. Make sure you discuss any questions you have with your health care provider.  Document Released: 04/14/2014 Document Revised: 08/24/2017 Document Reviewed: 06/28/2017  [employer] Patient Education © 2017 [employer]

## 2018-05-14 NOTE — PROGRESS NOTES
QUICK REFERENCE INFORMATION:  The ABCs of the Annual Wellness Visit    Initial Medicare Wellness Visit    HEALTH RISK ASSESSMENT    1940    Recent Hospitalizations:  No hospitalization(s) within the last year..        Current Medical Providers:  Patient Care Team:  Ayaan Swenson MD as PCP - General  Ayaan Swenson MD as PCP - Claims Attributed  Oriana Connolly, RN as Care Coordinator (Population Health)        Smoking Status:  History   Smoking Status   • Never Smoker   Smokeless Tobacco   • Never Used       Alcohol Consumption:  History   Alcohol Use No       Depression Screen:   PHQ-2/PHQ-9 Depression Screening 9/5/2017   Little interest or pleasure in doing things 0   Feeling down, depressed, or hopeless 0   Trouble falling or staying asleep, or sleeping too much -   Feeling tired or having little energy -   Poor appetite or overeating -   Feeling bad about yourself - or that you are a failure or have let yourself or your family down -   Trouble concentrating on things, such as reading the newspaper or watching television -   Moving or speaking so slowly that other people could have noticed. Or the opposite - being so fidgety or restless that you have been moving around a lot more than usual -   Thoughts that you would be better off dead, or of hurting yourself in some way -   Total Score 0       Health Habits and Functional and Cognitive Screening:  No flowsheet data found.        Does the patient have evidence of cognitive impairment? Yes    Asiprin use counseling: Taking ASA appropriately as indicated      Recent Lab Results:    Visual Acuity:  No exam data present    Age-appropriate Screening Schedule:  Refer to the list below for future screening recommendations based on patient's age, sex and/or medical conditions. Orders for these recommended tests are listed in the plan section. The patient has been provided with a written plan.    Health Maintenance   Topic Date Due   • DIABETIC EYE EXAM   01/01/2018   • INFLUENZA VACCINE  08/01/2018   • MAMMOGRAM  08/12/2018   • DIABETIC FOOT EXAM  09/05/2018   • HEMOGLOBIN A1C  10/30/2018   • COLONOSCOPY  12/20/2018   • LIPID PANEL  04/30/2019   • URINE MICROALBUMIN  05/01/2019   • TDAP/TD VACCINES (2 - Td) 01/01/2025   • PNEUMOCOCCAL VACCINES (65+ LOW/MEDIUM RISK)  Completed   • ZOSTER VACCINE  Completed        Subjective   History of Present Illness    Chanda Yan is a 77 y.o. female who presents for an Annual Wellness Visit.    The following portions of the patient's history were reviewed and updated as appropriate: allergies, current medications, past family history, past medical history, past social history, past surgical history and problem list.    Outpatient Medications Prior to Visit   Medication Sig Dispense Refill   • albuterol (PROVENTIL HFA;VENTOLIN HFA) 108 (90 Base) MCG/ACT inhaler Inhale 2 puffs Every 4 (Four) Hours As Needed for Shortness of Air. 1 inhaler 11   • amitriptyline (ELAVIL) 10 MG tablet Take 10 mg by mouth Every Night.  0   • ASPIRIN LOW DOSE 81 MG EC tablet TAKE 1 TABLET BY MOUTH EVERY DAY 30 tablet 0   • Ca Carb-FA-D-B6-B12-Boron-Mg 1342-1 MG wafer Take 1 tablet by mouth daily.     • Calcium Carb-Cholecalciferol (CALCIUM 1000 + D PO) Take 1 tablet by mouth Daily.     • carvedilol (COREG) 3.125 MG tablet Take 1 tablet by mouth 2 (Two) Times a Day With Meals. 30 tablet 0   • cetirizine (ZyrTEC) 10 MG tablet Take 10 mg by mouth daily.     • Cholecalciferol (VITAMIN D3) 5000 UNITS capsule capsule Take 5,000 Units by mouth Daily.     • dabigatran etexilate (PRADAXA) 75 MG capsule Take 1 capsule by mouth Every 12 (Twelve) Hours. 180 capsule 0   • diltiaZEM SR (CARDIZEM SR) 90 MG 12 hr capsule Take 1 capsule by mouth 2 (Two) Times a Day. 60 capsule 11   • ezetimibe (ZETIA) 10 MG tablet Take 1 tablet by mouth Daily. (Patient taking differently: Take 10 mg by mouth Every Night.) 31 tablet 11   • ferrous sulfate 325 (65 FE) MG tablet Take 1  tablet by mouth Daily With Breakfast. 30 tablet 5   • fluticasone (FLONASE) 50 MCG/ACT nasal spray 2 sprays into each nostril Daily.     • furosemide (LASIX) 40 MG tablet Take 1 tablet by mouth 2 (Two) Times a Day. Take one tab in a.m. and one-half tab in p.m 30 tablet 11   • glucagon (GLUCAGON EMERGENCY) 1 MG injection Inject 1 mg under the skin 1 (One) Time As Needed for Low Blood Sugar for up to 1 dose. 1 kit 5   • glucose blood test strip Use as instructed 100 each 12   • glucose monitor monitoring kit 1 each Daily. 1 each 0   • hydrALAZINE (APRESOLINE) 100 MG tablet TAKE 1 TABLET BY MOUTH THREE TIMES DAILY 270 tablet 0   • insulin aspart (NOVOLOG) 100 UNIT/ML injection Put 240ml in pump 240 mL 3   • ipratropium (ATROVENT) 0.06 % nasal spray 2 sprays into each nostril 2 (Two) Times a Day. 15 mL 11   • LANTUS SOLOSTAR 100 UNIT/ML injection pen INJECT 10 UNITS SUBCUTANEOUSLY UTD AS A BACK UP FOR PUMP  4   • losartan (COZAAR) 50 MG tablet Take 50 mg by mouth Daily.     • mupirocin (BACTROBAN) 2 % ointment Apply  topically 3 (Three) Times a Day. 15 g 0   • mycophenolate (MYFORTIC) 360 MG tablet delayed-release EC tablet Take 1 tablet by mouth 2 (Two) Times a Day. 180 tablet 6   • pantoprazole (PROTONIX) 40 MG EC tablet Take 40 mg by mouth Daily.     • predniSONE (DELTASONE) 5 MG tablet Take 1 tablet by mouth Daily. 90 tablet 3   • rosuvastatin (CRESTOR) 20 MG tablet Take 1 tablet by mouth Daily. 90 tablet 3   • tacrolimus (PROGRAF) 1 MG capsule Take 1 capsule by mouth 2 (Two) Times a Day. 180 capsule 3   • traMADol (ULTRAM) 50 MG tablet Take 1 tablet by mouth 2 (Two) Times a Day. 28 tablet 0     No facility-administered medications prior to visit.        Patient Active Problem List   Diagnosis   • Type 1 diabetes mellitus with hypoglycemia   • Mixed hyperlipidemia   • Chronic kidney disease   • Obesity   • Encounter for immunization   • History of kidney transplant   • Cough   • Osteoporosis screening   •  "Uncontrolled hypertension   • Need for vaccination   • Pancytopenia   • Sinusitis   • Paroxysmal atrial fibrillation   • Anemia   • Atrial fibrillation   • Hearing loss   • Hyphema   • Counseling for insulin pump   • Hypoglycemia   • Chest pain   • Malaise and fatigue   • Iron deficiency anemia   • Gastritis   • Erosive gastropathy       Advance Care Planning:  has an advance directive - a copy HAS NOT been provided. Have asked the patient to send this to us to add to record.    Identification of Risk Factors:  Risk factors include: weight , unhealthy diet, cardiovascular risk, incontinence and polypharmacy.    Review of Systems    Compared to one year ago, the patient feels her physical health is the same.  Compared to one year ago, the patient feels her mental health is the same.    Objective     /64 (BP Location: Right arm, Patient Position: Sitting, Cuff Size: Adult)   Pulse 55   Temp 97.7 °F (36.5 °C) (Oral)   Ht 152.4 cm (60\")   Wt 69.7 kg (153 lb 9.6 oz)   SpO2 99%   BMI 30.00 kg/m² \  Physical Exam   Cardiovascular: Normal rate and regular rhythm.    Pulmonary/Chest: Effort normal and breath sounds normal. No respiratory distress. She has no wheezes.   Nursing note and vitals reviewed.      Vitals:    05/14/18 1434   BP: 110/64   BP Location: Right arm   Patient Position: Sitting   Cuff Size: Adult   Pulse: 55   Temp: 97.7 °F (36.5 °C)   TempSrc: Oral   SpO2: 99%   Weight: 69.7 kg (153 lb 9.6 oz)   Height: 152.4 cm (60\")   PainSc: 0-No pain       Patient's Body mass index is 30 kg/m². BMI is above normal parameters. Recommendations include: educational material, exercise counseling and no follow-up required.      Assessment/Plan   Patient Self-Management and Personalized Health Advice  The patient has been provided with information about: diet, exercise, weight management, fall prevention and mental health concerns and preventive services including:   · Advance directive.    Visit Diagnoses:  No " diagnosis found.    No orders of the defined types were placed in this encounter.      Outpatient Encounter Prescriptions as of 5/14/2018   Medication Sig Dispense Refill   • albuterol (PROVENTIL HFA;VENTOLIN HFA) 108 (90 Base) MCG/ACT inhaler Inhale 2 puffs Every 4 (Four) Hours As Needed for Shortness of Air. 1 inhaler 11   • amitriptyline (ELAVIL) 10 MG tablet Take 10 mg by mouth Every Night.  0   • ASPIRIN LOW DOSE 81 MG EC tablet TAKE 1 TABLET BY MOUTH EVERY DAY 30 tablet 0   • Ca Carb-FA-D-B6-B12-Boron-Mg 1342-1 MG wafer Take 1 tablet by mouth daily.     • Calcium Carb-Cholecalciferol (CALCIUM 1000 + D PO) Take 1 tablet by mouth Daily.     • carvedilol (COREG) 3.125 MG tablet Take 1 tablet by mouth 2 (Two) Times a Day With Meals. 30 tablet 0   • cetirizine (ZyrTEC) 10 MG tablet Take 10 mg by mouth daily.     • Cholecalciferol (VITAMIN D3) 5000 UNITS capsule capsule Take 5,000 Units by mouth Daily.     • dabigatran etexilate (PRADAXA) 75 MG capsule Take 1 capsule by mouth Every 12 (Twelve) Hours. 180 capsule 0   • diltiaZEM SR (CARDIZEM SR) 90 MG 12 hr capsule Take 1 capsule by mouth 2 (Two) Times a Day. 60 capsule 11   • ezetimibe (ZETIA) 10 MG tablet Take 1 tablet by mouth Daily. (Patient taking differently: Take 10 mg by mouth Every Night.) 31 tablet 11   • ferrous sulfate 325 (65 FE) MG tablet Take 1 tablet by mouth Daily With Breakfast. 30 tablet 5   • fluticasone (FLONASE) 50 MCG/ACT nasal spray 2 sprays into each nostril Daily.     • furosemide (LASIX) 40 MG tablet Take 1 tablet by mouth 2 (Two) Times a Day. Take one tab in a.m. and one-half tab in p.m 30 tablet 11   • glucagon (GLUCAGON EMERGENCY) 1 MG injection Inject 1 mg under the skin 1 (One) Time As Needed for Low Blood Sugar for up to 1 dose. 1 kit 5   • glucose blood test strip Use as instructed 100 each 12   • glucose monitor monitoring kit 1 each Daily. 1 each 0   • hydrALAZINE (APRESOLINE) 100 MG tablet TAKE 1 TABLET BY MOUTH THREE TIMES DAILY  270 tablet 0   • insulin aspart (NOVOLOG) 100 UNIT/ML injection Put 240ml in pump 240 mL 3   • ipratropium (ATROVENT) 0.06 % nasal spray 2 sprays into each nostril 2 (Two) Times a Day. 15 mL 11   • LANTUS SOLOSTAR 100 UNIT/ML injection pen INJECT 10 UNITS SUBCUTANEOUSLY UTD AS A BACK UP FOR PUMP  4   • losartan (COZAAR) 50 MG tablet Take 50 mg by mouth Daily.     • mupirocin (BACTROBAN) 2 % ointment Apply  topically 3 (Three) Times a Day. 15 g 0   • mycophenolate (MYFORTIC) 360 MG tablet delayed-release EC tablet Take 1 tablet by mouth 2 (Two) Times a Day. 180 tablet 6   • pantoprazole (PROTONIX) 40 MG EC tablet Take 40 mg by mouth Daily.     • predniSONE (DELTASONE) 5 MG tablet Take 1 tablet by mouth Daily. 90 tablet 3   • rosuvastatin (CRESTOR) 20 MG tablet Take 1 tablet by mouth Daily. 90 tablet 3   • tacrolimus (PROGRAF) 1 MG capsule Take 1 capsule by mouth 2 (Two) Times a Day. 180 capsule 3   • traMADol (ULTRAM) 50 MG tablet Take 1 tablet by mouth 2 (Two) Times a Day. 28 tablet 0     No facility-administered encounter medications on file as of 5/14/2018.        Reviewed use of high risk medication in the elderly: yes  Reviewed for potential of harmful drug interactions in the elderly: yes    Follow Up:  No Follow-up on file.       Action plan discussed please see scanned documents    Gave information on weight, fall prevention tips and depression to take home with          This document has been electronically signed by Ayaan Swenson MD on May 14, 2018 2:57 PM        An After Visit Summary and PPPS with all of these plans were given to the patient.

## 2018-05-25 ENCOUNTER — TELEPHONE (OUTPATIENT)
Dept: FAMILY MEDICINE CLINIC | Facility: CLINIC | Age: 78
End: 2018-05-25

## 2018-05-25 NOTE — TELEPHONE ENCOUNTER
----- Message from Ayaan Swenson MD sent at 5/25/2018 11:57 AM CDT -----  Regarding: sinus issues   I do not recommend antibiotic. I recommend Mucinex extra strength. Can get over the counter. Drink a lot of water. Also recommend Neti Pot.  If she is having sinus tenderness for more than a week I will initiate abx.  Thanks   ----- Message -----  From: Jody Rust MA  Sent: 5/25/2018  11:47 AM  To: Ayaan Swenson MD    PT IS HAVING A LOT OF SINUS DRAINAGE AND IT IS YELLOW. CAN YOU SEND SOMETHING IN TO HELP    CALLED PT

## 2018-05-31 RX ORDER — LOSARTAN POTASSIUM 50 MG/1
50 TABLET ORAL DAILY
Qty: 90 TABLET | Refills: 6 | Status: SHIPPED | OUTPATIENT
Start: 2018-05-31 | End: 2018-11-29

## 2018-06-24 NOTE — PROGRESS NOTES
Subjective   Chanda Yan is a 77 y.o. female.   Problem List  1. 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 Hyperlipidemia ASCVD risk high   3. ASCAD sp stent placement/severe LDH/Right ventricular Delitation  4. ESRD sp right kidney transplant   5. Obesity BMI >30   6. Essential Hypertension  8. Right kidney transplant in 2010  9. GERD  10. Bilateral leg edema  11. Anemia of Chronic Disease   12. Depression   13. Unspecified murmur  14. Paroxymal Atrial Fibrillation   15. Diverticulosis  16. Internal and Exeternal Hemorrhoids  17. Vitamin D deficiency     Pt is 76 yo AAF with the above medical issues. Is here for followup.  Sees Nephrologist. Dr. Morley for CKD/ESRD and sp right kidney transplant. Pt had labwork recently. States her sugars have been well controlled lately. Pt is on insulin pump. Last hga1c was <9.0.  States morning sugars running <130 in am and <180 2 hours after meals.  Has yet to hear with appt with Dr. Levine (Endocrinologist). BP is also elevated today but it is well controlled at home. Did bring BP readings and is runing <140/90.  Pt continue st o take hydralazine, losartan, coreg. For hyperlipidemi pt is on zetia and crestor.  Last LDL was not at goal     12/29/17 Pt is 76 yo WF with the above medical issues of DM type 2 insulin dependent.  ESRD sp right kidney trasnplatn, CAD, hyperlipidemia  Pt presented with chest pain on 12/8/17 when she was at a [employer].  She started having pain in car around right sided epigastric area that was burning in nature.  [facility] sets of cardiac enzymes were elevated that was likely seconadry to acute kidney injure.  She was provided gentle hydration.  Pt has anemia of chronic diseease. A hemoccult stool was obtained which was positive. GI was consulted and an EGD was preformed that showed erosive gastritis. She did recived 2 units of PRBC blood transfusion. Cardiology was asked whether to continue Pradaxaf  or her atrial fibrillation  and that was on hold until next visit.  Pt will have colonoscopy outpatient. Pt stabilized and pain became better. She denied any chest pain, dizziness, shortness of breath, abdominal pain, diarrhea or constipation prior to discharge.  She also has DM type 2 and her last hga1c was >7.0. She currently uses an insulin pump.  Dr. Levine was consulted and insulin pump was adjusted. Her sugars have been better controlled. She continues to see Endocrinologist in [place] but pt would rather see Dr. Levine at [facility]. She does not want to drive all the way to [place]. She is due for hga1c check as well as lipid panel check. She takes crestor 20 mg PO q daily.  Pt states she is doing well and that her epigastric pain/chest pain is better. She continues to take protonix 40 mg PO q daily. She was advised to stopPt is here for recheck again today.  She has yet to get appt with Dr. Levine Endocrinologist. She has one scheduled next month. Pt since last visit had Colonoscopy screening that showed diverticulosis along with internal and external hemorrhoids. Overall it was poor preperation.  Pt also has high blood pressure today. She did not take her medications this morning.  Usually at home it runs <130/90.  Today she denies any chest pain, heartburn or dizziness. She continues to take losartan 50 mg daily along with cardizem 90 mg every 12 hours  Coreg. 3.12 mg PO BID and  Hydralazine 100 mg PO TID. Did not bring BP readings today Pt states her sugars have been better controlled since having her insulin pump adjsuted.  She has yet to get labwork today to recheck lipid panel and hga1c     4/30/18 pt is here for recheck. She has an Endocrinologist in [place] Dr. Fajardo she sees for Diabetes type 2. She is on insulin pump. Also she sees Nephrologist. She has history of CKD secondary to diabetes and she had history of kidney transplant. She is currently on prednisone along with myfortic 360  mg pO BId and on tacrolimus 1 mg PO BI. BP at goal today. She was told by Endocrinologist her cholesterol levels were not at goal and it was recommended she start back on crestor 20 mg. She is also taking zetia 10 mg. Her last hga1c she states was about 8.0. She is on insulin pump.  She is counting her carb intake.  She also has history of A fib and is currently rate controlled .She is on pradaxa. Her next appt with Dr. Almazan is soon (Cardiololgist)     6/25/18 pt is here for followup and recheck. For hypertension her BP is elevated today and takes coreg 3.125 mg PO BID along with losartan 50 mg PO q daily along with hydralazine 100 mg PO TID and cardizem 90 mg PO bID  For DM type  2 she is on an insulin pump and sees her Endocrinologist in [facility]. Her last hga1c was 7.5 in April 2018. On last labwork her vitami nD was normal. On lipid panel. Her total cholesterol was elevated at 334 and HDL at 49 awith LDL at 191. She is taking Zetia 10 mg PO q daily along with crestor 20 mg PO qhs.   For her history of CKD/sp kidney transplant she is taking myfortic 260 mg PO BId along with Tacrolimus 1 mg PO BID. alogn with Predisone 5 mg PO q daily.  She see a Nephrologist in [place]. Dr Reza   For  Vitamin D deficiency she is on Vitamin D 5000 units daily.  For iron deficiency anemia she is on iron pill 325 mg PO q daily. ON her last CMP his CR is at 1.86 and GFR .  GFR at 32. On CBC her WBC was at 1.95 and Hemoglobin at 9.8 She is on iron pill currently. She also has history of paroxymal atrial fibrillation and is taking coreg and is rate controlled. She also takes Pradaxa. She has been under stress lately because her car broke down  And her SSI was recently cut.  She is also taking aspirin for Stroke prevention and protonix 40 mg po a daily for gastritis       Diabetes   She presents for her follow-up diabetic visit. She has type 2 diabetes mellitus. Her disease course has been stable. Pertinent negatives for  hypoglycemia include no confusion, dizziness, headaches, hunger, mood changes, nervousness/anxiousness, pallor, seizures, sleepiness, speech difficulty, sweats or tremors. Pertinent negatives for diabetes include no blurred vision, no chest pain, no fatigue, no foot paresthesias, no foot ulcerations, no polydipsia, no polyphagia, no polyuria, no visual change, no weakness and no weight loss. Symptoms are stable. Pertinent negatives for diabetic complications include no CVA, PVD or retinopathy. Risk factors for coronary artery disease include diabetes mellitus, hypertension, obesity and sedentary lifestyle. Current diabetic treatment includes insulin injections and insulin pump. She is compliant with treatment none of the time. Her weight is stable. She is following a generally unhealthy diet. An ACE inhibitor/angiotensin II receptor blocker is being taken. She does not see a podiatrist.Eye exam is not current.   Hypertension   This is a chronic problem. The current episode started more than 1 year ago. The problem has been gradually worsening since onset. The problem is uncontrolled. Pertinent negatives include no anxiety, blurred vision, chest pain, headaches, malaise/fatigue, neck pain, orthopnea, palpitations, peripheral edema, PND, shortness of breath or sweats. Risk factors for coronary artery disease include diabetes mellitus, obesity, sedentary lifestyle and dyslipidemia. Past treatments include angiotensin blockers and beta blockers. The current treatment provides no improvement. There are no compliance problems.  There is no history of angina, kidney disease, CAD/MI, CVA, heart failure, left ventricular hypertrophy, PVD or retinopathy. There is no history of chronic renal disease, coarctation of the aorta, hyperaldosteronism, hypercortisolism, hyperparathyroidism, a hypertension causing med, pheochromocytoma, renovascular disease, sleep apnea or a thyroid problem.   Chronic Kidney Disease   This is a  chronic problem. The current episode started more than 1 year ago. The problem occurs constantly. The problem has been unchanged. Pertinent negatives include no abdominal pain, anorexia, arthralgias, change in bowel habit, chest pain, chills, congestion, coughing, diaphoresis, fatigue, fever, headaches, joint swelling, myalgias, nausea, neck pain, numbness, rash, sore throat, swollen glands, urinary symptoms, vertigo, visual change, vomiting or weakness. Nothing aggravates the symptoms. She has tried nothing (losartan ) for the symptoms. The treatment provided no relief.            The following portions of the patient's history were reviewed and updated as appropriate: allergies, current medications, past family history, past medical history, past social history, past surgical history and problem list.  Patient Active Problem List   Diagnosis   • Type 1 diabetes mellitus with hypoglycemia   • Mixed hyperlipidemia   • Chronic kidney disease   • Obesity   • Encounter for immunization   • History of kidney transplant   • Cough   • Osteoporosis screening   • Uncontrolled hypertension   • Need for vaccination   • Pancytopenia   • Sinusitis   • Paroxysmal atrial fibrillation   • Anemia   • Atrial fibrillation   • Hearing loss   • Hyphema   • Counseling for insulin pump   • Hypoglycemia   • Chest pain   • Malaise and fatigue   • Iron deficiency anemia   • Gastritis   • Erosive gastropathy   • Encounter for Medicare annual wellness exam   • General medical examination   • Vitamin D deficiency   • White coat syndrome with hypertension     Current Outpatient Prescriptions on File Prior to Visit   Medication Sig Dispense Refill   • albuterol (PROVENTIL HFA;VENTOLIN HFA) 108 (90 Base) MCG/ACT inhaler Inhale 2 puffs Every 4 (Four) Hours As Needed for Shortness of Air. 1 inhaler 11   • amitriptyline (ELAVIL) 10 MG tablet Take 10 mg by mouth Every Night.  0   • ASPIRIN LOW DOSE 81 MG EC tablet TAKE 1 TABLET BY MOUTH EVERY DAY 30  tablet 0   • Ca Carb-FA-D-B6-B12-Boron-Mg 1342-1 MG wafer Take 1 tablet by mouth daily.     • Calcium Carb-Cholecalciferol (CALCIUM 1000 + D PO) Take 1 tablet by mouth Daily.     • cetirizine (ZyrTEC) 10 MG tablet Take 10 mg by mouth daily.     • Cholecalciferol (VITAMIN D3) 5000 UNITS capsule capsule Take 5,000 Units by mouth Daily.     • dabigatran etexilate (PRADAXA) 75 MG capsule Take 1 capsule by mouth Every 12 (Twelve) Hours. 180 capsule 0   • diltiaZEM SR (CARDIZEM SR) 90 MG 12 hr capsule Take 1 capsule by mouth 2 (Two) Times a Day. 60 capsule 11   • ezetimibe (ZETIA) 10 MG tablet Take 1 tablet by mouth Daily. (Patient taking differently: Take 10 mg by mouth Every Night.) 31 tablet 11   • ferrous sulfate 325 (65 FE) MG tablet Take 1 tablet by mouth Daily With Breakfast. 30 tablet 5   • fluticasone (FLONASE) 50 MCG/ACT nasal spray 2 sprays into each nostril Daily.     • furosemide (LASIX) 40 MG tablet Take 1 tablet by mouth 2 (Two) Times a Day. Take one tab in a.m. and one-half tab in p.m 30 tablet 11   • glucagon (GLUCAGON EMERGENCY) 1 MG injection Inject 1 mg under the skin 1 (One) Time As Needed for Low Blood Sugar for up to 1 dose. 1 kit 5   • glucose blood test strip Use as instructed 100 each 12   • glucose monitor monitoring kit 1 each Daily. 1 each 0   • hydrALAZINE (APRESOLINE) 100 MG tablet TAKE 1 TABLET BY MOUTH THREE TIMES DAILY 270 tablet 0   • insulin aspart (NOVOLOG) 100 UNIT/ML injection Put 240ml in pump 240 mL 3   • ipratropium (ATROVENT) 0.06 % nasal spray 2 sprays into each nostril 2 (Two) Times a Day. 15 mL 11   • LANTUS SOLOSTAR 100 UNIT/ML injection pen INJECT 10 UNITS SUBCUTANEOUSLY UTD AS A BACK UP FOR PUMP  4   • losartan (COZAAR) 50 MG tablet Take 1 tablet by mouth Daily. 90 tablet 6   • mupirocin (BACTROBAN) 2 % ointment Apply  topically 3 (Three) Times a Day. 15 g 0   • mycophenolate (MYFORTIC) 360 MG tablet delayed-release EC tablet Take 1 tablet by mouth 2 (Two) Times a Day. 180  "tablet 6   • pantoprazole (PROTONIX) 40 MG EC tablet Take 40 mg by mouth Daily.     • predniSONE (DELTASONE) 5 MG tablet Take 1 tablet by mouth Daily. 90 tablet 3   • tacrolimus (PROGRAF) 1 MG capsule Take 1 capsule by mouth 2 (Two) Times a Day. 180 capsule 3   • traMADol (ULTRAM) 50 MG tablet Take 1 tablet by mouth 2 (Two) Times a Day. 28 tablet 0   • [DISCONTINUED] carvedilol (COREG) 3.125 MG tablet Take 1 tablet by mouth 2 (Two) Times a Day With Meals. 30 tablet 0   • [DISCONTINUED] rosuvastatin (CRESTOR) 20 MG tablet Take 1 tablet by mouth Daily. 90 tablet 3     No current facility-administered medications on file prior to visit.        Review of Systems   Constitutional: Negative.  Negative for chills, diaphoresis, fatigue, fever, malaise/fatigue and weight loss.   HENT: Negative.  Negative for congestion and sore throat.    Eyes: Negative.  Negative for blurred vision.   Respiratory: Negative.  Negative for cough and shortness of breath.    Cardiovascular: Negative.  Negative for chest pain, palpitations, orthopnea and PND.   Gastrointestinal: Negative.  Negative for abdominal pain, anorexia, change in bowel habit, nausea and vomiting.   Endocrine: Negative.  Negative for polydipsia, polyphagia and polyuria.   Genitourinary: Negative.    Musculoskeletal: Negative.  Negative for arthralgias, joint swelling, myalgias and neck pain.   Skin: Negative.  Negative for pallor and rash.   Allergic/Immunologic: Negative.    Neurological: Negative.  Negative for dizziness, vertigo, tremors, seizures, speech difficulty, weakness, numbness and headaches.   Hematological: Negative.    Psychiatric/Behavioral: Negative.  Negative for confusion. The patient is not nervous/anxious.        Objective    BP (!) 182/78   Pulse 67   Temp 98.6 °F (37 °C)   Ht 152.4 cm (60\")   Wt 68.6 kg (151 lb 3.2 oz)   SpO2 97%   BMI 29.53 kg/m²           Chemistry        Component Value Date/Time     04/30/2018 0904    K 3.6 04/30/2018 " 0904     04/30/2018 0904    CO2 25.0 04/30/2018 0904    BUN 50 (H) 04/30/2018 0904    CREATININE 1.86 (H) 04/30/2018 0904        Component Value Date/Time    CALCIUM 9.7 04/30/2018 0904    ALKPHOS 104 04/30/2018 0904    AST 17 04/30/2018 0904    ALT 25 04/30/2018 0904    BILITOT 0.4 04/30/2018 0904        Lab Results   Component Value Date    WBC 1.95 (L) 04/30/2018    HGB 9.8 (L) 04/30/2018    HCT 32.1 (L) 04/30/2018    MCV 87.2 04/30/2018     04/30/2018     Lab Results   Component Value Date    CHOL 335 (H) 04/30/2018    CHOL 269 (H) 12/29/2017    CHOL 175 09/12/2017     Lab Results   Component Value Date    TRIG 193 04/30/2018    TRIG 161 12/29/2017    TRIG 134 09/12/2017     Lab Results   Component Value Date    HDL 49 (L) 04/30/2018    HDL 60 12/29/2017    HDL 46 (L) 09/12/2017     No components found for: LDLCALC  Lab Results   Component Value Date     (H) 04/30/2018     (H) 12/29/2017    LDL 94 09/12/2017     No results found for: HDLLDLRATIO  No components found for: CHOLHDL  Lab Results   Component Value Date    HGBA1C 7.5 (H) 04/30/2018     Lab Results   Component Value Date    TSH 0.270 (L) 12/01/2017       Office Visit on 04/30/2018   Component Date Value Ref Range Status   • WBC 04/30/2018 1.95* 3.20 - 9.80 10*3/mm3 Final   • RBC 04/30/2018 3.68* 3.77 - 5.16 10*6/mm3 Final   • Hemoglobin 04/30/2018 9.8* 12.0 - 15.5 g/dL Final   • Hematocrit 04/30/2018 32.1* 35.0 - 45.0 % Final   • MCV 04/30/2018 87.2  80.0 - 98.0 fL Final   • MCH 04/30/2018 26.6  26.5 - 34.0 pg Final   • MCHC 04/30/2018 30.5* 31.4 - 36.0 g/dL Final   • RDW 04/30/2018 14.8* 11.5 - 14.5 % Final   • RDW-SD 04/30/2018 47.6* 36.4 - 46.3 fl Final   • MPV 04/30/2018 11.5  8.0 - 12.0 fL Final   • Platelets 04/30/2018 203  150 - 450 10*3/mm3 Final   • Neutrophil % 04/30/2018 59.0  37.0 - 80.0 % Final   • Lymphocyte % 04/30/2018 24.1  10.0 - 50.0 % Final   • Monocyte % 04/30/2018 12.3* 0.0 - 12.0 % Final   • Eosinophil  % 04/30/2018 3.6  0.0 - 7.0 % Final   • Basophil % 04/30/2018 1.0  0.0 - 2.0 % Final   • Immature Grans % 04/30/2018 0.0  0.0 - 0.5 % Final   • Neutrophils, Absolute 04/30/2018 1.15* 2.00 - 8.60 10*3/mm3 Final   • Lymphocytes, Absolute 04/30/2018 0.47* 0.60 - 4.20 10*3/mm3 Final   • Monocytes, Absolute 04/30/2018 0.24  0.00 - 0.90 10*3/mm3 Final   • Eosinophils, Absolute 04/30/2018 0.07  0.00 - 0.70 10*3/mm3 Final   • Basophils, Absolute 04/30/2018 0.02  0.00 - 0.20 10*3/mm3 Final   • Immature Grans, Absolute 04/30/2018 0.00  0.00 - 0.02 10*3/mm3 Final   • Glucose 04/30/2018 171* 60 - 100 mg/dL Final   • BUN 04/30/2018 50* 7 - 21 mg/dL Final   • Creatinine 04/30/2018 1.86* 0.50 - 1.00 mg/dL Final   • Sodium 04/30/2018 142  137 - 145 mmol/L Final   • Potassium 04/30/2018 3.6  3.5 - 5.1 mmol/L Final   • Chloride 04/30/2018 103  95 - 110 mmol/L Final   • CO2 04/30/2018 25.0  22.0 - 31.0 mmol/L Final   • Calcium 04/30/2018 9.7  8.4 - 10.2 mg/dL Final   • Total Protein 04/30/2018 6.5  6.3 - 8.6 g/dL Final   • Albumin 04/30/2018 4.00  3.40 - 4.80 g/dL Final   • ALT (SGPT) 04/30/2018 25  9 - 52 U/L Final   • AST (SGOT) 04/30/2018 17  14 - 36 U/L Final   • Alkaline Phosphatase 04/30/2018 104  38 - 126 U/L Final   • Total Bilirubin 04/30/2018 0.4  0.2 - 1.3 mg/dL Final   • eGFR   Amer 04/30/2018 32* 39 - 90 mL/min/1.73 Final   • Globulin 04/30/2018 2.5  2.3 - 3.5 gm/dL Final   • A/G Ratio 04/30/2018 1.6  1.1 - 1.8 g/dL Final   • BUN/Creatinine Ratio 04/30/2018 26.9* 7.0 - 25.0 Final   • Anion Gap 04/30/2018 14.0  5.0 - 15.0 mmol/L Final   • Hemoglobin A1C 04/30/2018 7.5* 4 - 5.6 % Final   • Total Cholesterol 04/30/2018 335* 0 - 199 mg/dL Final   • Triglycerides 04/30/2018 193  20 - 199 mg/dL Final   • HDL Cholesterol 04/30/2018 49* 60 - 200 mg/dL Final   • LDL Cholesterol  04/30/2018 191* 1 - 129 mg/dL Final   • LDL/HDL Ratio 04/30/2018 5.05* 0.00 - 3.22 Final   • 25 Hydroxy, Vitamin D 04/30/2018 65.5  30.0 - 100.0  ng/ml Final   • Microalbumin/Creatinine Ratio 05/01/2018 9.5  0.0 - 30.0 mg/g Final   • Creatinine, Urine 05/01/2018 83.8  mg/dL Final   • Microalbumin, Urine 05/01/2018 0.8  mg/L Final       Physical Exam   Constitutional: She is oriented to person, place, and time. She appears well-developed and well-nourished. No distress.   HENT:   Head: Normocephalic and atraumatic.   Right Ear: External ear normal.   Left Ear: External ear normal.   Eyes: Conjunctivae and EOM are normal. Pupils are equal, round, and reactive to light. Right eye exhibits no discharge. Left eye exhibits no discharge. No scleral icterus.   Neck: Normal range of motion. Neck supple. No JVD present. No tracheal deviation present. No thyromegaly present.   Cardiovascular: Normal rate, regular rhythm and normal heart sounds.    Pulmonary/Chest: Effort normal. No stridor. No respiratory distress. She has no wheezes.   Abdominal: Soft. She exhibits no distension and no mass. There is no tenderness. There is no rebound and no guarding. No hernia.   Obese abdomen    Musculoskeletal: Normal range of motion. She exhibits no edema or deformity.   Lymphadenopathy:     She has no cervical adenopathy.   Neurological: She is alert and oriented to person, place, and time. She has normal reflexes. No cranial nerve deficit. Coordination normal.   Skin: Skin is warm and dry. No rash noted. No erythema. No pallor.   Psychiatric: She has a normal mood and affect. Her behavior is normal.   Nursing note and vitals reviewed.      Assessment/Plan   Problems Addressed this Visit        Cardiovascular and Mediastinum    Uncontrolled hypertension    Relevant Medications    carvedilol (COREG) 3.125 MG tablet    Paroxysmal atrial fibrillation    Relevant Medications    carvedilol (COREG) 3.125 MG tablet    White coat syndrome with hypertension    Relevant Medications    carvedilol (COREG) 3.125 MG tablet       Digestive    Gastritis    Vitamin D deficiency       Endocrine     Type 1 diabetes mellitus with hypoglycemia       Hematopoietic and Hemostatic    Anemia    Iron deficiency anemia       Other    Chronic kidney disease    History of kidney transplant    General medical examination - Primary        - CKD/history of kidney transplant. Nephrology following - is on prednisone and tacrolimus curerntly. Nephroligst in [place]. Continue on Myfortic, Prograf and prednisone. Refilled prednisone.  Her next appt is in September 2018.    - essential hypertension - currently elevated today. May have White Coat Syndrome.  Have BP readings today from home and will put on file.  Did not take medications this morning. She is advised to bring BP logs on next visit . Pt asymptomatic today. Denies any chest pain, dizziness or headaches.  She continues to take hydralazine, losratan,coreg and cardizem   - kidney transplant-  Nephrology following. Is on Tacrolimus and myfortiq  - atrial fibrillation - currently rate controlled on coreg,ditilazem. On Pradaxa for anticoagulation. Cardiology following. Refilled coreg   - hyperlipidemia - continue crestor and zetia. Pt is due for lipid panel today.  Will go up on dosage of crestor from 20 to 40 mg at bedtime. Advised pt to take with FCoeznyme Q 10   - DM type 2 - will get records with Dr. Fajardo (Endocrinologist) in [facility] PT would like to establish with Endocrinologist at [facility] [place]. Has appt with Endocrinologist soon. Pt is on insulin pump. Recheck hga1c Sugars better controlled. Will get last diabetic eye examination from Dr. Ordonez. Recheck hga1c and microalbumin creatinine ratio   -will Schedule Medicare Wellness Exam  -advised pt to set up appt for diabetic eye examination   - iron deficiency anemia/anemia - recent hgb stable. Pt is on iron pill   - recheck in 1  Month for BP recheck and new labwork         This document has been electronically signed by Ayaan Swenson MD on June 25, 2018 9:07 AM               Review of Systems    Constitutional: Negative.  Negative for chills, diaphoresis, fatigue, fever, malaise/fatigue and unexpected weight loss.   HENT: Negative.  Negative for congestion and sore throat.    Eyes: Negative.  Negative for blurred vision.   Respiratory: Negative.  Negative for cough and shortness of breath.    Cardiovascular: Negative.  Negative for chest pain, palpitations, orthopnea and PND.   Gastrointestinal: Negative.  Negative for abdominal pain, anorexia, change in bowel habit, nausea and vomiting.   Endocrine: Negative.  Negative for polydipsia, polyphagia and polyuria.   Genitourinary: Negative.    Musculoskeletal: Negative.  Negative for arthralgias, joint swelling, myalgias and neck pain.   Skin: Negative.  Negative for pallor and rash.   Allergic/Immunologic: Negative.    Neurological: Negative.  Negative for dizziness, vertigo, tremors, seizures, speech difficulty, weakness, numbness and confusion.   Hematological: Negative.    Psychiatric/Behavioral: Negative.  The patient is not nervous/anxious.        Physical Exam   Constitutional: She is oriented to person, place, and time. She appears well-developed and well-nourished. No distress.   HENT:   Head: Normocephalic and atraumatic.   Right Ear: External ear normal.   Left Ear: External ear normal.   Eyes: Conjunctivae and EOM are normal. Pupils are equal, round, and reactive to light. Right eye exhibits no discharge. Left eye exhibits no discharge. No scleral icterus.   Neck: Normal range of motion. Neck supple. No JVD present. No tracheal deviation present. No thyromegaly present.   Cardiovascular: Normal rate, regular rhythm and normal heart sounds.    Pulmonary/Chest: Effort normal. No stridor. No respiratory distress. She has no wheezes.   Abdominal: Soft. She exhibits no distension and no mass. There is no tenderness. There is no rebound and no guarding. No hernia.   Obese abdomen    Musculoskeletal: Normal range of motion. She exhibits no edema or  deformity.   Lymphadenopathy:     She has no cervical adenopathy.   Neurological: She is alert and oriented to person, place, and time. She has normal reflexes. No cranial nerve deficit. Coordination normal.   Skin: Skin is warm and dry. No rash noted. No erythema. No pallor.   Psychiatric: She has a normal mood and affect. Her behavior is normal.   Nursing note and vitals reviewed.

## 2018-06-25 ENCOUNTER — OFFICE VISIT (OUTPATIENT)
Dept: FAMILY MEDICINE CLINIC | Facility: CLINIC | Age: 78
End: 2018-06-25

## 2018-06-25 VITALS
TEMPERATURE: 98.6 F | SYSTOLIC BLOOD PRESSURE: 182 MMHG | HEART RATE: 67 BPM | BODY MASS INDEX: 29.68 KG/M2 | DIASTOLIC BLOOD PRESSURE: 78 MMHG | WEIGHT: 151.2 LBS | HEIGHT: 60 IN | OXYGEN SATURATION: 97 %

## 2018-06-25 DIAGNOSIS — K29.70 GASTRITIS, PRESENCE OF BLEEDING UNSPECIFIED, UNSPECIFIED CHRONICITY, UNSPECIFIED GASTRITIS TYPE: ICD-10-CM

## 2018-06-25 DIAGNOSIS — D64.9 ANEMIA, UNSPECIFIED TYPE: ICD-10-CM

## 2018-06-25 DIAGNOSIS — E10.649 TYPE 1 DIABETES MELLITUS WITH HYPOGLYCEMIA AND WITHOUT COMA (HCC): ICD-10-CM

## 2018-06-25 DIAGNOSIS — Z94.0 HISTORY OF KIDNEY TRANSPLANT: ICD-10-CM

## 2018-06-25 DIAGNOSIS — I10 UNCONTROLLED HYPERTENSION: ICD-10-CM

## 2018-06-25 DIAGNOSIS — I48.0 PAROXYSMAL ATRIAL FIBRILLATION (HCC): ICD-10-CM

## 2018-06-25 DIAGNOSIS — N18.30 STAGE 3 CHRONIC KIDNEY DISEASE (HCC): ICD-10-CM

## 2018-06-25 DIAGNOSIS — D50.9 IRON DEFICIENCY ANEMIA, UNSPECIFIED IRON DEFICIENCY ANEMIA TYPE: ICD-10-CM

## 2018-06-25 DIAGNOSIS — Z00.00 GENERAL MEDICAL EXAMINATION: Primary | ICD-10-CM

## 2018-06-25 DIAGNOSIS — E55.9 VITAMIN D DEFICIENCY: ICD-10-CM

## 2018-06-25 DIAGNOSIS — I10 WHITE COAT SYNDROME WITH HYPERTENSION: ICD-10-CM

## 2018-06-25 PROCEDURE — 99214 OFFICE O/P EST MOD 30 MIN: CPT | Performed by: FAMILY MEDICINE

## 2018-06-25 RX ORDER — CARVEDILOL 3.12 MG/1
3.12 TABLET ORAL 2 TIMES DAILY WITH MEALS
Qty: 180 TABLET | Refills: 3 | Status: SHIPPED | OUTPATIENT
Start: 2018-06-25

## 2018-06-25 RX ORDER — ROSUVASTATIN CALCIUM 40 MG/1
40 TABLET, COATED ORAL DAILY
Qty: 30 TABLET | Refills: 3 | Status: SHIPPED | OUTPATIENT
Start: 2018-06-25 | End: 2018-11-29 | Stop reason: SDUPTHER

## 2018-06-25 NOTE — PATIENT INSTRUCTIONS
1. Bring blood pressure readings on next visit.  Goal <130/90  2. Start on crestor 40 mg or take 2 pills of crestor 20 mg = 40 mg at bedtime.    3. STart on coenzyme Q10 over the counter.    4. Go set up appt with Dr. Ordonez for Diabetic eye examination

## 2018-07-03 ENCOUNTER — CLINICAL SUPPORT (OUTPATIENT)
Dept: FAMILY MEDICINE CLINIC | Facility: CLINIC | Age: 78
End: 2018-07-03

## 2018-07-03 VITALS — DIASTOLIC BLOOD PRESSURE: 70 MMHG | SYSTOLIC BLOOD PRESSURE: 120 MMHG

## 2018-07-03 DIAGNOSIS — I10 ESSENTIAL HYPERTENSION: Primary | ICD-10-CM

## 2018-07-05 RX ORDER — HYDRALAZINE HYDROCHLORIDE 100 MG/1
TABLET, FILM COATED ORAL
Qty: 270 TABLET | Refills: 0 | Status: SHIPPED | OUTPATIENT
Start: 2018-07-05 | End: 2018-11-29 | Stop reason: SDUPTHER

## 2018-07-09 RX ORDER — HYDRALAZINE HYDROCHLORIDE 100 MG/1
TABLET, FILM COATED ORAL
Qty: 270 TABLET | Refills: 3 | Status: SHIPPED | OUTPATIENT
Start: 2018-07-09 | End: 2018-11-29

## 2018-07-24 PROBLEM — I10 ESSENTIAL HYPERTENSION: Status: ACTIVE | Noted: 2018-07-24

## 2018-07-24 PROBLEM — E11.9 DIABETES MELLITUS (HCC): Status: ACTIVE | Noted: 2018-07-24

## 2018-07-30 ENCOUNTER — TELEPHONE (OUTPATIENT)
Dept: FAMILY MEDICINE CLINIC | Facility: CLINIC | Age: 78
End: 2018-07-30

## 2018-08-02 RX ORDER — LOSARTAN POTASSIUM 25 MG/1
TABLET ORAL
Qty: 30 TABLET | Refills: 5 | Status: SHIPPED | OUTPATIENT
Start: 2018-08-02 | End: 2018-11-29 | Stop reason: SDUPTHER

## 2018-08-14 RX ORDER — EZETIMIBE 10 MG/1
10 TABLET ORAL DAILY
Qty: 90 TABLET | Refills: 4 | Status: SHIPPED | OUTPATIENT
Start: 2018-08-14 | End: 2018-11-29 | Stop reason: SDUPTHER

## 2018-09-05 RX ORDER — FUROSEMIDE 40 MG/1
TABLET ORAL
Qty: 180 TABLET | Refills: 0 | Status: SHIPPED | OUTPATIENT
Start: 2018-09-05 | End: 2018-12-09 | Stop reason: SDUPTHER

## 2018-09-14 RX ORDER — CETIRIZINE HYDROCHLORIDE 10 MG/1
10 TABLET ORAL DAILY
Qty: 30 TABLET | Refills: 3 | Status: SHIPPED | OUTPATIENT
Start: 2018-09-14 | End: 2019-02-01 | Stop reason: SDUPTHER

## 2018-09-19 RX ORDER — FLUTICASONE PROPIONATE 50 MCG
2 SPRAY, SUSPENSION (ML) NASAL DAILY
Qty: 1 BOTTLE | Refills: 3 | Status: SHIPPED | OUTPATIENT
Start: 2018-09-19

## 2018-09-20 ENCOUNTER — TELEPHONE (OUTPATIENT)
Dept: FAMILY MEDICINE CLINIC | Facility: CLINIC | Age: 78
End: 2018-09-20

## 2018-09-20 NOTE — TELEPHONE ENCOUNTER
----- Message from Ayaan Swenson MD sent at 9/19/2018  8:56 PM CDT -----  Regarding: sinus issues  I will send in flonase nasal spray. Let pt know. She can use 1-2 sprays in each nostril daily. Thanks  ----- Message -----  From: Jody Rust MA  Sent: 9/19/2018   4:21 PM  To: Ayaan Swenson MD    Pt called stating the zyrtec made her have a rash so she stopped it. Please send something in for her sinuses.    Called pt

## 2018-10-22 ENCOUNTER — EPISODE CHANGES (OUTPATIENT)
Dept: CASE MANAGEMENT | Facility: OTHER | Age: 78
End: 2018-10-22

## 2018-10-29 RX ORDER — EZETIMIBE 10 MG/1
10 TABLET ORAL DAILY
Qty: 31 TABLET | Refills: 5 | Status: SHIPPED | OUTPATIENT
Start: 2018-10-29 | End: 2018-12-26 | Stop reason: SDUPTHER

## 2018-10-29 RX ORDER — DABIGATRAN ETEXILATE MESYLATE 75 MG/1
CAPSULE ORAL
Qty: 180 CAPSULE | Refills: 0 | OUTPATIENT
Start: 2018-10-29

## 2018-11-05 RX ORDER — PANTOPRAZOLE SODIUM 40 MG/1
TABLET, DELAYED RELEASE ORAL
Qty: 90 TABLET | Refills: 10 | Status: SHIPPED | OUTPATIENT
Start: 2018-11-05 | End: 2018-11-29

## 2018-11-20 ENCOUNTER — TRANSCRIBE ORDERS (OUTPATIENT)
Dept: LAB | Facility: HOSPITAL | Age: 78
End: 2018-11-20

## 2018-11-20 ENCOUNTER — LAB (OUTPATIENT)
Dept: LAB | Facility: HOSPITAL | Age: 78
End: 2018-11-20

## 2018-11-20 DIAGNOSIS — D50.9 IRON DEFICIENCY ANEMIA, UNSPECIFIED IRON DEFICIENCY ANEMIA TYPE: ICD-10-CM

## 2018-11-20 DIAGNOSIS — E11.9 TYPE 2 DIABETES MELLITUS WITHOUT COMPLICATION, WITHOUT LONG-TERM CURRENT USE OF INSULIN (HCC): ICD-10-CM

## 2018-11-20 DIAGNOSIS — Z94.0 KIDNEY REPLACED BY TRANSPLANT: ICD-10-CM

## 2018-11-20 DIAGNOSIS — E11.9 TYPE 2 DIABETES MELLITUS WITHOUT COMPLICATION, WITHOUT LONG-TERM CURRENT USE OF INSULIN (HCC): Primary | ICD-10-CM

## 2018-11-20 DIAGNOSIS — Z94.0 KIDNEY REPLACED BY TRANSPLANT: Primary | ICD-10-CM

## 2018-11-20 LAB
ALBUMIN SERPL-MCNC: 4.2 G/DL (ref 3.4–4.8)
ALBUMIN/GLOB SERPL: 1.9 G/DL (ref 1.1–1.8)
ALP SERPL-CCNC: 114 U/L (ref 38–126)
ALT SERPL W P-5'-P-CCNC: 27 U/L (ref 9–52)
ANION GAP SERPL CALCULATED.3IONS-SCNC: 13 MMOL/L (ref 5–15)
ANISOCYTOSIS BLD QL: NORMAL
ARTICHOKE IGE QN: 98 MG/DL (ref 1–129)
AST SERPL-CCNC: 20 U/L (ref 14–36)
BASOPHILS # BLD AUTO: 0.02 10*3/MM3 (ref 0–0.2)
BASOPHILS NFR BLD AUTO: 0.5 % (ref 0–2)
BILIRUB SERPL-MCNC: 0.3 MG/DL (ref 0.2–1.3)
BUN BLD-MCNC: 61 MG/DL (ref 7–21)
BUN/CREAT SERPL: 27.4 (ref 7–25)
CALCIUM SPEC-SCNC: 9.5 MG/DL (ref 8.4–10.2)
CHLORIDE SERPL-SCNC: 97 MMOL/L (ref 95–110)
CHOLEST SERPL-MCNC: 208 MG/DL (ref 0–199)
CO2 SERPL-SCNC: 28 MMOL/L (ref 22–31)
CREAT BLD-MCNC: 2.23 MG/DL (ref 0.5–1)
DEPRECATED RDW RBC AUTO: 48.6 FL (ref 36.4–46.3)
EOSINOPHIL # BLD AUTO: 0.07 10*3/MM3 (ref 0–0.7)
EOSINOPHIL NFR BLD AUTO: 1.7 % (ref 0–7)
ERYTHROCYTE [DISTWIDTH] IN BLOOD BY AUTOMATED COUNT: 16 % (ref 11.5–14.5)
GFR SERPL CREATININE-BSD FRML MDRD: 26 ML/MIN/1.73 (ref 39–90)
GLOBULIN UR ELPH-MCNC: 2.2 GM/DL (ref 2.3–3.5)
GLUCOSE BLD-MCNC: 267 MG/DL (ref 60–100)
HBA1C MFR BLD: 7.7 % (ref 4–5.6)
HCT VFR BLD AUTO: 29.8 % (ref 35–45)
HDLC SERPL-MCNC: 80 MG/DL (ref 60–200)
HGB BLD-MCNC: 9.2 G/DL (ref 12–15.5)
HYPOCHROMIA BLD QL: NORMAL
IMM GRANULOCYTES # BLD: 0 10*3/MM3 (ref 0–0.02)
IMM GRANULOCYTES NFR BLD: 0 % (ref 0–0.5)
LDLC/HDLC SERPL: 1.31 {RATIO} (ref 0–3.22)
LYMPHOCYTES # BLD AUTO: 0.6 10*3/MM3 (ref 0.6–4.2)
LYMPHOCYTES NFR BLD AUTO: 14.8 % (ref 10–50)
MCH RBC QN AUTO: 25.6 PG (ref 26.5–34)
MCHC RBC AUTO-ENTMCNC: 30.9 G/DL (ref 31.4–36)
MCV RBC AUTO: 82.8 FL (ref 80–98)
MONOCYTES # BLD AUTO: 0.47 10*3/MM3 (ref 0–0.9)
MONOCYTES NFR BLD AUTO: 11.6 % (ref 0–12)
NEUTROPHILS # BLD AUTO: 2.89 10*3/MM3 (ref 2–8.6)
NEUTROPHILS NFR BLD AUTO: 71.4 % (ref 37–80)
PLATELET # BLD AUTO: 146 10*3/MM3 (ref 150–450)
PMV BLD AUTO: ABNORMAL FL (ref 8–12)
POTASSIUM BLD-SCNC: 3.9 MMOL/L (ref 3.5–5.1)
PROT SERPL-MCNC: 6.4 G/DL (ref 6.3–8.6)
RBC # BLD AUTO: 3.6 10*6/MM3 (ref 3.77–5.16)
SMALL PLATELETS BLD QL SMEAR: NORMAL
SODIUM BLD-SCNC: 138 MMOL/L (ref 137–145)
TRIGL SERPL-MCNC: 117 MG/DL (ref 20–199)
WBC MORPH BLD: NORMAL
WBC NRBC COR # BLD: 4.05 10*3/MM3 (ref 3.2–9.8)

## 2018-11-20 PROCEDURE — 85007 BL SMEAR W/DIFF WBC COUNT: CPT

## 2018-11-20 PROCEDURE — 80061 LIPID PANEL: CPT

## 2018-11-20 PROCEDURE — 80053 COMPREHEN METABOLIC PANEL: CPT

## 2018-11-20 PROCEDURE — 81001 URINALYSIS AUTO W/SCOPE: CPT

## 2018-11-20 PROCEDURE — 85025 COMPLETE CBC W/AUTO DIFF WBC: CPT

## 2018-11-20 PROCEDURE — 83036 HEMOGLOBIN GLYCOSYLATED A1C: CPT

## 2018-11-20 PROCEDURE — 87086 URINE CULTURE/COLONY COUNT: CPT

## 2018-11-21 LAB
BACTERIA UR QL AUTO: ABNORMAL /HPF
BILIRUB UR QL STRIP: NEGATIVE
CLARITY UR: CLEAR
COLOR UR: YELLOW
GLUCOSE UR STRIP-MCNC: ABNORMAL MG/DL
HGB UR QL STRIP.AUTO: NEGATIVE
HYALINE CASTS UR QL AUTO: ABNORMAL /LPF
KETONES UR QL STRIP: NEGATIVE
LEUKOCYTE ESTERASE UR QL STRIP.AUTO: ABNORMAL
NITRITE UR QL STRIP: NEGATIVE
PH UR STRIP.AUTO: 7 [PH] (ref 5–9)
PROT UR QL STRIP: NEGATIVE
RBC # UR: ABNORMAL /HPF
REF LAB TEST METHOD: ABNORMAL
SP GR UR STRIP: 1.01 (ref 1–1.03)
SQUAMOUS #/AREA URNS HPF: ABNORMAL /HPF
UROBILINOGEN UR QL STRIP: ABNORMAL
WBC UR QL AUTO: ABNORMAL /HPF

## 2018-11-22 LAB — BACTERIA SPEC AEROBE CULT: NORMAL

## 2018-11-29 ENCOUNTER — OFFICE VISIT (OUTPATIENT)
Dept: FAMILY MEDICINE CLINIC | Facility: CLINIC | Age: 78
End: 2018-11-29

## 2018-11-29 VITALS
HEIGHT: 60 IN | OXYGEN SATURATION: 100 % | SYSTOLIC BLOOD PRESSURE: 166 MMHG | HEART RATE: 60 BPM | WEIGHT: 155.1 LBS | TEMPERATURE: 97.9 F | BODY MASS INDEX: 30.45 KG/M2 | DIASTOLIC BLOOD PRESSURE: 74 MMHG

## 2018-11-29 DIAGNOSIS — Z94.0 HISTORY OF KIDNEY TRANSPLANT: ICD-10-CM

## 2018-11-29 DIAGNOSIS — E11.65 UNCONTROLLED TYPE 2 DIABETES MELLITUS WITH HYPERGLYCEMIA (HCC): ICD-10-CM

## 2018-11-29 DIAGNOSIS — D50.9 IRON DEFICIENCY ANEMIA, UNSPECIFIED IRON DEFICIENCY ANEMIA TYPE: ICD-10-CM

## 2018-11-29 DIAGNOSIS — I48.0 PAROXYSMAL ATRIAL FIBRILLATION (HCC): ICD-10-CM

## 2018-11-29 DIAGNOSIS — N18.30 STAGE 3 CHRONIC KIDNEY DISEASE (HCC): ICD-10-CM

## 2018-11-29 DIAGNOSIS — E66.9 OBESITY, UNSPECIFIED CLASSIFICATION, UNSPECIFIED OBESITY TYPE, UNSPECIFIED WHETHER SERIOUS COMORBIDITY PRESENT: ICD-10-CM

## 2018-11-29 DIAGNOSIS — E78.5 HYPERLIPIDEMIA, UNSPECIFIED HYPERLIPIDEMIA TYPE: ICD-10-CM

## 2018-11-29 DIAGNOSIS — E55.9 VITAMIN D DEFICIENCY: Primary | ICD-10-CM

## 2018-11-29 DIAGNOSIS — I10 UNCONTROLLED HYPERTENSION: ICD-10-CM

## 2018-11-29 DIAGNOSIS — I10 ESSENTIAL HYPERTENSION: ICD-10-CM

## 2018-11-29 PROCEDURE — 99214 OFFICE O/P EST MOD 30 MIN: CPT | Performed by: FAMILY MEDICINE

## 2018-11-29 RX ORDER — LOSARTAN POTASSIUM 100 MG/1
100 TABLET ORAL DAILY
Qty: 30 TABLET | Refills: 3 | Status: SHIPPED | OUTPATIENT
Start: 2018-11-29 | End: 2019-02-15 | Stop reason: HOSPADM

## 2018-11-29 RX ORDER — HYDRALAZINE HYDROCHLORIDE 100 MG/1
100 TABLET, FILM COATED ORAL 3 TIMES DAILY
Qty: 270 TABLET | Refills: 3
Start: 2018-11-29 | End: 2019-04-10 | Stop reason: SDUPTHER

## 2018-11-29 RX ORDER — ROSUVASTATIN CALCIUM 20 MG/1
20 TABLET, COATED ORAL DAILY
Refills: 2 | COMMUNITY
Start: 2018-11-23

## 2018-11-29 NOTE — PATIENT INSTRUCTIONS
Go up on losartan from 50 to 100 mg daily.   Take 2 pills of 50 mg of losartan    Then  new prescription    Advised importance of BP control     Sees Nephrologist    See me in 2 weeks

## 2018-11-29 NOTE — PROGRESS NOTES
Subjective   Chanda Yan is a 78 y.o. female.   Problem List  1. 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 Hyperlipidemia ASCVD risk high   3. ASCAD sp stent placement/severe LDH/Right ventricular Delitation  4. ESRD sp right kidney transplant   5. Obesity BMI >30   6. Essential Hypertension  8. Right kidney transplant in 2010  9. GERD  10. Bilateral leg edema  11. Anemia of Chronic Disease   12. Depression   13. Unspecified murmur  14. Paroxymal Atrial Fibrillation   15. Diverticulosis  16. Internal and Exeternal Hemorrhoids  17. Vitamin D deficiency     Pt is 76 yo AAF with the above medical issues. Is here for followup.  Sees Nephrologist. Dr. Morley for CKD/ESRD and sp right kidney transplant. Pt had labwork recently. States her sugars have been well controlled lately. Pt is on insulin pump. Last hga1c was <9.0.  States morning sugars running <130 in am and <180 2 hours after meals.  Has yet to hear with appt with Dr. Levine (Endocrinologist). BP is also elevated today but it is well controlled at home. Did bring BP readings and is runing <140/90.  Pt continue st o take hydralazine, losartan, coreg. For hyperlipidemi pt is on zetia and crestor.  Last LDL was not at goal     12/29/17 Pt is 76 yo WF with the above medical issues of DM type 2 insulin dependent.  ESRD sp right kidney trasnplatn, CAD, hyperlipidemia  Pt presented with chest pain on 12/8/17 when she was at a [employer].  She started having pain in car around right sided epigastric area that was burning in nature.  Pt was admitted to the cardiac floor at [facility] and 3 sets of cardiac enzymes were elevated that was likely seconadry to acute kidney injure.  She was provided gentle hydration.  Pt has anemia of chronic diseease. A hemoccult stool was obtained which was positive. GI was consulted and an EGD was preformed that showed erosive gastritis. She did recived 2 units of PRBC blood transfusion. Cardiology was asked whether to continue Pradaxaf  or her atrial fibrillation  and that was on hold until next visit.  Pt will have colonoscopy outpatient. Pt stabilized and pain became better. She denied any chest pain, dizziness, shortness of breath, abdominal pain, diarrhea or constipation prior to discharge.  She also has DM type 2 and her last hga1c was >7.0. She currently uses an insulin pump.  Dr. Levine was consulted and insulin pump was adjusted. Her sugars have been better controlled. She continues to see Endocrinologist in [place] but pt would rather see Dr. Levine at [facility]. She does not want to drive all the way to [place]. She is due for hga1c check as well as lipid panel check. She takes crestor 20 mg PO q daily.  Pt states she is doing well and that her epigastric pain/chest pain is better. She continues to take protonix 40 mg PO q daily. She was advised to stopPt is here for recheck again today.  She has yet to get appt with Dr. Levine Endocrinologist. She has one scheduled next month. Pt since last visit had Colonoscopy screening that showed diverticulosis along with internal and external hemorrhoids. Overall it was poor preperation.  Pt also has high blood pressure today. She did not take her medications this morning.  Usually at home it runs <130/90.  Today she denies any chest pain, heartburn or dizziness. She continues to take losartan 50 mg daily along with cardizem 90 mg every 12 hours  Coreg. 3.12 mg PO BID and  Hydralazine 100 mg PO TID. Did not bring BP readings today Pt states her sugars have been better controlled since having her insulin pump adjsuted.  She has yet to get labwork today to recheck lipid panel and hga1c     4/30/18 pt is here for recheck. She has an Endocrinologist in [place] Dr. Fajardo she sees for Diabetes type 2. She is on insulin pump. Also she sees Nephrologist. She has history of CKD secondary to diabetes and she had history of kidney transplant. She is currently on prednisone along with myfortic 360  mg pO BId and on tacrolimus 1 mg PO BI. BP at goal today. She was told by Endocrinologist her cholesterol levels were not at goal and it was recommended she start back on crestor 20 mg. She is also taking zetia 10 mg. Her last hga1c she states was about 8.0. She is on insulin pump.  She is counting her carb intake.  She also has history of A fib and is currently rate controlled .She is on pradaxa. Her next appt with Dr. Almazan is soon (Cardiololgist)     6/25/18 pt is here for followup and recheck. For hypertension her BP is elevated today and takes coreg 3.125 mg PO BID along with losartan 50 mg PO q daily along with hydralazine 100 mg PO TID and cardizem 90 mg PO bID  For DM type  2 she is on an insulin pump and sees her Endocrinologist in [facility]. Her last hga1c was 7.5 in April 2018. On last labwork her vitami nD was normal. On lipid panel. Her total cholesterol was elevated at 334 and HDL at 49 awith LDL at 191. She is taking Zetia 10 mg PO q daily along with crestor 20 mg PO qhs.   For her history of CKD/sp kidney transplant she is taking myfortic 260 mg PO BId along with Tacrolimus 1 mg PO BID. alogn with Predisone 5 mg PO q daily.  She see a Nephrologist in [place]. Dr Reza   For  Vitamin D deficiency she is on Vitamin D 5000 units daily.  For iron deficiency anemia she is on iron pill 325 mg PO q daily. ON her last CMP his CR is at 1.86 and GFR .  GFR at 32. On CBC her WBC was at 1.95 and Hemoglobin at 9.8 She is on iron pill currently. She also has history of paroxymal atrial fibrillation and is taking coreg and is rate controlled. She also takes Pradaxa. She has been under stress lately because her car broke down  And her SSI was recently cut.  She is also taking aspirin for Stroke prevention and protonix 40 mg po a daily for gastritis     11/29/18 pt is here for recheck and followup. She continues to take her medications  For hypertension. Also has insulin dependent DM type 2 and is on  insulin pump. Her Endocrinologist is in [place]. Also has had history of kidney transplant and sees Nephrologist in [place]. She continues to take Myfortic 360 mg PO BID  and tacrolimus 1 mg PO BID along with prednisone 5 mg daily.  Her recent labwork on 11/20/18 showed hemoglobin at 9.2 from 9.8 a few months ago . Platelets were slightly low at 146. On CMP her Cr is at 2.23 from 1.86 in April 2018 and  with GFR at 26 from 32 earlier this year  and BUN at 61. Lipid panel shows total cholesterol at 208 with LDL at 98. hga1c is at 7.7. UA showed WBC and RBC in urine with high amount of glucose. Urine culture showed mixed huseyin. She has not seen her Nephrologist due to not having a car. Daughter has been using it for work. Pt has had uncontrolled blood pressure at home. She takes care of granddaughter who has ADHD.  She has been stressed lately.   She continues to take coreg 3.125 mg PO BID, cardizem 90 mg twice a day,  Losartan 50 mg daily.  And hydralazine 100 mg TID.  Cholesterol levels have improved. She has been on clonidine before and makes her legs cramping. Denies any chest pain dizziness or shortness of air . Her next appt with Nephrologist is December 7th      Diabetes   She presents for her follow-up diabetic visit. She has type 2 diabetes mellitus. Her disease course has been stable. Pertinent negatives for hypoglycemia include no confusion, dizziness, headaches, hunger, mood changes, nervousness/anxiousness, pallor, seizures, sleepiness, speech difficulty, sweats or tremors. Pertinent negatives for diabetes include no blurred vision, no chest pain, no fatigue, no foot paresthesias, no foot ulcerations, no polydipsia, no polyphagia, no polyuria, no visual change, no weakness and no weight loss. Symptoms are stable. Pertinent negatives for diabetic complications include no CVA, PVD or retinopathy. Risk factors for coronary artery disease include diabetes mellitus, hypertension, obesity and sedentary  lifestyle. Current diabetic treatment includes insulin injections and insulin pump. She is compliant with treatment none of the time. Her weight is stable. She is following a generally unhealthy diet. An ACE inhibitor/angiotensin II receptor blocker is being taken. She does not see a podiatrist.Eye exam is not current.   Hypertension   This is a chronic problem. The current episode started more than 1 year ago. The problem has been gradually worsening since onset. The problem is uncontrolled. Pertinent negatives include no anxiety, blurred vision, chest pain, headaches, malaise/fatigue, neck pain, orthopnea, palpitations, peripheral edema, PND, shortness of breath or sweats. Risk factors for coronary artery disease include diabetes mellitus, obesity, sedentary lifestyle and dyslipidemia. Past treatments include angiotensin blockers and beta blockers. The current treatment provides no improvement. There are no compliance problems.  There is no history of angina, kidney disease, CAD/MI, CVA, heart failure, left ventricular hypertrophy, PVD or retinopathy. There is no history of chronic renal disease, coarctation of the aorta, hyperaldosteronism, hypercortisolism, hyperparathyroidism, a hypertension causing med, pheochromocytoma, renovascular disease, sleep apnea or a thyroid problem.   Chronic Kidney Disease   This is a chronic problem. The current episode started more than 1 year ago. The problem occurs constantly. The problem has been unchanged. Pertinent negatives include no abdominal pain, anorexia, arthralgias, change in bowel habit, chest pain, chills, congestion, coughing, diaphoresis, fatigue, fever, headaches, joint swelling, myalgias, nausea, neck pain, numbness, rash, sore throat, swollen glands, urinary symptoms, vertigo, visual change, vomiting or weakness. Nothing aggravates the symptoms. She has tried nothing (losartan ) for the symptoms. The treatment provided no relief.            The following  portions of the patient's history were reviewed and updated as appropriate: allergies, current medications, past family history, past medical history, past social history, past surgical history and problem list.  Patient Active Problem List   Diagnosis   • Type 1 diabetes mellitus with hypoglycemia (CMS/HCC)   • Mixed hyperlipidemia   • Chronic kidney disease   • Obesity   • Encounter for immunization   • History of kidney transplant   • Cough   • Osteoporosis screening   • Uncontrolled hypertension   • Need for vaccination   • Pancytopenia (CMS/HCC)   • Sinusitis   • Paroxysmal atrial fibrillation (CMS/HCC)   • Anemia   • Atrial fibrillation (CMS/HCC)   • Hearing loss   • Hyphema   • Counseling for insulin pump   • Hypoglycemia   • Chest pain   • Malaise and fatigue   • Iron deficiency anemia   • Gastritis   • Erosive gastropathy   • Encounter for Medicare annual wellness exam   • General medical examination   • Vitamin D deficiency   • White coat syndrome with hypertension   • Essential hypertension   • Diabetes mellitus (CMS/HCC)   • Uncontrolled type 2 diabetes mellitus with hyperglycemia (CMS/HCC)   • Hyperlipidemia     Current Outpatient Medications on File Prior to Visit   Medication Sig Dispense Refill   • albuterol (PROVENTIL HFA;VENTOLIN HFA) 108 (90 Base) MCG/ACT inhaler Inhale 2 puffs Every 4 (Four) Hours As Needed for Shortness of Air. 1 inhaler 11   • amitriptyline (ELAVIL) 10 MG tablet Take 10 mg by mouth Every Night.  0   • ASPIRIN LOW DOSE 81 MG EC tablet TAKE 1 TABLET BY MOUTH EVERY DAY 30 tablet 0   • Ca Carb-FA-D-B6-B12-Boron-Mg 1342-1 MG wafer Take 1 tablet by mouth daily.     • Calcium Carb-Cholecalciferol (CALCIUM 1000 + D PO) Take 1 tablet by mouth Daily.     • carvedilol (COREG) 3.125 MG tablet Take 1 tablet by mouth 2 (Two) Times a Day With Meals. 180 tablet 3   • cetirizine (zyrTEC) 10 MG tablet Take 1 tablet by mouth Daily. 30 tablet 3   • Cholecalciferol (VITAMIN D3) 5000 UNITS capsule  capsule Take 5,000 Units by mouth Daily.     • dabigatran etexilate (PRADAXA) 75 MG capsule Take 1 capsule by mouth Every 12 (Twelve) Hours. 180 capsule 0   • diltiaZEM SR (CARDIZEM SR) 90 MG 12 hr capsule Take 1 capsule by mouth 2 (Two) Times a Day. 60 capsule 11   • ezetimibe (ZETIA) 10 MG tablet TAKE 1 TABLET BY MOUTH DAILY 90 tablet 4   • ezetimibe (ZETIA) 10 MG tablet TAKE 1 TABLET BY MOUTH DAILY 31 tablet 5   • ferrous sulfate 325 (65 FE) MG tablet Take 1 tablet by mouth Daily With Breakfast. 30 tablet 5   • fluticasone (FLONASE) 50 MCG/ACT nasal spray 2 sprays into the nostril(s) as directed by provider Daily. 1 bottle 3   • furosemide (LASIX) 40 MG tablet TAKE 1 TABLET BY MOUTH EVERY MORNING AND 1/2 TABLET BY MOUTH EVERY EVENING 180 tablet 0   • glucagon (GLUCAGON EMERGENCY) 1 MG injection Inject 1 mg under the skin 1 (One) Time As Needed for Low Blood Sugar for up to 1 dose. 1 kit 5   • glucose blood test strip Use as instructed 100 each 12   • glucose monitor monitoring kit 1 each Daily. 1 each 0   • hydrALAZINE (APRESOLINE) 100 MG tablet TAKE 1 TABLET BY MOUTH THREE TIMES DAILY 270 tablet 0   • hydrALAZINE (APRESOLINE) 100 MG tablet TAKE 1 TABLET BY MOUTH THREE TIMES DAILY 270 tablet 3   • Injection Device for Insulin device 1 application 3 (Three) Times a Week if Needed (to use with insulin). To use with insulin pen 3 times a day. Give 90 day supply 300 each 3   • insulin aspart (NOVOLOG) 100 UNIT/ML injection Put 240ml in pump 240 mL 3   • ipratropium (ATROVENT) 0.06 % nasal spray 2 sprays into each nostril 2 (Two) Times a Day. 15 mL 11   • LANTUS SOLOSTAR 100 UNIT/ML injection pen INJECT 10 UNITS SUBCUTANEOUSLY UTD AS A BACK UP FOR PUMP  4   • losartan (COZAAR) 25 MG tablet TAKE 1 TABLET BY MOUTH EVERY DAY 30 tablet 5   • losartan (COZAAR) 50 MG tablet Take 1 tablet by mouth Daily. 90 tablet 6   • mupirocin (BACTROBAN) 2 % ointment Apply  topically 3 (Three) Times a Day. 15 g 0   • mycophenolate  "(MYFORTIC) 360 MG tablet delayed-release EC tablet Take 1 tablet by mouth 2 (Two) Times a Day. 180 tablet 6   • pantoprazole (PROTONIX) 40 MG EC tablet Take 40 mg by mouth Daily.     • predniSONE (DELTASONE) 5 MG tablet Take 1 tablet by mouth Daily. 90 tablet 3   • rosuvastatin (CRESTOR) 40 MG tablet Take 1 tablet by mouth Daily. 30 tablet 3   • tacrolimus (PROGRAF) 1 MG capsule Take 1 capsule by mouth 2 (Two) Times a Day. 180 capsule 3   • traMADol (ULTRAM) 50 MG tablet Take 1 tablet by mouth 2 (Two) Times a Day. 28 tablet 0   • [DISCONTINUED] pantoprazole (PROTONIX) 40 MG EC tablet TAKE 1 TABLET BY MOUTH EVERY DAY 90 tablet 10     No current facility-administered medications on file prior to visit.        Review of Systems   Constitutional: Negative.  Negative for chills, diaphoresis, fatigue, fever, malaise/fatigue and weight loss.   HENT: Negative.  Negative for congestion and sore throat.    Eyes: Negative.  Negative for blurred vision.   Respiratory: Negative.  Negative for cough and shortness of breath.    Cardiovascular: Negative.  Negative for chest pain, palpitations, orthopnea and PND.   Gastrointestinal: Negative.  Negative for abdominal pain, anorexia, change in bowel habit, nausea and vomiting.   Endocrine: Negative.  Negative for polydipsia, polyphagia and polyuria.   Genitourinary: Negative.    Musculoskeletal: Negative.  Negative for arthralgias, joint swelling, myalgias and neck pain.   Skin: Negative.  Negative for pallor and rash.   Allergic/Immunologic: Negative.    Neurological: Negative.  Negative for dizziness, vertigo, tremors, seizures, speech difficulty, weakness, numbness and headaches.   Hematological: Negative.    Psychiatric/Behavioral: Negative.  Negative for confusion. The patient is not nervous/anxious.        Objective    /74   Pulse 60   Temp 97.9 °F (36.6 °C)   Ht 152.4 cm (60\")   Wt 70.4 kg (155 lb 1.6 oz)   SpO2 100%   BMI 30.29 kg/m²     There were no vitals taken " for this visit.          Chemistry        Component Value Date/Time     11/20/2018 0903    K 3.9 11/20/2018 0903    CL 97 11/20/2018 0903    CO2 28.0 11/20/2018 0903    BUN 61 (H) 11/20/2018 0903    CREATININE 2.23 (H) 11/20/2018 0903        Component Value Date/Time    CALCIUM 9.5 11/20/2018 0903    ALKPHOS 114 11/20/2018 0903    AST 20 11/20/2018 0903    ALT 27 11/20/2018 0903    BILITOT 0.3 11/20/2018 0903        Lab Results   Component Value Date    WBC 4.05 11/20/2018    HGB 9.2 (L) 11/20/2018    HCT 29.8 (L) 11/20/2018    MCV 82.8 11/20/2018     (L) 11/20/2018     Lab Results   Component Value Date    CHOL 208 (H) 11/20/2018    CHOL 335 (H) 04/30/2018    CHOL 269 (H) 12/29/2017     Lab Results   Component Value Date    TRIG 117 11/20/2018    TRIG 193 04/30/2018    TRIG 161 12/29/2017     Lab Results   Component Value Date    HDL 80 11/20/2018    HDL 49 (L) 04/30/2018    HDL 60 12/29/2017     No components found for: LDLCALC  Lab Results   Component Value Date    LDL 98 11/20/2018     (H) 04/30/2018     (H) 12/29/2017     No results found for: HDLLDLRATIO  No components found for: CHOLHDL  Lab Results   Component Value Date    HGBA1C 7.7 (H) 11/20/2018     Lab Results   Component Value Date    TSH 0.270 (L) 12/01/2017       Lab on 11/20/2018   Component Date Value Ref Range Status   • Glucose 11/20/2018 267* 60 - 100 mg/dL Final   • BUN 11/20/2018 61* 7 - 21 mg/dL Final   • Creatinine 11/20/2018 2.23* 0.50 - 1.00 mg/dL Final   • Sodium 11/20/2018 138  137 - 145 mmol/L Final   • Potassium 11/20/2018 3.9  3.5 - 5.1 mmol/L Final   • Chloride 11/20/2018 97  95 - 110 mmol/L Final   • CO2 11/20/2018 28.0  22.0 - 31.0 mmol/L Final   • Calcium 11/20/2018 9.5  8.4 - 10.2 mg/dL Final   • Total Protein 11/20/2018 6.4  6.3 - 8.6 g/dL Final   • Albumin 11/20/2018 4.20  3.40 - 4.80 g/dL Final   • ALT (SGPT) 11/20/2018 27  9 - 52 U/L Final   • AST (SGOT) 11/20/2018 20  14 - 36 U/L Final   • Alkaline  Phosphatase 11/20/2018 114  38 - 126 U/L Final   • Total Bilirubin 11/20/2018 0.3  0.2 - 1.3 mg/dL Final   • eGFR   Amer 11/20/2018 26* 39 - 90 mL/min/1.73 Final   • Globulin 11/20/2018 2.2* 2.3 - 3.5 gm/dL Final   • A/G Ratio 11/20/2018 1.9* 1.1 - 1.8 g/dL Final   • BUN/Creatinine Ratio 11/20/2018 27.4* 7.0 - 25.0 Final   • Anion Gap 11/20/2018 13.0  5.0 - 15.0 mmol/L Final   • Total Cholesterol 11/20/2018 208* 0 - 199 mg/dL Final   • Triglycerides 11/20/2018 117  20 - 199 mg/dL Final   • HDL Cholesterol 11/20/2018 80  60 - 200 mg/dL Final   • LDL Cholesterol  11/20/2018 98  1 - 129 mg/dL Final   • LDL/HDL Ratio 11/20/2018 1.31  0.00 - 3.22 Final   • Hemoglobin A1C 11/20/2018 7.7* 4 - 5.6 % Final   • WBC 11/20/2018 4.05  3.20 - 9.80 10*3/mm3 Final   • RBC 11/20/2018 3.60* 3.77 - 5.16 10*6/mm3 Final   • Hemoglobin 11/20/2018 9.2* 12.0 - 15.5 g/dL Final   • Hematocrit 11/20/2018 29.8* 35.0 - 45.0 % Final   • MCV 11/20/2018 82.8  80.0 - 98.0 fL Final   • MCH 11/20/2018 25.6* 26.5 - 34.0 pg Final   • MCHC 11/20/2018 30.9* 31.4 - 36.0 g/dL Final   • RDW 11/20/2018 16.0* 11.5 - 14.5 % Final   • RDW-SD 11/20/2018 48.6* 36.4 - 46.3 fl Final   • MPV 11/20/2018   8.0 - 12.0 fL Final    INSTRUMENT UNABLE TO CALCULATE MPV   • Platelets 11/20/2018 146* 150 - 450 10*3/mm3 Final   • Neutrophil % 11/20/2018 71.4  37.0 - 80.0 % Final   • Lymphocyte % 11/20/2018 14.8  10.0 - 50.0 % Final   • Monocyte % 11/20/2018 11.6  0.0 - 12.0 % Final   • Eosinophil % 11/20/2018 1.7  0.0 - 7.0 % Final   • Basophil % 11/20/2018 0.5  0.0 - 2.0 % Final   • Immature Grans % 11/20/2018 0.0  0.0 - 0.5 % Final   • Neutrophils, Absolute 11/20/2018 2.89  2.00 - 8.60 10*3/mm3 Final   • Lymphocytes, Absolute 11/20/2018 0.60  0.60 - 4.20 10*3/mm3 Final   • Monocytes, Absolute 11/20/2018 0.47  0.00 - 0.90 10*3/mm3 Final   • Eosinophils, Absolute 11/20/2018 0.07  0.00 - 0.70 10*3/mm3 Final   • Basophils, Absolute 11/20/2018 0.02  0.00 - 0.20 10*3/mm3  Final   • Immature Grans, Absolute 11/20/2018 0.00  0.00 - 0.02 10*3/mm3 Final   • Anisocytosis 11/20/2018 Mod/2+  None Seen Final   • Hypochromia 11/20/2018 Slight/1+  None Seen Final   • WBC Morphology 11/20/2018 Normal  Normal Final   • Platelet Estimate 11/20/2018 Decreased  Normal Final   • Color, UA 11/20/2018 Yellow  Yellow, Straw, Dark Yellow, Anyi Final   • Appearance, UA 11/20/2018 Clear  Clear Final   • pH, UA 11/20/2018 7.0  5.0 - 9.0 Final   • Specific Gravity, UA 11/20/2018 1.014  1.003 - 1.030 Final   • Glucose, UA 11/20/2018 250 mg/dL (1+)* Negative Final   • Ketones, UA 11/20/2018 Negative  Negative Final   • Bilirubin, UA 11/20/2018 Negative  Negative Final   • Blood, UA 11/20/2018 Negative  Negative Final   • Protein, UA 11/20/2018 Negative  Negative Final   • Leuk Esterase, UA 11/20/2018 Small (1+)* Negative Final   • Nitrite, UA 11/20/2018 Negative  Negative Final   • Urobilinogen, UA 11/20/2018 0.2 E.U./dL  0.2 - 1.0 E.U./dL Final   • RBC, UA 11/20/2018 0-2* None Seen /HPF Final   • WBC, UA 11/20/2018 6-12* None Seen, 0-2, 3-5 /HPF Final   • Bacteria, UA 11/20/2018 None Seen  None Seen /HPF Final   • Squamous Epithelial Cells, UA 11/20/2018 3-5* None Seen, 0-2 /HPF Final   • Hyaline Casts, UA 11/20/2018 3-6  None Seen /LPF Final   • Methodology 11/20/2018 Automated Microscopy   Final   • Urine Culture 11/20/2018 Mixed Mirtha Isolated   Final       Physical Exam   Constitutional: She is oriented to person, place, and time. She appears well-developed and well-nourished. No distress.   HENT:   Head: Normocephalic and atraumatic.   Right Ear: External ear normal.   Left Ear: External ear normal.   Eyes: Conjunctivae and EOM are normal. Pupils are equal, round, and reactive to light. Right eye exhibits no discharge. Left eye exhibits no discharge. No scleral icterus.   Neck: Normal range of motion. Neck supple. No JVD present. No tracheal deviation present. No thyromegaly present.   Cardiovascular:  Normal rate, regular rhythm and normal heart sounds.   Pulmonary/Chest: Effort normal. No stridor. No respiratory distress. She has no wheezes.   Abdominal: Soft. She exhibits no distension and no mass. There is no tenderness. There is no rebound and no guarding. No hernia.   Obese abdomen    Musculoskeletal: Normal range of motion. She exhibits no edema or deformity.   Lymphadenopathy:     She has no cervical adenopathy.   Neurological: She is alert and oriented to person, place, and time. She has normal reflexes. No cranial nerve deficit. Coordination normal.   Skin: Skin is warm and dry. No rash noted. No erythema. No pallor.   Psychiatric: She has a normal mood and affect. Her behavior is normal.   Nursing note and vitals reviewed.      Assessment/Plan   Problems Addressed this Visit        Cardiovascular and Mediastinum    Uncontrolled hypertension    Paroxysmal atrial fibrillation (CMS/HCC)    Essential hypertension    Hyperlipidemia       Digestive    Obesity    Vitamin D deficiency - Primary       Endocrine    Uncontrolled type 2 diabetes mellitus with hyperglycemia (CMS/HCC)       Genitourinary    Chronic kidney disease       Hematopoietic and Hemostatic    Iron deficiency anemia       Other    History of kidney transplant      -went over labwork today   - CKD/history of kidney transplant. Nephrology following - is on prednisone and tacrolimus curerntly. Sees Dr. Morley Nephrologist  Continue on Myfortic, Prograf and prednisone. Refilled prednisone.  Her next appt is in September 2018.    - essential hypertension - currently elevated today. May have White Coat Syndrome.  Have BP readings today from home and will put on file.  Did not take medications this morning. She is advised to bring BP logs on next visit . Pt asymptomatic today. Denies any chest pain, dizziness or headaches.  She continues to take hydralazine, losratan,coreg and cardizem. Will go up on losartan from 50 to 100 mg daily.   Stressed  importance of BP control and find relaxation techniques. She has appt with Nephrologsit soon  - kidney transplant-  Nephrology following. Is on Tacrolimus and myfortiq and prednisone   - atrial fibrillation - currently rate controlled on coreg,ditilazem. On Pradaxa for anticoagulation. Cardiology following. Refilled coreg   - hyperlipidemia - continue crestor and zetia. Pt is due for lipid panel today.  Will go up on dosage of crestor from 20 to 40 mg at bedtime. Advised pt to take with FCoeznyme Q 10   - DM type 2 - will get records with Dr. Fajardo (Endocrinologist) in [facility] . Pt is on insulin pump. Recheck hga1c Sugars better controlled. Will get last diabetic eye examination from Dr. Ordonez. Recheck hga1c and microalbumin creatinine ratio   -will Schedule Medicare Wellness Exam  -advised pt to set up appt for diabetic eye examination   - iron deficiency anemia/anemia - recent hgb stable. Pt is on iron pill   - recheck in 2  Weeks  for BP recheck         This document has been electronically signed by Ayaan Swenson MD on November 29, 2018 7:42 AM               Review of Systems   Constitutional: Negative.  Negative for chills, diaphoresis, fatigue, fever, malaise/fatigue and unexpected weight loss.   HENT: Negative.  Negative for congestion and sore throat.    Eyes: Negative.  Negative for blurred vision.   Respiratory: Negative.  Negative for cough and shortness of breath.    Cardiovascular: Negative.  Negative for chest pain, palpitations, orthopnea and PND.   Gastrointestinal: Negative.  Negative for abdominal pain, anorexia, change in bowel habit, nausea and vomiting.   Endocrine: Negative.  Negative for polydipsia, polyphagia and polyuria.   Genitourinary: Negative.    Musculoskeletal: Negative.  Negative for arthralgias, joint swelling, myalgias and neck pain.   Skin: Negative.  Negative for pallor and rash.   Allergic/Immunologic: Negative.    Neurological: Negative.  Negative for  dizziness, vertigo, tremors, seizures, speech difficulty, weakness, numbness and confusion.   Hematological: Negative.    Psychiatric/Behavioral: Negative.  The patient is not nervous/anxious.        Physical Exam   Constitutional: She is oriented to person, place, and time. She appears well-developed and well-nourished. No distress.   HENT:   Head: Normocephalic and atraumatic.   Right Ear: External ear normal.   Left Ear: External ear normal.   Eyes: Conjunctivae and EOM are normal. Pupils are equal, round, and reactive to light. Right eye exhibits no discharge. Left eye exhibits no discharge. No scleral icterus.   Neck: Normal range of motion. Neck supple. No JVD present. No tracheal deviation present. No thyromegaly present.   Cardiovascular: Normal rate, regular rhythm and normal heart sounds.   Pulmonary/Chest: Effort normal. No stridor. No respiratory distress. She has no wheezes.   Abdominal: Soft. She exhibits no distension and no mass. There is no tenderness. There is no rebound and no guarding. No hernia.   Obese abdomen    Musculoskeletal: Normal range of motion. She exhibits no edema or deformity.   Lymphadenopathy:     She has no cervical adenopathy.   Neurological: She is alert and oriented to person, place, and time. She has normal reflexes. No cranial nerve deficit. Coordination normal.   Skin: Skin is warm and dry. No rash noted. No erythema. No pallor.   Psychiatric: She has a normal mood and affect. Her behavior is normal.   Nursing note and vitals reviewed.

## 2018-12-08 NOTE — PROGRESS NOTES
Subjective   Chanda Yan is a 78 y.o. female.   Problem List  1. 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 Hyperlipidemia ASCVD risk high   3. ASCAD sp stent placement/severe LDH/Right ventricular Delitation  4. ESRD sp right kidney transplant   5. Obesity BMI >30   6. Essential Hypertension  8. Right kidney transplant in 2010  9. GERD  10. Bilateral leg edema  11. Anemia of Chronic Disease   12. Depression   13. Unspecified murmur  14. Paroxymal Atrial Fibrillation   15. Diverticulosis  16. Internal and Exeternal Hemorrhoids  17. Vitamin D deficiency     Pt is 78 yo AAF with the above medical issues. Is here for followup.  Sees Nephrologist. Dr. Morley for CKD/ESRD and sp right kidney transplant. Pt had labwork recently. States her sugars have been well controlled lately. Pt is on insulin pump. Last hga1c was <9.0.  States morning sugars running <130 in am and <180 2 hours after meals.  Has yet to hear with appt with Dr. Levine (Endocrinologist). BP is also elevated today but it is well controlled at home. Did bring BP readings and is runing <140/90.  Pt continue st o take hydralazine, losartan, coreg. For hyperlipidemi pt is on zetia and crestor.  Last LDL was not at goal     12/29/17 Pt is 78 yo WF with the above medical issues of DM type 2 insulin dependent.  ESRD sp right kidney trasnplatn, CAD, hyperlipidemia  Pt presented with chest pain on 12/8/17 when she was at a [employer].  She started having pain in car around right sided epigastric area that was burning in nature.  Pt was admitted to the cardiac floor at [facility] and 3 sets of cardiac enzymes were elevated that was likely seconadry to acute kidney injure.  She was provided gentle hydration.  Pt has anemia of chronic diseease. A hemoccult stool was obtained which was positive. GI was consulted and an EGD was preformed that showed erosive gastritis. She did recived 2 units of PRBC blood transfusion. Cardiology was asked whether to continue Pradaxaf  or her atrial fibrillation  and that was on hold until next visit.  Pt will have colonoscopy outpatient. Pt stabilized and pain became better. She denied any chest pain, dizziness, shortness of breath, abdominal pain, diarrhea or constipation prior to discharge.  She also has DM type 2 and her last hga1c was >7.0. She currently uses an insulin pump.  Dr. Levine was consulted and insulin pump was adjusted. Her sugars have been better controlled. She continues to see Endocrinologist in [place] but pt would rather see Dr. Levine at [facility]. She does not want to drive all the way to [place]. She is due for hga1c check as well as lipid panel check. She takes crestor 20 mg PO q daily.  Pt states she is doing well and that her epigastric pain/chest pain is better. She continues to take protonix 40 mg PO q daily. She was advised to stopPt is here for recheck again today.  She has yet to get appt with Dr. Levine Endocrinologist. She has one scheduled next month. Pt since last visit had Colonoscopy screening that showed diverticulosis along with internal and external hemorrhoids. Overall it was poor preperation.  Pt also has high blood pressure today. She did not take her medications this morning.  Usually at home it runs <130/90.  Today she denies any chest pain, heartburn or dizziness. She continues to take losartan 50 mg daily along with cardizem 90 mg every 12 hours  Coreg. 3.12 mg PO BID and  Hydralazine 100 mg PO TID. Did not bring BP readings today Pt states her sugars have been better controlled since having her insulin pump adjsuted.  She has yet to get labwork today to recheck lipid panel and hga1c     4/30/18 pt is here for recheck. She has an Endocrinologist in [place] Dr. Fajardo she sees for Diabetes type 2. She is on insulin pump. Also she sees Nephrologist. She has history of CKD secondary to diabetes and she had history of kidney transplant. She is currently on prednisone along with myfortic 360  mg pO BId and on tacrolimus 1 mg PO BI. BP at goal today. She was told by Endocrinologist her cholesterol levels were not at goal and it was recommended she start back on crestor 20 mg. She is also taking zetia 10 mg. Her last hga1c she states was about 8.0. She is on insulin pump.  She is counting her carb intake.  She also has history of A fib and is currently rate controlled .She is on pradaxa. Her next appt with Dr. Almazan is soon (Cardiololgist)     6/25/18 pt is here for followup and recheck. For hypertension her BP is elevated today and takes coreg 3.125 mg PO BID along with losartan 50 mg PO q daily along with hydralazine 100 mg PO TID and cardizem 90 mg PO bID  For DM type  2 she is on an insulin pump and sees her Endocrinologist in [facility]. Her last hga1c was 7.5 in April 2018. On last labwork her vitami nD was normal. On lipid panel. Her total cholesterol was elevated at 334 and HDL at 49 awith LDL at 191. She is taking Zetia 10 mg PO q daily along with crestor 20 mg PO qhs.   For her history of CKD/sp kidney transplant she is taking myfortic 260 mg PO BId along with Tacrolimus 1 mg PO BID. alogn with Predisone 5 mg PO q daily.  She see a Nephrologist in [place]. Dr Reza   For  Vitamin D deficiency she is on Vitamin D 5000 units daily.  For iron deficiency anemia she is on iron pill 325 mg PO q daily. ON her last CMP his CR is at 1.86 and GFR .  GFR at 32. On CBC her WBC was at 1.95 and Hemoglobin at 9.8 She is on iron pill currently. She also has history of paroxymal atrial fibrillation and is taking coreg and is rate controlled. She also takes Pradaxa. She has been under stress lately because her car broke down  And her SSI was recently cut.  She is also taking aspirin for Stroke prevention and protonix 40 mg po a daily for gastritis     11/29/18 pt is here for recheck and followup. She continues to take her medications  For hypertension. Also has insulin dependent DM type 2 and is on  insulin pump. Her Endocrinologist is in [place]. Also has had history of kidney transplant and sees Nephrologist in [place]. She continues to take Myfortic 360 mg PO BID  and tacrolimus 1 mg PO BID along with prednisone 5 mg daily.  Her recent labwork on 11/20/18 showed hemoglobin at 9.2 from 9.8 a few months ago . Platelets were slightly low at 146. On CMP her Cr is at 2.23 from 1.86 in April 2018 and  with GFR at 26 from 32 earlier this year  and BUN at 61. Lipid panel shows total cholesterol at 208 with LDL at 98. hga1c is at 7.7. UA showed WBC and RBC in urine with high amount of glucose. Urine culture showed mixed huseyin. She has not seen her Nephrologist due to not having a car. Daughter has been using it for work. Pt has had uncontrolled blood pressure at home. She takes care of granddaughter who has ADHD.  She has been stressed lately.   She continues to take coreg 3.125 mg PO BID, cardizem 90 mg twice a day,  Losartan 50 mg daily.  And hydralazine 100 mg TID.  Cholesterol levels have improved. She has been on clonidine before and makes her legs cramping. Denies any chest pain dizziness or shortness of air . Her next appt with Nephrologist is December 7th1 12/12/18 pt is here for rechekc and followup on her CKD stage 5 sp right kidney transplant, DM type 2, hyperlipidemia, hypertension. She continues to take her medications. Her labwork done on 11/20/18. Showed hemoglobin at 9.2 with  Low RBC and slightly low platelets. Her CR is at 2.23 from 1.86 earlier this year.  Her GFR is at  26 from 32 earlier this year  with glucose at 267 and BUN at 61. GFR is at 26 . On lipid panel total cholesterol is at 208 from 335 earlier this year. HDL is at 80 from 49 earlier this year and LDL is at 98 from 191.  Hga1c is at 7.7 and she continues to see her Nephrologist and Endocrinologist. Her BP is stable today and HR stable. She denies any chest pain or dizziness  She recently saw her Nephroligist in [place]  Dr. Reza and her medication of Prograf was incresaed from 1 mg twice a day to 2 mg twice a day for a week She continues prednisone 5 mg daily. Along with myfortic 360 mg PO BID.  Pt also takes  Coreg 3.125 mg PO BID for her paroxymal atrial fibrillation along with Pradaxa for anticoagulation and stroke prevention. Regarding DM type 2 she still sees her Endocrinologist in [place] and pt will get a new insulin pump T-Slim x2  Soon.        Diabetes   She presents for her follow-up diabetic visit. She has type 2 diabetes mellitus. Her disease course has been stable. Pertinent negatives for hypoglycemia include no confusion, dizziness, headaches, hunger, mood changes, nervousness/anxiousness, pallor, seizures, sleepiness, speech difficulty, sweats or tremors. Pertinent negatives for diabetes include no blurred vision, no chest pain, no fatigue, no foot paresthesias, no foot ulcerations, no polydipsia, no polyphagia, no polyuria, no visual change, no weakness and no weight loss. Symptoms are stable. Pertinent negatives for diabetic complications include no CVA, PVD or retinopathy. Risk factors for coronary artery disease include diabetes mellitus, hypertension, obesity and sedentary lifestyle. Current diabetic treatment includes insulin injections and insulin pump. She is compliant with treatment none of the time. Her weight is stable. She is following a generally unhealthy diet. An ACE inhibitor/angiotensin II receptor blocker is being taken. She does not see a podiatrist.Eye exam is not current.   Hypertension   This is a chronic problem. The current episode started more than 1 year ago. The problem has been gradually worsening since onset. The problem is uncontrolled. Pertinent negatives include no anxiety, blurred vision, chest pain, headaches, malaise/fatigue, neck pain, orthopnea, palpitations, peripheral edema, PND, shortness of breath or sweats. Risk factors for coronary artery disease include diabetes  mellitus, obesity, sedentary lifestyle and dyslipidemia. Past treatments include angiotensin blockers and beta blockers. The current treatment provides no improvement. There are no compliance problems.  There is no history of angina, kidney disease, CAD/MI, CVA, heart failure, left ventricular hypertrophy, PVD or retinopathy. There is no history of chronic renal disease, coarctation of the aorta, hyperaldosteronism, hypercortisolism, hyperparathyroidism, a hypertension causing med, pheochromocytoma, renovascular disease, sleep apnea or a thyroid problem.   Chronic Kidney Disease   This is a chronic problem. The current episode started more than 1 year ago. The problem occurs constantly. The problem has been unchanged. Pertinent negatives include no abdominal pain, anorexia, arthralgias, change in bowel habit, chest pain, chills, congestion, coughing, diaphoresis, fatigue, fever, headaches, joint swelling, myalgias, nausea, neck pain, numbness, rash, sore throat, swollen glands, urinary symptoms, vertigo, visual change, vomiting or weakness. Nothing aggravates the symptoms. She has tried nothing (losartan ) for the symptoms. The treatment provided no relief.            The following portions of the patient's history were reviewed and updated as appropriate: allergies, current medications, past family history, past medical history, past social history, past surgical history and problem list.  Patient Active Problem List   Diagnosis   • Type 1 diabetes mellitus with hypoglycemia (CMS/HCC)   • Mixed hyperlipidemia   • Chronic kidney disease   • Obesity   • Encounter for immunization   • History of kidney transplant   • Cough   • Osteoporosis screening   • Uncontrolled hypertension   • Need for vaccination   • Pancytopenia (CMS/HCC)   • Sinusitis   • Paroxysmal atrial fibrillation (CMS/HCC)   • Anemia   • Atrial fibrillation (CMS/HCC)   • Hearing loss   • Hyphema   • Counseling for insulin pump   • Hypoglycemia   • Chest  pain   • Malaise and fatigue   • Iron deficiency anemia   • Gastritis   • Erosive gastropathy   • Encounter for Medicare annual wellness exam   • General medical examination   • Vitamin D deficiency   • White coat syndrome with hypertension   • Essential hypertension   • Diabetes mellitus (CMS/[facility])   • Uncontrolled type 2 diabetes mellitus with hyperglycemia (CMS/[facility])   • Hyperlipidemia   • Chronic anticoagulation   • Encounter for screening mammogram for malignant neoplasm of breast   • Thrombocytopenia (CMS/[facility])     Current Outpatient Medications on File Prior to Visit   Medication Sig Dispense Refill   • albuterol (PROVENTIL HFA;VENTOLIN HFA) 108 (90 Base) MCG/ACT inhaler Inhale 2 puffs Every 4 (Four) Hours As Needed for Shortness of Air. 1 inhaler 11   • amitriptyline (ELAVIL) 10 MG tablet Take 10 mg by mouth Every Night.  0   • ASPIRIN LOW DOSE 81 MG EC tablet TAKE 1 TABLET BY MOUTH EVERY DAY 30 tablet 0   • Ca Carb-FA-D-B6-B12-Boron-Mg 1342-1 MG wafer Take 1 tablet by mouth daily.     • Calcium Carb-Cholecalciferol (CALCIUM 1000 + D PO) Take 1 tablet by mouth Daily.     • carvedilol (COREG) 3.125 MG tablet Take 1 tablet by mouth 2 (Two) Times a Day With Meals. 180 tablet 3   • cetirizine (zyrTEC) 10 MG tablet Take 1 tablet by mouth Daily. 30 tablet 3   • Cholecalciferol (VITAMIN D3) 5000 UNITS capsule capsule Take 5,000 Units by mouth Daily.     • dabigatran etexilate (PRADAXA) 75 MG capsule Take 1 capsule by mouth Every 12 (Twelve) Hours. 180 capsule 0   • diltiaZEM SR (CARDIZEM SR) 90 MG 12 hr capsule Take 1 capsule by mouth 2 (Two) Times a Day. 60 capsule 11   • ezetimibe (ZETIA) 10 MG tablet TAKE 1 TABLET BY MOUTH DAILY 31 tablet 5   • ferrous sulfate 325 (65 FE) MG tablet Take 1 tablet by mouth Daily With Breakfast. 30 tablet 5   • fluticasone (FLONASE) 50 MCG/ACT nasal spray 2 sprays into the nostril(s) as directed by provider Daily. 1 bottle 3   • furosemide (LASIX) 40 MG tablet TAKE 1 TABLET BY MOUTH  EVERY MORNING AND 1/2 TABLET BY MOUTH EVERY EVENING 180 tablet 3   • glucagon (GLUCAGON EMERGENCY) 1 MG injection Inject 1 mg under the skin 1 (One) Time As Needed for Low Blood Sugar for up to 1 dose. 1 kit 5   • glucose blood test strip Use as instructed 100 each 12   • glucose monitor monitoring kit 1 each Daily. 1 each 0   • hydrALAZINE (APRESOLINE) 100 MG tablet Take 1 tablet by mouth 3 (Three) Times a Day. 270 tablet 3   • Injection Device for Insulin device 1 application 3 (Three) Times a Week if Needed (to use with insulin). To use with insulin pen 3 times a day. Give 90 day supply 300 each 3   • insulin aspart (NOVOLOG) 100 UNIT/ML injection Put 240ml in pump 240 mL 3   • ipratropium (ATROVENT) 0.06 % nasal spray 2 sprays into each nostril 2 (Two) Times a Day. 15 mL 11   • LANTUS SOLOSTAR 100 UNIT/ML injection pen INJECT 10 UNITS SUBCUTANEOUSLY UTD AS A BACK UP FOR PUMP  4   • losartan (COZAAR) 100 MG tablet Take 1 tablet by mouth Daily. 30 tablet 3   • mupirocin (BACTROBAN) 2 % ointment Apply  topically 3 (Three) Times a Day. 15 g 0   • mycophenolate (MYFORTIC) 360 MG tablet delayed-release EC tablet Take 1 tablet by mouth 2 (Two) Times a Day. 180 tablet 6   • predniSONE (DELTASONE) 5 MG tablet Take 1 tablet by mouth Daily. 90 tablet 3   • rosuvastatin (CRESTOR) 20 MG tablet Take 20 mg by mouth Daily.  2   • tacrolimus (PROGRAF) 1 MG capsule Take 1 capsule by mouth 2 (Two) Times a Day. 180 capsule 3   • traMADol (ULTRAM) 50 MG tablet Take 1 tablet by mouth 2 (Two) Times a Day. 28 tablet 0   • [DISCONTINUED] pantoprazole (PROTONIX) 40 MG EC tablet TAKE 1 TABLET BY MOUTH EVERY DAY 30 tablet 5     No current facility-administered medications on file prior to visit.        Review of Systems   Constitutional: Negative.  Negative for chills, diaphoresis, fatigue, fever, malaise/fatigue and weight loss.   HENT: Positive for sinus pressure and sinus pain. Negative for congestion and sore throat.    Eyes: Negative.  " Negative for blurred vision.   Respiratory: Negative.  Negative for cough and shortness of breath.    Cardiovascular: Negative.  Negative for chest pain, palpitations, orthopnea and PND.   Gastrointestinal: Negative.  Negative for abdominal pain, anorexia, change in bowel habit, nausea and vomiting.   Endocrine: Negative.  Negative for polydipsia, polyphagia and polyuria.   Genitourinary: Negative.    Musculoskeletal: Negative.  Negative for arthralgias, joint swelling, myalgias and neck pain.   Skin: Negative.  Negative for pallor and rash.   Allergic/Immunologic: Negative.    Neurological: Negative.  Negative for dizziness, vertigo, tremors, seizures, speech difficulty, weakness, numbness and headaches.   Hematological: Negative.    Psychiatric/Behavioral: Negative.  Negative for confusion. The patient is not nervous/anxious.        Objective    /62   Pulse 64   Temp 97.9 °F (36.6 °C)   Ht 152.4 cm (60\")   Wt 70.3 kg (155 lb)   SpO2 97%   BMI 30.27 kg/m²            Chemistry        Component Value Date/Time     11/20/2018 0903    K 3.9 11/20/2018 0903    CL 97 11/20/2018 0903    CO2 28.0 11/20/2018 0903    BUN 61 (H) 11/20/2018 0903    CREATININE 2.23 (H) 11/20/2018 0903        Component Value Date/Time    CALCIUM 9.5 11/20/2018 0903    ALKPHOS 114 11/20/2018 0903    AST 20 11/20/2018 0903    ALT 27 11/20/2018 0903    BILITOT 0.3 11/20/2018 0903        Lab Results   Component Value Date    WBC 4.05 11/20/2018    HGB 9.2 (L) 11/20/2018    HCT 29.8 (L) 11/20/2018    MCV 82.8 11/20/2018     (L) 11/20/2018     Lab Results   Component Value Date    CHOL 208 (H) 11/20/2018    CHOL 335 (H) 04/30/2018    CHOL 269 (H) 12/29/2017     Lab Results   Component Value Date    TRIG 117 11/20/2018    TRIG 193 04/30/2018    TRIG 161 12/29/2017     Lab Results   Component Value Date    HDL 80 11/20/2018    HDL 49 (L) 04/30/2018    HDL 60 12/29/2017     No components found for: LDLCALC  Lab Results "   Component Value Date    LDL 98 11/20/2018     (H) 04/30/2018     (H) 12/29/2017     No results found for: HDLLDLRATIO  No components found for: CHOLHDL  Lab Results   Component Value Date    HGBA1C 7.7 (H) 11/20/2018     Lab Results   Component Value Date    TSH 0.270 (L) 12/01/2017       Lab on 11/20/2018   Component Date Value Ref Range Status   • Glucose 11/20/2018 267* 60 - 100 mg/dL Final   • BUN 11/20/2018 61* 7 - 21 mg/dL Final   • Creatinine 11/20/2018 2.23* 0.50 - 1.00 mg/dL Final   • Sodium 11/20/2018 138  137 - 145 mmol/L Final   • Potassium 11/20/2018 3.9  3.5 - 5.1 mmol/L Final   • Chloride 11/20/2018 97  95 - 110 mmol/L Final   • CO2 11/20/2018 28.0  22.0 - 31.0 mmol/L Final   • Calcium 11/20/2018 9.5  8.4 - 10.2 mg/dL Final   • Total Protein 11/20/2018 6.4  6.3 - 8.6 g/dL Final   • Albumin 11/20/2018 4.20  3.40 - 4.80 g/dL Final   • ALT (SGPT) 11/20/2018 27  9 - 52 U/L Final   • AST (SGOT) 11/20/2018 20  14 - 36 U/L Final   • Alkaline Phosphatase 11/20/2018 114  38 - 126 U/L Final   • Total Bilirubin 11/20/2018 0.3  0.2 - 1.3 mg/dL Final   • eGFR   Amer 11/20/2018 26* 39 - 90 mL/min/1.73 Final   • Globulin 11/20/2018 2.2* 2.3 - 3.5 gm/dL Final   • A/G Ratio 11/20/2018 1.9* 1.1 - 1.8 g/dL Final   • BUN/Creatinine Ratio 11/20/2018 27.4* 7.0 - 25.0 Final   • Anion Gap 11/20/2018 13.0  5.0 - 15.0 mmol/L Final   • Total Cholesterol 11/20/2018 208* 0 - 199 mg/dL Final   • Triglycerides 11/20/2018 117  20 - 199 mg/dL Final   • HDL Cholesterol 11/20/2018 80  60 - 200 mg/dL Final   • LDL Cholesterol  11/20/2018 98  1 - 129 mg/dL Final   • LDL/HDL Ratio 11/20/2018 1.31  0.00 - 3.22 Final   • Hemoglobin A1C 11/20/2018 7.7* 4 - 5.6 % Final   • WBC 11/20/2018 4.05  3.20 - 9.80 10*3/mm3 Final   • RBC 11/20/2018 3.60* 3.77 - 5.16 10*6/mm3 Final   • Hemoglobin 11/20/2018 9.2* 12.0 - 15.5 g/dL Final   • Hematocrit 11/20/2018 29.8* 35.0 - 45.0 % Final   • MCV 11/20/2018 82.8  80.0 - 98.0 fL Final    • MCH 11/20/2018 25.6* 26.5 - 34.0 pg Final   • MCHC 11/20/2018 30.9* 31.4 - 36.0 g/dL Final   • RDW 11/20/2018 16.0* 11.5 - 14.5 % Final   • RDW-SD 11/20/2018 48.6* 36.4 - 46.3 fl Final   • MPV 11/20/2018   8.0 - 12.0 fL Final    INSTRUMENT UNABLE TO CALCULATE MPV   • Platelets 11/20/2018 146* 150 - 450 10*3/mm3 Final   • Neutrophil % 11/20/2018 71.4  37.0 - 80.0 % Final   • Lymphocyte % 11/20/2018 14.8  10.0 - 50.0 % Final   • Monocyte % 11/20/2018 11.6  0.0 - 12.0 % Final   • Eosinophil % 11/20/2018 1.7  0.0 - 7.0 % Final   • Basophil % 11/20/2018 0.5  0.0 - 2.0 % Final   • Immature Grans % 11/20/2018 0.0  0.0 - 0.5 % Final   • Neutrophils, Absolute 11/20/2018 2.89  2.00 - 8.60 10*3/mm3 Final   • Lymphocytes, Absolute 11/20/2018 0.60  0.60 - 4.20 10*3/mm3 Final   • Monocytes, Absolute 11/20/2018 0.47  0.00 - 0.90 10*3/mm3 Final   • Eosinophils, Absolute 11/20/2018 0.07  0.00 - 0.70 10*3/mm3 Final   • Basophils, Absolute 11/20/2018 0.02  0.00 - 0.20 10*3/mm3 Final   • Immature Grans, Absolute 11/20/2018 0.00  0.00 - 0.02 10*3/mm3 Final   • Anisocytosis 11/20/2018 Mod/2+  None Seen Final   • Hypochromia 11/20/2018 Slight/1+  None Seen Final   • WBC Morphology 11/20/2018 Normal  Normal Final   • Platelet Estimate 11/20/2018 Decreased  Normal Final   • Color, UA 11/20/2018 Yellow  Yellow, Straw, Dark Yellow, Anyi Final   • Appearance, UA 11/20/2018 Clear  Clear Final   • pH, UA 11/20/2018 7.0  5.0 - 9.0 Final   • Specific Gravity, UA 11/20/2018 1.014  1.003 - 1.030 Final   • Glucose, UA 11/20/2018 250 mg/dL (1+)* Negative Final   • Ketones, UA 11/20/2018 Negative  Negative Final   • Bilirubin, UA 11/20/2018 Negative  Negative Final   • Blood, UA 11/20/2018 Negative  Negative Final   • Protein, UA 11/20/2018 Negative  Negative Final   • Leuk Esterase, UA 11/20/2018 Small (1+)* Negative Final   • Nitrite, UA 11/20/2018 Negative  Negative Final   • Urobilinogen, UA 11/20/2018 0.2 E.U./dL  0.2 - 1.0 E.U./dL Final   •  RBC, UA 11/20/2018 0-2* None Seen /HPF Final   • WBC, UA 11/20/2018 6-12* None Seen, 0-2, 3-5 /HPF Final   • Bacteria, UA 11/20/2018 None Seen  None Seen /HPF Final   • Squamous Epithelial Cells, UA 11/20/2018 3-5* None Seen, 0-2 /HPF Final   • Hyaline Casts, UA 11/20/2018 3-6  None Seen /LPF Final   • Methodology 11/20/2018 Automated Microscopy   Final   • Urine Culture 11/20/2018 Mixed Mirtha Isolated   Final       Physical Exam   Constitutional: She is oriented to person, place, and time. She appears well-developed and well-nourished. No distress.   HENT:   Head: Normocephalic and atraumatic.   Right Ear: External ear normal.   Left Ear: External ear normal.   Mild sinus tenderness on palpation.     Eyes: Conjunctivae and EOM are normal. Pupils are equal, round, and reactive to light. Right eye exhibits no discharge. Left eye exhibits no discharge. No scleral icterus.   Neck: Normal range of motion. Neck supple. No JVD present. No tracheal deviation present. No thyromegaly present.   Cardiovascular: Normal rate, regular rhythm and normal heart sounds.   Pulmonary/Chest: Effort normal. No stridor. No respiratory distress. She has no wheezes.   Abdominal: Soft. She exhibits no distension and no mass. There is no tenderness. There is no rebound and no guarding. No hernia.   Obese abdomen    Musculoskeletal: Normal range of motion. She exhibits no edema or deformity.   Lymphadenopathy:     She has no cervical adenopathy.   Neurological: She is alert and oriented to person, place, and time. She has normal reflexes. No cranial nerve deficit. Coordination normal.   Skin: Skin is warm and dry. No rash noted. No erythema. No pallor.   Psychiatric: She has a normal mood and affect. Her behavior is normal.   Nursing note and vitals reviewed.      Assessment/Plan   Problems Addressed this Visit        Cardiovascular and Mediastinum    Paroxysmal atrial fibrillation (CMS/HCC)    Essential hypertension    Hyperlipidemia        Digestive    Vitamin D deficiency       Endocrine    Uncontrolled type 2 diabetes mellitus with hyperglycemia (CMS/HCC)       Immune and Lymphatic    Pancytopenia (CMS/HCC) - Primary    Relevant Orders    Ambulatory Referral to Hematology (Completed)       Genitourinary    Chronic kidney disease       Hematopoietic and Hemostatic    Anemia    Relevant Orders    Ambulatory Referral to Hematology (Completed)    Ambulatory Referral to Hematology (Completed)    Iron deficiency anemia    Relevant Orders    Ambulatory Referral to Hematology (Completed)    Thrombocytopenia (CMS/HCC)    Relevant Orders    Ambulatory Referral to Hematology (Completed)       Other    History of kidney transplant    Relevant Orders    Ambulatory Referral to Hematology (Completed)    Chronic anticoagulation    Encounter for screening mammogram for malignant neoplasm of breast    Relevant Orders    Mammo Screening Bilateral With CAD      -went over labwork today   - CKD/history of kidney transplant. Nephrology following - is on prednisone and tacrolimus curerntly. Sees Dr. Morley Nephrologist  Continue on Myfortic, Prograf and prednisone. Refilled prednisone.  Her next appt is in September 2018.  Her prograf was recently increased from 1 mg to 2 mg PO BID for a week. Continue on myfortic 360 mg PO BID along with prednisone 5 mg daily   -mammogram screening at [facility] /imaging center   - essential hypertension - currently elevated today. May have White Coat Syndrome.  Have BP readings today from home and will put on file.  Did not take medications this morning. She is advised to bring BP logs on next visit . Pt asymptomatic today. Denies any chest pain, dizziness or headaches.  She continues to take hydralazine, losratan,coreg and cardizem. Continue  losartan from  100 mg daily.   Stressed importance of BP control and find relaxation techniques. She has appt with Nephrologsit soon.   -for acute sinusitis - recommend Neti Pot.  Steam therapy,  Warm  compresses to face. If not better in 1-2 weeks consider abx therapy.     - kidney transplant-  Nephrology following. Is on Tacrolimus and myfortiq and prednisone   - paroxymal atrial fibrillation - currently rate controlled on coreg,ditilazem. On Pradaxa for anticoagulation. Cardiology following. Refilled coreg . She will need to call for appt with Cardiologist soon   - hyperlipidemia - continue crestor and zetia. Pt is due for lipid panel today.  Will go up on dosage of crestor from 20 to 40 mg at bedtime. Advised pt to take with FCoeznyme Q 10   - DM type 2 - will get records with Dr. Fajardo (Endocrinologist) in [facility] . Pt is on insulin pump. Recheck hga1c Sugars better controlled. Will get last diabetic eye examination from Dr. Ordonez. Recheck hga1c and microalbumin creatinine ratio. She will get new insulin pump soon   -will Schedule Medicare Wellness Exam  -advised pt to set up appt for diabetic eye examination   - iron deficiency anemia/anemia  Of chronic disease - recent hgb stable. Pt is on iron pill . Pt has history of pancytopenia. Will refer to Hematologist. Dr. Barrios. Services appreciated.    - recheck in 2  Weeks  for BP recheck         This document has been electronically signed by Ayaan Swenson MD on December 12, 2018 11:30 AM               Review of Systems   Constitutional: Negative.  Negative for chills, diaphoresis, fatigue, fever, malaise/fatigue and unexpected weight loss.   HENT: Positive for sinus pressure. Negative for congestion and sore throat.    Eyes: Negative.  Negative for blurred vision.   Respiratory: Negative.  Negative for cough and shortness of breath.    Cardiovascular: Negative.  Negative for chest pain, palpitations, orthopnea and PND.   Gastrointestinal: Negative.  Negative for abdominal pain, anorexia, change in bowel habit, nausea and vomiting.   Endocrine: Negative.  Negative for polydipsia, polyphagia and polyuria.   Genitourinary: Negative.     Musculoskeletal: Negative.  Negative for arthralgias, joint swelling, myalgias and neck pain.   Skin: Negative.  Negative for pallor and rash.   Allergic/Immunologic: Negative.    Neurological: Negative.  Negative for dizziness, vertigo, tremors, seizures, speech difficulty, weakness, numbness and confusion.   Hematological: Negative.    Psychiatric/Behavioral: Negative.  The patient is not nervous/anxious.        Physical Exam   Constitutional: She is oriented to person, place, and time. She appears well-developed and well-nourished. No distress.   HENT:   Head: Normocephalic and atraumatic.   Right Ear: External ear normal.   Left Ear: External ear normal.   Mild sinus tenderness on palpation.     Eyes: Conjunctivae and EOM are normal. Pupils are equal, round, and reactive to light. Right eye exhibits no discharge. Left eye exhibits no discharge. No scleral icterus.   Neck: Normal range of motion. Neck supple. No JVD present. No tracheal deviation present. No thyromegaly present.   Cardiovascular: Normal rate, regular rhythm and normal heart sounds.   Pulmonary/Chest: Effort normal. No stridor. No respiratory distress. She has no wheezes.   Abdominal: Soft. She exhibits no distension and no mass. There is no tenderness. There is no rebound and no guarding. No hernia.   Obese abdomen    Musculoskeletal: Normal range of motion. She exhibits no edema or deformity.   Lymphadenopathy:     She has no cervical adenopathy.   Neurological: She is alert and oriented to person, place, and time. She has normal reflexes. No cranial nerve deficit. Coordination normal.   Skin: Skin is warm and dry. No rash noted. No erythema. No pallor.   Psychiatric: She has a normal mood and affect. Her behavior is normal.   Nursing note and vitals reviewed.

## 2018-12-10 RX ORDER — FUROSEMIDE 40 MG/1
TABLET ORAL
Qty: 180 TABLET | Refills: 3 | Status: ON HOLD | OUTPATIENT
Start: 2018-12-10 | End: 2019-02-15 | Stop reason: SDUPTHER

## 2018-12-10 RX ORDER — PANTOPRAZOLE SODIUM 40 MG/1
TABLET, DELAYED RELEASE ORAL
Qty: 30 TABLET | Refills: 5 | Status: SHIPPED | OUTPATIENT
Start: 2018-12-10 | End: 2018-12-12 | Stop reason: SDUPTHER

## 2018-12-12 ENCOUNTER — OFFICE VISIT (OUTPATIENT)
Dept: FAMILY MEDICINE CLINIC | Facility: CLINIC | Age: 78
End: 2018-12-12

## 2018-12-12 VITALS
OXYGEN SATURATION: 97 % | HEART RATE: 64 BPM | SYSTOLIC BLOOD PRESSURE: 142 MMHG | DIASTOLIC BLOOD PRESSURE: 62 MMHG | HEIGHT: 60 IN | BODY MASS INDEX: 30.43 KG/M2 | WEIGHT: 155 LBS | TEMPERATURE: 97.9 F

## 2018-12-12 DIAGNOSIS — Z12.31 ENCOUNTER FOR SCREENING MAMMOGRAM FOR MALIGNANT NEOPLASM OF BREAST: ICD-10-CM

## 2018-12-12 DIAGNOSIS — I48.0 PAROXYSMAL ATRIAL FIBRILLATION (HCC): ICD-10-CM

## 2018-12-12 DIAGNOSIS — D69.6 THROMBOCYTOPENIA (HCC): ICD-10-CM

## 2018-12-12 DIAGNOSIS — D61.818 PANCYTOPENIA (HCC): Primary | ICD-10-CM

## 2018-12-12 DIAGNOSIS — Z79.01 CHRONIC ANTICOAGULATION: ICD-10-CM

## 2018-12-12 DIAGNOSIS — D50.9 IRON DEFICIENCY ANEMIA, UNSPECIFIED IRON DEFICIENCY ANEMIA TYPE: ICD-10-CM

## 2018-12-12 DIAGNOSIS — E78.5 HYPERLIPIDEMIA, UNSPECIFIED HYPERLIPIDEMIA TYPE: ICD-10-CM

## 2018-12-12 DIAGNOSIS — D63.1 ANEMIA DUE TO STAGE 4 CHRONIC KIDNEY DISEASE (HCC): ICD-10-CM

## 2018-12-12 DIAGNOSIS — N18.4 ANEMIA DUE TO STAGE 4 CHRONIC KIDNEY DISEASE (HCC): ICD-10-CM

## 2018-12-12 DIAGNOSIS — E11.65 UNCONTROLLED TYPE 2 DIABETES MELLITUS WITH HYPERGLYCEMIA (HCC): ICD-10-CM

## 2018-12-12 DIAGNOSIS — N18.4 STAGE 4 CHRONIC KIDNEY DISEASE (HCC): ICD-10-CM

## 2018-12-12 DIAGNOSIS — E55.9 VITAMIN D DEFICIENCY: ICD-10-CM

## 2018-12-12 DIAGNOSIS — Z94.0 HISTORY OF KIDNEY TRANSPLANT: ICD-10-CM

## 2018-12-12 DIAGNOSIS — I10 ESSENTIAL HYPERTENSION: ICD-10-CM

## 2018-12-12 PROCEDURE — 99214 OFFICE O/P EST MOD 30 MIN: CPT | Performed by: FAMILY MEDICINE

## 2018-12-12 RX ORDER — PANTOPRAZOLE SODIUM 40 MG/1
40 TABLET, DELAYED RELEASE ORAL DAILY
Qty: 30 TABLET | Refills: 3 | Status: SHIPPED | OUTPATIENT
Start: 2018-12-12

## 2018-12-12 NOTE — PATIENT INSTRUCTIONS
Call for appt with Cardiologist    -will refer to Hematologist Dr. Barrios for low hemoglobin but also low platelets    Try steam therapy or neti pot with distilled water  Warm compresses to face    If not better after 1-2 weeks go to urgent care with ROSEANNA Jhaveri.    Sinusitis, Adult  Sinusitis is soreness and inflammation of your sinuses. Sinuses are hollow spaces in the bones around your face. Your sinuses are located:  · Around your eyes.  · In the middle of your forehead.  · Behind your nose.  · In your cheekbones.    Your sinuses and nasal passages are lined with a stringy fluid (mucus). Mucus normally drains out of your sinuses. When your nasal tissues become inflamed or swollen, the mucus can become trapped or blocked so air cannot flow through your sinuses. This allows bacteria, viruses, and funguses to grow, which leads to infection.  Sinusitis can develop quickly and last for 7?10 days (acute) or for more than 12 weeks (chronic). Sinusitis often develops after a cold.  What are the causes?  This condition is caused by anything that creates swelling in the sinuses or stops mucus from draining, including:  · Allergies.  · Asthma.  · Bacterial or viral infection.  · Abnormally shaped bones between the nasal passages.  · Nasal growths that contain mucus (nasal polyps).  · Narrow sinus openings.  · Pollutants, such as chemicals or irritants in the air.  · A foreign object stuck in the nose.  · A fungal infection. This is rare.    What increases the risk?  The following factors may make you more likely to develop this condition:  · Having allergies or asthma.  · Having had a recent cold or respiratory tract infection.  · Having structural deformities or blockages in your nose or sinuses.  · Having a weak immune system.  · Doing a lot of swimming or diving.  · Overusing nasal sprays.  · Smoking.    What are the signs or symptoms?  The main symptoms of this condition are pain and a feeling of pressure around  the affected sinuses. Other symptoms include:  · Upper toothache.  · Earache.  · Headache.  · Bad breath.  · Decreased sense of smell and taste.  · A cough that may get worse at night.  · Fatigue.  · Fever.  · Thick drainage from your nose. The drainage is often green and it may contain pus (purulent).  · Stuffy nose or congestion.  · Postnasal drip. This is when extra mucus collects in the throat or back of the nose.  · Swelling and warmth over the affected sinuses.  · Sore throat.  · Sensitivity to light.    How is this diagnosed?  This condition is diagnosed based on symptoms, a medical history, and a physical exam. To find out if your condition is acute or chronic, your health care provider may:  · Look in your nose for signs of nasal polyps.  · Tap over the affected sinus to check for signs of infection.  · View the inside of your sinuses using an imaging device that has a light attached (endoscope).    If your health care provider suspects that you have chronic sinusitis, you may also:  · Be tested for allergies.  · Have a sample of mucus taken from your nose (nasal culture) and checked for bacteria.  · Have a mucus sample examined to see if your sinusitis is related to an allergy.    If your sinusitis does not respond to treatment and it lasts longer than 8 weeks, you may have an MRI or CT scan to check your sinuses. These scans also help to determine how severe your infection is.  In rare cases, a bone biopsy may be done to rule out more serious types of fungal sinus disease.  How is this treated?  Treatment for sinusitis depends on the cause and whether your condition is chronic or acute. If a virus is causing your sinusitis, your symptoms will go away on their own within 10 days. You may be given medicines to relieve your symptoms, including:  · Topical nasal decongestants. They shrink swollen nasal passages and let mucus drain from your sinuses.  · Antihistamines. These drugs block inflammation that is  triggered by allergies. This can help to ease swelling in your nose and sinuses.  · Topical nasal corticosteroids. These are nasal sprays that ease inflammation and swelling in your nose and sinuses.  · Nasal saline washes. These rinses can help to get rid of thick mucus in your nose.    If your condition is caused by bacteria, you will be given an antibiotic medicine. If your condition is caused by a fungus, you will be given an antifungal medicine.  Surgery may be needed to correct underlying conditions, such as narrow nasal passages. Surgery may also be needed to remove polyps.  Follow these instructions at home:  Medicines  · Take, use, or apply over-the-counter and prescription medicines only as told by your health care provider. These may include nasal sprays.  · If you were prescribed an antibiotic medicine, take it as told by your health care provider. Do not stop taking the antibiotic even if you start to feel better.  Hydrate and Humidify  · Drink enough water to keep your urine clear or pale yellow. Staying hydrated will help to thin your mucus.  · Use a cool mist humidifier to keep the humidity level in your home above 50%.  · Inhale steam for 10-15 minutes, 3-4 times a day or as told by your health care provider. You can do this in the bathroom while a hot shower is running.  · Limit your exposure to cool or dry air.  Rest  · Rest as much as possible.  · Sleep with your head raised (elevated).  · Make sure to get enough sleep each night.  General instructions  · Apply a warm, moist washcloth to your face 3-4 times a day or as told by your health care provider. This will help with discomfort.  · Wash your hands often with soap and water to reduce your exposure to viruses and other germs. If soap and water are not available, use hand .  · Do not smoke. Avoid being around people who are smoking (secondhand smoke).  · Keep all follow-up visits as told by your health care provider. This is  important.  Contact a health care provider if:  · You have a fever.  · Your symptoms get worse.  · Your symptoms do not improve within 10 days.  Get help right away if:  · You have a severe headache.  · You have persistent vomiting.  · You have pain or swelling around your face or eyes.  · You have vision problems.  · You develop confusion.  · Your neck is stiff.  · You have trouble breathing.  This information is not intended to replace advice given to you by your health care provider. Make sure you discuss any questions you have with your health care provider.  Document Released: 12/18/2006 Document Revised: 08/13/2017 Document Reviewed: 10/12/2016  [employer] Interactive Patient Education © 2018 [employer]

## 2018-12-27 RX ORDER — EZETIMIBE 10 MG/1
10 TABLET ORAL DAILY
Qty: 31 TABLET | Refills: 3 | Status: SHIPPED | OUTPATIENT
Start: 2018-12-27

## 2019-02-01 RX ORDER — CETIRIZINE HYDROCHLORIDE 10 MG/1
10 TABLET ORAL DAILY
Qty: 30 TABLET | Refills: 0 | Status: SHIPPED | OUTPATIENT
Start: 2019-02-01

## 2019-02-11 ENCOUNTER — HOSPITAL ENCOUNTER (INPATIENT)
Facility: HOSPITAL | Age: 79
LOS: 2 days | Discharge: HOME | End: 2019-02-15
Attending: INTERNAL MEDICINE | Admitting: INTERNAL MEDICINE

## 2019-02-11 DIAGNOSIS — Z78.9 IMPAIRED MOBILITY AND ACTIVITIES OF DAILY LIVING: ICD-10-CM

## 2019-02-11 DIAGNOSIS — Z74.09 IMPAIRED MOBILITY AND ACTIVITIES OF DAILY LIVING: ICD-10-CM

## 2019-02-11 DIAGNOSIS — N18.4 STAGE 4 CHRONIC KIDNEY DISEASE (HCC): ICD-10-CM

## 2019-02-11 DIAGNOSIS — Z74.09 IMPAIRED FUNCTIONAL MOBILITY, BALANCE, GAIT, AND ENDURANCE: ICD-10-CM

## 2019-02-11 DIAGNOSIS — I48.3 TYPICAL ATRIAL FLUTTER (HCC): Primary | ICD-10-CM

## 2019-02-11 PROBLEM — I48.92 ATRIAL FLUTTER (HCC): Status: ACTIVE | Noted: 2019-02-11

## 2019-02-11 LAB
ANION GAP SERPL CALCULATED.3IONS-SCNC: 14 MMOL/L (ref 5–15)
BASOPHILS # BLD AUTO: 0.02 10*3/MM3 (ref 0–0.2)
BASOPHILS NFR BLD AUTO: 0.5 % (ref 0–1.5)
BUN BLD-MCNC: 83 MG/DL (ref 7–21)
BUN/CREAT SERPL: 33.1 (ref 7–25)
CALCIUM SPEC-SCNC: 10.2 MG/DL (ref 8.4–10.2)
CHLORIDE SERPL-SCNC: 97 MMOL/L (ref 95–110)
CO2 SERPL-SCNC: 25 MMOL/L (ref 22–31)
CREAT BLD-MCNC: 2.51 MG/DL (ref 0.5–1)
DEPRECATED RDW RBC AUTO: 48.3 FL (ref 37–54)
EOSINOPHIL # BLD AUTO: 0.01 10*3/MM3 (ref 0–0.4)
EOSINOPHIL NFR BLD AUTO: 0.2 % (ref 0.3–6.2)
ERYTHROCYTE [DISTWIDTH] IN BLOOD BY AUTOMATED COUNT: 15.9 % (ref 12.3–15.4)
GFR SERPL CREATININE-BSD FRML MDRD: 23 ML/MIN/1.73 (ref 39–90)
GLUCOSE BLD-MCNC: 283 MG/DL (ref 60–100)
GLUCOSE BLDC GLUCOMTR-MCNC: 273 MG/DL (ref 70–130)
HCT VFR BLD AUTO: 29.7 % (ref 34–46.6)
HGB BLD-MCNC: 9.3 G/DL (ref 12–15.9)
IMM GRANULOCYTES # BLD AUTO: 0.01 10*3/MM3 (ref 0–0.05)
IMM GRANULOCYTES NFR BLD AUTO: 0.2 % (ref 0–0.5)
LYMPHOCYTES # BLD AUTO: 0.5 10*3/MM3 (ref 0.7–3.1)
LYMPHOCYTES NFR BLD AUTO: 11.3 % (ref 19.6–45.3)
MAGNESIUM SERPL-MCNC: 2.4 MG/DL (ref 1.6–2.3)
MCH RBC QN AUTO: 25.9 PG (ref 26.6–33)
MCHC RBC AUTO-ENTMCNC: 31.3 G/DL (ref 31.5–35.7)
MCV RBC AUTO: 82.7 FL (ref 79–97)
MONOCYTES # BLD AUTO: 0.38 10*3/MM3 (ref 0.1–0.9)
MONOCYTES NFR BLD AUTO: 8.6 % (ref 5–12)
NEUTROPHILS # BLD AUTO: 3.51 10*3/MM3 (ref 1.4–7)
NEUTROPHILS NFR BLD AUTO: 79.2 % (ref 42.7–76)
NRBC BLD AUTO-RTO: 0 /100 WBC (ref 0–0)
PLATELET # BLD AUTO: 178 10*3/MM3 (ref 140–450)
PMV BLD AUTO: 12.2 FL (ref 6–12)
POTASSIUM BLD-SCNC: 4.1 MMOL/L (ref 3.5–5.1)
RBC # BLD AUTO: 3.59 10*6/MM3 (ref 3.77–5.28)
SODIUM BLD-SCNC: 136 MMOL/L (ref 137–145)
TROPONIN I SERPL-MCNC: 0.07 NG/ML
WBC NRBC COR # BLD: 4.43 10*3/MM3 (ref 3.4–10.8)

## 2019-02-11 PROCEDURE — 63710000001 TACROLIMUS PER 1 MG: Performed by: INTERNAL MEDICINE

## 2019-02-11 PROCEDURE — 84484 ASSAY OF TROPONIN QUANT: CPT | Performed by: INTERNAL MEDICINE

## 2019-02-11 PROCEDURE — 94640 AIRWAY INHALATION TREATMENT: CPT

## 2019-02-11 PROCEDURE — G0378 HOSPITAL OBSERVATION PER HR: HCPCS

## 2019-02-11 PROCEDURE — 94799 UNLISTED PULMONARY SVC/PX: CPT

## 2019-02-11 PROCEDURE — 85025 COMPLETE CBC W/AUTO DIFF WBC: CPT | Performed by: INTERNAL MEDICINE

## 2019-02-11 PROCEDURE — 83735 ASSAY OF MAGNESIUM: CPT | Performed by: INTERNAL MEDICINE

## 2019-02-11 PROCEDURE — 82962 GLUCOSE BLOOD TEST: CPT

## 2019-02-11 PROCEDURE — 80048 BASIC METABOLIC PNL TOTAL CA: CPT | Performed by: INTERNAL MEDICINE

## 2019-02-11 RX ORDER — AMITRIPTYLINE HYDROCHLORIDE 10 MG/1
10 TABLET, FILM COATED ORAL NIGHTLY
Status: DISCONTINUED | OUTPATIENT
Start: 2019-02-11 | End: 2019-02-15 | Stop reason: HOSPADM

## 2019-02-11 RX ORDER — NICOTINE POLACRILEX 4 MG
15 LOZENGE BUCCAL
Status: DISCONTINUED | OUTPATIENT
Start: 2019-02-11 | End: 2019-02-15 | Stop reason: HOSPADM

## 2019-02-11 RX ORDER — TRAMADOL HYDROCHLORIDE 50 MG/1
50 TABLET ORAL EVERY 8 HOURS PRN
Status: DISCONTINUED | OUTPATIENT
Start: 2019-02-11 | End: 2019-02-15 | Stop reason: HOSPADM

## 2019-02-11 RX ORDER — NITROGLYCERIN 0.4 MG/1
0.4 TABLET SUBLINGUAL
Status: DISCONTINUED | OUTPATIENT
Start: 2019-02-11 | End: 2019-02-15 | Stop reason: HOSPADM

## 2019-02-11 RX ORDER — SODIUM CHLORIDE 0.9 % (FLUSH) 0.9 %
3 SYRINGE (ML) INJECTION EVERY 12 HOURS SCHEDULED
Status: DISCONTINUED | OUTPATIENT
Start: 2019-02-11 | End: 2019-02-15 | Stop reason: HOSPADM

## 2019-02-11 RX ORDER — ALBUTEROL SULFATE 2.5 MG/3ML
2.5 SOLUTION RESPIRATORY (INHALATION)
Status: DISCONTINUED | OUTPATIENT
Start: 2019-02-11 | End: 2019-02-15 | Stop reason: HOSPADM

## 2019-02-11 RX ORDER — ROSUVASTATIN CALCIUM 20 MG/1
20 TABLET, COATED ORAL DAILY
Status: DISCONTINUED | OUTPATIENT
Start: 2019-02-12 | End: 2019-02-15 | Stop reason: HOSPADM

## 2019-02-11 RX ORDER — ASPIRIN 81 MG/1
81 TABLET ORAL DAILY
Status: DISCONTINUED | OUTPATIENT
Start: 2019-02-12 | End: 2019-02-15 | Stop reason: HOSPADM

## 2019-02-11 RX ORDER — FUROSEMIDE 40 MG/1
40 TABLET ORAL DAILY
Status: DISCONTINUED | OUTPATIENT
Start: 2019-02-11 | End: 2019-02-13

## 2019-02-11 RX ORDER — PANTOPRAZOLE SODIUM 40 MG/1
40 TABLET, DELAYED RELEASE ORAL DAILY
Status: DISCONTINUED | OUTPATIENT
Start: 2019-02-12 | End: 2019-02-15 | Stop reason: HOSPADM

## 2019-02-11 RX ORDER — CETIRIZINE HYDROCHLORIDE 10 MG/1
10 TABLET ORAL DAILY
Status: DISCONTINUED | OUTPATIENT
Start: 2019-02-12 | End: 2019-02-15 | Stop reason: HOSPADM

## 2019-02-11 RX ORDER — TACROLIMUS 1 MG/1
1 CAPSULE ORAL 2 TIMES DAILY
Status: DISCONTINUED | OUTPATIENT
Start: 2019-02-11 | End: 2019-02-13

## 2019-02-11 RX ORDER — SODIUM CHLORIDE 0.9 % (FLUSH) 0.9 %
3-10 SYRINGE (ML) INJECTION AS NEEDED
Status: DISCONTINUED | OUTPATIENT
Start: 2019-02-11 | End: 2019-02-15 | Stop reason: HOSPADM

## 2019-02-11 RX ORDER — CARVEDILOL 3.12 MG/1
3.12 TABLET ORAL 2 TIMES DAILY WITH MEALS
Status: DISCONTINUED | OUTPATIENT
Start: 2019-02-11 | End: 2019-02-14

## 2019-02-11 RX ORDER — LOSARTAN POTASSIUM 50 MG/1
100 TABLET ORAL DAILY
Status: DISCONTINUED | OUTPATIENT
Start: 2019-02-11 | End: 2019-02-13

## 2019-02-11 RX ORDER — HYDRALAZINE HYDROCHLORIDE 50 MG/1
100 TABLET, FILM COATED ORAL 3 TIMES DAILY
Status: DISCONTINUED | OUTPATIENT
Start: 2019-02-11 | End: 2019-02-15 | Stop reason: HOSPADM

## 2019-02-11 RX ORDER — DEXTROSE MONOHYDRATE 25 G/50ML
25 INJECTION, SOLUTION INTRAVENOUS
Status: DISCONTINUED | OUTPATIENT
Start: 2019-02-11 | End: 2019-02-15 | Stop reason: HOSPADM

## 2019-02-11 RX ORDER — DILTIAZEM HYDROCHLORIDE 180 MG/1
180 CAPSULE, COATED, EXTENDED RELEASE ORAL
Status: DISCONTINUED | OUTPATIENT
Start: 2019-02-11 | End: 2019-02-15 | Stop reason: HOSPADM

## 2019-02-11 RX ORDER — DABIGATRAN ETEXILATE 75 MG/1
75 CAPSULE ORAL EVERY 12 HOURS SCHEDULED
Status: DISCONTINUED | OUTPATIENT
Start: 2019-02-11 | End: 2019-02-13

## 2019-02-11 RX ADMIN — INSULIN ASPART 0 UNITS: 100 INJECTION, SOLUTION INTRAVENOUS; SUBCUTANEOUS at 21:07

## 2019-02-11 RX ADMIN — CARVEDILOL 3.12 MG: 3.12 TABLET, FILM COATED ORAL at 19:02

## 2019-02-11 RX ADMIN — TACROLIMUS 1 MG: 1 CAPSULE ORAL at 21:04

## 2019-02-11 RX ADMIN — HYDRALAZINE HYDROCHLORIDE 100 MG: 50 TABLET ORAL at 21:04

## 2019-02-11 RX ADMIN — DABIGATRAN ETEXILATE MESYLATE 75 MG: 75 CAPSULE ORAL at 21:04

## 2019-02-11 RX ADMIN — AMITRIPTYLINE HYDROCHLORIDE 10 MG: 10 TABLET, FILM COATED ORAL at 21:04

## 2019-02-11 RX ADMIN — ALBUTEROL SULFATE 2.5 MG: 2.5 SOLUTION RESPIRATORY (INHALATION) at 19:12

## 2019-02-11 RX ADMIN — DILTIAZEM HYDROCHLORIDE 180 MG: 180 CAPSULE, COATED, EXTENDED RELEASE ORAL at 19:04

## 2019-02-11 RX ADMIN — LOSARTAN POTASSIUM 100 MG: 50 TABLET, FILM COATED ORAL at 19:03

## 2019-02-11 NOTE — PLAN OF CARE
Problem: Patient Care Overview  Goal: Plan of Care Review  Outcome: Ongoing (interventions implemented as appropriate)   02/11/19 0773   Coping/Psychosocial   Plan of Care Reviewed With patient   Plan of Care Review   Progress no change   OTHER   Outcome Summary initial assessment

## 2019-02-12 ENCOUNTER — APPOINTMENT (OUTPATIENT)
Dept: CARDIOLOGY | Facility: HOSPITAL | Age: 79
End: 2019-02-12

## 2019-02-12 LAB
ANION GAP SERPL CALCULATED.3IONS-SCNC: 11 MMOL/L (ref 5–15)
ANISOCYTOSIS BLD QL: NORMAL
BASOPHILS # BLD AUTO: 0.02 10*3/MM3 (ref 0–0.2)
BASOPHILS NFR BLD AUTO: 0.6 % (ref 0–1.5)
BH CV ECHO MEAS - ACS: 1.8 CM
BH CV ECHO MEAS - AO MAX PG (FULL): 11.1 MMHG
BH CV ECHO MEAS - AO MAX PG: 17.5 MMHG
BH CV ECHO MEAS - AO MEAN PG (FULL): 5.4 MMHG
BH CV ECHO MEAS - AO MEAN PG: 8.9 MMHG
BH CV ECHO MEAS - AO ROOT AREA (BSA CORRECTED): 1.6
BH CV ECHO MEAS - AO ROOT AREA: 5.5 CM^2
BH CV ECHO MEAS - AO ROOT DIAM: 2.6 CM
BH CV ECHO MEAS - AO V2 MAX: 209 CM/SEC
BH CV ECHO MEAS - AO V2 MEAN: 139.3 CM/SEC
BH CV ECHO MEAS - AO V2 VTI: 49.5 CM
BH CV ECHO MEAS - ASC AORTA: 2.9 CM
BH CV ECHO MEAS - AVA(I,A): 2.2 CM^2
BH CV ECHO MEAS - AVA(I,D): 2.2 CM^2
BH CV ECHO MEAS - AVA(V,A): 2.2 CM^2
BH CV ECHO MEAS - AVA(V,D): 2.2 CM^2
BH CV ECHO MEAS - BSA(HAYCOCK): 1.8 M^2
BH CV ECHO MEAS - BSA: 1.7 M^2
BH CV ECHO MEAS - BZI_BMI: 28 KILOGRAMS/M^2
BH CV ECHO MEAS - BZI_METRIC_HEIGHT: 157 CM
BH CV ECHO MEAS - BZI_METRIC_WEIGHT: 69 KG
BH CV ECHO MEAS - EDV(CUBED): 102.5 ML
BH CV ECHO MEAS - EDV(TEICH): 101.3 ML
BH CV ECHO MEAS - EF(CUBED): 87.4 %
BH CV ECHO MEAS - EF(TEICH): 81.2 %
BH CV ECHO MEAS - ESV(CUBED): 12.9 ML
BH CV ECHO MEAS - ESV(TEICH): 19 ML
BH CV ECHO MEAS - FS: 49.9 %
BH CV ECHO MEAS - IVS/LVPW: 1
BH CV ECHO MEAS - IVSD: 1.3 CM
BH CV ECHO MEAS - LA DIMENSION: 3.7 CM
BH CV ECHO MEAS - LA/AO: 1.4
BH CV ECHO MEAS - LV MASS(C)D: 233.7 GRAMS
BH CV ECHO MEAS - LV MASS(C)DI: 137.7 GRAMS/M^2
BH CV ECHO MEAS - LV MAX PG: 6.4 MMHG
BH CV ECHO MEAS - LV MEAN PG: 3.5 MMHG
BH CV ECHO MEAS - LV V1 MAX: 126 CM/SEC
BH CV ECHO MEAS - LV V1 MEAN: 86.7 CM/SEC
BH CV ECHO MEAS - LV V1 VTI: 30 CM
BH CV ECHO MEAS - LVIDD: 4.7 CM
BH CV ECHO MEAS - LVIDS: 2.3 CM
BH CV ECHO MEAS - LVOT AREA: 3.7 CM^2
BH CV ECHO MEAS - LVOT DIAM: 2.2 CM
BH CV ECHO MEAS - LVPWD: 1.3 CM
BH CV ECHO MEAS - MR MAX PG: 52.5 MMHG
BH CV ECHO MEAS - MR MAX VEL: 362.2 CM/SEC
BH CV ECHO MEAS - MV A MAX VEL: 69.1 CM/SEC
BH CV ECHO MEAS - MV E MAX VEL: 109.9 CM/SEC
BH CV ECHO MEAS - MV E/A: 1.6
BH CV ECHO MEAS - MV P1/2T MAX VEL: 119 CM/SEC
BH CV ECHO MEAS - MVA P1/2T LCG: 1.8 CM^2
BH CV ECHO MEAS - PA MAX PG (FULL): 2.9 MMHG
BH CV ECHO MEAS - PA MAX PG: 7.2 MMHG
BH CV ECHO MEAS - PA V2 MAX: 133.7 CM/SEC
BH CV ECHO MEAS - PULM A REVS DUR: 0.14 SEC
BH CV ECHO MEAS - PULM A REVS VEL: 34.6 CM/SEC
BH CV ECHO MEAS - PULM DIAS VEL: 95 CM/SEC
BH CV ECHO MEAS - PULM S/D: 0.62
BH CV ECHO MEAS - PULM SYS VEL: 58.6 CM/SEC
BH CV ECHO MEAS - RAP SYSTOLE: 5 MMHG
BH CV ECHO MEAS - RV MAX PG: 4.2 MMHG
BH CV ECHO MEAS - RV MEAN PG: 2.4 MMHG
BH CV ECHO MEAS - RV V1 MAX: 103 CM/SEC
BH CV ECHO MEAS - RV V1 MEAN: 73 CM/SEC
BH CV ECHO MEAS - RV V1 VTI: 23 CM
BH CV ECHO MEAS - RVDD: 2.9 CM
BH CV ECHO MEAS - RVSP: 53.9 MMHG
BH CV ECHO MEAS - SI(AO): 160.7 ML/M^2
BH CV ECHO MEAS - SI(CUBED): 52.7 ML/M^2
BH CV ECHO MEAS - SI(LVOT): 65.1 ML/M^2
BH CV ECHO MEAS - SI(TEICH): 48.5 ML/M^2
BH CV ECHO MEAS - SV(AO): 272.8 ML
BH CV ECHO MEAS - SV(CUBED): 89.6 ML
BH CV ECHO MEAS - SV(LVOT): 110.5 ML
BH CV ECHO MEAS - SV(TEICH): 82.3 ML
BH CV ECHO MEAS - TR MAX VEL: 349.6 CM/SEC
BUN BLD-MCNC: 86 MG/DL (ref 7–21)
BUN/CREAT SERPL: 29.5 (ref 7–25)
CALCIUM SPEC-SCNC: 9.7 MG/DL (ref 8.4–10.2)
CHLORIDE SERPL-SCNC: 99 MMOL/L (ref 95–110)
CO2 SERPL-SCNC: 26 MMOL/L (ref 22–31)
CREAT BLD-MCNC: 2.92 MG/DL (ref 0.5–1)
DEPRECATED RDW RBC AUTO: 49.1 FL (ref 37–54)
EOSINOPHIL # BLD AUTO: 0.06 10*3/MM3 (ref 0–0.4)
EOSINOPHIL NFR BLD AUTO: 1.8 % (ref 0.3–6.2)
ERYTHROCYTE [DISTWIDTH] IN BLOOD BY AUTOMATED COUNT: 16.2 % (ref 12.3–15.4)
FERRITIN SERPL-MCNC: 331 NG/ML (ref 11.1–264)
FOLATE SERPL-MCNC: 12.7 NG/ML (ref 2.76–21)
GFR SERPL CREATININE-BSD FRML MDRD: 19 ML/MIN/1.73 (ref 39–90)
GLUCOSE BLD-MCNC: 332 MG/DL (ref 60–100)
GLUCOSE BLDC GLUCOMTR-MCNC: 210 MG/DL (ref 70–130)
GLUCOSE BLDC GLUCOMTR-MCNC: 327 MG/DL (ref 70–130)
GLUCOSE BLDC GLUCOMTR-MCNC: 389 MG/DL (ref 70–130)
GLUCOSE BLDC GLUCOMTR-MCNC: 392 MG/DL (ref 70–130)
HCT VFR BLD AUTO: 28.1 % (ref 34–46.6)
HGB BLD-MCNC: 8.6 G/DL (ref 12–15.9)
IMM GRANULOCYTES # BLD AUTO: 0.01 10*3/MM3 (ref 0–0.05)
IMM GRANULOCYTES NFR BLD AUTO: 0.3 % (ref 0–0.5)
IRON 24H UR-MRATE: 45 MCG/DL (ref 37–170)
IRON SATN MFR SERPL: 20 % (ref 15–50)
LYMPHOCYTES # BLD AUTO: 0.68 10*3/MM3 (ref 0.7–3.1)
LYMPHOCYTES NFR BLD AUTO: 20.4 % (ref 19.6–45.3)
MAGNESIUM SERPL-MCNC: 2.5 MG/DL (ref 1.6–2.3)
MAXIMAL PREDICTED HEART RATE: 144 BPM
MCH RBC QN AUTO: 25.9 PG (ref 26.6–33)
MCHC RBC AUTO-ENTMCNC: 30.6 G/DL (ref 31.5–35.7)
MCV RBC AUTO: 84.6 FL (ref 79–97)
MONOCYTES # BLD AUTO: 0.47 10*3/MM3 (ref 0.1–0.9)
MONOCYTES NFR BLD AUTO: 14.1 % (ref 5–12)
NEUTROPHILS # BLD AUTO: 2.09 10*3/MM3 (ref 1.4–7)
NEUTROPHILS NFR BLD AUTO: 62.8 % (ref 42.7–76)
NRBC BLD AUTO-RTO: 0 /100 WBC (ref 0–0)
OVALOCYTES BLD QL SMEAR: NORMAL
PLATELET # BLD AUTO: 150 10*3/MM3 (ref 140–450)
PMV BLD AUTO: 12.5 FL (ref 6–12)
POTASSIUM BLD-SCNC: 4.4 MMOL/L (ref 3.5–5.1)
RBC # BLD AUTO: 3.32 10*6/MM3 (ref 3.77–5.28)
SMALL PLATELETS BLD QL SMEAR: ADEQUATE
SODIUM BLD-SCNC: 136 MMOL/L (ref 137–145)
STRESS TARGET HR: 122 BPM
TIBC SERPL-MCNC: 228 MCG/DL (ref 265–497)
TROPONIN I SERPL-MCNC: 0.14 NG/ML
TROPONIN I SERPL-MCNC: 0.14 NG/ML
VIT B12 BLD-MCNC: 704 PG/ML (ref 239–931)
WBC MORPH BLD: NORMAL
WBC NRBC COR # BLD: 3.33 10*3/MM3 (ref 3.4–10.8)

## 2019-02-12 PROCEDURE — 93005 ELECTROCARDIOGRAM TRACING: CPT | Performed by: NURSE PRACTITIONER

## 2019-02-12 PROCEDURE — 63710000001 INSULIN ASPART PER 5 UNITS: Performed by: INTERNAL MEDICINE

## 2019-02-12 PROCEDURE — 85025 COMPLETE CBC W/AUTO DIFF WBC: CPT | Performed by: INTERNAL MEDICINE

## 2019-02-12 PROCEDURE — 82962 GLUCOSE BLOOD TEST: CPT

## 2019-02-12 PROCEDURE — G0378 HOSPITAL OBSERVATION PER HR: HCPCS

## 2019-02-12 PROCEDURE — 93010 ELECTROCARDIOGRAM REPORT: CPT | Performed by: INTERNAL MEDICINE

## 2019-02-12 PROCEDURE — 83540 ASSAY OF IRON: CPT | Performed by: PHYSICIAN ASSISTANT

## 2019-02-12 PROCEDURE — 84484 ASSAY OF TROPONIN QUANT: CPT | Performed by: INTERNAL MEDICINE

## 2019-02-12 PROCEDURE — 94760 N-INVAS EAR/PLS OXIMETRY 1: CPT

## 2019-02-12 PROCEDURE — 93306 TTE W/DOPPLER COMPLETE: CPT

## 2019-02-12 PROCEDURE — 99213 OFFICE O/P EST LOW 20 MIN: CPT | Performed by: NURSE PRACTITIONER

## 2019-02-12 PROCEDURE — 94799 UNLISTED PULMONARY SVC/PX: CPT

## 2019-02-12 PROCEDURE — 83550 IRON BINDING TEST: CPT | Performed by: PHYSICIAN ASSISTANT

## 2019-02-12 PROCEDURE — 80048 BASIC METABOLIC PNL TOTAL CA: CPT | Performed by: INTERNAL MEDICINE

## 2019-02-12 PROCEDURE — 83735 ASSAY OF MAGNESIUM: CPT | Performed by: INTERNAL MEDICINE

## 2019-02-12 PROCEDURE — 93306 TTE W/DOPPLER COMPLETE: CPT | Performed by: INTERNAL MEDICINE

## 2019-02-12 PROCEDURE — 63710000001 INSULIN ASPART PER 5 UNITS: Performed by: PHYSICIAN ASSISTANT

## 2019-02-12 PROCEDURE — 82607 VITAMIN B-12: CPT | Performed by: PHYSICIAN ASSISTANT

## 2019-02-12 PROCEDURE — 63710000001 TACROLIMUS PER 1 MG: Performed by: INTERNAL MEDICINE

## 2019-02-12 PROCEDURE — 82728 ASSAY OF FERRITIN: CPT | Performed by: PHYSICIAN ASSISTANT

## 2019-02-12 PROCEDURE — 85007 BL SMEAR W/DIFF WBC COUNT: CPT | Performed by: INTERNAL MEDICINE

## 2019-02-12 PROCEDURE — 82746 ASSAY OF FOLIC ACID SERUM: CPT | Performed by: PHYSICIAN ASSISTANT

## 2019-02-12 RX ORDER — SODIUM CHLORIDE 9 MG/ML
75 INJECTION, SOLUTION INTRAVENOUS CONTINUOUS
Status: DISCONTINUED | OUTPATIENT
Start: 2019-02-12 | End: 2019-02-14

## 2019-02-12 RX ADMIN — AMITRIPTYLINE HYDROCHLORIDE 10 MG: 10 TABLET, FILM COATED ORAL at 20:42

## 2019-02-12 RX ADMIN — HYDROCORTISONE: 1 CREAM TOPICAL at 14:47

## 2019-02-12 RX ADMIN — ASPIRIN 81 MG: 81 TABLET, COATED ORAL at 08:13

## 2019-02-12 RX ADMIN — CARVEDILOL 3.12 MG: 3.12 TABLET, FILM COATED ORAL at 08:17

## 2019-02-12 RX ADMIN — DABIGATRAN ETEXILATE MESYLATE 75 MG: 75 CAPSULE ORAL at 08:13

## 2019-02-12 RX ADMIN — ALBUTEROL SULFATE 2.5 MG: 2.5 SOLUTION RESPIRATORY (INHALATION) at 14:50

## 2019-02-12 RX ADMIN — HYDRALAZINE HYDROCHLORIDE 100 MG: 50 TABLET ORAL at 20:42

## 2019-02-12 RX ADMIN — HYDRALAZINE HYDROCHLORIDE 100 MG: 50 TABLET ORAL at 14:48

## 2019-02-12 RX ADMIN — LOSARTAN POTASSIUM 100 MG: 50 TABLET, FILM COATED ORAL at 08:11

## 2019-02-12 RX ADMIN — PANTOPRAZOLE SODIUM 40 MG: 40 TABLET, DELAYED RELEASE ORAL at 08:13

## 2019-02-12 RX ADMIN — CETIRIZINE HYDROCHLORIDE 10 MG: 10 TABLET, FILM COATED ORAL at 08:12

## 2019-02-12 RX ADMIN — SODIUM CHLORIDE, PRESERVATIVE FREE 3 ML: 5 INJECTION INTRAVENOUS at 08:14

## 2019-02-12 RX ADMIN — TACROLIMUS 1 MG: 1 CAPSULE ORAL at 08:13

## 2019-02-12 RX ADMIN — INSULIN ASPART 5 UNITS: 100 INJECTION, SOLUTION INTRAVENOUS; SUBCUTANEOUS at 20:42

## 2019-02-12 RX ADMIN — TACROLIMUS 1 MG: 1 CAPSULE ORAL at 20:42

## 2019-02-12 RX ADMIN — ALBUTEROL SULFATE 2.5 MG: 2.5 SOLUTION RESPIRATORY (INHALATION) at 18:54

## 2019-02-12 RX ADMIN — HYDROCORTISONE: 1 CREAM TOPICAL at 12:24

## 2019-02-12 RX ADMIN — INSULIN ASPART 0 UNITS: 100 INJECTION, SOLUTION INTRAVENOUS; SUBCUTANEOUS at 07:20

## 2019-02-12 RX ADMIN — ROSUVASTATIN CALCIUM 20 MG: 20 TABLET, FILM COATED ORAL at 08:13

## 2019-02-12 RX ADMIN — INSULIN ASPART 0 UNITS: 100 INJECTION, SOLUTION INTRAVENOUS; SUBCUTANEOUS at 12:25

## 2019-02-12 RX ADMIN — DABIGATRAN ETEXILATE MESYLATE 75 MG: 75 CAPSULE ORAL at 20:42

## 2019-02-12 RX ADMIN — ALBUTEROL SULFATE 2.5 MG: 2.5 SOLUTION RESPIRATORY (INHALATION) at 06:48

## 2019-02-12 RX ADMIN — FUROSEMIDE 40 MG: 40 TABLET ORAL at 08:13

## 2019-02-12 RX ADMIN — DILTIAZEM HYDROCHLORIDE 180 MG: 180 CAPSULE, COATED, EXTENDED RELEASE ORAL at 08:13

## 2019-02-12 RX ADMIN — CARVEDILOL 3.12 MG: 3.12 TABLET, FILM COATED ORAL at 17:25

## 2019-02-12 RX ADMIN — ALBUTEROL SULFATE 2.5 MG: 2.5 SOLUTION RESPIRATORY (INHALATION) at 10:22

## 2019-02-12 RX ADMIN — HYDRALAZINE HYDROCHLORIDE 100 MG: 50 TABLET ORAL at 08:12

## 2019-02-12 RX ADMIN — INSULIN ASPART 5 UNITS: 100 INJECTION, SOLUTION INTRAVENOUS; SUBCUTANEOUS at 17:39

## 2019-02-12 RX ADMIN — INSULIN ASPART 9 UNITS: 100 INJECTION, SOLUTION INTRAVENOUS; SUBCUTANEOUS at 17:24

## 2019-02-12 RX ADMIN — HYDROCORTISONE: 1 CREAM TOPICAL at 20:42

## 2019-02-12 RX ADMIN — ALBUTEROL SULFATE 2.5 MG: 2.5 SOLUTION RESPIRATORY (INHALATION) at 00:18

## 2019-02-12 RX ADMIN — SODIUM CHLORIDE 75 ML/HR: 9 INJECTION, SOLUTION INTRAVENOUS at 14:48

## 2019-02-12 RX ADMIN — ALBUTEROL SULFATE 2.5 MG: 2.5 SOLUTION RESPIRATORY (INHALATION) at 23:41

## 2019-02-12 NOTE — PROGRESS NOTES
[facility] Medicine Services  INPATIENT PROGRESS NOTE    Length of Stay: 1  Date of Admission: 2/11/2019  Primary Care Physician: Ayaan Swenson MD    Subjective   Chief Complaint/HPI: This 78-year-old -American female was admitted to hospital services secondary to chest pain and palpitations.  Patient was diagnosed with atrial flutter with a 2-1 rate at [facility].  She was transferred here for further evaluation.  Patient chest pain has resolved and patient is currently in normal sinus rhythm.  Patient did have a mildly elevated troponin, this is likely secondary to the atrial flutter.  Dr. Almazan, the patient's regular cardiologist, has agreed to see the patient.  Consult appreciated.    Review of Systems   Constitutional: Negative for chills and fever.   Respiratory: Negative for chest tightness and shortness of breath.    Cardiovascular: Negative for chest pain and palpitations.   Gastrointestinal: Negative for abdominal pain, nausea and vomiting.   Neurological: Negative for dizziness and light-headedness.      All pertinent negatives and positives are as above. All other systems have been reviewed and are negative unless otherwise stated.     Objective    Temp:  [96.9 °F (36.1 °C)-98 °F (36.7 °C)] 97 °F (36.1 °C)  Heart Rate:  [60-91] 71  Resp:  [16-20] 18  BP: (107-170)/(53-76) 120/57    Physical Exam   Constitutional: She is oriented to person, place, and time. She appears well-developed and well-nourished. No distress.   HENT:   Head: Normocephalic and atraumatic.   Eyes: Conjunctivae are normal.   Cardiovascular: Normal rate and regular rhythm.   Pulmonary/Chest: Effort normal and breath sounds normal. No stridor. No respiratory distress.   Abdominal: Soft. Bowel sounds are normal. She exhibits no distension. There is no tenderness.   Musculoskeletal: She exhibits no edema.   Neurological: She is alert and oriented to person, place,  and time.   Skin: Skin is warm and dry. Capillary refill takes less than 2 seconds. She is not diaphoretic.   Psychiatric: She has a normal mood and affect. Her behavior is normal.     Results Review:  I have reviewed the labs, radiology results, and diagnostic studies.    Laboratory Data:   Results from last 7 days   Lab Units 02/12/19  0528 02/11/19  1834   SODIUM mmol/L 136* 136*   POTASSIUM mmol/L 4.4 4.1   CHLORIDE mmol/L 99 97   CO2 mmol/L 26.0 25.0   BUN mg/dL 86* 83*   CREATININE mg/dL 2.92* 2.51*   GLUCOSE mg/dL 332* 283*   CALCIUM mg/dL 9.7 10.2   ANION GAP mmol/L 11.0 14.0     Estimated Creatinine Clearance: 14.3 mL/min (A) (by C-G formula based on SCr of 2.92 mg/dL (H)).  Results from last 7 days   Lab Units 02/12/19  0528 02/11/19  1834   MAGNESIUM mg/dL 2.5* 2.4*         Results from last 7 days   Lab Units 02/12/19  0528 02/11/19  1834   WBC 10*3/mm3 3.33* 4.43   HEMOGLOBIN g/dL 8.6* 9.3*   HEMATOCRIT % 28.1* 29.7*   PLATELETS 10*3/mm3 150 178           Culture Data:   No results found for: BLOODCX  No results found for: URINECX  No results found for: RESPCX  No results found for: WOUNDCX  No results found for: STOOLCX  No components found for: BODYFLD    Radiology Data:   Imaging Results (last 24 hours)     ** No results found for the last 24 hours. **          I have reviewed the patient's current medications.     Assessment/Plan     Active Hospital Problems    Diagnosis   • Atrial flutter (CMS/HCC)   Acute kidney injury on chronic kidney disease stage III  Anemia of chronic disease versus iron deficiency versus blood loss  Chest pain, resolved  Elevated troponin, likely secondary to atrial flutter and possible demand ischemia      Plan: Anemia labs, occult stool, monitor hemoglobin hematocrit, IV fluid resuscitation, monitor creatinine, echocardiogram.  Cardiology consult appreciated continue his hospital course dictates.                This document has been electronically signed by Buddy LAMAR  BRYAN Young on February 12, 2019 1:22 PM

## 2019-02-12 NOTE — PLAN OF CARE
Problem: Patient Care Overview  Goal: Plan of Care Review  Outcome: Ongoing (interventions implemented as appropriate)   02/12/19 0039   Coping/Psychosocial   Plan of Care Reviewed With patient   Plan of Care Review   Progress improving   OTHER   Outcome Summary Patient in NSR at this time. Will continue to monitor.

## 2019-02-12 NOTE — H&P
[facility] Medicine Admission      Date of Admission: 2/11/2019      Primary Care Physician: Ayaan Swenson MD      Chief Complaint: chest pain    HPI: 79 yo AA female with past medical hx of cad s/p stent, afib/aflutter comes from [facility] due to chest pain. Initially when pt presented to [facility], pt was in aflutter with 2:1 per dr. aNvarro ED, physician and then he gave iv cardizem 10-mg and then started pt on iv cardizem gtt, and he says within 15 minutes into the iv cardiozem gtt, pt converted to nsr. Pt was transferred to our hospital for closer monitoring since they do not have cardiology.    10 point ros is negative except for aforementioned findings    Concurrent Medical History:  has a past medical history of Acute bronchitis (12/21/2015), Allergic, Asthma, Chronic kidney disease (04/25/2016), Coronary atherosclerosis (06/22/2016), Diabetes mellitus type 2, insulin dependent (CMS/[facility]) (06/22/2016), Dyslipidemia (06/22/2016), Edema of left upper eyelid (06/30/2016), Edema of right upper eyelid (06/30/2016), Essential hypertension (06/30/2016), History of echocardiogram (07/07/2016), History of transfusion, Hyperlipidemia (06/22/2016), Low back pain (02/29/2016), Sinusitis, Stroke (CMS/[facility]), and Upper respiratory infection (12/21/2015).    Past Surgical History:  has a past surgical history that includes Colonoscopy (02/12/2014); Injection of Medication (12/21/2015); Injection of Medication (06/30/2016); Injection of Medication (12/21/2015); Transplantation renal; COLONOSCOPY (N/A, 12/20/2017); and ESOPHAGOGASTRODUODENOSCOPY (N/A, 12/4/2017).    Family History: family history includes Diabetes in her other; Heart disease in her father; Lung cancer in her mother; Lupus in her other.     Social History:  reports that  has never smoked. she has never used smokeless tobacco. She reports that she does not drink alcohol or use  drugs.    Allergies:   Allergies   Allergen Reactions   • Clonidine Derivatives Hallucinations   • Latex Itching   • Naproxen Other (See Comments)     MAKES NUMB ALL OVER   • Penicillins Itching   • Zyrtec [Cetirizine] Rash       Medications:   Medications Prior to Admission   Medication Sig Dispense Refill Last Dose   • albuterol (PROVENTIL HFA;VENTOLIN HFA) 108 (90 Base) MCG/ACT inhaler Inhale 2 puffs Every 4 (Four) Hours As Needed for Shortness of Air. 1 inhaler 11 2/10/2019 at Unknown time   • amitriptyline (ELAVIL) 10 MG tablet Take 10 mg by mouth Every Night.  0 2/10/2019 at     • ASPIRIN LOW DOSE 81 MG EC tablet TAKE 1 TABLET BY MOUTH EVERY DAY 30 tablet 0 2/11/2019 at Unknown time   • carvedilol (COREG) 3.125 MG tablet Take 1 tablet by mouth 2 (Two) Times a Day With Meals. 180 tablet 3 2/11/2019 at Unknown time   • cetirizine (zyrTEC) 10 MG tablet TAKE 1 TABLET BY MOUTH DAILY 30 tablet 0 2/11/2019 at Unknown time   • Cholecalciferol (VITAMIN D3) 5000 UNITS capsule capsule Take 5,000 Units by mouth Daily.   2/11/2019 at Unknown time   • dabigatran etexilate (PRADAXA) 75 MG capsule Take 1 capsule by mouth Every 12 (Twelve) Hours. 180 capsule 0 2/11/2019 at Unknown time   • ezetimibe (ZETIA) 10 MG tablet TAKE 1 TABLET BY MOUTH DAILY 31 tablet 3 2/11/2019 at Unknown time   • furosemide (LASIX) 40 MG tablet TAKE 1 TABLET BY MOUTH EVERY MORNING AND 1/2 TABLET BY MOUTH EVERY EVENING 180 tablet 3 2/11/2019 at Unknown time   • glucose blood test strip Use as instructed 100 each 12 2/11/2019 at Unknown time   • glucose monitor monitoring kit 1 each Daily. 1 each 0 2/11/2019 at Unknown time   • hydrALAZINE (APRESOLINE) 100 MG tablet Take 1 tablet by mouth 3 (Three) Times a Day. 270 tablet 3 2/11/2019 at Unknown time   • Injection Device for Insulin device 1 application 3 (Three) Times a Week if Needed (to use with insulin). To use with insulin pen 3 times a day. Give 90 day supply 300 each 3 2/11/2019 at Unknown time    • insulin aspart (NOVOLOG) 100 UNIT/ML injection Put 240ml in pump 240 mL 3 2/11/2019 at Unknown time   • LANTUS SOLOSTAR 100 UNIT/ML injection pen INJECT 10 UNITS SUBCUTANEOUSLY UTD AS A BACK UP FOR PUMP  4 2/11/2019 at Unknown time   • losartan (COZAAR) 100 MG tablet Take 1 tablet by mouth Daily. (Patient taking differently: Take 50 mg by mouth Daily.) 30 tablet 3 Patient Taking Differently at Unknown time   • mycophenolate (MYFORTIC) 360 MG tablet delayed-release EC tablet Take 1 tablet by mouth 2 (Two) Times a Day. 180 tablet 6 2/11/2019 at Unknown time   • pantoprazole (PROTONIX) 40 MG EC tablet Take 1 tablet by mouth Daily. 30 tablet 3 2/11/2019 at Unknown time   • rosuvastatin (CRESTOR) 20 MG tablet Take 20 mg by mouth Daily.  2 2/11/2019 at Unknown time   • tacrolimus (PROGRAF) 1 MG capsule Take 1 capsule by mouth 2 (Two) Times a Day. 180 capsule 3 2/10/2019 at Unknown time   • Ca Carb-FA-D-B6-B12-Boron-Mg 1342-1 MG wafer Take 1 tablet by mouth daily.   Taking   • Calcium Carb-Cholecalciferol (CALCIUM 1000 + D PO) Take 1 tablet by mouth Daily.   Taking   • diltiaZEM SR (CARDIZEM SR) 90 MG 12 hr capsule Take 1 capsule by mouth 2 (Two) Times a Day. (Patient not taking: Reported on 2/11/2019) 60 capsule 11 Not Taking at Unknown time   • ferrous sulfate 325 (65 FE) MG tablet Take 1 tablet by mouth Daily With Breakfast. (Patient not taking: Reported on 2/11/2019) 30 tablet 5 Not Taking at Unknown time   • fluticasone (FLONASE) 50 MCG/ACT nasal spray 2 sprays into the nostril(s) as directed by provider Daily. (Patient not taking: Reported on 2/11/2019) 1 bottle 3 Not Taking at Unknown time   • glucagon (GLUCAGON EMERGENCY) 1 MG injection Inject 1 mg under the skin 1 (One) Time As Needed for Low Blood Sugar for up to 1 dose. 1 kit 5 Unknown at Unknown time   • ipratropium (ATROVENT) 0.06 % nasal spray 2 sprays into each nostril 2 (Two) Times a Day. (Patient not taking: Reported on 2/11/2019) 15 mL 11 Not Taking  at Unknown time   • mupirocin (BACTROBAN) 2 % ointment Apply  topically 3 (Three) Times a Day. (Patient not taking: Reported on 2/11/2019) 15 g 0 Not Taking at Unknown time   • predniSONE (DELTASONE) 5 MG tablet Take 1 tablet by mouth Daily. (Patient not taking: Reported on 2/11/2019) 90 tablet 3 Not Taking at Unknown time   • traMADol (ULTRAM) 50 MG tablet Take 1 tablet by mouth 2 (Two) Times a Day. (Patient not taking: Reported on 2/11/2019) 28 tablet 0 Not Taking at Unknown time       Review of Systems:  Review of Systems   Otherwise complete ROS is negative except as mentioned above.    Physical Exam:   Temp:  [97.6 °F (36.4 °C)] 97.6 °F (36.4 °C)  Heart Rate:  [62] 62  Resp:  [18] 18  BP: (158)/(72) 158/72  Physical Exam   Constitutional: She is oriented to person, place, and time. She appears well-developed.   HENT:   Head: Normocephalic.   Eyes: Pupils are equal, round, and reactive to light.   Neck: Normal range of motion.   Cardiovascular: Normal rate.   Pulmonary/Chest: Breath sounds normal.   Abdominal: Soft. Bowel sounds are normal.   Musculoskeletal: Normal range of motion.   Neurological: She is alert and oriented to person, place, and time.   Skin: Skin is warm.         Results Reviewed:  I have personally reviewed current lab, radiology, and data and agree with results.  Lab Results (last 24 hours)     ** No results found for the last 24 hours. **        Imaging Results (last 24 hours)     ** No results found for the last 24 hours. **            Assessment:    Active Hospital Problems    Diagnosis   • Atrial flutter (CMS/[facility])     Chest pain        Plan:    Chest pain  -will r/o acs  -no acute ekg changes  -trend cardiac enzymes     Atrial Flutter   -converted back to nsr  -if she worsens , consider consulting cardiology in am; pt's cardiologist is dr. waters  -[facility] meds      I discussed the patient's findings and my recommendations with: pt

## 2019-02-12 NOTE — PLAN OF CARE
Problem: Patient Care Overview  Goal: Plan of Care Review  Outcome: Ongoing (interventions implemented as appropriate)   02/12/19 1342   Coping/Psychosocial   Plan of Care Reviewed With patient   Plan of Care Review   Progress improving

## 2019-02-12 NOTE — PLAN OF CARE
Problem: Patient Care Overview  Goal: Plan of Care Review  Outcome: Ongoing (interventions implemented as appropriate)  Encourage adequate intake.   02/12/19 1412   Coping/Psychosocial   Plan of Care Reviewed With patient   Plan of Care Review   Progress no change   OTHER   Outcome Summary initial assessment

## 2019-02-12 NOTE — CONSULTS
Adult Nutrition  Assessment    Patient Name:  Chanda Yan  YOB: 1940  MRN: [id number]  Admit Date:  2/11/2019    Assessment Date:  2/12/2019    Comments:  Pt reports good appetite.  Indicates she had a kidney transplant 7 year ago.  BUN, Creat are elevated.  Blood glucose is elevated.  Sliding scale novolog prescribed.Pt with right anteroir gluteal pressure injury.  Pt willing to receive cottage cheese.    Reason for Assessment     Row Name 02/12/19 1342          Reason for Assessment    Reason For Assessment  per organizational policy wound     Diagnosis  other (see comments) right anterior gluteal pressure injury             Labs/Tests/Procedures/Meds     Row Name 02/12/19 1343          Labs/Procedures/Meds    Lab Results Reviewed  reviewed, pertinent        Medications    Pertinent Medications Reviewed  reviewed, pertinent         Physical Findings     Row Name 02/12/19 1343          Physical Findings    Skin  pressure injury right anterior gluteal pressure injury         Estimated/Assessed Needs     Row Name 02/12/19 1344          Calculation Measurements    Weight Used For Calculations  51.3 kg (113 lb 1.5 oz)        Estimated/Assessed Needs    Additional Documentation  Calorie Requirements (Group);Fluid Requirements (Group);Cleveland-St. Jeor Equation (Group);Protein Requirements (Group)        Calorie Requirements    Estimated Calorie Requirement (kcal/day)  1200     Estimated Calorie Need Method  Cleveland-St Jeor        KCAL/KG    14 Kcal/Kg (kcal)  718.2     15 Kcal/Kg (kcal)  769.5     18 Kcal/Kg (kcal)  923.4     20 Kcal/Kg (kcal)  1026     25 Kcal/Kg (kcal)  1282.5     30 Kcal/Kg (kcal)  1539     35 Kcal/Kg (kcal)  1795.5     40 Kcal/Kg (kcal)  2052     45 Kcal/Kg (kcal)  2308.5     50 Kcal/Kg (kcal)  2565        Cleveland-St. Jeor Equation    RMR (Cleveland-St. Jeor Equation)  924.5        Protein Requirements    Est Protein Requirement Amount (gms/kg)  1.0 gm protein     Estimated Protein  Requirements (gms/day)  51.3        Fluid Requirements    Estimated Fluid Requirements (mL/day)  1388     RDA Method (mL)  1388     Mack-David Method (over 20 kg)  2526         Nutrition Prescription Ordered     Row Name 02/12/19 1348          Nutrition Prescription PO    Current PO Diet  Regular     Common Modifiers  Cardiac;Consistent Carbohydrate;Renal         Evaluation of Received Nutrient/Fluid Intake     Row Name 02/12/19 1348 02/12/19 1344       Calculation Measurements    Weight Used For Calculations  --  51.3 kg (113 lb 1.5 oz)       PO Evaluation    Number of Days PO Intake Evaluated  Insufficient Data  --        Evaluation of Prescribed Nutrient/Fluid Intake     Row Name 02/12/19 1344          Calculation Measurements    Weight Used For Calculations  51.3 kg (113 lb 1.5 oz)             Electronically signed by:  Marta Bey RD  02/12/19 1:49 PM

## 2019-02-12 NOTE — NURSING NOTE
Patient has 2 stage 2 pressure injuries. Right anterior buttock and right buttock. See assessment for details. Needs wound care, offloading and protection. Magic Butt has been ordered. POA yes

## 2019-02-12 NOTE — CONSULTS
"    [facility] Consult/Progress Note    Referring Provider: Olvin Barrios DO    Reason for Consultation: Abnormal troponin  Cardiologist: Dr Almazan    Patient Care Team:  Ayaan Swenson MD as PCP - General  Ayaan Swenson MD as PCP - Claims Attributed  Tha, Feliciano Cobos MD PhD as Consulting Physician (Cardiology)  Jamil Ordonez MD as Consulting Physician (Ophthalmology)  Tim Reza MD as Consulting Physician (Nephrology)  Abhi Fajardo MD (Endocrinology)   LOS: 1 day     Subjective .     History of present illness:   The patient is a 78-year-old female who reports awakening yesterday morning and following preparation and intake of her breakfast meal, she had onset of \"heart racing\" with associated substernal chest pressure.  She contacted her daughter with whom she resides and presented to [facility] for further evaluation.  At [facility], it is reported that she was in atrial fibrillation of rapid ventricular response converting with intravenous diltiazem.  Troponins were obtained at the [place] facility and with findings of abnormality in the results, the patient was referred and thus transferred to [facility] for further evaluation.  The patient reports resolution of her symptoms once she converted to sinus rhythm following pharmacologic measures.    The patient does have history significant for hypertension with hypertensive nephropathy and a single disease status post renal transplant approximately 9 years prior at in [place].  The patient reports that she continues to receive periodic nephrology evaluations with Dr. Reza in [place].  She reports her creatinine (baseline) to range from 2.0-3.0.  The patient does have history of coronary artery disease status post PCI at time of renal transplant.  Once again, this service was provided per cardiologist in [place].  The patient underwent " myocardial perfusion scan in 2017 under the direction of Dr. James Almazan which was found to be negative for evidence of ischemia.  The patient has history of paroxysmal atrial fibrillation on anticoagulation plus rate control.    In February 2017 the patient underwent transesophageal echocardiogram for definitive evaluation with subsequent findings of preserved LVEF at 61-65%; severe septal asymmetric hypertrophy; RV of normal size and function; no evidence of VSD; consideration for L MCA physiologic flow; prominent eustachian valve present; Mckeon grade 2 aortic arch; no significant valvular abnormality.    Review of Systems   Constitutional: Negative for chills and fever.   Respiratory: Negative for apnea, cough, choking, chest tightness, shortness of breath, wheezing and stridor.    Cardiovascular: Negative for chest pain, palpitations and leg swelling.   Gastrointestinal: Negative for abdominal distention and abdominal pain.   Neurological: Negative for dizziness, syncope and light-headedness.   Hematological: Does not bruise/bleed easily.     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   • Allergic    • Asthma    • History of transfusion    • Sinusitis    • Stroke (CMS/[facility])     mini stroke   , Past Surgical History:  "  Procedure Laterality Date   • COLONOSCOPY  02/12/2014    Solid stool through out the colon.I biopsies the colonic mucosa given her diarrhea.Exam markedly limited by poor prep.[facility].   • INJECTION OF MEDICATION  12/21/2015    Kenalog (1)     • INJECTION OF MEDICATION  12/21/2015    Rocephin (1)     • INJECTION OF MEDICATION  06/30/2016    PNEUMOC VAC/ADMIN/RCVD 4040F (1)      • ENDOSCOPY N/A 12/4/2017    Procedure: ESOPHAGOGASTRODUODENOSCOPY;  Surgeon: Nikko Zee MD;  Location: [facility] ENDOSCOPY;  Service:    • COLONOSCOPY N/A 12/20/2017    Procedure: COLONOSCOPY ;  Surgeon: Nikko Zee MD;  Location: [facility] ENDOSCOPY;  Service:    • TRANSPLANTATION RENAL     , Family History   Problem Relation Age of Onset   • Lung cancer Mother    • Heart disease Father    • Lupus Other    • Diabetes Other    , Social History     Tobacco Use   • Smoking status: Never Smoker   • Smokeless tobacco: Never Used   Substance Use Topics   • Alcohol use: No   • Drug use: No     Allergies:  Clonidine derivatives; Latex; Naproxen; Penicillins; and Zyrtec [cetirizine]    Objective     Dietary Orders (From admission, onward)    Start     Ordered    02/12/19 1414  Diet Regular; Cardiac, Consistent Carbohydrate, Renal  Diet Effective Now     Comments:  Cottage Cheese with lunch   Question Answer Comment   Diet Texture / Consistency Regular    Common Modifiers Cardiac    Common Modifiers Consistent Carbohydrate    Common Modifiers Renal        02/12/19 1413        Respiratory:  No Data Recorded   Weight  Min: 69.4 kg (152 lb 14.4 oz)  Max: 70.3 kg (154 lb 14.4 oz)     Vital Sign Min/Max for last 24 hours  Temp  Min: 96.9 °F (36.1 °C)  Max: 98 °F (36.7 °C)   BP  Min: 107/53  Max: 170/76   Pulse  Min: 60  Max: 91   Resp  Min: 16  Max: 20   SpO2  Min: 95 %  Max: 100 %   No Data Recorded   Weight  Min: 69.4 kg (152 lb 14.4 oz)  Max: 70.3 kg (154 lb 14.4 oz)     Flowsheet Rows      First Filed Value   Admission Height  152.4 cm (60\") " Documented at 02/11/2019 1734   Admission Weight  70.3 kg (154 lb 14.4 oz) Documented at 02/11/2019 1734            02/11/19  1734 02/12/19  0400   Weight: 70.3 kg (154 lb 14.4 oz) 69.4 kg (152 lb 14.4 oz)     I/O last 3 completed shifts:  In: 240 [P.O.:240]  Out: 2000 [Urine:2000]    Physical Exam   Constitutional: She is oriented to person, place, and time. No distress.   HENT:   Head: Normocephalic and atraumatic.   Eyes: Right eye exhibits no discharge. Left eye exhibits no discharge.   Neck: No JVD present. No tracheal deviation present.   Cardiovascular: Normal rate, regular rhythm, S1 normal and S2 normal.   Murmur heard.   Systolic murmur is present with a grade of 2/6.  Pulses:       Radial pulses are 2+ on the right side, and 2+ on the left side.   Pulmonary/Chest: Effort normal. No stridor. No respiratory distress. She has no wheezes. She has no rales.   Abdominal: She exhibits no distension. There is no tenderness.   Musculoskeletal: She exhibits no edema or tenderness.   Neurological: She is alert and oriented to person, place, and time.   Skin: Skin is warm and dry. Capillary refill takes less than 2 seconds. She is not diaphoretic.   Psychiatric: She has a normal mood and affect. Her behavior is normal. Judgment and thought content normal.   Vitals reviewed.    97 °F (36.1 °C) (Oral) 60 120/57 18 95% 69.4 kg (152 lb 14.4 oz) Body mass index is 29.86 kg/m².         Current Facility-Administered Medications:   •  albuterol (PROVENTIL) nebulizer solution 0.083% 2.5 mg/3mL, 2.5 mg, Nebulization, Q4H - RT, Olvin Barrios, DO, 2.5 mg at 02/12/19 1022  •  amitriptyline (ELAVIL) tablet 10 mg, 10 mg, Oral, Nightly, Olvin Barrios, DO, 10 mg at 02/11/19 2104  •  aspirin EC tablet 81 mg, 81 mg, Oral, Daily, Olvin Barriosish, DO, 81 mg at 02/12/19 0813  •  carvedilol (COREG) tablet 3.125 mg, 3.125 mg, Oral, BID With Meals, Olvin Barrios DO, 3.125 mg at 02/12/19 0817  •  cetirizine (zyrTEC)  tablet 10 mg, 10 mg, Oral, Daily, Barrios, Olvin Andreas, DO, 10 mg at 02/12/19 0812  •  dabigatran etexilate (PRADAXA) capsule 75 mg, 75 mg, Oral, Q12H, Barrios, Olvin Andreas, DO, 75 mg at 02/12/19 0813  •  dextrose (D50W) 25 g/ 50mL Intravenous Solution 25 g, 25 g, Intravenous, Q15 Min PRN, Barrios, Olvin Andreas, DO  •  dextrose (GLUTOSE) oral gel 15 g, 15 g, Oral, Q15 Min PRN, [facility], Olvin Andreas, DO  •  diltiaZEM CD (CARDIZEM CD) 24 hr capsule 180 mg, 180 mg, Oral, Q24H, Barrios, [facility] Andreas, DO, 180 mg at 02/12/19 0813  •  furosemide (LASIX) tablet 40 mg, 40 mg, Oral, Daily, Barrios, [facility] Andreas, DO, 40 mg at 02/12/19 0813  •  glucagon (human recombinant) (GLUCAGEN DIAGNOSTIC) injection 1 mg, 1 mg, Subcutaneous, PRN, Barrios, Olvin Andreas, DO  •  hydrALAZINE (APRESOLINE) tablet 100 mg, 100 mg, Oral, TID, [facility], [facility] Andreas, DO, 100 mg at 02/12/19 0812  •  insulin aspart (novoLOG) injection 0-9 Units, 0-9 Units, Subcutaneous, 4x Daily AC & at Bedtime, [facility], [facility] Andreas, DO, 0 Units at 02/12/19 1225  •  losartan (COZAAR) tablet 100 mg, 100 mg, Oral, Daily, Barrios, [facility] Andreas, DO, 100 mg at 02/12/19 0811  •  magic butt ointment, , Topical, TID, Buddy Young PA  •  nitroglycerin (NITROSTAT) SL tablet 0.4 mg, 0.4 mg, Sublingual, Q5 Min PRN, [facility], [facility] Andreas, DO  •  pantoprazole (PROTONIX) EC tablet 40 mg, 40 mg, Oral, Daily, Barrios, Olvin Andreas, DO, 40 mg at 02/12/19 0813  •  rosuvastatin (CRESTOR) tablet 20 mg, 20 mg, Oral, Daily, Olvin Barrios, , 20 mg at 02/12/19 0813  •  sodium chloride 0.9 % flush 3 mL, 3 mL, Intravenous, Q12H, Olvin Barrios, , 3 mL at 02/12/19 0814  •  sodium chloride 0.9 % flush 3-10 mL, 3-10 mL, Intravenous, PRN, Olvin Barrios DO  •  sodium chloride 0.9 % infusion, 75 mL/hr, Intravenous, Continuous, Buddy Young, PA  •  tacrolimus (PROGRAF) capsule 1 mg, 1 mg, Oral, BID, Olvin Barrios, , 1 mg at 02/12/19 0813  •  traMADol (ULTRAM) tablet  50 mg, 50 mg, Oral, Q8H PRN, Olvin Barrios,     Infusions:    sodium chloride 75 mL/hr     Medications Prior to Admission   Medication Sig Dispense Refill Last Dose   • albuterol (PROVENTIL HFA;VENTOLIN HFA) 108 (90 Base) MCG/ACT inhaler Inhale 2 puffs Every 4 (Four) Hours As Needed for Shortness of Air. 1 inhaler 11 2/10/2019 at Unknown time   • amitriptyline (ELAVIL) 10 MG tablet Take 10 mg by mouth Every Night.  0 2/10/2019 at     • ASPIRIN LOW DOSE 81 MG EC tablet TAKE 1 TABLET BY MOUTH EVERY DAY 30 tablet 0 2/11/2019 at Unknown time   • carvedilol (COREG) 3.125 MG tablet Take 1 tablet by mouth 2 (Two) Times a Day With Meals. 180 tablet 3 2/11/2019 at Unknown time   • cetirizine (zyrTEC) 10 MG tablet TAKE 1 TABLET BY MOUTH DAILY 30 tablet 0 2/11/2019 at Unknown time   • Cholecalciferol (VITAMIN D3) 5000 UNITS capsule capsule Take 5,000 Units by mouth Daily.   2/11/2019 at Unknown time   • dabigatran etexilate (PRADAXA) 75 MG capsule Take 1 capsule by mouth Every 12 (Twelve) Hours. 180 capsule 0 2/11/2019 at Unknown time   • ezetimibe (ZETIA) 10 MG tablet TAKE 1 TABLET BY MOUTH DAILY 31 tablet 3 2/11/2019 at Unknown time   • furosemide (LASIX) 40 MG tablet TAKE 1 TABLET BY MOUTH EVERY MORNING AND 1/2 TABLET BY MOUTH EVERY EVENING 180 tablet 3 2/11/2019 at Unknown time   • glucose blood test strip Use as instructed 100 each 12 2/11/2019 at Unknown time   • glucose monitor monitoring kit 1 each Daily. 1 each 0 2/11/2019 at Unknown time   • hydrALAZINE (APRESOLINE) 100 MG tablet Take 1 tablet by mouth 3 (Three) Times a Day. 270 tablet 3 2/11/2019 at Unknown time   • Injection Device for Insulin device 1 application 3 (Three) Times a Week if Needed (to use with insulin). To use with insulin pen 3 times a day. Give 90 day supply 300 each 3 2/11/2019 at Unknown time   • insulin aspart (NOVOLOG) 100 UNIT/ML injection Put 240ml in pump 240 mL 3 2/11/2019 at Unknown time   • LANTUS SOLOSTAR 100 UNIT/ML injection  pen INJECT 10 UNITS SUBCUTANEOUSLY UTD AS A BACK UP FOR PUMP  4 2/11/2019 at Unknown time   • losartan (COZAAR) 100 MG tablet Take 1 tablet by mouth Daily. (Patient taking differently: Take 50 mg by mouth Daily.) 30 tablet 3 Patient Taking Differently at Unknown time   • mycophenolate (MYFORTIC) 360 MG tablet delayed-release EC tablet Take 1 tablet by mouth 2 (Two) Times a Day. 180 tablet 6 2/11/2019 at Unknown time   • pantoprazole (PROTONIX) 40 MG EC tablet Take 1 tablet by mouth Daily. 30 tablet 3 2/11/2019 at Unknown time   • rosuvastatin (CRESTOR) 20 MG tablet Take 20 mg by mouth Daily.  2 2/11/2019 at Unknown time   • tacrolimus (PROGRAF) 1 MG capsule Take 1 capsule by mouth 2 (Two) Times a Day. 180 capsule 3 2/10/2019 at Unknown time   • Ca Carb-FA-D-B6-B12-Boron-Mg 1342-1 MG wafer Take 1 tablet by mouth daily.   Taking   • Calcium Carb-Cholecalciferol (CALCIUM 1000 + D PO) Take 1 tablet by mouth Daily.   Taking   • diltiaZEM SR (CARDIZEM SR) 90 MG 12 hr capsule Take 1 capsule by mouth 2 (Two) Times a Day. (Patient not taking: Reported on 2/11/2019) 60 capsule 11 Not Taking at Unknown time   • ferrous sulfate 325 (65 FE) MG tablet Take 1 tablet by mouth Daily With Breakfast. (Patient not taking: Reported on 2/11/2019) 30 tablet 5 Not Taking at Unknown time   • fluticasone (FLONASE) 50 MCG/ACT nasal spray 2 sprays into the nostril(s) as directed by provider Daily. (Patient not taking: Reported on 2/11/2019) 1 bottle 3 Not Taking at Unknown time   • glucagon (GLUCAGON EMERGENCY) 1 MG injection Inject 1 mg under the skin 1 (One) Time As Needed for Low Blood Sugar for up to 1 dose. 1 kit 5 Unknown at Unknown time   • ipratropium (ATROVENT) 0.06 % nasal spray 2 sprays into each nostril 2 (Two) Times a Day. (Patient not taking: Reported on 2/11/2019) 15 mL 11 Not Taking at Unknown time   • mupirocin (BACTROBAN) 2 % ointment Apply  topically 3 (Three) Times a Day. (Patient not taking: Reported on 2/11/2019) 15 g 0  Not Taking at Unknown time   • predniSONE (DELTASONE) 5 MG tablet Take 1 tablet by mouth Daily. (Patient not taking: Reported on 2/11/2019) 90 tablet 3 Not Taking at Unknown time   • traMADol (ULTRAM) 50 MG tablet Take 1 tablet by mouth 2 (Two) Times a Day. (Patient not taking: Reported on 2/11/2019) 28 tablet 0 Not Taking at Unknown time     Data Reviewed:  Electrocardiogram:  Will obtain EKG today    @  Results for orders placed in visit on 02/01/17   Adult Transesophageal Echo With Contrast    Narrative · Transesophageal echocardiogram with agitated saline contrast.  · Left ventricular function is normal.  · Estimated EF appears to be in the range of 61 - 65%.  · Left ventricular wall thickness is consistent with severe septal   asymmetric hypertrophy.  · The right ventricle is normal in size and shape with normal systolic   function  · There is no evidence for VSD. The unusual color flow Doppler on the   echocardigram correlates with LMCA physiologic flow.  · A prominent Eustachian valve is present with a filamentous structure off   the eustachian valve.  · Mckeon grade II aortic arch  · No significant valvular abnormality.        Results from last 7 days   Lab Units 02/12/19  0528 02/11/19  1834   SODIUM mmol/L 136* 136*   POTASSIUM mmol/L 4.4 4.1   CHLORIDE mmol/L 99 97   CO2 mmol/L 26.0 25.0   BUN mg/dL 86* 83*   CREATININE mg/dL 2.92* 2.51*   CALCIUM mg/dL 9.7 10.2   GLUCOSE mg/dL 332* 283*     Estimated Creatinine Clearance: 14.3 mL/min (A) (by C-G formula based on SCr of 2.92 mg/dL (H)).    Results from last 7 days   Lab Units 02/12/19  0528 02/11/19  1834   MAGNESIUM mg/dL 2.5* 2.4*     Results from last 7 days   Lab Units 02/12/19  0528 02/11/19  1834   WBC 10*3/mm3 3.33* 4.43   HEMOGLOBIN g/dL 8.6* 9.3*   PLATELETS 10*3/mm3 150 178     Lab Results   Component Value Date    HGBA1C 7.7 (H) 11/20/2018     Lab Results   Component Value Date    CKTOTAL 73 03/21/2017    TROPONINI 0.137 (C) 02/12/2019      Lab Results   Component Value Date    PROBNP 2,700.0 (H) 03/22/2017     Lab Results   Component Value Date    CHOL 208 (H) 11/20/2018    CHOL 335 (H) 04/30/2018    CHOL 269 (H) 12/29/2017     Lab Results   Component Value Date    TRIG 117 11/20/2018    TRIG 193 04/30/2018    TRIG 161 12/29/2017     Lab Results   Component Value Date    HDL 80 11/20/2018    HDL 49 (L) 04/30/2018    HDL 60 12/29/2017     The following portions of the patient's history were reviewed and updated as appropriate: allergies, current medications, problem list,  past medical history, surgical history, family history and social history.    Recent images independently reviewed.    Available laboratory values reviewed.    Assessment/Plan       Atrial flutter (CMS/HCC)  The patient presents with history of paroxysmal atrial fibrillation whose presentation to outside facility was with findings of atrial fibrillation rapid ventricular response converting to sinus rhythm following pharmacologic intervention (intravenous diltiazem).  The patient's mildly abnormal troponin appears multifactorial in etiology given her renal status post renal transplant.  In addition, the proBNP appears to be within a normal range given her advanced age and renal status.    The patient appears at the present time to be comfortable without chest discomfort.    We will repeat electrocardiogram is I am unable to find documents from [facility] at this time.    I did have discussion with the patient today regarding performing of myocardial perfusion scan to reassess for underlying ischemia.  The patient is inclined to hold off on that procedure at this time and follow-up with Dr. Almazan in the outpatient arena.    Regarding paroxysmal atrial fibrillation, now in normal sinus rhythm:  -Anticoagulation with Pradaxa  -Rate control agents: Carvedilol and diltiazem  -Update of transthoracic echocardiogram with further recommendations based upon that  review    Regarding history of coronary artery disease:  -Aspirin 81 mg daily  - Statin therapy  -Beta-blocker therapy    Regarding hypertension which is well controlled at time of cardiology consultation:  -Carvedilol 3.125 mg twice daily  -Diltiazem 180 mg daily  -Lasix 40 mg daily  -Hydralazine 100 mg 3 times daily  -Losartan 100 mg daily    Regarding hyperlipidemia:  -Statin therapy     Further recommendations will be based upon above findings/response.    I discussed the patients findings and my recommendations with patient, nursing staff and consulting provider    Thank you for allowing me to participate in the evaluation and management of this patient.        Cinthia Martinez, APRN  02/12/19  2:30 PM

## 2019-02-13 LAB
ALBUMIN SERPL-MCNC: 3.6 G/DL (ref 3.4–4.8)
ALBUMIN/GLOB SERPL: 1.4 G/DL (ref 1.1–1.8)
ALP SERPL-CCNC: 76 U/L (ref 38–126)
ALT SERPL W P-5'-P-CCNC: 10 U/L (ref 9–52)
ANION GAP SERPL CALCULATED.3IONS-SCNC: 14 MMOL/L (ref 5–15)
AST SERPL-CCNC: 19 U/L (ref 14–36)
BASOPHILS # BLD AUTO: 0.01 10*3/MM3 (ref 0–0.2)
BASOPHILS NFR BLD AUTO: 0.3 % (ref 0–1.5)
BILIRUB SERPL-MCNC: 0.3 MG/DL (ref 0.2–1.3)
BUN BLD-MCNC: 90 MG/DL (ref 7–21)
BUN/CREAT SERPL: 28.6 (ref 7–25)
CALCIUM SPEC-SCNC: 9 MG/DL (ref 8.4–10.2)
CHLORIDE SERPL-SCNC: 99 MMOL/L (ref 95–110)
CO2 SERPL-SCNC: 22 MMOL/L (ref 22–31)
CREAT BLD-MCNC: 3.15 MG/DL (ref 0.5–1)
CREAT UR-MCNC: 101.3 MG/DL
DEPRECATED RDW RBC AUTO: 48.5 FL (ref 37–54)
EOSINOPHIL # BLD AUTO: 0.07 10*3/MM3 (ref 0–0.4)
EOSINOPHIL NFR BLD AUTO: 2.2 % (ref 0.3–6.2)
ERYTHROCYTE [DISTWIDTH] IN BLOOD BY AUTOMATED COUNT: 15.9 % (ref 12.3–15.4)
GFR SERPL CREATININE-BSD FRML MDRD: 17 ML/MIN/1.73 (ref 39–90)
GLOBULIN UR ELPH-MCNC: 2.5 GM/DL (ref 2.3–3.5)
GLUCOSE BLD-MCNC: 233 MG/DL (ref 60–100)
GLUCOSE BLD-MCNC: 407 MG/DL (ref 60–100)
GLUCOSE BLDC GLUCOMTR-MCNC: 126 MG/DL (ref 70–130)
GLUCOSE BLDC GLUCOMTR-MCNC: 254 MG/DL (ref 70–130)
GLUCOSE BLDC GLUCOMTR-MCNC: 297 MG/DL (ref 70–130)
GLUCOSE BLDC GLUCOMTR-MCNC: 50 MG/DL (ref 70–130)
HCT VFR BLD AUTO: 26.1 % (ref 34–46.6)
HEMOCCULT STL QL: NEGATIVE
HGB BLD-MCNC: 7.9 G/DL (ref 12–15.9)
IMM GRANULOCYTES # BLD AUTO: 0 10*3/MM3 (ref 0–0.05)
IMM GRANULOCYTES NFR BLD AUTO: 0 % (ref 0–0.5)
LYMPHOCYTES # BLD AUTO: 0.62 10*3/MM3 (ref 0.7–3.1)
LYMPHOCYTES NFR BLD AUTO: 19.3 % (ref 19.6–45.3)
MAGNESIUM SERPL-MCNC: 2.4 MG/DL (ref 1.6–2.3)
MCH RBC QN AUTO: 25.7 PG (ref 26.6–33)
MCHC RBC AUTO-ENTMCNC: 30.3 G/DL (ref 31.5–35.7)
MCV RBC AUTO: 85 FL (ref 79–97)
MONOCYTES # BLD AUTO: 0.39 10*3/MM3 (ref 0.1–0.9)
MONOCYTES NFR BLD AUTO: 12.1 % (ref 5–12)
NEUTROPHILS # BLD AUTO: 2.12 10*3/MM3 (ref 1.4–7)
NEUTROPHILS NFR BLD AUTO: 66.1 % (ref 42.7–76)
NRBC BLD AUTO-RTO: 0 /100 WBC (ref 0–0)
PLATELET # BLD AUTO: 174 10*3/MM3 (ref 140–450)
PMV BLD AUTO: 12.1 FL (ref 6–12)
POTASSIUM BLD-SCNC: 4 MMOL/L (ref 3.5–5.1)
PROT SERPL-MCNC: 6.1 G/DL (ref 6.3–8.6)
RBC # BLD AUTO: 3.07 10*6/MM3 (ref 3.77–5.28)
SODIUM BLD-SCNC: 135 MMOL/L (ref 137–145)
SODIUM UR-SCNC: 41 MMOL/L (ref 30–90)
WBC NRBC COR # BLD: 3.21 10*3/MM3 (ref 3.4–10.8)

## 2019-02-13 PROCEDURE — 63710000001 TACROLIMUS PER 1 MG: Performed by: INTERNAL MEDICINE

## 2019-02-13 PROCEDURE — 82272 OCCULT BLD FECES 1-3 TESTS: CPT | Performed by: PHYSICIAN ASSISTANT

## 2019-02-13 PROCEDURE — 94799 UNLISTED PULMONARY SVC/PX: CPT

## 2019-02-13 PROCEDURE — 97116 GAIT TRAINING THERAPY: CPT

## 2019-02-13 PROCEDURE — 80053 COMPREHEN METABOLIC PANEL: CPT | Performed by: PHYSICIAN ASSISTANT

## 2019-02-13 PROCEDURE — 63710000001 PREDNISONE PER 5 MG: Performed by: INTERNAL MEDICINE

## 2019-02-13 PROCEDURE — 85025 COMPLETE CBC W/AUTO DIFF WBC: CPT | Performed by: INTERNAL MEDICINE

## 2019-02-13 PROCEDURE — 82947 ASSAY GLUCOSE BLOOD QUANT: CPT | Performed by: INTERNAL MEDICINE

## 2019-02-13 PROCEDURE — 99213 OFFICE O/P EST LOW 20 MIN: CPT | Performed by: NURSE PRACTITIONER

## 2019-02-13 PROCEDURE — 87205 SMEAR GRAM STAIN: CPT | Performed by: INTERNAL MEDICINE

## 2019-02-13 PROCEDURE — 63710000001 MYCOPHENOLATE 360 MG TABLET DELAYED-RELEASE: Performed by: INTERNAL MEDICINE

## 2019-02-13 PROCEDURE — 82570 ASSAY OF URINE CREATININE: CPT | Performed by: INTERNAL MEDICINE

## 2019-02-13 PROCEDURE — 83735 ASSAY OF MAGNESIUM: CPT | Performed by: INTERNAL MEDICINE

## 2019-02-13 PROCEDURE — 82962 GLUCOSE BLOOD TEST: CPT

## 2019-02-13 PROCEDURE — 63710000001 INSULIN ASPART PER 5 UNITS: Performed by: PHYSICIAN ASSISTANT

## 2019-02-13 PROCEDURE — 84300 ASSAY OF URINE SODIUM: CPT | Performed by: INTERNAL MEDICINE

## 2019-02-13 PROCEDURE — 93005 ELECTROCARDIOGRAM TRACING: CPT | Performed by: INTERNAL MEDICINE

## 2019-02-13 PROCEDURE — 97162 PT EVAL MOD COMPLEX 30 MIN: CPT

## 2019-02-13 PROCEDURE — 93010 ELECTROCARDIOGRAM REPORT: CPT | Performed by: INTERNAL MEDICINE

## 2019-02-13 PROCEDURE — G0378 HOSPITAL OBSERVATION PER HR: HCPCS

## 2019-02-13 RX ORDER — PREDNISONE 1 MG/1
5 TABLET ORAL
Status: DISCONTINUED | OUTPATIENT
Start: 2019-02-13 | End: 2019-02-15 | Stop reason: HOSPADM

## 2019-02-13 RX ORDER — LACTULOSE 10 G/15ML
10 SOLUTION ORAL DAILY
Status: DISCONTINUED | OUTPATIENT
Start: 2019-02-13 | End: 2019-02-15 | Stop reason: HOSPADM

## 2019-02-13 RX ORDER — ALUMINA, MAGNESIA, AND SIMETHICONE 2400; 2400; 240 MG/30ML; MG/30ML; MG/30ML
15 SUSPENSION ORAL ONCE
Status: COMPLETED | OUTPATIENT
Start: 2019-02-13 | End: 2019-02-13

## 2019-02-13 RX ORDER — FAMOTIDINE 10 MG/ML
20 INJECTION, SOLUTION INTRAVENOUS ONCE
Status: COMPLETED | OUTPATIENT
Start: 2019-02-13 | End: 2019-02-13

## 2019-02-13 RX ORDER — TACROLIMUS 1 MG/1
4 CAPSULE ORAL 2 TIMES DAILY
Status: DISCONTINUED | OUTPATIENT
Start: 2019-02-13 | End: 2019-02-15 | Stop reason: HOSPADM

## 2019-02-13 RX ORDER — MYCOPHENOLIC ACID 360 MG/1
360 TABLET, DELAYED RELEASE ORAL EVERY 12 HOURS SCHEDULED
Status: DISCONTINUED | OUTPATIENT
Start: 2019-02-13 | End: 2019-02-15 | Stop reason: HOSPADM

## 2019-02-13 RX ORDER — CALCIUM CARBONATE 200(500)MG
1 TABLET,CHEWABLE ORAL ONCE
Status: COMPLETED | OUTPATIENT
Start: 2019-02-13 | End: 2019-02-13

## 2019-02-13 RX ADMIN — CALCIUM CARBONATE (ANTACID) CHEW TAB 500 MG 1 TABLET: 500 CHEW TAB at 20:01

## 2019-02-13 RX ADMIN — SODIUM CHLORIDE, PRESERVATIVE FREE 10 ML: 5 INJECTION INTRAVENOUS at 09:01

## 2019-02-13 RX ADMIN — HYDRALAZINE HYDROCHLORIDE 100 MG: 50 TABLET ORAL at 08:54

## 2019-02-13 RX ADMIN — HYDRALAZINE HYDROCHLORIDE 100 MG: 50 TABLET ORAL at 17:14

## 2019-02-13 RX ADMIN — DILTIAZEM HYDROCHLORIDE 180 MG: 180 CAPSULE, COATED, EXTENDED RELEASE ORAL at 08:53

## 2019-02-13 RX ADMIN — PREDNISONE 5 MG: 5 TABLET ORAL at 13:20

## 2019-02-13 RX ADMIN — CARVEDILOL 3.12 MG: 3.12 TABLET, FILM COATED ORAL at 17:14

## 2019-02-13 RX ADMIN — NITROGLYCERIN 0.4 MG: 0.4 TABLET, ORALLY DISINTEGRATING SUBLINGUAL at 19:43

## 2019-02-13 RX ADMIN — HYDROCORTISONE: 1 CREAM TOPICAL at 21:49

## 2019-02-13 RX ADMIN — ROSUVASTATIN CALCIUM 20 MG: 20 TABLET, FILM COATED ORAL at 08:53

## 2019-02-13 RX ADMIN — ALUMINUM HYDROXIDE, MAGNESIUM HYDROXIDE, AND DIMETHICONE 15 ML: 400; 400; 40 SUSPENSION ORAL at 20:01

## 2019-02-13 RX ADMIN — SODIUM CHLORIDE 125 ML/HR: 9 INJECTION, SOLUTION INTRAVENOUS at 15:28

## 2019-02-13 RX ADMIN — AMITRIPTYLINE HYDROCHLORIDE 10 MG: 10 TABLET, FILM COATED ORAL at 20:01

## 2019-02-13 RX ADMIN — MYCOPHENOLIC ACID 360 MG: 360 TABLET, DELAYED RELEASE ORAL at 20:01

## 2019-02-13 RX ADMIN — PANTOPRAZOLE SODIUM 40 MG: 40 TABLET, DELAYED RELEASE ORAL at 08:53

## 2019-02-13 RX ADMIN — TACROLIMUS 1 MG: 1 CAPSULE ORAL at 08:54

## 2019-02-13 RX ADMIN — CETIRIZINE HYDROCHLORIDE 10 MG: 10 TABLET, FILM COATED ORAL at 08:54

## 2019-02-13 RX ADMIN — DABIGATRAN ETEXILATE MESYLATE 75 MG: 75 CAPSULE ORAL at 08:53

## 2019-02-13 RX ADMIN — ASPIRIN 81 MG: 81 TABLET, COATED ORAL at 08:53

## 2019-02-13 RX ADMIN — HYDROCORTISONE: 1 CREAM TOPICAL at 08:55

## 2019-02-13 RX ADMIN — FAMOTIDINE 20 MG: 10 INJECTION INTRAVENOUS at 20:01

## 2019-02-13 RX ADMIN — ALBUTEROL SULFATE 2.5 MG: 2.5 SOLUTION RESPIRATORY (INHALATION) at 03:27

## 2019-02-13 RX ADMIN — HYDRALAZINE HYDROCHLORIDE 100 MG: 50 TABLET ORAL at 21:49

## 2019-02-13 RX ADMIN — NITROGLYCERIN 0.4 MG: 0.4 TABLET, ORALLY DISINTEGRATING SUBLINGUAL at 19:37

## 2019-02-13 RX ADMIN — HYDROCORTISONE: 1 CREAM TOPICAL at 17:14

## 2019-02-13 RX ADMIN — MYCOPHENOLIC ACID 360 MG: 360 TABLET, DELAYED RELEASE ORAL at 13:20

## 2019-02-13 RX ADMIN — INSULIN ASPART 8 UNITS: 100 INJECTION, SOLUTION INTRAVENOUS; SUBCUTANEOUS at 09:00

## 2019-02-13 RX ADMIN — CARVEDILOL 3.12 MG: 3.12 TABLET, FILM COATED ORAL at 08:53

## 2019-02-13 RX ADMIN — INSULIN ASPART 8 UNITS: 100 INJECTION, SOLUTION INTRAVENOUS; SUBCUTANEOUS at 11:56

## 2019-02-13 RX ADMIN — INSULIN ASPART 14 UNITS: 100 INJECTION, SOLUTION INTRAVENOUS; SUBCUTANEOUS at 21:49

## 2019-02-13 RX ADMIN — NITROGLYCERIN 0.4 MG: 0.4 TABLET, ORALLY DISINTEGRATING SUBLINGUAL at 19:31

## 2019-02-13 RX ADMIN — TACROLIMUS 4 MG: 1 CAPSULE ORAL at 20:01

## 2019-02-13 RX ADMIN — LACTULOSE 10 G: 10 SOLUTION ORAL at 13:21

## 2019-02-13 NOTE — MEDICAL STUDENT
[facility]  [address] [place] [address]. [id number]  T - [phone]  F - [phone]     Consultation         PATIENT  DEMOGRAPHICS   PATIENT NAME: Chanda Yan                      PHYSICIAN: Bettie Browning  : 1940  MRN: [id number]    Subjective   SUBJECTIVE   Referring Provider: Buddy Young   Reason for Consultation: ckd 3/4 post transplant  History of present illness:    79 yo female with history of CAD, Atrial fibrillation,CAD s/p stent in the past and Diabetes Mellitus presented to ED with chest pain and atrial flutter. She has DDKT in  and was on hemodialysis for nearly 7 years prior to the transplant. Her baseline cr is close to 1.9-2. She is currently on myfortic prograf and prednisone. She is followed by Dr Reza in [place]. She is currently in normal sinus rhythm and is not having chest pain.     Past Medical History:   Diagnosis Date   • Acute bronchitis 2015   • Allergic    • Asthma    • Chronic kidney disease 2016    unspecified   • Coronary atherosclerosis 2016    s/p stent placement 2 years ago      • Diabetes mellitus type 2, insulin dependent (CMS/HCC) 2016   • Dyslipidemia 2016   • Edema of left upper eyelid 2016   • Edema of right upper eyelid 2016   • Essential hypertension 2016   • History of echocardiogram 2016    Normal LV function with Ef of 65%.Normal RV size and function.Grade 1a diastolic dysfunction.Mild LVH, and severe left atrial dilation.Moderate tricuspid regurg.   • History of transfusion    • Hyperlipidemia 2016   • Low back pain 2016   • Sinusitis    • Stroke (CMS/[facility])     mini stroke   • Upper respiratory infection 2015     Past Surgical History:   Procedure Laterality Date   • COLONOSCOPY  2014    Solid stool through out the colon.I biopsies the colonic mucosa given her diarrhea.Exam markedly limited by poor prep.[facility].   • COLONOSCOPY N/A 2017  "   Procedure: COLONOSCOPY ;  Surgeon: Nikko Zee MD;  Location: [facility] ENDOSCOPY;  Service:    • ENDOSCOPY N/A 12/4/2017    Procedure: ESOPHAGOGASTRODUODENOSCOPY;  Surgeon: Nikko Zee MD;  Location: [facility] ENDOSCOPY;  Service:    • INJECTION OF MEDICATION  12/21/2015    Kenalog (1)     • INJECTION OF MEDICATION  06/30/2016    PNEUMOC VAC/ADMIN/RCVD 4040F (1)      • INJECTION OF MEDICATION  12/21/2015    Rocephin (1)     • TRANSPLANTATION RENAL       Family History   Problem Relation Age of Onset   • Lung cancer Mother    • Heart disease Father    • Lupus Other    • Diabetes Other      Social History     Tobacco Use   • Smoking status: Never Smoker   • Smokeless tobacco: Never Used   Substance Use Topics   • Alcohol use: No   • Drug use: No     Allergies:  Clonidine derivatives; Latex; Naproxen; Penicillins; and Zyrtec [cetirizine]     REVIEW OF SYSTEMS    Review of Systems   Constitutional: Negative for fatigue and fever.   Respiratory: Negative for chest tightness and shortness of breath.    Cardiovascular: Negative for chest pain, palpitations and leg swelling.   Gastrointestinal: Negative for abdominal pain, constipation, diarrhea, nausea and vomiting.   Genitourinary: Negative for dysuria and flank pain.   Musculoskeletal: Negative for arthralgias and back pain.   Skin: Positive for wound. Negative for color change, pallor and rash.        Pressure wound on buttocks.   Neurological: Negative for dizziness, weakness, light-headedness and headaches.   Psychiatric/Behavioral: Negative for confusion.       Objective   OBJECTIVE   Vital Signs  Temp:  [97 °F (36.1 °C)-98.5 °F (36.9 °C)] 98.3 °F (36.8 °C)  Heart Rate:  [58-91] 64  Resp:  [16-18] 18  BP: (101-154)/(50-65) 134/60    Flowsheet Rows      First Filed Value   Admission Height  152.4 cm (60\") Documented at 02/11/2019 1734   Admission Weight  70.3 kg (154 lb 14.4 oz) Documented at 02/11/2019 1734           I/O last 3 completed shifts:  In: 840 " [P.O.:840]  Out: 2400 [Urine:2400]    PHYSICAL EXAM    Physical Exam   Constitutional: She is oriented to person, place, and time. She appears well-developed and well-nourished. No distress.   HENT:   Head: Normocephalic and atraumatic.   Eyes: Pupils are equal, round, and reactive to light.   Neck: Normal range of motion.   Pulmonary/Chest: Effort normal and breath sounds normal.   Abdominal: Soft. Bowel sounds are normal.   Musculoskeletal: Normal range of motion. She exhibits no edema.   Neurological: She is alert and oriented to person, place, and time.   Skin: Skin is warm and dry. She is not diaphoretic.   Psychiatric: She has a normal mood and affect. Her behavior is normal. Judgment and thought content normal.       RESULTS   Results Review:    Results from last 7 days   Lab Units 02/13/19 0535 02/12/19 0528 02/11/19  1834   SODIUM mmol/L 135* 136* 136*   POTASSIUM mmol/L 4.0 4.4 4.1   CHLORIDE mmol/L 99 99 97   CO2 mmol/L 22.0 26.0 25.0   BUN mg/dL 90* 86* 83*   CREATININE mg/dL 3.15* 2.92* 2.51*   CALCIUM mg/dL 9.0 9.7 10.2   BILIRUBIN mg/dL 0.3  --   --    ALK PHOS U/L 76  --   --    ALT (SGPT) U/L 10  --   --    AST (SGOT) U/L 19  --   --    GLUCOSE mg/dL 233* 332* 283*       Estimated Creatinine Clearance: 13.4 mL/min (A) (by C-G formula based on SCr of 3.15 mg/dL (H)).    Results from last 7 days   Lab Units 02/13/19 0535 02/12/19 0528 02/11/19  1834   MAGNESIUM mg/dL 2.4* 2.5* 2.4*             Results from last 7 days   Lab Units 02/13/19 0535 02/12/19  0528 02/11/19  1834   WBC 10*3/mm3 3.21* 3.33* 4.43   HEMOGLOBIN g/dL 7.9* 8.6* 9.3*   PLATELETS 10*3/mm3 174 150 178              MEDICATIONS      albuterol 2.5 mg Nebulization Q4H - RT   amitriptyline 10 mg Oral Nightly   aspirin 81 mg Oral Daily   carvedilol 3.125 mg Oral BID With Meals   cetirizine 10 mg Oral Daily   dabigatran etexilate 75 mg Oral Q12H   diltiaZEM  mg Oral Q24H   hydrALAZINE 100 mg Oral TID   insulin aspart 0-14 Units  Subcutaneous 4x Daily AC & at Bedtime   magic butt ointment  Topical TID   pantoprazole 40 mg Oral Daily   rosuvastatin 20 mg Oral Daily   sodium chloride 3 mL Intravenous Q12H   tacrolimus 1 mg Oral BID       sodium chloride 75 mL/hr Last Rate: 75 mL/hr (02/12/19 9833)     Medications Prior to Admission   Medication Sig Dispense Refill Last Dose   • albuterol (PROVENTIL HFA;VENTOLIN HFA) 108 (90 Base) MCG/ACT inhaler Inhale 2 puffs Every 4 (Four) Hours As Needed for Shortness of Air. 1 inhaler 11 2/10/2019 at Unknown time   • amitriptyline (ELAVIL) 10 MG tablet Take 10 mg by mouth Every Night.  0 2/10/2019 at     • ASPIRIN LOW DOSE 81 MG EC tablet TAKE 1 TABLET BY MOUTH EVERY DAY 30 tablet 0 2/11/2019 at Unknown time   • carvedilol (COREG) 3.125 MG tablet Take 1 tablet by mouth 2 (Two) Times a Day With Meals. 180 tablet 3 2/11/2019 at Unknown time   • cetirizine (zyrTEC) 10 MG tablet TAKE 1 TABLET BY MOUTH DAILY 30 tablet 0 2/11/2019 at Unknown time   • Cholecalciferol (VITAMIN D3) 5000 UNITS capsule capsule Take 5,000 Units by mouth Daily.   2/11/2019 at Unknown time   • dabigatran etexilate (PRADAXA) 75 MG capsule Take 1 capsule by mouth Every 12 (Twelve) Hours. 180 capsule 0 2/11/2019 at Unknown time   • ezetimibe (ZETIA) 10 MG tablet TAKE 1 TABLET BY MOUTH DAILY 31 tablet 3 2/11/2019 at Unknown time   • furosemide (LASIX) 40 MG tablet TAKE 1 TABLET BY MOUTH EVERY MORNING AND 1/2 TABLET BY MOUTH EVERY EVENING 180 tablet 3 2/11/2019 at Unknown time   • glucose blood test strip Use as instructed 100 each 12 2/11/2019 at Unknown time   • glucose monitor monitoring kit 1 each Daily. 1 each 0 2/11/2019 at Unknown time   • hydrALAZINE (APRESOLINE) 100 MG tablet Take 1 tablet by mouth 3 (Three) Times a Day. 270 tablet 3 2/11/2019 at Unknown time   • Injection Device for Insulin device 1 application 3 (Three) Times a Week if Needed (to use with insulin). To use with insulin pen 3 times a day. Give 90 day supply 300 each  3 2/11/2019 at Unknown time   • insulin aspart (NOVOLOG) 100 UNIT/ML injection Put 240ml in pump 240 mL 3 2/11/2019 at Unknown time   • LANTUS SOLOSTAR 100 UNIT/ML injection pen INJECT 10 UNITS SUBCUTANEOUSLY UTD AS A BACK UP FOR PUMP  4 2/11/2019 at Unknown time   • losartan (COZAAR) 100 MG tablet Take 1 tablet by mouth Daily. (Patient taking differently: Take 50 mg by mouth Daily.) 30 tablet 3 Patient Taking Differently at Unknown time   • mycophenolate (MYFORTIC) 360 MG tablet delayed-release EC tablet Take 1 tablet by mouth 2 (Two) Times a Day. 180 tablet 6 2/11/2019 at Unknown time   • pantoprazole (PROTONIX) 40 MG EC tablet Take 1 tablet by mouth Daily. 30 tablet 3 2/11/2019 at Unknown time   • rosuvastatin (CRESTOR) 20 MG tablet Take 20 mg by mouth Daily.  2 2/11/2019 at Unknown time   • tacrolimus (PROGRAF) 1 MG capsule Take 1 capsule by mouth 2 (Two) Times a Day. 180 capsule 3 2/10/2019 at Unknown time   • Ca Carb-FA-D-B6-B12-Boron-Mg 1342-1 MG wafer Take 1 tablet by mouth daily.   Taking   • Calcium Carb-Cholecalciferol (CALCIUM 1000 + D PO) Take 1 tablet by mouth Daily.   Taking   • diltiaZEM SR (CARDIZEM SR) 90 MG 12 hr capsule Take 1 capsule by mouth 2 (Two) Times a Day. (Patient not taking: Reported on 2/11/2019) 60 capsule 11 Not Taking at Unknown time   • ferrous sulfate 325 (65 FE) MG tablet Take 1 tablet by mouth Daily With Breakfast. (Patient not taking: Reported on 2/11/2019) 30 tablet 5 Not Taking at Unknown time   • fluticasone (FLONASE) 50 MCG/ACT nasal spray 2 sprays into the nostril(s) as directed by provider Daily. (Patient not taking: Reported on 2/11/2019) 1 bottle 3 Not Taking at Unknown time   • glucagon (GLUCAGON EMERGENCY) 1 MG injection Inject 1 mg under the skin 1 (One) Time As Needed for Low Blood Sugar for up to 1 dose. 1 kit 5 Unknown at Unknown time   • ipratropium (ATROVENT) 0.06 % nasal spray 2 sprays into each nostril 2 (Two) Times a Day. (Patient not taking: Reported on  2/11/2019) 15 mL 11 Not Taking at Unknown time   • mupirocin (BACTROBAN) 2 % ointment Apply  topically 3 (Three) Times a Day. (Patient not taking: Reported on 2/11/2019) 15 g 0 Not Taking at Unknown time   • predniSONE (DELTASONE) 5 MG tablet Take 1 tablet by mouth Daily. (Patient not taking: Reported on 2/11/2019) 90 tablet 3 Not Taking at Unknown time   • traMADol (ULTRAM) 50 MG tablet Take 1 tablet by mouth 2 (Two) Times a Day. (Patient not taking: Reported on 2/11/2019) 28 tablet 0 Not Taking at Unknown time     Assessment/Plan   ASSESSMENT / PLAN      Atrial flutter (CMS/HCC)    1. CKD with history of transplant. DDKT in 2012 and had 7 years of HD prior to the transplant. Baseline cr is 1.9-2. Now upto 3.15. No albuminuria or hematuria on UA. no pain over the transplant site. She didn't have her myfortic and prednisone since admission. Also she is on prograf 1 mg here and normally takes 4 mg bid at home.     We will adjust her home immunosuppressive meds. Will check renal transplant u/s and urine studies. No potential nephrotoxins in the list. Losartan is on hold. Keep saline at 125cc/hr. Check prograf level in am (trough)    2. htn stable     2- afib with RVR on coreg cardizem    4- h/o cad with stent before       I discussed the patients findings and my recommendations with patient        This document has been electronically signed by Bettie Browning on February 13, 2019 9:43 AM      As the teaching physician, I have personally re-performed the physical exam and medical decision making activities included in the student note.    Randal Madison MD  2/13/2019  12:12 PM

## 2019-02-13 NOTE — PROGRESS NOTES
[facility] Medicine Services  INPATIENT PROGRESS NOTE    Length of Stay: 1  Date of Admission: 2/11/2019  Primary Care Physician: Ayaan Swenson MD    Subjective   Please note that all previous progress notes, lab findings, radiographical findings, medication changes, and physical exam findings have been noted and updated as appropriate.    2/13/2019: Patient has been cleared by cardiology, patient refused stress test and would rather follow-up with Dr. Almazan as an outpatient.  Patient is a renal transplant recipient and has demonstrated elevated creatinine over the past few days.  Patient has not been on her Prograf, trough has been drawn by nephrology.  Patient currently on IV fluids.  Lasix and losartan has been held.    Hemoglobin continued to trend downward, awaiting occult stool.  Have held Pradaxa at this time.  Iron is within normal limits.  TIBC is depressed, possible anemia of chronic disease given patient's renal transplant status.  Will await occult stool if possible before further intervention.  Continue to trend hemoglobin and hematocrit.    Chief Complaint/HPI: This 78-year-old -American female was admitted to hospital services secondary to chest pain and palpitations.  Patient was diagnosed with atrial flutter with a 2-1 rate at [facility].  She was transferred here for further evaluation.  Patient chest pain has resolved and patient is currently in normal sinus rhythm.  Patient did have a mildly elevated troponin, this is likely secondary to the atrial flutter.  Dr. Almazan, the patient's regular cardiologist, has agreed to see the patient.  Consult appreciated.    Review of Systems   Constitutional: Negative for chills and fever.   Respiratory: Negative for chest tightness and shortness of breath.    Cardiovascular: Negative for chest pain and palpitations.   Gastrointestinal: Negative for abdominal pain, nausea and  vomiting.   Genitourinary: Negative for difficulty urinating, dysuria, flank pain and hematuria.   Neurological: Negative for dizziness and light-headedness.      All pertinent negatives and positives are as above. All other systems have been reviewed and are negative unless otherwise stated.     Objective    Temp:  [97.1 °F (36.2 °C)-98.5 °F (36.9 °C)] 97.1 °F (36.2 °C)  Heart Rate:  [58-78] 63  Resp:  [18] 18  BP: (101-154)/(50-67) 148/67    Physical Exam   Constitutional: She is oriented to person, place, and time. She appears well-developed and well-nourished. No distress.   HENT:   Head: Normocephalic and atraumatic.   Eyes: Conjunctivae are normal.   Cardiovascular: Normal rate and regular rhythm.   Pulmonary/Chest: Effort normal and breath sounds normal. No stridor. No respiratory distress.   Abdominal: Soft. Bowel sounds are normal. She exhibits no distension. There is no tenderness.   Musculoskeletal: She exhibits no edema.   Neurological: She is alert and oriented to person, place, and time.   Skin: Skin is warm and dry. Capillary refill takes less than 2 seconds. She is not diaphoretic.   Psychiatric: She has a normal mood and affect. Her behavior is normal.     Results Review:  I have reviewed the labs, radiology results, and diagnostic studies.    Laboratory Data:   Results from last 7 days   Lab Units 02/13/19  0535 02/12/19  0528 02/11/19  1834   SODIUM mmol/L 135* 136* 136*   POTASSIUM mmol/L 4.0 4.4 4.1   CHLORIDE mmol/L 99 99 97   CO2 mmol/L 22.0 26.0 25.0   BUN mg/dL 90* 86* 83*   CREATININE mg/dL 3.15* 2.92* 2.51*   GLUCOSE mg/dL 233* 332* 283*   CALCIUM mg/dL 9.0 9.7 10.2   BILIRUBIN mg/dL 0.3  --   --    ALK PHOS U/L 76  --   --    ALT (SGPT) U/L 10  --   --    AST (SGOT) U/L 19  --   --    ANION GAP mmol/L 14.0 11.0 14.0     Estimated Creatinine Clearance: 13.4 mL/min (A) (by C-G formula based on SCr of 3.15 mg/dL (H)).  Results from last 7 days   Lab Units 02/13/19  0535 02/12/19  0528  02/11/19  1834   MAGNESIUM mg/dL 2.4* 2.5* 2.4*         Results from last 7 days   Lab Units 02/13/19  0535 02/12/19  0528 02/11/19  1834   WBC 10*3/mm3 3.21* 3.33* 4.43   HEMOGLOBIN g/dL 7.9* 8.6* 9.3*   HEMATOCRIT % 26.1* 28.1* 29.7*   PLATELETS 10*3/mm3 174 150 178           Culture Data:   No results found for: BLOODCX  No results found for: URINECX  No results found for: RESPCX  No results found for: WOUNDCX  No results found for: STOOLCX  No components found for: BODYFLD    Radiology Data:   Imaging Results (last 24 hours)     ** No results found for the last 24 hours. **          I have reviewed the patient's current medications.     Assessment/Plan     Active Hospital Problems    Diagnosis   • Atrial flutter (CMS/HCC)   Acute kidney injury on chronic kidney disease stage III  Anemia of chronic disease versus blood loss  Chest pain, resolved  Elevated troponin, likely secondary to atrial flutter and possible demand ischemia      Plan: IV fluids deferred to nephrology, restart Prograf, restart prednisone, monitor hemoglobin and hematocrit, occult stool, hold Pradaxa, if patient continues to demonstrate decreased hemoglobin may require packed red blood cells and a GI consultation.              This document has been electronically signed by BRYAN Lima on February 13, 2019 1:07 PM

## 2019-02-13 NOTE — THERAPY EVALUATION
Acute Care - Physical Therapy Initial Evaluation  [facility]     Patient Name: Chanda Yan  : 1940  MRN: [id number]  Today's Date: 2019   Onset of Illness/Injury or Date of Surgery: 19  Date of Referral to PT: 19  Referring Physician: BRYAN Peraza      Admit Date: 2019    Visit Dx:     ICD-10-CM ICD-9-CM   1. Impaired functional mobility, balance, gait, and endurance Z74.09 V49.89     Patient Active Problem List   Diagnosis   • Type 1 diabetes mellitus with hypoglycemia (CMS/HCC)   • Mixed hyperlipidemia   • Chronic kidney disease   • Obesity   • Encounter for immunization   • History of kidney transplant   • Cough   • Osteoporosis screening   • Uncontrolled hypertension   • Need for vaccination   • Pancytopenia (CMS/HCC)   • Sinusitis   • Paroxysmal atrial fibrillation (CMS/HCC)   • Anemia   • Atrial fibrillation (CMS/HCC)   • Hearing loss   • Hyphema   • Counseling for insulin pump   • Hypoglycemia   • Chest pain   • Malaise and fatigue   • Iron deficiency anemia   • Gastritis   • Erosive gastropathy   • Encounter for Medicare annual wellness exam   • General medical examination   • Vitamin D deficiency   • White coat syndrome with hypertension   • Essential hypertension   • Diabetes mellitus (CMS/HCC)   • Uncontrolled type 2 diabetes mellitus with hyperglycemia (CMS/HCC)   • Hyperlipidemia   • Chronic anticoagulation   • Encounter for screening mammogram for malignant neoplasm of breast   • Thrombocytopenia (CMS/HCC)   • Atrial flutter (CMS/HCC)     Past Medical History:   Diagnosis Date   • Acute bronchitis 2015   • Allergic    • Asthma    • Chronic kidney disease 2016    unspecified   • Coronary atherosclerosis 2016    s/p stent placement 2 years ago      • Diabetes mellitus type 2, insulin dependent (CMS/HCC) 2016   • Dyslipidemia 2016   • Edema of left upper eyelid 2016   • Edema of right upper eyelid 2016   • Essential  "hypertension 06/30/2016   • History of echocardiogram 07/07/2016    Normal LV function with Ef of 65%.Normal RV size and function.Grade 1a diastolic dysfunction.Mild LVH, and severe left atrial dilation.Moderate tricuspid regurg.   • History of transfusion    • Hyperlipidemia 06/22/2016   • Low back pain 02/29/2016   • Sinusitis    • Stroke (CMS/HCC)     mini stroke   • Upper respiratory infection 12/21/2015     Past Surgical History:   Procedure Laterality Date   • COLONOSCOPY  02/12/2014    Solid stool through out the colon.I biopsies the colonic mucosa given her diarrhea.Exam markedly limited by poor prep.[facility].   • COLONOSCOPY N/A 12/20/2017    Procedure: COLONOSCOPY ;  Surgeon: Nikko Zee MD;  Location: [facility] ENDOSCOPY;  Service:    • ENDOSCOPY N/A 12/4/2017    Procedure: ESOPHAGOGASTRODUODENOSCOPY;  Surgeon: Nikko Zee MD;  Location: [facility] ENDOSCOPY;  Service:    • INJECTION OF MEDICATION  12/21/2015    Kenalog (1)     • INJECTION OF MEDICATION  06/30/2016    PNEUMOC VAC/ADMIN/RCVD 4040F (1)      • INJECTION OF MEDICATION  12/21/2015    Rocephin (1)     • TRANSPLANTATION RENAL          PT ASSESSMENT (last 12 hours)      Physical Therapy Evaluation     Row Name 02/13/19 1403          PT Evaluation Time/Intention    Subjective Information  no complaints  -TONO     Document Type  evaluation  -TONO     Mode of Treatment  physical therapy;individual therapy  -TONO     Patient Effort  good  -TONO     Symptoms Noted During/After Treatment  shortness of breath;other (see comments) complained legs a little \"shaky\"  -TONO     Row Name 02/13/19 1400          General Information    Patient Profile Reviewed?  yes  -TONO     Onset of Illness/Injury or Date of Surgery  02/11/19  -TONO     Referring Physician  BRYAN Peraza  -TONO     Patient Observations  alert;cooperative;agree to therapy  -TONO     Patient/Family Observations  no family present  -TONO     General Observations of Patient  pt supine in bed;noted IV, " telemetry  -TONO     Prior Level of Function  independent:;all household mobility;community mobility;ADL's;home management;driving;shopping;using stairs  -TONO     Equipment Currently Used at Home  none  -TONO     Pertinent History of Current Functional Problem  [facility], [facility]THUR wtih anemia  -TONO     Equipment Issued to Patient  gait belt  -TONO     Risks Reviewed  patient:;LOB;dizziness;increased discomfort;change in vital signs  -TONO     Benefits Reviewed  patient:;improve function;increase independence;increase strength;increase balance  -TONO     Barriers to Rehab  none identified  -TONO     Row Name 02/13/19 1403          Relationship/Environment    Lives With  child(darshana), adult  -TONO     Concerns About Impact on Relationships  Lives with dtr and 3 grandchildren ages 9,12,18  -TONO     Row Name 02/13/19 1403          Resource/Environmental Concerns    Current Living Arrangements  home/apartment/condo 1 level house  -TONO     Row Name 02/13/19 1403          Home Main Entrance    Number of Stairs, Main Entrance  three  -TONO     Stair Railings, Main Entrance  railing on right side (ascending)  -TONO     Row Name 02/13/19 1403          Cognitive Assessment/Intervention- PT/OT    Orientation Status (Cognition)  oriented x 4  -TONO     Follows Commands (Cognition)  WFL  -TONO     Safety Deficit (Cognitive)  mild deficit  -TONO     Row Name 02/13/19 1403          Safety Issues, Functional Mobility    Safety Issues Affecting Function (Mobility)  awareness of need for assistance;insight into deficits/self awareness;safety precaution awareness;safety precautions follow-through/compliance  -TONO     Impairments Affecting Function (Mobility)  endurance/activity tolerance;balance;shortness of breath;strength  -TONO     Row Name 02/13/19 1403          Bed Mobility Assessment/Treatment    Bed Mobility Assessment/Treatment  supine-sit;sit-supine  -TONO     Supine-Sit [place] (Bed Mobility)  independent  -TONO     Sit-Supine  "[place] (Bed Mobility)  independent  -     Comment (Bed Mobility)  verbal cues not to move too fast  -[facility] Name 02/13/19 1403          Transfer Assessment/Treatment    Transfer Assessment/Treatment  sit-stand transfer;stand-sit transfer  -     Sit-Stand [place] (Transfers)  stand by assist  -     Stand-Sit [place] (Transfers)  stand by assist  -TONO     Row Name 02/13/19 1403          Gait/Stairs Assessment/Training    Gait/Stairs Assessment/Training  gait/ambulation independence  -     [place] Level (Gait)  contact guard;verbal cues;nonverbal cues (demo/gesture)  -     Distance in Feet (Gait)  210  -     Comment (Gait/Stairs)  Deferred step training as pt beginning to feel \"shaky\" after ambulation in hallway  -[facility] 02/13/19 1403          General ROM    GENERAL ROM COMMENTS  AROM WFL all extremities  -TONO     Row Name 02/13/19 1403          MMT (Manual Muscle Testing)    General MMT Comments  BUE: 4/5 , wrists, elbows, shoulders;BLE: 4/5 ankles/feet, knees, hips  -TONO     Row Name 02/13/19 1403          Sensory Assessment/Intervention    Sensory General Assessment  no sensation deficits identified to light touch  -TONO     Row Name 02/13/19 1403          Vision Assessment/Intervention    Visual Impairment/Limitations  corrective lenses for reading  -TONO     Row Name 02/13/19 1403          Pain Assessment    Additional Documentation  Pain Scale: Numbers Pre/Post-Treatment (Group)  -TONO     Row Name 02/13/19 1403          Pain Scale: Numbers Pre/Post-Treatment    Pain Scale: Numbers, Pretreatment  0/10 - no pain  -     Pain Scale: Numbers, Post-Treatment  0/10 - no pain  -TONO     Row Name             Wound 02/12/19 0810 Right gluteal pressure injury    Wound - Properties Group Date first assessed: 02/12/19  -AW Time first assessed: 0810  -AW Present On Admission : picture taken;yes  -AW Side: Right  -AW Location: gluteal  -AW Type: pressure injury  -AW Stage, Pressure " Injury: Stage 2  -AW Wound Outcome: Other  -AW, healing     Row Name             Wound 02/12/19 0810 Right anterior gluteal pressure injury    Wound - Properties Group Date first assessed: 02/12/19  -AW Time first assessed: 0810  -AW Present On Admission : yes;picture taken  -AW Side: Right  -AW Orientation: anterior  -AW Location: gluteal  -AW Type: pressure injury  -AW Stage, Pressure Injury: Stage 2  -AW Wound Outcome: Other  -AW, healing     Row Name 02/13/19 1403          Plan of Care Review    Plan of Care Reviewed With  patient  -TONO     Row Name 02/13/19 1403          Physical Therapy Clinical Impression    Date of Referral to PT  02/12/19  -TONO     PT Diagnosis (PT Clinical Impression)  impaired gait and mobility  -     Prognosis (PT Clinical Impression)  good  -TONO     Patient/Family Goals Statement (PT Clinical Impression)  return home to [facility]  -     Criteria for Skilled Interventions Met (PT Clinical Impression)  yes;treatment indicated  -     Pathology/Pathophysiology Noted (Describe Specifically for Each System)  musculoskeletal;cardiovascular  -     Impairments Found (describe specific impairments)  aerobic capacity/endurance;gait, locomotion, and balance;ventilation and respiration/gas exchange  -     Functional Limitations in Following Categories (Describe Specific Limitations)  home management;community/leisure  -     Disability: Inability to Perform Actions/Activities of Required Roles (describe specific disability)  community/leisure  -     Rehab Potential (PT Clinical Summary)  good, to achieve stated therapy goals  -     Predicted Duration of Therapy (PT)  until discharge or goals met  -     Care Plan Review (PT)  evaluation/treatment results reviewed;care plan/treatment goals reviewed;risks/benefits reviewed;current/potential barriers reviewed;patient/other agree to care plan  -TONO     Row Name 02/13/19 1403          Vital Signs    Pre Systolic BP Rehab  114  -TONO     Pre  Treatment Diastolic BP  48  -TONO     Post Systolic BP Rehab  120  -TONO     Post Treatment Diastolic BP  40  -TONO     Pretreatment Heart Rate (beats/min)  59  -TONO     Intratreatment Heart Rate (beats/min)  65  -TONO     Posttreatment Heart Rate (beats/min)  67  -TONO     Pre SpO2 (%)  97  -TONO     O2 Delivery Pre Treatment  room air  -TONO     Intra SpO2 (%)  98  -TONO     O2 Delivery Intra Treatment  room air  -TONO     Post SpO2 (%)  98  -TONO     O2 Delivery Post Treatment  room air  -TONO     Pre Patient Position  Supine  -TONO     Intra Patient Position  Sitting  -TONO     Post Patient Position  Supine  -TONO     Row Name 02/13/19 1403          Physical Therapy Goals    Transfer Goal Selection (PT)  transfer, PT goal 1  -TONO     Gait Training Goal Selection (PT)  gait training, PT goal 1  -TONO     Stairs Goal Selection (PT)  stairs, PT goal 1  -TONO     Additional Documentation  Stairs Goal Selection (PT) (Row)  -     Row Name 02/13/19 1403          Transfer Goal 1 (PT)    Activity/Assistive Device (Transfer Goal 1, PT)  sit-to-stand/stand-to-sit;bed-to-chair/chair-to-bed  -TONO     [place] Level/Cues Needed (Transfer Goal 1, PT)  independent  -TONO     Time Frame (Transfer Goal 1, PT)  by discharge  -TONO     Progress/Outcome (Transfer Goal 1, PT)  goal not met  -     Row Name 02/13/19 1403          Gait Training Goal 1 (PT)    Activity/Assistive Device (Gait Training Goal 1, PT)  gait (walking locomotion);increase endurance/gait distance  -TONO     [place] Level (Gait Training Goal 1, PT)  independent  -TONO     Distance (Gait Goal 1, PT)  300ft  -TONO     Time Frame (Gait Training Goal 1, PT)  by discharge  -TONO     Progress/Outcome (Gait Training Goal 1, PT)  goal not met  -     Row Name 02/13/19 1403          Stairs Goal 1 (PT)    Activity/Assistive Device (Stairs Goal 1, PT)  ascending stairs;descending stairs;using handrail, right  -TONO     [place] Level/Cues Needed (Stairs Goal 1, PT)  conditional independence  -TONO      Number of Stairs (Stairs Goal 1, PT)  3  -     Time Frame (Stairs Goal 1, PT)  by discharge  -     Progress/Outcome (Stairs Goal 1, PT)  goal not met  -     Row Name 02/13/19 1403          Positioning and Restraints    Pre-Treatment Position  in bed  -     Post Treatment Position  bed  -     In Bed  supine;call light within reach;encouraged to call for assist  -     Row Name 02/13/19 1403          Living Environment    Home Accessibility  stairs to enter home;tub/shower is not walk in  -       User Key  (r) = Recorded By, (t) = Taken By, (c) = Cosigned By    Initials Name Provider Type     Thais Higuera, PT Physical Therapist    Mely Caceres, RN Registered Nurse        Physical Therapy Education     Title: PT OT SLP Therapies (Done)     Topic: Physical Therapy (Done)     Point: Mobility training (Done)     Learning Progress Summary           Patient Acceptance, E, VU by  at 2/13/2019  3:51 PM                   Point: Body mechanics (Done)     Learning Progress Summary           Patient Acceptance, E, VU by  at 2/13/2019  3:51 PM                   Point: Precautions (Done)     Learning Progress Summary           Patient Acceptance, E, VU by  at 2/13/2019  3:51 PM                               User Key     Initials Effective Dates Name Provider Type Discipline     04/03/18 -  Thais Higuera, PT Physical Therapist PT              PT Recommendation and Plan  Anticipated Discharge Disposition (PT): home with assist  Planned Therapy Interventions (PT Eval): balance training, bed mobility training, gait training, patient/family education, stair training, strengthening, transfer training  Therapy Frequency (PT Clinical Impression): other (see comments)(5-14 times per week)  Outcome Summary/Treatment Plan (PT)  Anticipated Discharge Disposition (PT): home with assist  Plan of Care Reviewed With: patient  Outcome Summary: PT evaluation completed. Pt independent with bed mobility and transfer  "sit to stand.Pt ambulated 210 ft with CGA. Noted pt SOA with ambulation and complained of \"shakiness\". Vital signs stable except diastolic BP below 50mmHG. Function limited by decreased strength, balance, and tolerance for functional mobility and activities. Pt will benefit from PT to regain lost function. Anticipate home with assistance as needed from family.  Outcome Measures     Row Name 02/13/19 1403             How much help from another person do you currently need...    Turning from your back to your side while in flat bed without using bedrails?  4  -TONO      Moving from lying on back to sitting on the side of a flat bed without bedrails?  4  -TONO      Moving to and from a bed to a chair (including a wheelchair)?  3  -TONO      Standing up from a chair using your arms (e.g., wheelchair, bedside chair)?  4  -TONO      Climbing 3-5 steps with a railing?  3  -TONO      To walk in hospital room?  3  -TONO      AM-PAC 6 Clicks Score  21  -TONO         Functional Assessment    Outcome Measure Options  AM-PAC 6 Clicks Basic Mobility (PT)  -        User Key  (r) = Recorded By, (t) = Taken By, (c) = Cosigned By    Initials Name Provider Type    Thais Lawler, PT Physical Therapist         Time Calculation:   PT Charges     Row Name 02/13/19 1557             Time Calculation    Start Time  1403  -      Stop Time  1430  -      Time Calculation (min)  27 min  -      PT Received On  02/13/19  -      PT Goal Re-Cert Due Date  02/26/19  -         Time Calculation- PT    Total Timed Code Minutes- PT  12 minute(s)  -        User Key  (r) = Recorded By, (t) = Taken By, (c) = Cosigned By    Initials Name Provider Type    Thais Lawler, PT Physical Therapist        Therapy Suggested Charges     Code   Minutes Charges    None           Therapy Charges for Today     Code Description Service Date Service Provider Modifiers Qty    [id number]  PT EVAL MOD COMPLEXITY 1 2/13/2019 Thais Higuera, PT GP 1    [id number]  " GAIT TRAINING EA 15 MIN 2/13/2019 Thais Higuera, PT GP 1          PT G-Codes  Outcome Measure Options: AM-PAC 6 Clicks Basic Mobility (PT)  AM-PAC 6 Clicks Score: 21      Thais Higuera, PT  2/13/2019

## 2019-02-13 NOTE — PROGRESS NOTES
"    [facility] Cardiology Progress Note    Referring Provider: Olvin Barrios DO    Reason for Consultation: Abnormal troponin  Cardiologist: Dr Almazan    Patient Care Team:  Ayaan Swenson MD as PCP - General  Ayaan Swenson MD as PCP - Claims Attributed  Tha, Feliciano Cobos MD PhD as Consulting Physician (Cardiology)  Jamil Ordonez MD as Consulting Physician (Ophthalmology)  Tim Reza MD as Consulting Physician (Nephrology)  Abhi Fajardo MD (Endocrinology)   LOS: 1 day     Subjective .     History of present illness:   The patient is a 78-year-old female who reports awakening yesterday morning and following preparation and intake of her breakfast meal, she had onset of \"heart racing\" with associated substernal chest pressure.  She contacted her daughter with whom she resides and presented to [facility] for further evaluation.  At [facility], it is reported that she was in atrial fibrillation of rapid ventricular response converting with intravenous diltiazem.  Troponins were obtained at the [place] facility and with findings of abnormality in the results, the patient was referred and thus transferred to [facility] for further evaluation.  The patient reports resolution of her symptoms once she converted to sinus rhythm following pharmacologic measures.     The patient does have history significant for hypertension with hypertensive nephropathy and a single disease status post renal transplant approximately 9 years prior at in [place].  The patient reports that she continues to receive periodic nephrology evaluations with Dr. Reza in [place].  She reports her creatinine (baseline) to range from 2.0-3.0.  The patient does have history of coronary artery disease status post PCI at time of renal transplant.  Once again, this service was provided per cardiologist in [place].  The patient underwent myocardial " perfusion scan in 2017 under the direction of Dr. James Almazan which was found to be negative for evidence of ischemia.  The patient has history of paroxysmal atrial fibrillation on anticoagulation plus rate control.     In February 2017 the patient underwent transesophageal echocardiogram for definitive evaluation with subsequent findings of preserved LVEF at 61-65%; severe septal asymmetric hypertrophy; RV of normal size and function; no evidence of VSD; consideration for L MCA physiologic flow; prominent eustachian valve present; Mckeon grade 2 aortic arch; no significant valvular abnormality.    Interval History:   Patient Denies: no complaints today  Patient Complaints: no complaints today  History taken from: patient RN    Review of Systems   Respiratory: Negative for apnea, cough, choking, chest tightness, shortness of breath, wheezing and stridor.    Cardiovascular: Negative for chest pain, palpitations and leg swelling.   Neurological: Negative for dizziness, syncope and light-headedness.     History:  Past Medical History:   Diagnosis Date   • Acute bronchitis 12/21/2015   • Upper respiratory infection 12/21/2015   • Low back pain 02/29/2016   • Chronic kidney disease 04/25/2016    unspecified   • Coronary atherosclerosis 06/22/2016    s/p stent placement 2 years ago      • Dyslipidemia 06/22/2016   • Hyperlipidemia 06/22/2016   • Diabetes mellitus type 2, insulin dependent (CMS/[facility]) 06/22/2016   • Edema of left upper eyelid 06/30/2016   • Edema of right upper eyelid 06/30/2016   • Essential hypertension 06/30/2016   • History of echocardiogram 07/07/2016    Normal LV function with Ef of 65%.Normal RV size and function.Grade 1a diastolic dysfunction.Mild LVH, and severe left atrial dilation.Moderate tricuspid regurg.   • Allergic    • Asthma    • History of transfusion    • Sinusitis    • Stroke (CMS/[facility])     mini stroke   , Past Surgical History:   Procedure Laterality Date   • COLONOSCOPY   "02/12/2014    Solid stool through out the colon.I biopsies the colonic mucosa given her diarrhea.Exam markedly limited by poor prep.[facility].   • INJECTION OF MEDICATION  12/21/2015    Kenalog (1)     • INJECTION OF MEDICATION  12/21/2015    Rocephin (1)     • INJECTION OF MEDICATION  06/30/2016    PNEUMOC VAC/ADMIN/RCVD 4040F (1)      • ENDOSCOPY N/A 12/4/2017    Procedure: ESOPHAGOGASTRODUODENOSCOPY;  Surgeon: Nikko Zee MD;  Location: [facility] ENDOSCOPY;  Service:    • COLONOSCOPY N/A 12/20/2017    Procedure: COLONOSCOPY ;  Surgeon: Nikko Zee MD;  Location: [facility] ENDOSCOPY;  Service:    • TRANSPLANTATION RENAL     , Family History   Problem Relation Age of Onset   • Lung cancer Mother    • Heart disease Father    • Lupus Other    • Diabetes Other    , Social History     Tobacco Use   • Smoking status: Never Smoker   • Smokeless tobacco: Never Used   Substance Use Topics   • Alcohol use: No   • Drug use: No     Allergies:  Clonidine derivatives; Latex; Naproxen; Penicillins; and Zyrtec [cetirizine]    Objective     Dietary Orders (From admission, onward)    Start     Ordered    02/12/19 1414  Diet Regular; Cardiac, Consistent Carbohydrate, Renal  Diet Effective Now     Comments:  Cottage Cheese with lunch   Question Answer Comment   Diet Texture / Consistency Regular    Common Modifiers Cardiac    Common Modifiers Consistent Carbohydrate    Common Modifiers Renal        02/12/19 1413        Respiratory:  No Data Recorded   Weight  Min: 70.9 kg (156 lb 6.4 oz)  Max: 70.9 kg (156 lb 6.4 oz)     Vital Sign Min/Max for last 24 hours  Temp  Min: 97 °F (36.1 °C)  Max: 98.5 °F (36.9 °C)   BP  Min: 101/50  Max: 154/65   Pulse  Min: 58  Max: 91   Resp  Min: 16  Max: 18   SpO2  Min: 94 %  Max: 99 %   No Data Recorded   Weight  Min: 70.9 kg (156 lb 6.4 oz)  Max: 70.9 kg (156 lb 6.4 oz)     Flowsheet Rows      First Filed Value   Admission Height  152.4 cm (60\") Documented at 02/11/2019 1734   Admission Weight  " 70.3 kg (154 lb 14.4 oz) Documented at 02/11/2019 1734            02/11/19  1734 02/12/19  0400 02/13/19  0433   Weight: 70.3 kg (154 lb 14.4 oz) 69.4 kg (152 lb 14.4 oz) 70.9 kg (156 lb 6.4 oz) (sliver scale)     I/O last 3 completed shifts:  In: 840 [P.O.:840]  Out: 2400 [Urine:2400]    Physical Exam   Constitutional: She is oriented to person, place, and time. She appears well-developed and well-nourished. No distress.   HENT:   Head: Normocephalic and atraumatic.   Eyes: Conjunctivae are normal.   Cardiovascular: Normal rate and regular rhythm.   Murmur heard.   Systolic murmur is present with a grade of 2/6.  Pulmonary/Chest: Effort normal and breath sounds normal. No stridor. No respiratory distress.   Abdominal: Soft. Bowel sounds are normal. She exhibits no distension. There is no tenderness.   Musculoskeletal: She exhibits no edema.   Neurological: She is alert and oriented to person, place, and time.   Skin: Skin is warm and dry. Capillary refill takes less than 2 seconds. She is not diaphoretic.   Psychiatric: She has a normal mood and affect. Her behavior is normal.   Vitals reviewed.    98.3 °F (36.8 °C) (Oral) 64 134/60 18 98% 70.9 kg (156 lb 6.4 oz) Body mass index is 30.54 kg/m².           Current Facility-Administered Medications:   •  albuterol (PROVENTIL) nebulizer solution 0.083% 2.5 mg/3mL, 2.5 mg, Nebulization, Q4H - RT, BarriosOlvin blackwood Andreas, DO, 2.5 mg at 02/13/19 0327  •  amitriptyline (ELAVIL) tablet 10 mg, 10 mg, Oral, Nightly, Olvin Barrios Andreas, DO, 10 mg at 02/12/19 2042  •  aspirin EC tablet 81 mg, 81 mg, Oral, Daily, BarriosOlvin blackwood Andreas, DO, 81 mg at 02/13/19 0853  •  carvedilol (COREG) tablet 3.125 mg, 3.125 mg, Oral, BID With Meals, Olvin Barrios, , 3.125 mg at 02/13/19 0853  •  cetirizine (zyrTEC) tablet 10 mg, 10 mg, Oral, Daily, Olvin Barrios DO, 10 mg at 02/13/19 0854  •  dabigatran etexilate (PRADAXA) capsule 75 mg, 75 mg, Oral, Q12H, Olvin Barrios,  DO, 75 mg at 02/13/19 0853  •  dextrose (D50W) 25 g/ 50mL Intravenous Solution 25 g, 25 g, Intravenous, Q15 Min PRN, Denis, Olvin Barriosish, DO  •  dextrose (GLUTOSE) oral gel 15 g, 15 g, Oral, Q15 Min PRN, Barrios, Olvin Barriosish, DO  •  diltiaZEM CD (CARDIZEM CD) 24 hr capsule 180 mg, 180 mg, Oral, Q24H, Barrios, Olvin Andreas, DO, 180 mg at 02/13/19 0853  •  glucagon (human recombinant) (GLUCAGEN DIAGNOSTIC) injection 1 mg, 1 mg, Subcutaneous, PRN, Barrios, Olvin Barriosish, DO  •  glucagon (human recombinant) (GLUCAGEN DIAGNOSTIC) injection 1 mg, 1 mg, Subcutaneous, PRN, Buddy Young, PA  •  hydrALAZINE (APRESOLINE) tablet 100 mg, 100 mg, Oral, TID, Barrios, Olvin Johns, DO, 100 mg at 02/13/19 0854  •  insulin aspart (novoLOG) injection 0-14 Units, 0-14 Units, Subcutaneous, 4x Daily AC & at Bedtime, Buddy Young PA, 8 Units at 02/13/19 0900  •  magic butt ointment, , Topical, TID, Buddy Young PA  •  nitroglycerin (NITROSTAT) SL tablet 0.4 mg, 0.4 mg, Sublingual, Q5 Min PRN, Denis, Olvin Johns, DO  •  pantoprazole (PROTONIX) EC tablet 40 mg, 40 mg, Oral, Daily, Olvin Barrios, DO, 40 mg at 02/13/19 0853  •  rosuvastatin (CRESTOR) tablet 20 mg, 20 mg, Oral, Daily, Barrios, Olvin Andreas, DO, 20 mg at 02/13/19 0853  •  sodium chloride 0.9 % flush 3 mL, 3 mL, Intravenous, Q12H, Olvin Barrios Andreas, DO, 10 mL at 02/13/19 0901  •  sodium chloride 0.9 % flush 3-10 mL, 3-10 mL, Intravenous, PRN, Denis, Olvin Johns, DO  •  sodium chloride 0.9 % infusion, 75 mL/hr, Intravenous, Continuous, Buddy Young PA, Last Rate: 75 mL/hr at 02/12/19 2335, 75 mL/hr at 02/12/19 2335  •  tacrolimus (PROGRAF) capsule 1 mg, 1 mg, Oral, BID, Olvin Barrios DO, 1 mg at 02/13/19 0854  •  traMADol (ULTRAM) tablet 50 mg, 50 mg, Oral, Q8H PRN, Olvin Barrios DO    Infusions:    sodium chloride 75 mL/hr Last Rate: 75 mL/hr (02/12/19 2335)       Medications Prior to Admission   Medication Sig Dispense Refill  Last Dose   • albuterol (PROVENTIL HFA;VENTOLIN HFA) 108 (90 Base) MCG/ACT inhaler Inhale 2 puffs Every 4 (Four) Hours As Needed for Shortness of Air. 1 inhaler 11 2/10/2019 at Unknown time   • amitriptyline (ELAVIL) 10 MG tablet Take 10 mg by mouth Every Night.  0 2/10/2019 at     • ASPIRIN LOW DOSE 81 MG EC tablet TAKE 1 TABLET BY MOUTH EVERY DAY 30 tablet 0 2/11/2019 at Unknown time   • carvedilol (COREG) 3.125 MG tablet Take 1 tablet by mouth 2 (Two) Times a Day With Meals. 180 tablet 3 2/11/2019 at Unknown time   • cetirizine (zyrTEC) 10 MG tablet TAKE 1 TABLET BY MOUTH DAILY 30 tablet 0 2/11/2019 at Unknown time   • Cholecalciferol (VITAMIN D3) 5000 UNITS capsule capsule Take 5,000 Units by mouth Daily.   2/11/2019 at Unknown time   • dabigatran etexilate (PRADAXA) 75 MG capsule Take 1 capsule by mouth Every 12 (Twelve) Hours. 180 capsule 0 2/11/2019 at Unknown time   • ezetimibe (ZETIA) 10 MG tablet TAKE 1 TABLET BY MOUTH DAILY 31 tablet 3 2/11/2019 at Unknown time   • furosemide (LASIX) 40 MG tablet TAKE 1 TABLET BY MOUTH EVERY MORNING AND 1/2 TABLET BY MOUTH EVERY EVENING 180 tablet 3 2/11/2019 at Unknown time   • glucose blood test strip Use as instructed 100 each 12 2/11/2019 at Unknown time   • glucose monitor monitoring kit 1 each Daily. 1 each 0 2/11/2019 at Unknown time   • hydrALAZINE (APRESOLINE) 100 MG tablet Take 1 tablet by mouth 3 (Three) Times a Day. 270 tablet 3 2/11/2019 at Unknown time   • Injection Device for Insulin device 1 application 3 (Three) Times a Week if Needed (to use with insulin). To use with insulin pen 3 times a day. Give 90 day supply 300 each 3 2/11/2019 at Unknown time   • insulin aspart (NOVOLOG) 100 UNIT/ML injection Put 240ml in pump 240 mL 3 2/11/2019 at Unknown time   • LANTUS SOLOSTAR 100 UNIT/ML injection pen INJECT 10 UNITS SUBCUTANEOUSLY UTD AS A BACK UP FOR PUMP  4 2/11/2019 at Unknown time   • losartan (COZAAR) 100 MG tablet Take 1 tablet by mouth Daily. (Patient  taking differently: Take 50 mg by mouth Daily.) 30 tablet 3 Patient Taking Differently at Unknown time   • mycophenolate (MYFORTIC) 360 MG tablet delayed-release EC tablet Take 1 tablet by mouth 2 (Two) Times a Day. 180 tablet 6 2/11/2019 at Unknown time   • pantoprazole (PROTONIX) 40 MG EC tablet Take 1 tablet by mouth Daily. 30 tablet 3 2/11/2019 at Unknown time   • rosuvastatin (CRESTOR) 20 MG tablet Take 20 mg by mouth Daily.  2 2/11/2019 at Unknown time   • tacrolimus (PROGRAF) 1 MG capsule Take 1 capsule by mouth 2 (Two) Times a Day. 180 capsule 3 2/10/2019 at Unknown time   • Ca Carb-FA-D-B6-B12-Boron-Mg 1342-1 MG wafer Take 1 tablet by mouth daily.   Taking   • Calcium Carb-Cholecalciferol (CALCIUM 1000 + D PO) Take 1 tablet by mouth Daily.   Taking   • diltiaZEM SR (CARDIZEM SR) 90 MG 12 hr capsule Take 1 capsule by mouth 2 (Two) Times a Day. (Patient not taking: Reported on 2/11/2019) 60 capsule 11 Not Taking at Unknown time   • ferrous sulfate 325 (65 FE) MG tablet Take 1 tablet by mouth Daily With Breakfast. (Patient not taking: Reported on 2/11/2019) 30 tablet 5 Not Taking at Unknown time   • fluticasone (FLONASE) 50 MCG/ACT nasal spray 2 sprays into the nostril(s) as directed by provider Daily. (Patient not taking: Reported on 2/11/2019) 1 bottle 3 Not Taking at Unknown time   • glucagon (GLUCAGON EMERGENCY) 1 MG injection Inject 1 mg under the skin 1 (One) Time As Needed for Low Blood Sugar for up to 1 dose. 1 kit 5 Unknown at Unknown time   • ipratropium (ATROVENT) 0.06 % nasal spray 2 sprays into each nostril 2 (Two) Times a Day. (Patient not taking: Reported on 2/11/2019) 15 mL 11 Not Taking at Unknown time   • mupirocin (BACTROBAN) 2 % ointment Apply  topically 3 (Three) Times a Day. (Patient not taking: Reported on 2/11/2019) 15 g 0 Not Taking at Unknown time   • predniSONE (DELTASONE) 5 MG tablet Take 1 tablet by mouth Daily. (Patient not taking: Reported on 2/11/2019) 90 tablet 3 Not Taking at  Unknown time   • traMADol (ULTRAM) 50 MG tablet Take 1 tablet by mouth 2 (Two) Times a Day. (Patient not taking: Reported on 2/11/2019) 28 tablet 0 Not Taking at Unknown time     Data Reviewed:    Electrocardiogram:  ECG/EMG Results (last 24 hours)     Procedure Component Value Units Date/Time    ECG 12 Lead [[id number]] Collected:  02/12/19 1454     Updated:  02/12/19 1454    Narrative:       Test Reason : indeterminate troponin  Blood Pressure : **/** mmHG  Vent. Rate : 062 BPM     Atrial Rate : 062 BPM     P-R Int : 156 ms          QRS Dur : 122 ms      QT Int : 458 ms       P-R-T Axes : 000 -26 -34 degrees     QTc Int : 464 ms    Normal sinus rhythm  Right bundle branch block  Voltage criteria for left ventricular hypertrophy  Lateral infarct , age undetermined  Abnormal ECG  When compared with ECG of 01-DEC-2017 19:59,  Nonspecific T wave abnormality no longer evident in Anterior leads    Referred By:             Confirmed By:      @  Results for orders placed during the hospital encounter of 02/11/19   Adult Transthoracic Echo Complete W/ Cont if Necessary Per Protocol    Narrative · Preserved systolic biventricular function. Estimated LVEF is 66-70%.   Moderate concentric hypertrophy with pseudo-normal diastolic dysfunction.  · A prominent Chiari network is present.  · The aortic valve is mildly calcified and thickened, but no significant   regurgitation or stenosis.  · Mild-to-moderate mitral valve regurgitation is present  · Mild to moderate tricuspid valve regurgitation is present. PA systolic   pressure is 53 mmHg and consistent with pulmonary hypertension.        Results from last 7 days   Lab Units 02/13/19  0535 02/12/19  0528 02/11/19  1834   SODIUM mmol/L 135* 136* 136*   POTASSIUM mmol/L 4.0 4.4 4.1   CHLORIDE mmol/L 99 99 97   CO2 mmol/L 22.0 26.0 25.0   BUN mg/dL 90* 86* 83*   CREATININE mg/dL 3.15* 2.92* 2.51*   CALCIUM mg/dL 9.0 9.7 10.2   BILIRUBIN mg/dL 0.3  --   --    ALK PHOS U/L 76  --   --     ALT (SGPT) U/L 10  --   --    AST (SGOT) U/L 19  --   --    GLUCOSE mg/dL 233* 332* 283*     Estimated Creatinine Clearance: 13.4 mL/min (A) (by C-G formula based on SCr of 3.15 mg/dL (H)).    Results from last 7 days   Lab Units 02/13/19  0535 02/12/19  0528 02/11/19  1834   MAGNESIUM mg/dL 2.4* 2.5* 2.4*     Results from last 7 days   Lab Units 02/13/19  0535 02/12/19  0528 02/11/19  1834   WBC 10*3/mm3 3.21* 3.33* 4.43   HEMOGLOBIN g/dL 7.9* 8.6* 9.3*   PLATELETS 10*3/mm3 174 150 178     Lab Results   Component Value Date    HGBA1C 7.7 (H) 11/20/2018     Lab Results   Component Value Date    CKTOTAL 73 03/21/2017    TROPONINI 0.137 (C) 02/12/2019     Lab Results   Component Value Date    PROBNP 2,700.0 (H) 03/22/2017     Lab Results   Component Value Date    CHOL 208 (H) 11/20/2018    CHOL 335 (H) 04/30/2018    CHOL 269 (H) 12/29/2017     Lab Results   Component Value Date    TRIG 117 11/20/2018    TRIG 193 04/30/2018    TRIG 161 12/29/2017     Lab Results   Component Value Date    HDL 80 11/20/2018    HDL 49 (L) 04/30/2018    HDL 60 12/29/2017     The following portions of the patient's history were reviewed and updated as appropriate: allergies, current medications, problem list,  past medical history, surgical history, family history and social history.    Recent images independently reviewed.    Available laboratory values reviewed.    Assessment/Plan       Atrial flutter (CMS/HCC)  The patient presents with history of paroxysmal atrial fibrillation whose presentation to outside facility was with findings of atrial fibrillation rapid ventricular response converting to sinus rhythm following pharmacologic intervention (intravenous diltiazem).  The patient's mildly abnormal troponin appears multifactorial in etiology given her renal status post renal transplant.  In addition, the proBNP appears to be within a normal range given her advanced age and renal status.     The patient appears at the present time to  be comfortable without chest discomfort.    Regarding paroxysmal atrial fibrillation, now in normal sinus rhythm:  -Anticoagulation with Pradaxa  -Rate control agents: Carvedilol and diltiazem  -Update of transthoracic echocardiogram with further recommendations based upon that review     Regarding history of coronary artery disease:  -Aspirin 81 mg daily  - Statin therapy  -Beta-blocker therapy     Regarding hypertension which is well controlled at time of cardiology consultation:  -Carvedilol 3.125 mg twice daily  -Diltiazem 180 mg daily  -Lasix 40 mg daily  -Hydralazine 100 mg 3 times daily  -Losartan 100 mg daily     Regarding hyperlipidemia:  -Statin therapy     From cardiology perspective, the patient is stable and could be discharged home pending additional evaluation and management of multiple medical problems per primary care team.  From a cardiology perspective, we will sign off and see patient as needed.  The patient has discussed with Dr. Almazan on tooth 12/2019 that secondary to hardships she is unable to travel to [place] to receive periodic evaluations with Dr. Almazan at this time.    I discussed the patients findings and my recommendations with patient    Thank you for allowing me to participate in the evaluation and management of this patient.        Cinthia Martinez, APRN  02/13/19  9:47 AM

## 2019-02-13 NOTE — PLAN OF CARE
"Problem: Patient Care Overview  Goal: Plan of Care Review   02/13/19 5776   Coping/Psychosocial   Plan of Care Reviewed With patient   OTHER   Outcome Summary PT evaluation completed. Pt independent with bed mobility and transfer sit to stand.Pt ambulated 210 ft with CGA. Noted pt SOA with ambulation and complained of \"shakiness\". Vital signs stable except diastolic BP below 50mmHG. Function limited by decreased strength, balance, and tolerance for functional mobility and activities. Pt will benefit from PT to regain lost function. Anticipate home with assistance as needed from family.         "

## 2019-02-13 NOTE — PROGRESS NOTES
Discharge Planning Assessment  [facility]     Patient Name: Chanda Yan  MRN: [id number]  Today's Date: 2/13/2019    Admit Date: 2/11/2019    Discharge Needs Assessment     Row Name 02/13/19 1348       Living Environment    Lives With  child(darshana), adult Resides with daughter.     Name(s) of Who Lives With Patient  Alyson Yan    Current Living Arrangements  home/apartment/condo Information on face sheet confirmed with patient.     Primary Care Provided by  self    Provides Primary Care For  no one    Family Caregiver if Needed  child(darshana), adult    Quality of Family Relationships  helpful;involved;supportive    Able to Return to Prior Arrangements  yes       Resource/Environmental Concerns    Transportation Concerns  car, none       Transition Planning    Patient/Family Anticipates Transition to  home with family    Transportation Anticipated  family or friend will provide If unable to drive, patient's daughter is available to assist with transportation.        Discharge Needs Assessment    Readmission Within the Last 30 Days  no previous admission in last 30 days    Concerns to be Addressed  denies needs/concerns at this time;no discharge needs identified    Equipment Currently Used at Home  glucometer    Anticipated Changes Related to Illness  none    Equipment Needed After Discharge  none    Offered/Gave Vendor List  yes    Patient's Choice of Community Agency(s)  hx: [facility]    Current Discharge Risk  chronically ill DM. Patient voiced compliance with DM management.        Discharge Plan     Row Name 02/13/19 1351       Plan    Plan  home with no services    Plan Comments  LACE complete. Patient denies need for  services. Plan is to return home pending medical stability. Continue [facility] medical management...[facility]        Destination      No service coordination in this encounter.      Durable Medical Equipment      No service coordination in this encounter.       Dialysis/Infusion      No service coordination in this encounter.      Home Medical Care      No service coordination in this encounter.      Community Resources      No service coordination in this encounter.        Expected Discharge Date and Time     Expected Discharge Date Expected Discharge Time    Feb 13, 2019         Demographic Summary     Row Name 02/13/19 1346       General Information    Admission Type  observation    Arrived From  home    Referral Source  high risk screening    Preferred Language  English     Used During This Interaction  no        Functional Status     Row Name 02/13/19 1346       Functional Status    Usual Activity Tolerance  good    Functional Status Comments  Patient voiced independence with ADL's.        Functional Status, IADL    Medications  independent Pharmacy, [facility], and coverage confirmed with patient.     Meal Preparation  independent    Housekeeping  independent    Laundry  independent    Shopping  independent       Mental Status Summary    Recent Changes in Mental Status/Cognitive Functioning  no changes       Employment/    Employment Status  retired        Psychosocial    No documentation.       Abuse/Neglect    No documentation.       Legal    No documentation.       Substance Abuse    No documentation.       Patient Forms    No documentation.           CHINO Singh

## 2019-02-13 NOTE — PLAN OF CARE
Problem: Patient Care Overview  Goal: Plan of Care Review  Outcome: Ongoing (interventions implemented as appropriate)   02/13/19 0115   Coping/Psychosocial   Plan of Care Reviewed With patient   Plan of Care Review   Progress improving

## 2019-02-14 ENCOUNTER — APPOINTMENT (OUTPATIENT)
Dept: ULTRASOUND IMAGING | Facility: HOSPITAL | Age: 79
End: 2019-02-14

## 2019-02-14 LAB
ANION GAP SERPL CALCULATED.3IONS-SCNC: 8 MMOL/L (ref 5–15)
BASOPHILS # BLD AUTO: 0.01 10*3/MM3 (ref 0–0.2)
BASOPHILS NFR BLD AUTO: 0.3 % (ref 0–1.5)
BUN BLD-MCNC: 77 MG/DL (ref 7–21)
BUN/CREAT SERPL: 28.6 (ref 7–25)
CALCIUM SPEC-SCNC: 9 MG/DL (ref 8.4–10.2)
CHLORIDE SERPL-SCNC: 106 MMOL/L (ref 95–110)
CO2 SERPL-SCNC: 22 MMOL/L (ref 22–31)
CREAT BLD-MCNC: 2.69 MG/DL (ref 0.5–1)
DEPRECATED RDW RBC AUTO: 49.6 FL (ref 37–54)
EOSINOPHIL # BLD AUTO: 0.09 10*3/MM3 (ref 0–0.4)
EOSINOPHIL NFR BLD AUTO: 2.5 % (ref 0.3–6.2)
ERYTHROCYTE [DISTWIDTH] IN BLOOD BY AUTOMATED COUNT: 15.8 % (ref 12.3–15.4)
GFR SERPL CREATININE-BSD FRML MDRD: 21 ML/MIN/1.73 (ref 39–90)
GLUCOSE BLD-MCNC: 168 MG/DL (ref 60–100)
GLUCOSE BLD-MCNC: 368 MG/DL (ref 60–100)
GLUCOSE BLDC GLUCOMTR-MCNC: 194 MG/DL (ref 70–130)
GLUCOSE BLDC GLUCOMTR-MCNC: 245 MG/DL (ref 70–130)
GLUCOSE BLDC GLUCOMTR-MCNC: 298 MG/DL (ref 70–130)
GLUCOSE BLDC GLUCOMTR-MCNC: 324 MG/DL (ref 70–130)
GLUCOSE BLDC GLUCOMTR-MCNC: 405 MG/DL (ref 70–130)
GLUCOSE BLDC GLUCOMTR-MCNC: 481 MG/DL (ref 70–130)
HANSEL STAIN: NEGATIVE
HCT VFR BLD AUTO: 25.1 % (ref 34–46.6)
HGB BLD-MCNC: 7.7 G/DL (ref 12–15.9)
IMM GRANULOCYTES # BLD AUTO: 0.01 10*3/MM3 (ref 0–0.05)
IMM GRANULOCYTES NFR BLD AUTO: 0.3 % (ref 0–0.5)
LYMPHOCYTES # BLD AUTO: 0.49 10*3/MM3 (ref 0.7–3.1)
LYMPHOCYTES NFR BLD AUTO: 13.5 % (ref 19.6–45.3)
MAGNESIUM SERPL-MCNC: 2.5 MG/DL (ref 1.6–2.3)
MCH RBC QN AUTO: 26.3 PG (ref 26.6–33)
MCHC RBC AUTO-ENTMCNC: 30.7 G/DL (ref 31.5–35.7)
MCV RBC AUTO: 85.7 FL (ref 79–97)
MONOCYTES # BLD AUTO: 0.43 10*3/MM3 (ref 0.1–0.9)
MONOCYTES NFR BLD AUTO: 11.8 % (ref 5–12)
NEUTROPHILS # BLD AUTO: 2.6 10*3/MM3 (ref 1.4–7)
NEUTROPHILS NFR BLD AUTO: 71.6 % (ref 42.7–76)
NRBC BLD AUTO-RTO: 0 /100 WBC (ref 0–0)
PLATELET # BLD AUTO: 164 10*3/MM3 (ref 140–450)
PMV BLD AUTO: 11.8 FL (ref 6–12)
POTASSIUM BLD-SCNC: 4.6 MMOL/L (ref 3.5–5.1)
RBC # BLD AUTO: 2.93 10*6/MM3 (ref 3.77–5.28)
SODIUM BLD-SCNC: 136 MMOL/L (ref 137–145)
WBC NRBC COR # BLD: 3.63 10*3/MM3 (ref 3.4–10.8)

## 2019-02-14 PROCEDURE — 93975 VASCULAR STUDY: CPT

## 2019-02-14 PROCEDURE — 63710000001 PREDNISONE PER 5 MG: Performed by: INTERNAL MEDICINE

## 2019-02-14 PROCEDURE — 97165 OT EVAL LOW COMPLEX 30 MIN: CPT

## 2019-02-14 PROCEDURE — 63710000001 INSULIN ASPART PER 5 UNITS: Performed by: PHYSICIAN ASSISTANT

## 2019-02-14 PROCEDURE — 83735 ASSAY OF MAGNESIUM: CPT | Performed by: INTERNAL MEDICINE

## 2019-02-14 PROCEDURE — 85025 COMPLETE CBC W/AUTO DIFF WBC: CPT | Performed by: INTERNAL MEDICINE

## 2019-02-14 PROCEDURE — 63710000001 MYCOPHENOLATE 360 MG TABLET DELAYED-RELEASE: Performed by: INTERNAL MEDICINE

## 2019-02-14 PROCEDURE — 82947 ASSAY GLUCOSE BLOOD QUANT: CPT | Performed by: INTERNAL MEDICINE

## 2019-02-14 PROCEDURE — 63710000001 TACROLIMUS PER 1 MG: Performed by: INTERNAL MEDICINE

## 2019-02-14 PROCEDURE — 76776 US EXAM K TRANSPL W/DOPPLER: CPT

## 2019-02-14 PROCEDURE — 82962 GLUCOSE BLOOD TEST: CPT

## 2019-02-14 PROCEDURE — 80048 BASIC METABOLIC PNL TOTAL CA: CPT | Performed by: INTERNAL MEDICINE

## 2019-02-14 PROCEDURE — 80197 ASSAY OF TACROLIMUS: CPT | Performed by: INTERNAL MEDICINE

## 2019-02-14 PROCEDURE — 63510000001 EPOETIN ALFA PER 1000 UNITS: Performed by: INTERNAL MEDICINE

## 2019-02-14 RX ORDER — FERROUS SULFATE TAB EC 324 MG (65 MG FE EQUIVALENT) 324 (65 FE) MG
324 TABLET DELAYED RESPONSE ORAL
Status: DISCONTINUED | OUTPATIENT
Start: 2019-02-15 | End: 2019-02-15 | Stop reason: HOSPADM

## 2019-02-14 RX ORDER — DABIGATRAN ETEXILATE 75 MG/1
75 CAPSULE ORAL EVERY 12 HOURS SCHEDULED
Status: DISCONTINUED | OUTPATIENT
Start: 2019-02-14 | End: 2019-02-15 | Stop reason: HOSPADM

## 2019-02-14 RX ORDER — CARVEDILOL 6.25 MG/1
6.25 TABLET ORAL 2 TIMES DAILY WITH MEALS
Status: DISCONTINUED | OUTPATIENT
Start: 2019-02-14 | End: 2019-02-15 | Stop reason: HOSPADM

## 2019-02-14 RX ADMIN — INSULIN ASPART 10 UNITS: 100 INJECTION, SOLUTION INTRAVENOUS; SUBCUTANEOUS at 11:00

## 2019-02-14 RX ADMIN — ASPIRIN 81 MG: 81 TABLET, COATED ORAL at 08:42

## 2019-02-14 RX ADMIN — INSULIN ASPART 12 UNITS: 100 INJECTION, SOLUTION INTRAVENOUS; SUBCUTANEOUS at 21:02

## 2019-02-14 RX ADMIN — TACROLIMUS 4 MG: 1 CAPSULE ORAL at 08:42

## 2019-02-14 RX ADMIN — HYDRALAZINE HYDROCHLORIDE 100 MG: 50 TABLET ORAL at 15:09

## 2019-02-14 RX ADMIN — HYDRALAZINE HYDROCHLORIDE 100 MG: 50 TABLET ORAL at 08:42

## 2019-02-14 RX ADMIN — TACROLIMUS 4 MG: 1 CAPSULE ORAL at 20:43

## 2019-02-14 RX ADMIN — CETIRIZINE HYDROCHLORIDE 10 MG: 10 TABLET, FILM COATED ORAL at 08:42

## 2019-02-14 RX ADMIN — PREDNISONE 5 MG: 5 TABLET ORAL at 08:42

## 2019-02-14 RX ADMIN — HYDROCORTISONE: 1 CREAM TOPICAL at 15:12

## 2019-02-14 RX ADMIN — ERYTHROPOIETIN 10000 UNITS: 10000 INJECTION, SOLUTION INTRAVENOUS; SUBCUTANEOUS at 11:50

## 2019-02-14 RX ADMIN — AMITRIPTYLINE HYDROCHLORIDE 10 MG: 10 TABLET, FILM COATED ORAL at 20:42

## 2019-02-14 RX ADMIN — CARVEDILOL 6.25 MG: 6.25 TABLET, FILM COATED ORAL at 17:14

## 2019-02-14 RX ADMIN — HYDROCORTISONE: 1 CREAM TOPICAL at 08:45

## 2019-02-14 RX ADMIN — MYCOPHENOLIC ACID 360 MG: 360 TABLET, DELAYED RELEASE ORAL at 08:42

## 2019-02-14 RX ADMIN — SODIUM CHLORIDE, PRESERVATIVE FREE 3 ML: 5 INJECTION INTRAVENOUS at 08:43

## 2019-02-14 RX ADMIN — HYDROCORTISONE: 1 CREAM TOPICAL at 21:02

## 2019-02-14 RX ADMIN — INSULIN ASPART 3 UNITS: 100 INJECTION, SOLUTION INTRAVENOUS; SUBCUTANEOUS at 08:41

## 2019-02-14 RX ADMIN — DABIGATRAN ETEXILATE MESYLATE 75 MG: 75 CAPSULE ORAL at 20:42

## 2019-02-14 RX ADMIN — INSULIN ASPART 5 UNITS: 100 INJECTION, SOLUTION INTRAVENOUS; SUBCUTANEOUS at 17:17

## 2019-02-14 RX ADMIN — MYCOPHENOLIC ACID 360 MG: 360 TABLET, DELAYED RELEASE ORAL at 20:43

## 2019-02-14 RX ADMIN — PANTOPRAZOLE SODIUM 40 MG: 40 TABLET, DELAYED RELEASE ORAL at 08:42

## 2019-02-14 RX ADMIN — DILTIAZEM HYDROCHLORIDE 180 MG: 180 CAPSULE, COATED, EXTENDED RELEASE ORAL at 08:42

## 2019-02-14 RX ADMIN — CARVEDILOL 3.12 MG: 3.12 TABLET, FILM COATED ORAL at 08:42

## 2019-02-14 RX ADMIN — HYDRALAZINE HYDROCHLORIDE 100 MG: 50 TABLET ORAL at 20:43

## 2019-02-14 RX ADMIN — ROSUVASTATIN CALCIUM 20 MG: 20 TABLET, FILM COATED ORAL at 08:42

## 2019-02-14 NOTE — THERAPY DISCHARGE NOTE
Acute Care - Occupational Therapy Initial Eval/Discharge  [facility]     Patient Name: Chanda Yan  : 1940  MRN: [id number]  Today's Date: 2019  Onset of Illness/Injury or Date of Surgery: 19  Date of Referral to OT: 19  Referring Physician: RUT ADAMS      Admit Date: 2019       ICD-10-CM ICD-9-CM   1. Impaired functional mobility, balance, gait, and endurance Z74.09 V49.89   2. Impaired mobility and activities of daily living Z74.09 799.89     Patient Active Problem List   Diagnosis   • Type 1 diabetes mellitus with hypoglycemia (CMS/HCC)   • Mixed hyperlipidemia   • Chronic kidney disease   • Obesity   • Encounter for immunization   • History of kidney transplant   • Cough   • Osteoporosis screening   • Uncontrolled hypertension   • Need for vaccination   • Pancytopenia (CMS/HCC)   • Sinusitis   • Paroxysmal atrial fibrillation (CMS/HCC)   • Anemia   • Atrial fibrillation (CMS/HCC)   • Hearing loss   • Hyphema   • Counseling for insulin pump   • Hypoglycemia   • Chest pain   • Malaise and fatigue   • Iron deficiency anemia   • Gastritis   • Erosive gastropathy   • Encounter for Medicare annual wellness exam   • General medical examination   • Vitamin D deficiency   • White coat syndrome with hypertension   • Essential hypertension   • Diabetes mellitus (CMS/HCC)   • Uncontrolled type 2 diabetes mellitus with hyperglycemia (CMS/HCC)   • Hyperlipidemia   • Chronic anticoagulation   • Encounter for screening mammogram for malignant neoplasm of breast   • Thrombocytopenia (CMS/HCC)   • Atrial flutter (CMS/HCC)     Past Medical History:   Diagnosis Date   • Acute bronchitis 2015   • Allergic    • Asthma    • Chronic kidney disease 2016    unspecified   • Coronary atherosclerosis 2016    s/p stent placement 2 years ago      • Diabetes mellitus type 2, insulin dependent (CMS/HCC) 2016   • Dyslipidemia 2016   • Edema of left upper eyelid 2016    • Edema of right upper eyelid 06/30/2016   • Essential hypertension 06/30/2016   • History of echocardiogram 07/07/2016    Normal LV function with Ef of 65%.Normal RV size and function.Grade 1a diastolic dysfunction.Mild LVH, and severe left atrial dilation.Moderate tricuspid regurg.   • History of transfusion    • Hyperlipidemia 06/22/2016   • Low back pain 02/29/2016   • Sinusitis    • Stroke (CMS/HCC)     mini stroke   • Upper respiratory infection 12/21/2015     Past Surgical History:   Procedure Laterality Date   • COLONOSCOPY  02/12/2014    Solid stool through out the colon.I biopsies the colonic mucosa given her diarrhea.Exam markedly limited by poor prep.[facility].   • COLONOSCOPY N/A 12/20/2017    Procedure: COLONOSCOPY ;  Surgeon: Nikko Zee MD;  Location: [facility] ENDOSCOPY;  Service:    • ENDOSCOPY N/A 12/4/2017    Procedure: ESOPHAGOGASTRODUODENOSCOPY;  Surgeon: Nikko Zee MD;  Location: [facility] ENDOSCOPY;  Service:    • INJECTION OF MEDICATION  12/21/2015    Kenalog (1)     • INJECTION OF MEDICATION  06/30/2016    PNEUMOC VAC/ADMIN/RCVD 4040F (1)      • INJECTION OF MEDICATION  12/21/2015    Rocephin (1)     • TRANSPLANTATION RENAL            OT ASSESSMENT FLOWSHEET (last 72 hours)      Occupational Therapy Evaluation     Row Name 02/14/19 1445                   OT Evaluation Time/Intention    Subjective Information  no complaints  -RB        Document Type  evaluation  -RB        Mode of Treatment  individual therapy;occupational therapy  -RB        Patient Effort  excellent  -RB        Symptoms Noted During/After Treatment  none  -RB           General Information    Patient Profile Reviewed?  yes  -RB        Onset of Illness/Injury or Date of Surgery  02/11/19  -RB        Referring Physician  RUT ADAMS  -RB        Patient Observations  alert;cooperative;agree to therapy  -RB        General Observations of Patient  Pt standing beside bed on room air and in no apparent distress.  -RB         Prior Level of Function  independent:;all household mobility;community mobility;gait;transfer;ADL's;driving  -RB        Equipment Currently Used at Home  none  -RB        Pertinent History of Current Functional Problem  Pt demo independence with LB dress, tiolet transfer and 120' ambulation without device.  -RB        Existing Precautions/Restrictions  no known precautions/restrictions  -RB        Equipment Issued to Patient  gait belt  -RB        Risks Reviewed  patient:;LOB  -RB           Relationship/Environment    Lives With  child(darshana), adult  -RB           Resource/Environmental Concerns    Current Living Arrangements  home/apartment/condo  -RB           Home Main Entrance    Number of Stairs, Main Entrance  three  -RB        Stair Railings, Main Entrance  railing on right side (ascending)  -RB           Cognitive Assessment/Intervention- PT/OT    Orientation Status (Cognition)  oriented x 4  -RB        Follows Commands (Cognition)  WFL  -RB           Safety Issues, Functional Mobility    Impairments Affecting Function (Mobility)  endurance/activity tolerance;balance;shortness of breath;strength  -RB           Bed Mobility Assessment/Treatment    Bed Mobility Assessment/Treatment  supine-sit;sit-supine  -RB        Supine-Sit [place] (Bed Mobility)  independent  -RB        Sit-Supine [place] (Bed Mobility)  independent  -RB           Functional Mobility    Functional Mobility- Ind. Level  independent  -RB        Functional Mobility-Distance (Feet)  120  -RB           Transfer Assessment/Treatment    Transfer Assessment/Treatment  sit-stand transfer;stand-sit transfer;toilet transfer  -RB           Sit-Stand Transfer    Sit-Stand [place] (Transfers)  independent  -RB           Stand-Sit Transfer    Stand-Sit [place] (Transfers)  independent  -RB           Toilet Transfer    Type (Toilet Transfer)  sit-stand;stand-sit  -RB        [place] Level (Toilet Transfer)  independent  -RB            ADL Assessment/Intervention    BADL Assessment/Intervention  lower body dressing  -RB           Lower Body Dressing Assessment/Training    Lower Body Dressing [place] Level  lower body dressing skills;doff;don;socks  -RB           General ROM    GENERAL ROM COMMENTS  B UE AROM was WNLs.  -RB           MMT (Manual Muscle Testing)    General MMT Comments  B shld flex was 4/5 and rest of UE strength was 4+/5.  -RB           Sensory Assessment/Intervention    Sensory General Assessment  no sensation deficits identified to light touch  -RB           Vision Assessment/Intervention    Visual Impairment/Limitations  corrective lenses for reading  -RB           Pain Scale: Numbers Pre/Post-Treatment    Pain Scale: Numbers, Pretreatment  0/10 - no pain  -RB        Pain Scale: Numbers, Post-Treatment  0/10 - no pain  -RB           Wound 02/12/19 0810 Right gluteal pressure injury    Wound - Properties Group Date first assessed: 02/12/19  -AW Time first assessed: 0810  -AW Present On Admission : picture taken;yes  -AW Side: Right  -AW Location: gluteal  -AW Type: pressure injury  -AW Stage, Pressure Injury: Stage 2  -AW Wound Outcome: Other  -AW, healing        Wound 02/12/19 0810 Right anterior gluteal pressure injury    Wound - Properties Group Date first assessed: 02/12/19  -AW Time first assessed: 0810  -AW Present On Admission : yes;picture taken  -AW Side: Right  -AW Orientation: anterior  -AW Location: gluteal  -AW Type: pressure injury  -AW Stage, Pressure Injury: Stage 2  -AW Wound Outcome: Other  -AW, healing        Clinical Impression (OT)    Date of Referral to OT  02/12/19  -RB        OT Diagnosis  ? impaired mobility and ADLs.  -RB        Functional Level at Time of Evaluation (OT Eval)  Pt demo independence with LB dress, toilet transfer and 120' ambulation.    -RB        Criteria for Skilled Therapeutic Interventions Met (OT Eval)  no problems identified which require skilled intervention  -RB         Anticipated Discharge Disposition (OT)  home  -RB           Vital Signs    Pre Systolic BP Rehab  185  -RB        Pre Treatment Diastolic BP  75  -RB        Post Systolic BP Rehab  179  -RB        Post Treatment Diastolic BP  74  -RB        Pretreatment Heart Rate (beats/min)  68  -RB        Posttreatment Heart Rate (beats/min)  77  -RB        Pre SpO2 (%)  98  -RB        O2 Delivery Pre Treatment  room air  -RB        Post SpO2 (%)  98  -RB        O2 Delivery Post Treatment  room air  -RB        Pre Patient Position  Standing  -RB        Intra Patient Position  Sitting  -RB        Post Patient Position  Sitting  -RB           Living Environment    Home Accessibility  stairs to enter home  -RB          User Key  (r) = Recorded By, (t) = Taken By, (c) = Cosigned By    Initials Name Effective Dates    RB Estrada Lopez OT 06/15/16 -     Mely Caceres RN 10/17/16 -           Occupational Therapy Education     Title: PT OT SLP Therapies (In Progress)     Topic: Occupational Therapy (In Progress)     Point: Precautions (Resolved)     Description: Instruct learner(s) on prescribed precautions during self-care and functional transfers.    Learning Progress Summary           Patient Acceptance, E, VU by RB at 2/14/2019  4:59 PM    Comment:  Edu pt on use of non skid socks when OOB.                               User Key     Initials Effective Dates Name Provider Type Discipline     06/15/16 -  Estrada Lopez, OT Occupational Therapist OT                OT Recommendation and Plan  Outcome Summary/Treatment Plan (OT)  Anticipated Discharge Disposition (OT): home  Plan of Care Review  Plan of Care Reviewed With: patient  Plan of Care Reviewed With: patient  Outcome Summary: OT eval complete on this date.  Pt demonstrated independence with LB dressing, toilet transfer and 120' ambulation.  No problems identified which would require skilled OT intervention.           Outcome Measures     Row Name 02/14/19 1445 02/13/19  1403          How much help from another person do you currently need...    Turning from your back to your side while in flat bed without using bedrails?  --  4  -TONO     Moving from lying on back to sitting on the side of a flat bed without bedrails?  --  4  -TONO     Moving to and from a bed to a chair (including a wheelchair)?  --  3  -TONO     Standing up from a chair using your arms (e.g., wheelchair, bedside chair)?  --  4  -TONO     Climbing 3-5 steps with a railing?  --  3  -TONO     To walk in hospital room?  --  3  -TONO     AM-PAC 6 Clicks Score  --  21  -TONO        How much help from another is currently needed...    Putting on and taking off regular lower body clothing?  4  -RB  --     Bathing (including washing, rinsing, and drying)  4  -RB  --     Toileting (which includes using toilet bed pan or urinal)  4  -RB  --     Putting on and taking off regular upper body clothing  4  -RB  --     Taking care of personal grooming (such as brushing teeth)  4  -RB  --     Eating meals  4  -RB  --     Score  24  -RB  --        Functional Assessment    Outcome Measure Options  AM-PAC 6 Clicks Daily Activity (OT)  -  AM-PAC 6 Clicks Basic Mobility (PT)  -       User Key  (r) = Recorded By, (t) = Taken By, (c) = Cosigned By    Initials Name Provider Type    Estrada Roblero OT Occupational Therapist    Thais Lawler, PT Physical Therapist          Time Calculation:   Time Calculation- OT     Row Name 02/14/19 1704             Time Calculation- OT    OT Start Time  1445  -RB      OT Stop Time  1504  -      OT Time Calculation (min)  19 min  -RB      OT Received On  02/14/19  -        User Key  (r) = Recorded By, (t) = Taken By, (c) = Cosigned By    Initials Name Provider Type    Estrada Roblero OT Occupational Therapist        Therapy Suggested Charges     Code   Minutes Charges    None           Therapy Charges for Today     Code Description Service Date Service Provider Modifiers Qty    [id number]  OT EVAL  LOW COMPLEXITY 1 2/14/2019 Estrada Lopez, OT GO 1               OT Discharge Summary  Anticipated Discharge Disposition (OT): home    Estrada Lopez OT  2/14/2019

## 2019-02-14 NOTE — PLAN OF CARE
Problem: Patient Care Overview  Goal: Plan of Care Review  Outcome: Ongoing (interventions implemented as appropriate)   02/14/19 2290   Coping/Psychosocial   Plan of Care Reviewed With patient   Plan of Care Review   Progress improving

## 2019-02-14 NOTE — MEDICAL STUDENT
"[facility]  [address] [place] [address]. [address]  T - [phone]  F - [phone]     Progress Note          PATIENT  DEMOGRAPHICS   PATIENT NAME: Chanda Yan                      PHYSICIAN: Bettie Browning  : 1940  MRN: [id number]   LOS: 1 day    Patient Care Team:  Ayaan Swenson MD as PCP - General  Ayaan Swenson MD as PCP - Claims Attributed  Feliciano Almazan MD PhD as Consulting Physician (Cardiology)  Jamil Ordonez MD as Consulting Physician (Ophthalmology)  Tim Reza MD as Consulting Physician (Nephrology)  Abhi Fajardo MD (Endocrinology)  Subjective   SUBJECTIVE   Pt had chest pain last night, given nitroglycerin. It is resolved this morning. She reports feeling well.         Objective   OBJECTIVE   Vital Signs  Temp:  [96.3 °F (35.7 °C)-99.2 °F (37.3 °C)] 99.2 °F (37.3 °C)  Heart Rate:  [57-74] 72  Resp:  [16-18] 16  BP: (133-154)/(60-69) 154/69    Flowsheet Rows      First Filed Value   Admission Height  152.4 cm (60\") Documented at 2019 1734   Admission Weight  70.3 kg (154 lb 14.4 oz) Documented at 2019 1734           I/O last 3 completed shifts:  In: 3928 [P.O.:1080; I.V.:2848]  Out: 1000 [Urine:1000]    PHYSICAL EXAM    Physical Exam   Constitutional: She is oriented to person, place, and time. She appears well-developed and well-nourished. No distress.   HENT:   Head: Normocephalic and atraumatic.   Eyes: Pupils are equal, round, and reactive to light.   Neck: Normal range of motion.   Cardiovascular: Normal rate, regular rhythm, normal heart sounds and intact distal pulses.   Pulmonary/Chest: Effort normal and breath sounds normal. No respiratory distress.   Abdominal: Soft. Bowel sounds are normal. She exhibits no distension.   Musculoskeletal: Normal range of motion. She exhibits no edema.   Neurological: She is alert and oriented to person, place, and time.   Skin: Skin is warm and dry. She is not " diaphoretic.   Psychiatric: She has a normal mood and affect. Her behavior is normal. Judgment and thought content normal.       RESULTS   Results Review:    Results from last 7 days   Lab Units 02/14/19 0521 02/13/19 2015 02/13/19 0535 02/12/19 0528   SODIUM mmol/L 136*  --  135* 136*   POTASSIUM mmol/L 4.6  --  4.0 4.4   CHLORIDE mmol/L 106  --  99 99   CO2 mmol/L 22.0  --  22.0 26.0   BUN mg/dL 77*  --  90* 86*   CREATININE mg/dL 2.69*  --  3.15* 2.92*   CALCIUM mg/dL 9.0  --  9.0 9.7   BILIRUBIN mg/dL  --   --  0.3  --    ALK PHOS U/L  --   --  76  --    ALT (SGPT) U/L  --   --  10  --    AST (SGOT) U/L  --   --  19  --    GLUCOSE mg/dL 168* 407* 233* 332*       Estimated Creatinine Clearance: 15.9 mL/min (A) (by C-G formula based on SCr of 2.69 mg/dL (H)).    Results from last 7 days   Lab Units 02/14/19 0521 02/13/19 0535 02/12/19 0528   MAGNESIUM mg/dL 2.5* 2.4* 2.5*             Results from last 7 days   Lab Units 02/14/19 0521 02/13/19 0535 02/12/19 0528 02/11/19  1834   WBC 10*3/mm3 3.63 3.21* 3.33* 4.43   HEMOGLOBIN g/dL 7.7* 7.9* 8.6* 9.3*   PLATELETS 10*3/mm3 164 174 150 178               Imaging Results (last 24 hours)     Procedure Component Value Units Date/Time     Renal Artery Complete [[id number]] Updated:  02/14/19 0809    US Renal Transplant [[id number]] Updated:  02/14/19 0808           MEDICATIONS      albuterol 2.5 mg Nebulization Q4H - RT   amitriptyline 10 mg Oral Nightly   aspirin 81 mg Oral Daily   carvedilol 3.125 mg Oral BID With Meals   cetirizine 10 mg Oral Daily   diltiaZEM  mg Oral Q24H   hydrALAZINE 100 mg Oral TID   insulin aspart 0-14 Units Subcutaneous 4x Daily AC & at Bedtime   lactulose 10 g Oral Daily   magic butt ointment  Topical TID   mycophenolate 360 mg Oral Q12H   pantoprazole 40 mg Oral Daily   predniSONE 5 mg Oral Daily With Breakfast   rosuvastatin 20 mg Oral Daily   sodium chloride 3 mL Intravenous Q12H   tacrolimus 4 mg Oral BID       sodium  chloride 125 mL/hr Last Rate: Stopped (02/14/19 2388)       Assessment/Plan   ASSESSMENT / PLAN      Atrial flutter (CMS/HCC)    1. CKD with history of transplant. DDKT in 2012 and had 7 years of HD prior to the transplant. Baseline cr is 1.9-2. Cr peaked at 3.15. Cr and BUN are improved today. No albuminuria or hematuria on UA. No pain over the transplant site. She didn't have her myfortic and prednisone since admission. Also she is on prograf 1 mg here and normally takes 4 mg bid at home.      She is now on home doses of immunosuppressive meds. No potential nephrotoxins in the list. Losartan is on hold. Waiting on prograf level this am (trough).    Renal US is unchanged since prior renal us in 2017. High resistance waveform due to ckd of the transplant kidney, no GARY of the transplant artery.    FENa <1. Jacek >40. Iam negative. Keep ivf for another 5 hrs     2. HTN. BP elevated this am after returning from US. Add Hydralazine prn if continues to be high. Increase coreg     2- afib with RVR on coreg cardizem     4- h/o cad with stent before    5-Anemia of chronic disease. Hgb trending down. Low TIBC.   Elevated Ferritin. Fecal occult blood negative. Erythropoiesis stimulating agent today, agreed with po fe               This document has been electronically signed by Bettie Browning on February 14, 2019 8:22 AM      As the teaching physician, I have personally re-performed the physical exam and medical decision making activities included in the student note.    Randal Madison MD  2/14/2019  10:53 AM

## 2019-02-14 NOTE — PROGRESS NOTES
[facility] Medicine Services  INPATIENT PROGRESS NOTE    Length of Stay: 1  Date of Admission: 2/11/2019  Primary Care Physician: Ayaan Swenson MD    Subjective   Chief Complaint: No complaints    HPI: This is a 78-year-old -American female with past medical history of CKD, DM 2, dyslipidemia, hypertension, status post renal transplant and CAD that presented to [facility] from [facility] on 2/11/2019 with complaints of chest pain.  The patient was noted to be in atrial flutter at [facility] and was given a Cardizem push and started on a drip.  The patient converted to normal sinus rhythm prior to transfer.      Patient was noted to have anemia.  Pradaxa was held.  Iron is noted to be within normal limits, but the patient states she recently discontinued iron that she had been on previously.  Occult stool was negative.    Dr. Madison is following CKD.  Baseline creatinine is 1.9-2.0.  She is on home dose of Prograf.  Prograf level pending per nephrology.      Review of Systems   Constitutional: Negative for activity change and fatigue.   HENT: Negative for ear pain and sore throat.    Eyes: Negative for pain and discharge.   Respiratory: Negative for cough and shortness of breath.    Cardiovascular: Negative for chest pain and palpitations.   Gastrointestinal: Negative for abdominal pain and nausea.   Endocrine: Negative for cold intolerance and heat intolerance.   Genitourinary: Negative for difficulty urinating and dysuria.   Musculoskeletal: Negative for arthralgias and gait problem.   Skin: Negative for color change and rash.   Neurological: Negative for dizziness and weakness.   Psychiatric/Behavioral: Negative for agitation and confusion.        Objective    Temp:  [96.3 °F (35.7 °C)-99.2 °F (37.3 °C)] 97.9 °F (36.6 °C)  Heart Rate:  [57-74] 65  Resp:  [16-18] 16  BP: (133-172)/(60-70) 162/66    Physical Exam   Constitutional: She is oriented to  person, place, and time. She appears well-developed and well-nourished.   HENT:   Head: Normocephalic and atraumatic.   Eyes: EOM are normal. Pupils are equal, round, and reactive to light.   Neck: Normal range of motion. Neck supple.   Cardiovascular: Normal rate and regular rhythm.   Pulmonary/Chest: Effort normal and breath sounds normal.   Abdominal: Soft. Bowel sounds are normal.   Musculoskeletal: Normal range of motion.   Neurological: She is alert and oriented to person, place, and time.   Skin: Skin is warm and dry.   Psychiatric: She has a normal mood and affect. Her behavior is normal.     Results Review:  I have reviewed the labs, radiology results, and diagnostic studies.    Laboratory Data:   Results from last 7 days   Lab Units 02/14/19 0521 02/13/19 2015 02/13/19 0535 02/12/19 0528   SODIUM mmol/L 136*  --  135* 136*   POTASSIUM mmol/L 4.6  --  4.0 4.4   CHLORIDE mmol/L 106  --  99 99   CO2 mmol/L 22.0  --  22.0 26.0   BUN mg/dL 77*  --  90* 86*   CREATININE mg/dL 2.69*  --  3.15* 2.92*   GLUCOSE mg/dL 168* 407* 233* 332*   CALCIUM mg/dL 9.0  --  9.0 9.7   BILIRUBIN mg/dL  --   --  0.3  --    ALK PHOS U/L  --   --  76  --    ALT (SGPT) U/L  --   --  10  --    AST (SGOT) U/L  --   --  19  --    ANION GAP mmol/L 8.0  --  14.0 11.0     Estimated Creatinine Clearance: 15.9 mL/min (A) (by C-G formula based on SCr of 2.69 mg/dL (H)).  Results from last 7 days   Lab Units 02/14/19 0521 02/13/19 0535 02/12/19 0528   MAGNESIUM mg/dL 2.5* 2.4* 2.5*         Results from last 7 days   Lab Units 02/14/19 0521 02/13/19 0535 02/12/19 0528 02/11/19  1834   WBC 10*3/mm3 3.63 3.21* 3.33* 4.43   HEMOGLOBIN g/dL 7.7* 7.9* 8.6* 9.3*   HEMATOCRIT % 25.1* 26.1* 28.1* 29.7*   PLATELETS 10*3/mm3 164 174 150 178           Culture Data:   No results found for: BLOODCX  No results found for: URINECX  No results found for: RESPCX  No results found for: WOUNDCX  No results found for: STOOLCX  No components found for:  BODYFLD    Radiology Data:   Imaging Results (last 24 hours)     Procedure Component Value Units Date/Time    US Renal Transplant [[id number]] Collected:  02/14/19 0730     Updated:  02/14/19 0911    Narrative:       Ultrasound renal transplant.    HISTORY: Renal insufficiency. Transplanted kidney is noted in the  pelvis on the right.    Normal in size 9.88 x 4.34 x 4.19 cm. Normal renal cortical  echogenicity and cortical thickness. Slight fullness of the  calyces and renal pelvis. This is often seen in transplanted  kidneys.        Doppler renal artery demonstrate peak systolic velocity 57.8  cm/s. Resistive index 0.85.    Doppler waveform also demonstrates a high resistance appearance.  This is usually indicate or of transplant dysfunction.      Impression:       CONCLUSION: Normal size right transplanted kidney. Mild  dilatation of the calyces often seen normally in the transplanted  kidneys. Normal renal parenchyma. No masses or calculi.    Doppler evaluation demonstrates a high resistance waveform with  elevated resistive index of 0.85. This is often an indicator  of  transplant dysfunction.    The appearance of the transplanted kidney and Doppler waveforms,  (resistive index)  however is unchanged since prior examination  December 2, 2017.     Electronically signed by:  Blaine Landry MD  2/14/2019 9:09 AM CST  Workstation: [id number]     Renal Artery Complete [[id number]] Collected:  02/14/19 0730     Updated:  02/14/19 0911    Narrative:       Ultrasound renal transplant.    HISTORY: Renal insufficiency. Transplanted kidney is noted in the  pelvis on the right.    Normal in size 9.88 x 4.34 x 4.19 cm. Normal renal cortical  echogenicity and cortical thickness. Slight fullness of the  calyces and renal pelvis. This is often seen in transplanted  kidneys.        Doppler renal artery demonstrate peak systolic velocity 57.8  cm/s. Resistive index 0.85.    Doppler waveform also demonstrates a high resistance  appearance.  This is usually indicate or of transplant dysfunction.      Impression:       CONCLUSION: Normal size right transplanted kidney. Mild  dilatation of the calyces often seen normally in the transplanted  kidneys. Normal renal parenchyma. No masses or calculi.    Doppler evaluation demonstrates a high resistance waveform with  elevated resistive index of 0.85. This is often an indicator  of  transplant dysfunction.    The appearance of the transplanted kidney and Doppler waveforms,  (resistive index)  however is unchanged since prior examination  December 2, 2017.     Electronically signed by:  Blaine Landry MD  2/14/2019 9:09 AM CST  Workstation: [id number]          I have reviewed the patient's current medications.     Assessment/Plan     Active Hospital Problems    Diagnosis Date Noted   • Atrial flutter (CMS/HCC) [I48.92] 02/11/2019       Plan:    1.  Paroxysmal atrial fibrillation: Now in normal sinus rhythm.  Continue Coreg, diltiazem and Pradaxa.  2.  CKD/post renal transplant: Dr. Madison following.  Continue home dose of Prograf.  Prograf level pending.  3.  Anemia of chronic disease: We will restart oral iron.  Occult stool negative.  4.  Hypertension: Continue Coreg, Cardizem and Hydralazine.  Lasix and Losartan will be restarted once kidney function is better.   5.  Hyperlipidemia: Continue statin.  6.  Diabetes mellitus, type II: SSI.  Will increase dosage for better control.        Discharge Planning: I expect patient to be discharged to home in 1-2 days.      This document has been electronically signed by ROSEANNA Kong on February 14, 2019 1:12 PM

## 2019-02-14 NOTE — SIGNIFICANT NOTE
"   02/14/19 5519   Rehab Treatment   Discipline physical therapy assistant   Reason Treatment Not Performed (pt states  she was to tired from all the running this am \"check back tomorrow\")     "

## 2019-02-14 NOTE — PLAN OF CARE
Problem: Patient Care Overview  Goal: Plan of Care Review  Outcome: Ongoing (interventions implemented as appropriate)   02/14/19 0421   Coping/Psychosocial   Plan of Care Reviewed With patient   Plan of Care Review   Progress no change   OTHER   Outcome Summary VSS, C/O CP at beginning of shift now relieved. Renal US this AM. Will continue to monitor.        Problem: Arrhythmia/Dysrhythmia (Symptomatic) (Adult)  Goal: Signs and Symptoms of Listed Potential Problems Will be Absent, Minimized or Managed (Arrhythmia/Dysrhythmia)  Outcome: Ongoing (interventions implemented as appropriate)      Problem: Wound (Includes Pressure Injury) (Adult)  Goal: Signs and Symptoms of Listed Potential Problems Will be Absent, Minimized or Managed (Wound)  Outcome: Ongoing (interventions implemented as appropriate)

## 2019-02-14 NOTE — PLAN OF CARE
Problem: Patient Care Overview  Goal: Plan of Care Review  Outcome: Ongoing (interventions implemented as appropriate)   02/14/19 1701   Coping/Psychosocial   Plan of Care Reviewed With patient   OTHER   Outcome Summary OT eval complete on this date. Pt demonstrated independence with LB dressing, toilet transfer and 120' ambulation. No problems identified which would require skilled OT intervention.

## 2019-02-15 VITALS
TEMPERATURE: 97.9 F | HEIGHT: 60 IN | RESPIRATION RATE: 18 BRPM | WEIGHT: 163.8 LBS | SYSTOLIC BLOOD PRESSURE: 156 MMHG | HEART RATE: 70 BPM | BODY MASS INDEX: 32.16 KG/M2 | OXYGEN SATURATION: 95 % | DIASTOLIC BLOOD PRESSURE: 70 MMHG

## 2019-02-15 LAB
ALBUMIN SERPL-MCNC: 3.6 G/DL (ref 3.4–4.8)
ANION GAP SERPL CALCULATED.3IONS-SCNC: 6 MMOL/L (ref 5–15)
ANION GAP SERPL CALCULATED.3IONS-SCNC: 7 MMOL/L (ref 5–15)
BASOPHILS # BLD AUTO: 0.02 10*3/MM3 (ref 0–0.2)
BASOPHILS NFR BLD AUTO: 0.7 % (ref 0–1.5)
BUN BLD-MCNC: 67 MG/DL (ref 7–21)
BUN BLD-MCNC: 68 MG/DL (ref 7–21)
BUN/CREAT SERPL: 28.6 (ref 7–25)
BUN/CREAT SERPL: 29.2 (ref 7–25)
CALCIUM SPEC-SCNC: 9.4 MG/DL (ref 8.4–10.2)
CALCIUM SPEC-SCNC: 9.4 MG/DL (ref 8.4–10.2)
CHLORIDE SERPL-SCNC: 105 MMOL/L (ref 95–110)
CHLORIDE SERPL-SCNC: 106 MMOL/L (ref 95–110)
CO2 SERPL-SCNC: 22 MMOL/L (ref 22–31)
CO2 SERPL-SCNC: 22 MMOL/L (ref 22–31)
CREAT BLD-MCNC: 2.33 MG/DL (ref 0.5–1)
CREAT BLD-MCNC: 2.34 MG/DL (ref 0.5–1)
DEPRECATED RDW RBC AUTO: 48.5 FL (ref 37–54)
EOSINOPHIL # BLD AUTO: 0.07 10*3/MM3 (ref 0–0.4)
EOSINOPHIL NFR BLD AUTO: 2.5 % (ref 0.3–6.2)
ERYTHROCYTE [DISTWIDTH] IN BLOOD BY AUTOMATED COUNT: 15.9 % (ref 12.3–15.4)
GFR SERPL CREATININE-BSD FRML MDRD: 24 ML/MIN/1.73 (ref 39–90)
GFR SERPL CREATININE-BSD FRML MDRD: 25 ML/MIN/1.73 (ref 39–90)
GLUCOSE BLD-MCNC: 158 MG/DL (ref 60–100)
GLUCOSE BLD-MCNC: 159 MG/DL (ref 60–100)
GLUCOSE BLDC GLUCOMTR-MCNC: 162 MG/DL (ref 70–130)
GLUCOSE BLDC GLUCOMTR-MCNC: 335 MG/DL (ref 70–130)
GLUCOSE BLDC GLUCOMTR-MCNC: 416 MG/DL (ref 70–130)
HCT VFR BLD AUTO: 24.6 % (ref 34–46.6)
HGB BLD-MCNC: 7.5 G/DL (ref 12–15.9)
IMM GRANULOCYTES # BLD AUTO: 0.01 10*3/MM3 (ref 0–0.05)
IMM GRANULOCYTES NFR BLD AUTO: 0.4 % (ref 0–0.5)
LYMPHOCYTES # BLD AUTO: 0.64 10*3/MM3 (ref 0.7–3.1)
LYMPHOCYTES NFR BLD AUTO: 23 % (ref 19.6–45.3)
MAGNESIUM SERPL-MCNC: 2.6 MG/DL (ref 1.6–2.3)
MCH RBC QN AUTO: 25.8 PG (ref 26.6–33)
MCHC RBC AUTO-ENTMCNC: 30.5 G/DL (ref 31.5–35.7)
MCV RBC AUTO: 84.5 FL (ref 79–97)
MONOCYTES # BLD AUTO: 0.26 10*3/MM3 (ref 0.1–0.9)
MONOCYTES NFR BLD AUTO: 9.4 % (ref 5–12)
NEUTROPHILS # BLD AUTO: 1.78 10*3/MM3 (ref 1.4–7)
NEUTROPHILS NFR BLD AUTO: 64 % (ref 42.7–76)
NRBC BLD AUTO-RTO: 0 /100 WBC (ref 0–0)
PHOSPHATE SERPL-MCNC: 3.7 MG/DL (ref 2.4–4.4)
PLATELET # BLD AUTO: 155 10*3/MM3 (ref 140–450)
PMV BLD AUTO: 11.9 FL (ref 6–12)
POTASSIUM BLD-SCNC: 4.3 MMOL/L (ref 3.5–5.1)
POTASSIUM BLD-SCNC: 4.3 MMOL/L (ref 3.5–5.1)
RBC # BLD AUTO: 2.91 10*6/MM3 (ref 3.77–5.28)
SODIUM BLD-SCNC: 133 MMOL/L (ref 137–145)
SODIUM BLD-SCNC: 135 MMOL/L (ref 137–145)
TACROLIMUS BLD-MCNC: 2.7 NG/ML (ref 2–20)
WBC NRBC COR # BLD: 2.78 10*3/MM3 (ref 3.4–10.8)

## 2019-02-15 PROCEDURE — 85025 COMPLETE CBC W/AUTO DIFF WBC: CPT | Performed by: INTERNAL MEDICINE

## 2019-02-15 PROCEDURE — 63710000001 MYCOPHENOLATE 360 MG TABLET DELAYED-RELEASE: Performed by: INTERNAL MEDICINE

## 2019-02-15 PROCEDURE — 82962 GLUCOSE BLOOD TEST: CPT

## 2019-02-15 PROCEDURE — 94760 N-INVAS EAR/PLS OXIMETRY 1: CPT

## 2019-02-15 PROCEDURE — 80048 BASIC METABOLIC PNL TOTAL CA: CPT | Performed by: INTERNAL MEDICINE

## 2019-02-15 PROCEDURE — 63710000001 TACROLIMUS PER 1 MG: Performed by: INTERNAL MEDICINE

## 2019-02-15 PROCEDURE — 63710000001 PREDNISONE PER 5 MG: Performed by: INTERNAL MEDICINE

## 2019-02-15 PROCEDURE — 94799 UNLISTED PULMONARY SVC/PX: CPT

## 2019-02-15 PROCEDURE — 83735 ASSAY OF MAGNESIUM: CPT | Performed by: INTERNAL MEDICINE

## 2019-02-15 PROCEDURE — 80069 RENAL FUNCTION PANEL: CPT | Performed by: INTERNAL MEDICINE

## 2019-02-15 RX ORDER — TACROLIMUS 1 MG/1
CAPSULE ORAL
Qty: 180 CAPSULE | Refills: 3
Start: 2019-02-15

## 2019-02-15 RX ORDER — FUROSEMIDE 40 MG/1
20 TABLET ORAL EVERY OTHER DAY
Qty: 180 TABLET | Refills: 3 | Status: SHIPPED | OUTPATIENT
Start: 2019-02-15

## 2019-02-15 RX ADMIN — ALBUTEROL SULFATE 2.5 MG: 2.5 SOLUTION RESPIRATORY (INHALATION) at 06:44

## 2019-02-15 RX ADMIN — HYDROCORTISONE: 1 CREAM TOPICAL at 08:45

## 2019-02-15 RX ADMIN — HYDRALAZINE HYDROCHLORIDE 100 MG: 50 TABLET ORAL at 08:44

## 2019-02-15 RX ADMIN — PANTOPRAZOLE SODIUM 40 MG: 40 TABLET, DELAYED RELEASE ORAL at 08:44

## 2019-02-15 RX ADMIN — FERROUS SULFATE TAB EC 324 MG (65 MG FE EQUIVALENT) 324 MG: 324 (65 FE) TABLET DELAYED RESPONSE at 08:44

## 2019-02-15 RX ADMIN — PREDNISONE 5 MG: 5 TABLET ORAL at 08:44

## 2019-02-15 RX ADMIN — DABIGATRAN ETEXILATE MESYLATE 75 MG: 75 CAPSULE ORAL at 08:44

## 2019-02-15 RX ADMIN — MYCOPHENOLIC ACID 360 MG: 360 TABLET, DELAYED RELEASE ORAL at 08:46

## 2019-02-15 RX ADMIN — DILTIAZEM HYDROCHLORIDE 180 MG: 180 CAPSULE, COATED, EXTENDED RELEASE ORAL at 08:44

## 2019-02-15 RX ADMIN — CARVEDILOL 6.25 MG: 6.25 TABLET, FILM COATED ORAL at 08:44

## 2019-02-15 RX ADMIN — ROSUVASTATIN CALCIUM 20 MG: 20 TABLET, FILM COATED ORAL at 08:43

## 2019-02-15 RX ADMIN — TACROLIMUS 4 MG: 1 CAPSULE ORAL at 08:46

## 2019-02-15 RX ADMIN — ASPIRIN 81 MG: 81 TABLET, COATED ORAL at 08:44

## 2019-02-15 RX ADMIN — CETIRIZINE HYDROCHLORIDE 10 MG: 10 TABLET, FILM COATED ORAL at 08:43

## 2019-02-15 NOTE — THERAPY DISCHARGE NOTE
Acute Care - Physical Therapy Discharge Summary  [facility]       Patient Name: Chanda Yan  : 1940  MRN: [id number]    Today's Date: 2/15/2019  Onset of Illness/Injury or Date of Surgery: 19    Date of Referral to PT: 19  Referring Physician: RUT ADAMS      Admit Date: 2019      PT Recommendation and Plan    Visit Dx:    ICD-10-CM ICD-9-CM   1. Typical atrial flutter (CMS/HCC) I48.3 427.32   2. Impaired functional mobility, balance, gait, and endurance Z74.09 V49.89   3. Impaired mobility and activities of daily living Z74.09 799.89   4. Stage 4 chronic kidney disease (CMS/[facility]) N18.4 585.4       Outcome Measures     Row Name 19 1445 19 1403          How much help from another person do you currently need...    Turning from your back to your side while in flat bed without using bedrails?  --  4  -TONO     Moving from lying on back to sitting on the side of a flat bed without bedrails?  --  4  -TONO     Moving to and from a bed to a chair (including a wheelchair)?  --  3  -OTNO     Standing up from a chair using your arms (e.g., wheelchair, bedside chair)?  --  4  -TONO     Climbing 3-5 steps with a railing?  --  3  -TONO     To walk in hospital room?  --  3  -TONO     AM-PAC 6 Clicks Score  --  21  -TONO        How much help from another is currently needed...    Putting on and taking off regular lower body clothing?  4  -RB  --     Bathing (including washing, rinsing, and drying)  4  -RB  --     Toileting (which includes using toilet bed pan or urinal)  4  -RB  --     Putting on and taking off regular upper body clothing  4  -RB  --     Taking care of personal grooming (such as brushing teeth)  4  -RB  --     Eating meals  4  -RB  --     Score  24  -RB  --        Functional Assessment    Outcome Measure Options  AM-PAC 6 Clicks Daily Activity (OT)  -RB  AM-PAC 6 Clicks Basic Mobility (PT)  -TONO       User Key  (r) = Recorded By, (t) = Taken By, (c) = Cosigned By    Initials Name  Provider Type    RB Estrada Lopez OT Occupational Therapist    Thais Lawler, PT Physical Therapist            Therapy Suggested Charges     Code   Minutes Charges    None                     PT Discharge Summary  Anticipated Discharge Disposition (PT): home with assist, home with home health  Reason for Discharge: Discharge from facility, Per MD order  Outcomes Achieved: Unable to make functional progress toward goals at this time(0 of 3 goals met)  Discharge Destination: Home with assist      Thais Higuera, PT   2/15/2019

## 2019-02-15 NOTE — PLAN OF CARE
Problem: Patient Care Overview  Goal: Plan of Care Review  Outcome: Ongoing (interventions implemented as appropriate)   02/15/19 0248   Coping/Psychosocial   Plan of Care Reviewed With patient   Plan of Care Review   Progress no change   OTHER   Outcome Summary No C/O pain/discomfort. Renal US results available. FSBS continues to be elevated. VSS, will continue to monitor.        Problem: Arrhythmia/Dysrhythmia (Symptomatic) (Adult)  Goal: Signs and Symptoms of Listed Potential Problems Will be Absent, Minimized or Managed (Arrhythmia/Dysrhythmia)  Outcome: Ongoing (interventions implemented as appropriate)      Problem: Wound (Includes Pressure Injury) (Adult)  Goal: Signs and Symptoms of Listed Potential Problems Will be Absent, Minimized or Managed (Wound)  Outcome: Ongoing (interventions implemented as appropriate)

## 2019-02-15 NOTE — DISCHARGE SUMMARY
[facility] Medicine Services  DISCHARGE SUMMARY       Date of Admission: 2/11/2019  Date of Discharge:  2/15/2019  Primary Care Physician: Ayaan Swenson MD    Presenting Problem/History of Present Illness:  Atrial flutter (CMS/HCC) [I48.92]  Atrial flutter (CMS/HCC) [I48.92]     Final Discharge Diagnoses:  Active Hospital Problems    Diagnosis   • Atrial flutter (CMS/HCC)       Consults:   Consults     Date and Time Order Name Status Description    2/13/2019 0844 Inpatient Nephrology Consult      2/12/2019 1059 Inpatient Cardiology Consult Completed                   Pertinent Test Results:   Lab Results (last 24 hours)     Procedure Component Value Units Date/Time    POC Glucose Once [[id number]]  (Abnormal) Collected:  02/15/19 1023    Specimen:  Blood Updated:  02/15/19 1058     Glucose 335 mg/dL      Comment: RN NotifiedOperator: [id number] CEDRICK MEJIAEYMeter ID: [id number]       Renal Function Panel [[id number]]  (Abnormal) Collected:  02/15/19 0613    Specimen:  Blood Updated:  02/15/19 0951     Glucose 158 mg/dL      BUN 68 mg/dL      Creatinine 2.33 mg/dL      Sodium 133 mmol/L      Potassium 4.3 mmol/L      Chloride 105 mmol/L      CO2 22.0 mmol/L      Calcium 9.4 mg/dL      Albumin 3.60 g/dL      Phosphorus 3.7 mg/dL      Anion Gap 6.0 mmol/L      BUN/Creatinine Ratio 29.2     eGFR  African Amer 25 mL/min/1.73     Narrative:       The MDRD GFR formula is only valid for adults with stable renal function between ages 18 and 70.    POC Glucose Once [[id number]]  (Abnormal) Collected:  02/1940    Specimen:  Blood Updated:  02/15/19 0808     Glucose 416 mg/dL      Comment: RN NotifiedOperator: [id number] ADRIANA VALENZUELAMeter ID: [id number]       Basic Metabolic Panel [[id number]]  (Abnormal) Collected:  02/15/19 0613    Specimen:  Blood Updated:  02/15/19 0704     Glucose 159 mg/dL      BUN 67 mg/dL      Creatinine 2.34 mg/dL      Sodium 135 mmol/L      Potassium  4.3 mmol/L      Chloride 106 mmol/L      CO2 22.0 mmol/L      Calcium 9.4 mg/dL      eGFR  African Amer 24 mL/min/1.73      BUN/Creatinine Ratio 28.6     Anion Gap 7.0 mmol/L     Narrative:       The MDRD GFR formula is only valid for adults with stable renal function between ages 18 and 70.    Magnesium [[id number]]  (Abnormal) Collected:  02/15/19 0613    Specimen:  Blood Updated:  02/15/19 0704     Magnesium 2.6 mg/dL     POC Glucose Once [[id number]]  (Abnormal) Collected:  02/15/19 0612    Specimen:  Blood Updated:  02/15/19 0700     Glucose 162 mg/dL      Comment: RN NotifiedOperator: [id number] ADRIANA FRAGAFERMeter ID: [id number]       CBC & Differential [[id number]] Collected:  02/15/19 0613    Specimen:  Blood Updated:  02/15/19 0646    Narrative:       The following orders were created for panel order CBC & Differential.  Procedure                               Abnormality         Status                     ---------                               -----------         ------                     CBC Auto Differential[[id number]]        Abnormal            Final result                 Please view results for these tests on the individual orders.    CBC Auto Differential [[id number]]  (Abnormal) Collected:  02/15/19 0613    Specimen:  Blood Updated:  02/15/19 0646     WBC 2.78 10*3/mm3      RBC 2.91 10*6/mm3      Hemoglobin 7.5 g/dL      Hematocrit 24.6 %      MCV 84.5 fL      MCH 25.8 pg      MCHC 30.5 g/dL      RDW 15.9 %      RDW-SD 48.5 fl      MPV 11.9 fL      Platelets 155 10*3/mm3      Neutrophil % 64.0 %      Lymphocyte % 23.0 %      Monocyte % 9.4 %      Eosinophil % 2.5 %      Basophil % 0.7 %      Immature Grans % 0.4 %      Neutrophils, Absolute 1.78 10*3/mm3      Lymphocytes, Absolute 0.64 10*3/mm3      Monocytes, Absolute 0.26 10*3/mm3      Eosinophils, Absolute 0.07 10*3/mm3      Basophils, Absolute 0.02 10*3/mm3      Immature Grans, Absolute 0.01 10*3/mm3      nRBC 0.0 /100 WBC     Glucose, Random  "[[id number]]  (Abnormal) Collected:  02/14/19 2005    Specimen:  Blood Updated:  02/14/19 2050     Glucose 368 mg/dL     POC Glucose Once [[id number]]  (Abnormal) Collected:  02/14/19 1716    Specimen:  Blood Updated:  02/14/19 1741     Glucose 245 mg/dL      Comment: RN NotifiedOperator: [id number] CEDRICK ALVAREZMeter ID: [id number]           Imaging Results (last 24 hours)     ** No results found for the last 24 hours. **          Chief Complaint on Day of Discharge: No complaints    Hospital Course:      This is a 78-year-old -American female with past medical history of CKD, DM 2, dyslipidemia, hypertension, status post renal transplant and CAD that presented to [facility] from [facility] on 2/11/2019 with complaints of chest pain.  The patient was noted to be in atrial flutter at [facility] and was given a Cardizem push and started on a drip.  The patient converted to normal sinus rhythm prior to transfer.       Patient was noted to have anemia.  Pradaxa was held.  Iron is noted to be within normal limits, but the patient states she recently discontinued iron that she had been on previously.  Occult stool was negative and hemoglobin remained stabled.  Iron and Pradaxa were restarted.      Dr. Madison is following CKD.  Baseline creatinine is 1.9-2.0.  Losartan has been discontinued and Lasix will be continued every other day until she follows up with Dr. Morley.    Follow-up with Dr. Nieves in 2-3 weeks.  Follow with primary care physician in 1 week.       Condition on Discharge: Stable    Physical Exam on Discharge:  /70 (BP Location: Left arm, Patient Position: Sitting)   Pulse 70   Temp 97.9 °F (36.6 °C) (Tympanic)   Resp 18   Ht 152.4 cm (60\")   Wt 74.3 kg (163 lb 12.8 oz)   SpO2 95%   BMI 31.99 kg/m²   Physical Exam   Constitutional: She is oriented to person, place, and time. She appears well-developed and well-nourished.   HENT:   Head: Normocephalic and atraumatic.   Eyes: " EOM are normal. Pupils are equal, round, and reactive to light.   Neck: Normal range of motion. Neck supple.   Cardiovascular: Normal rate and regular rhythm.   Pulmonary/Chest: Effort normal and breath sounds normal.   Abdominal: Soft. Bowel sounds are normal.   Musculoskeletal: Normal range of motion.   Neurological: She is alert and oriented to person, place, and time.   Skin: Skin is warm and dry.   Psychiatric: She has a normal mood and affect. Her behavior is normal.     Discharge Disposition:  Home or Self Care    Discharge Medications:     Discharge Medications      Changes to Medications      Instructions Start Date   furosemide 40 MG tablet  Commonly known as:  LASIX  What changed:  See the new instructions.  Notes to patient:  Fluid remove    20 mg, Oral, Every Other Day      tacrolimus 1 MG capsule  Commonly known as:  PROGRAF  What changed:    · how much to take  · how to take this  · when to take this  · additional instructions  Notes to patient:  Prevent rejection    4 mg twice daily         Continue These Medications      Instructions Start Date   albuterol sulfate  (90 Base) MCG/ACT inhaler  Commonly known as:  PROVENTIL HFA;VENTOLIN HFA;PROAIR HFA  Notes to patient:  Rescue inhaler    2 puffs, Inhalation, Every 4 Hours PRN      amitriptyline 10 MG tablet  Commonly known as:  ELAVIL  Notes to patient:  Breathing    10 mg, Oral, Nightly      ASPIRIN LOW DOSE 81 MG EC tablet  Generic drug:  aspirin  Notes to patient:  Blood thinner    TAKE 1 TABLET BY MOUTH EVERY DAY      Ca Carb-FA-D-B6-B12-Boron-Mg 1342-1 MG wafer   1 tablet, Oral, Daily      CALCIUM 1000 + D PO  Notes to patient:  Supplement    1 tablet, Oral, Daily      carvedilol 3.125 MG tablet  Commonly known as:  COREG  Notes to patient:  Blood pressure    3.125 mg, Oral, 2 Times Daily With Meals      cetirizine 10 MG tablet  Commonly known as:  zyrTEC  Notes to patient:  Allergy    10 mg, Oral, Daily      dabigatran etexilate 75 MG  capsule  Commonly known as:  PRADAXA  Notes to patient:  Blood thinner    75 mg, Oral, Every 12 Hours Scheduled      diltiaZEM SR 90 MG 12 hr capsule  Commonly known as:  CARDIZEM SR  Notes to patient:  Heart rhythm control    90 mg, Oral, 2 Times Daily      ezetimibe 10 MG tablet  Commonly known as:  ZETIA  Notes to patient:  Blood sugar control    10 mg, Oral, Daily      ferrous sulfate 325 (65 FE) MG tablet  Notes to patient:  Supplement    325 mg, Oral, Daily With Breakfast      fluticasone 50 MCG/ACT nasal spray  Commonly known as:  FLONASE  Notes to patient:  Sinus    2 sprays, Nasal, Daily      glucagon 1 MG injection  Commonly known as:  GLUCAGON EMERGENCY  Notes to patient:  Low blood sugar   1 mg, Subcutaneous, Once As Needed      glucose blood test strip   Use as instructed      glucose monitor monitoring kit   1 each, Does not apply, Daily      hydrALAZINE 100 MG tablet  Commonly known as:  APRESOLINE  Notes to patient:  Blood pressure    100 mg, Oral, 3 Times Daily      Injection Device for Insulin device   1 application, Does not apply, 3 Times Weekly PRN, To use with insulin pen 3 times a day. Give 90 day supply      insulin aspart 100 UNIT/ML injection  Commonly known as:  NOVOLOG   Put 240ml in pump      ipratropium 0.06 % nasal spray  Commonly known as:  ATROVENT  Notes to patient:  Breathing treatment    2 sprays, Nasal, 2 Times Daily      LANTUS SOLOSTAR 100 UNIT/ML injection pen  Generic drug:  Insulin Glargine   INJECT 10 UNITS SUBCUTANEOUSLY UTD AS A BACK UP FOR PUMP      mupirocin 2 % ointment  Commonly known as:  BACTROBAN   Topical, 3 Times Daily      mycophenolate 360 MG tablet delayed-release EC tablet  Commonly known as:  MYFORTIC   360 mg, Oral, 2 Times Daily      pantoprazole 40 MG EC tablet  Commonly known as:  PROTONIX  Notes to patient:  Reduce stomach acid   40 mg, Oral, Daily      predniSONE 5 MG tablet  Commonly known as:  DELTASONE  Notes to patient:  Steroid    5 mg, Oral,  Daily      rosuvastatin 20 MG tablet  Commonly known as:  CRESTOR  Notes to patient:  Cholesterol    20 mg, Oral, Daily      traMADol 50 MG tablet  Commonly known as:  ULTRAM  Notes to patient:  Pain control    50 mg, Oral, 2 Times Daily      vitamin D3 5000 units capsule capsule  Notes to patient:  Supplement    5,000 Units, Oral, Daily         Stop These Medications    losartan 100 MG tablet  Commonly known as:  COZAAR            Discharge Diet:   Diet Instructions     Diet: Consistent Carbohydrate, Cardiac, Renal      Discharge Diet:   Consistent Carbohydrate  Cardiac  Renal             Activity at Discharge:   Activity Instructions     Activity as Tolerated            Discharge Care Plan/Instructions: As above.     Follow-up Appointments:   Future Appointments   Date Time Provider Department Center   2/22/2019 11:15 AM Barbara Kwon APRN [facility] None   3/13/2019 10:15 AM Ayaan Swenson MD [facility] None       Test Results Pending at Discharge:    Order Current Status    Tacrolimus Level In process            This document has been electronically signed by ROSEANNA Kong on February 15, 2019 1:14 PM        Time: Greater than 30 minutes.

## 2019-02-15 NOTE — MEDICAL STUDENT
"[facility]  [address] [place] [address]. [address]  T - [phone]  F - [phone]     Progress Note          PATIENT  DEMOGRAPHICS   PATIENT NAME: Chanda Yan                      PHYSICIAN: Bettie Browning  : 1940  MRN: [id number]   LOS: 2 days    Patient Care Team:  Ayaan Swenson MD as PCP - General  Ayaan Swenson MD as PCP - Claims Attributed  Feliciano Almazan MD PhD as Consulting Physician (Cardiology)  Jamil Ordonez MD as Consulting Physician (Ophthalmology)  Tim Reza MD as Consulting Physician (Nephrology)  Abhi Fajardo MD (Endocrinology)  Subjective   SUBJECTIVE   Pt reports feeling well this morning. No chest pain, palpitations or shortness of air.         Objective   OBJECTIVE   Vital Signs  Temp:  [97.3 °F (36.3 °C)-98.3 °F (36.8 °C)] 97.3 °F (36.3 °C)  Heart Rate:  [55-78] 68  Resp:  [16] 16  BP: (132-172)/(60-70) 132/60    Flowsheet Rows      First Filed Value   Admission Height  152.4 cm (60\") Documented at 2019 1734   Admission Weight  70.3 kg (154 lb 14.4 oz) Documented at 2019 1734           I/O last 3 completed shifts:  In: 3568 [P.O.:720; I.V.:2848]  Out: 1250 [Urine:1250]    PHYSICAL EXAM    Physical Exam   Constitutional: She is oriented to person, place, and time. She appears well-developed and well-nourished. No distress.   HENT:   Head: Normocephalic and atraumatic.   Eyes: Pupils are equal, round, and reactive to light.   Neck: Normal range of motion.   Cardiovascular: Normal rate, regular rhythm, normal heart sounds and intact distal pulses.   Pulmonary/Chest: Effort normal and breath sounds normal. No respiratory distress.   Abdominal: Soft. Bowel sounds are normal. She exhibits no distension.   Musculoskeletal: Normal range of motion. She exhibits no edema.   Neurological: She is alert and oriented to person, place, and time.   Skin: Skin is warm and dry. She is not diaphoretic. "   Psychiatric: She has a normal mood and affect. Her behavior is normal. Judgment and thought content normal.       RESULTS   Results Review:    Results from last 7 days   Lab Units 02/15/19  0613 02/14/19  2005 02/14/19  0521  02/13/19  0535   SODIUM mmol/L 135*  --  136*  --  135*   POTASSIUM mmol/L 4.3  --  4.6  --  4.0   CHLORIDE mmol/L 106  --  106  --  99   CO2 mmol/L 22.0  --  22.0  --  22.0   BUN mg/dL 67*  --  77*  --  90*   CREATININE mg/dL 2.34*  --  2.69*  --  3.15*   CALCIUM mg/dL 9.4  --  9.0  --  9.0   BILIRUBIN mg/dL  --   --   --   --  0.3   ALK PHOS U/L  --   --   --   --  76   ALT (SGPT) U/L  --   --   --   --  10   AST (SGOT) U/L  --   --   --   --  19   GLUCOSE mg/dL 159* 368* 168*   < > 233*    < > = values in this interval not displayed.       Estimated Creatinine Clearance: 15.9 mL/min (A) (by C-G formula based on SCr of 2.69 mg/dL (H)).    Results from last 7 days   Lab Units 02/15/19  0613 02/14/19  0521 02/13/19  0535   MAGNESIUM mg/dL 2.6* 2.5* 2.4*             Results from last 7 days   Lab Units 02/15/19  0613 02/14/19  0521 02/13/19  0535 02/12/19  0528 02/11/19  1834   WBC 10*3/mm3 2.78* 3.63 3.21* 3.33* 4.43   HEMOGLOBIN g/dL 7.5* 7.7* 7.9* 8.6* 9.3*   PLATELETS 10*3/mm3 155 164 174 150 178               Imaging Results (last 24 hours)     Procedure Component Value Units Date/Time    US Renal Transplant [[id number]] Collected:  02/14/19 0730     Updated:  02/14/19 0911    Narrative:       Ultrasound renal transplant.    HISTORY: Renal insufficiency. Transplanted kidney is noted in the  pelvis on the right.    Normal in size 9.88 x 4.34 x 4.19 cm. Normal renal cortical  echogenicity and cortical thickness. Slight fullness of the  calyces and renal pelvis. This is often seen in transplanted  kidneys.        Doppler renal artery demonstrate peak systolic velocity 57.8  cm/s. Resistive index 0.85.    Doppler waveform also demonstrates a high resistance appearance.  This is usually  indicate or of transplant dysfunction.      Impression:       CONCLUSION: Normal size right transplanted kidney. Mild  dilatation of the calyces often seen normally in the transplanted  kidneys. Normal renal parenchyma. No masses or calculi.    Doppler evaluation demonstrates a high resistance waveform with  elevated resistive index of 0.85. This is often an indicator  of  transplant dysfunction.    The appearance of the transplanted kidney and Doppler waveforms,  (resistive index)  however is unchanged since prior examination  December 2, 2017.     Electronically signed by:  Blaine Landry MD  2/14/2019 9:09 AM CST  Workstation: [id number]    US Renal Artery Complete [[id number]] Collected:  02/14/19 0730     Updated:  02/14/19 0911    Narrative:       Ultrasound renal transplant.    HISTORY: Renal insufficiency. Transplanted kidney is noted in the  pelvis on the right.    Normal in size 9.88 x 4.34 x 4.19 cm. Normal renal cortical  echogenicity and cortical thickness. Slight fullness of the  calyces and renal pelvis. This is often seen in transplanted  kidneys.        Doppler renal artery demonstrate peak systolic velocity 57.8  cm/s. Resistive index 0.85.    Doppler waveform also demonstrates a high resistance appearance.  This is usually indicate or of transplant dysfunction.      Impression:       CONCLUSION: Normal size right transplanted kidney. Mild  dilatation of the calyces often seen normally in the transplanted  kidneys. Normal renal parenchyma. No masses or calculi.    Doppler evaluation demonstrates a high resistance waveform with  elevated resistive index of 0.85. This is often an indicator  of  transplant dysfunction.    The appearance of the transplanted kidney and Doppler waveforms,  (resistive index)  however is unchanged since prior examination  December 2, 2017.     Electronically signed by:  Blaine Landry MD  2/14/2019 9:09 AM CST  Workstation: [id number]           MEDICATIONS      albuterol 2.5 mg  Nebulization Q4H - RT   amitriptyline 10 mg Oral Nightly   aspirin 81 mg Oral Daily   carvedilol 6.25 mg Oral BID With Meals   cetirizine 10 mg Oral Daily   dabigatran etexilate 75 mg Oral Q12H   diltiaZEM  mg Oral Q24H   ferrous sulfate 324 mg Oral Daily With Breakfast   hydrALAZINE 100 mg Oral TID   insulin aspart 0-14 Units Subcutaneous 4x Daily AC & at Bedtime   lactulose 10 g Oral Daily   magic butt ointment  Topical TID   mycophenolate 360 mg Oral Q12H   pantoprazole 40 mg Oral Daily   predniSONE 5 mg Oral Daily With Breakfast   rosuvastatin 20 mg Oral Daily   sodium chloride 3 mL Intravenous Q12H   tacrolimus 4 mg Oral BID          Assessment/Plan   ASSESSMENT / PLAN      Atrial flutter (CMS/HCC)    1. CKD with history of transplant. DDKT in 2012 and had 7 years of HD prior to the transplant. Baseline cr is 1.9-2. Cr peaked at 3.15. Cr and BUN are improved today. No albuminuria or hematuria on UA. No pain over the transplant site. She didn't have her myfortic and prednisone since admission. Also she is on prograf 1 mg here and normally takes 4 mg bid at home.      She is now on home doses of immunosuppressive meds. No potential nephrotoxins in the list. Losartan is on hold and will hold it for now. Waiting on prograf level  (trough).     Renal US is unchanged since prior renal us in 2017. High resistance waveform due to ckd of the transplant kidney, no GARY of the transplant artery.     FENa <1. Jacek >40. Iam negative.     Ok to discharge from renal standpoint and she will see Dr Morley in Mar 6 2019 at 245      2. HTN. BP stable on Coreg and Hydralazine.     2- afib with RVR on coreg cardizem     4- h/o cad with stent before     5-Anemia of chronic disease. Hgb continues to trend down. Low TIBC.   Elevated Ferritin. Fecal occult blood negative. Epogen injection yesterday. PO Fe daily. Will setup epogen in out pt every week in [place]            This document has been electronically signed by  Bettie Browning on February 15, 2019 6:05 AM      As the teaching physician, I have personally re-performed the physical exam and medical decision making activities included in the student note.    Randal Madison MD  2/15/2019  8:58 AM

## 2019-02-16 ENCOUNTER — READMISSION MANAGEMENT (OUTPATIENT)
Dept: CALL CENTER | Facility: HOSPITAL | Age: 79
End: 2019-02-16

## 2019-02-16 NOTE — OUTREACH NOTE
Prep Survey      Responses   Facility patient discharged from?  [facility]   Is patient eligible?  Yes   Discharge diagnosis  Atrial flutter    Does the patient have one of the following disease processes/diagnoses(primary or secondary)?  Other   Does the patient have Home health ordered?  No   Is there a DME ordered?  No   Prep survey completed?  Yes          Jenny Chowdhury RN

## 2019-02-19 ENCOUNTER — READMISSION MANAGEMENT (OUTPATIENT)
Dept: CALL CENTER | Facility: HOSPITAL | Age: 79
End: 2019-02-19

## 2019-02-19 NOTE — OUTREACH NOTE
Medical Week 1 Survey      Responses   Facility patient discharged from?  [facility]   Does the patient have one of the following disease processes/diagnoses(primary or secondary)?  Other   Is there a successful TCM telephone encounter documented?  No   Week 1 attempt successful?  No   Unsuccessful attempts  Attempt 1          Laila Cheatham RN

## 2019-02-20 ENCOUNTER — READMISSION MANAGEMENT (OUTPATIENT)
Dept: CALL CENTER | Facility: HOSPITAL | Age: 79
End: 2019-02-20

## 2019-02-20 NOTE — OUTREACH NOTE
Medical Week 1 Survey      Responses   Facility patient discharged from?  [facility]   Does the patient have one of the following disease processes/diagnoses(primary or secondary)?  Other   Is there a successful TCM telephone encounter documented?  No   Week 1 attempt successful?  Yes   Call start time  1034   Call end time  1039   Discharge diagnosis  Atrial flutter    Is patient permission given to speak with other caregiver?  No   Meds reviewed with patient/caregiver?  Yes   Is the patient having any side effects they believe may be caused by any medication additions or changes?  No   Does the patient have all medications ordered at discharge?  Yes   Is the patient taking all medications as directed (includes completed medication regime)?  Yes   Does the patient have a primary care provider?   Yes   Does the patient have an appointment with their PCP within 7 days of discharge?  Yes   Has the patient kept scheduled appointments due by today?  N/A   Comments  PCP Dr. Swenson   Has home health visited the patient within 72 hours of discharge?  N/A   Psychosocial issues?  No   Did the patient receive a copy of their discharge instructions?  Yes   Nursing interventions  Reviewed instructions with patient   What is the patient's perception of their health status since discharge?  Improving   Is the patient/caregiver able to teach back signs and symptoms related to disease process for when to call PCP?  Yes   Is the patient/caregiver able to teach back signs and symptoms related to disease process for when to call 911?  Yes   Is the patient/caregiver able to teach back the hierarchy of who to call/visit for symptoms/problems? PCP, Specialist, Home health nurse, Urgent Care, ED, 911  Yes   If the patient is a current smoker, are they able to teach back resources for cessation?  -- [nonsmoker]   Week 1 call completed?  Yes          Adilia Helms RN

## 2019-02-22 ENCOUNTER — TELEPHONE (OUTPATIENT)
Dept: FAMILY MEDICINE CLINIC | Facility: CLINIC | Age: 79
End: 2019-02-22

## 2019-02-26 ENCOUNTER — OFFICE VISIT (OUTPATIENT)
Dept: FAMILY MEDICINE CLINIC | Facility: CLINIC | Age: 79
End: 2019-02-26

## 2019-02-26 ENCOUNTER — TELEPHONE (OUTPATIENT)
Dept: FAMILY MEDICINE CLINIC | Facility: CLINIC | Age: 79
End: 2019-02-26

## 2019-02-26 VITALS
OXYGEN SATURATION: 98 % | BODY MASS INDEX: 31.99 KG/M2 | SYSTOLIC BLOOD PRESSURE: 176 MMHG | HEIGHT: 60 IN | RESPIRATION RATE: 20 BRPM | DIASTOLIC BLOOD PRESSURE: 80 MMHG | HEART RATE: 66 BPM

## 2019-02-26 DIAGNOSIS — T78.40XA ALLERGIC REACTION, INITIAL ENCOUNTER: Primary | ICD-10-CM

## 2019-02-26 PROCEDURE — 99214 OFFICE O/P EST MOD 30 MIN: CPT | Performed by: NURSE PRACTITIONER

## 2019-02-26 PROCEDURE — 96372 THER/PROPH/DIAG INJ SC/IM: CPT | Performed by: NURSE PRACTITIONER

## 2019-02-26 RX ORDER — CIPROFLOXACIN 250 MG/1
250 TABLET, FILM COATED ORAL 2 TIMES DAILY
Qty: 6 TABLET | Refills: 0 | Status: SHIPPED | OUTPATIENT
Start: 2019-02-26 | End: 2019-02-26

## 2019-02-26 RX ORDER — DIPHENHYDRAMINE HCL 25 MG
25 TABLET ORAL EVERY 6 HOURS PRN
Qty: 20 TABLET | Refills: 1 | Status: SHIPPED | OUTPATIENT
Start: 2019-02-26

## 2019-02-26 RX ORDER — CEPHALEXIN 500 MG/1
1 CAPSULE ORAL 3 TIMES DAILY
Refills: 0 | COMMUNITY
Start: 2019-02-24

## 2019-02-26 RX ORDER — BETAMETHASONE SODIUM PHOSPHATE AND BETAMETHASONE ACETATE 3; 3 MG/ML; MG/ML
6 INJECTION, SUSPENSION INTRA-ARTICULAR; INTRALESIONAL; INTRAMUSCULAR; SOFT TISSUE EVERY 24 HOURS
Status: SHIPPED | OUTPATIENT
Start: 2019-02-26 | End: 2019-02-28

## 2019-02-26 RX ADMIN — BETAMETHASONE SODIUM PHOSPHATE AND BETAMETHASONE ACETATE 6 MG: 3; 3 INJECTION, SUSPENSION INTRA-ARTICULAR; INTRALESIONAL; INTRAMUSCULAR; SOFT TISSUE at 16:05

## 2019-02-26 NOTE — TELEPHONE ENCOUNTER
You list PCN allergy, Keflex usually not given to Pt with this allergy.   I will send renal dose of Cipro, F/U next week wit Dr. Swenson.

## 2019-02-26 NOTE — PROGRESS NOTES
"Subjective   Orsanam Yan is a 78 y.o. female.     Here today stating that she went to [facility] 2 days ago for a uti.   They gave her a \"shot\" which she tolerated well and gave her keflex which she started today.  She says she took her first capsule of the keflex this am and she has since noticed swelling in her tongue and lips.  No difficulty breathing.  She is a diabetic and uses an insulin pump.      Allergic Reaction   This is a new problem. The current episode started today. The problem occurs constantly. The problem is unchanged. The problem is mild. The patient was exposed to an antibiotic. Pertinent negatives include no abdominal pain, chest pain, chest pressure, coughing, diarrhea, difficulty breathing, drooling, eye itching, eye redness, eye watering, globus sensation, hyperventilation, itching, rash, stridor, trouble swallowing, vomiting or wheezing. (Swelling of tongue) Swelling is present on the tongue. Past treatments include nothing. The treatment provided no relief. Her past medical history is significant for medication allergies.        The following portions of the patient's history were reviewed and updated as appropriate: allergies, current medications, past family history, past medical history, past social history, past surgical history and problem list.    Review of Systems   Constitutional: Negative.    HENT: Negative.  Negative for drooling and trouble swallowing.    Eyes: Negative for redness and itching.   Respiratory: Negative.  Negative for cough, wheezing and stridor.    Cardiovascular: Negative.  Negative for chest pain.   Gastrointestinal: Negative for abdominal pain, diarrhea and vomiting.   Musculoskeletal: Negative.    Skin: Negative.  Negative for itching and rash.   Neurological: Negative.    Psychiatric/Behavioral: Negative.        Objective   Physical Exam   Constitutional: She is oriented to person, place, and time. She appears well-developed and well-nourished. No distress. "   HENT:   Head: Normocephalic and atraumatic.   Right Ear: External ear normal.   Left Ear: External ear normal.   Nose: Nose normal.   Mouth/Throat: Oropharynx is clear and moist. No oropharyngeal exudate.   Has slight  Swelling of lips, but no swelling of tongue is noted.     Eyes: Pupils are equal, round, and reactive to light.   Neck: Normal range of motion. Neck supple. No thyromegaly present.   Cardiovascular: Normal rate, regular rhythm and normal heart sounds. Exam reveals no friction rub.   No murmur heard.  Has a trace of edema in lower ext.   Pulmonary/Chest: Effort normal and breath sounds normal. No stridor. No respiratory distress. She has no wheezes. She has no rales.   Abdominal: Soft.   Musculoskeletal:   Wheelchair bound   Neurological: She is alert and oriented to person, place, and time.   Skin: Skin is warm and dry.   Psychiatric: She has a normal mood and affect. Thought content normal.   Nursing note and vitals reviewed.        Assessment/Plan   Ora was seen today for allergic reaction.    Diagnoses and all orders for this visit:    Allergic reaction, initial encounter  -     betamethasone acetate-betamethasone sodium phosphate (CELESTONE SOLUSPAN) injection 6 mg; Inject 1 mL into the appropriate muscle as directed by prescriber Daily.  -     diphenhydrAMINE (BENADRYL ALLERGY) 25 MG tablet; Take 1 tablet by mouth Every 6 (Six) Hours As Needed for Itching.      Have informed her that the cortisone will most likely raise her b/s and make change her current insulin requirements.  Her daughter accompanies and did verbalize understanding of this.  Will give her 24 hrs off the antibiotics and see how she does.  If she is still not symptomatic for uti, will hold the antibiotics.

## 2019-02-26 NOTE — TELEPHONE ENCOUNTER
Pt left  stating she was diagnosed with a UTI from [facility] ER. Was given cephalexin. Took one this morning and her lip has swollen. Thinks she is allergic to it. Would like to know if she could have something else please.

## 2019-02-27 ENCOUNTER — TELEPHONE (OUTPATIENT)
Dept: FAMILY MEDICINE CLINIC | Facility: CLINIC | Age: 79
End: 2019-02-27

## 2019-02-27 NOTE — TELEPHONE ENCOUNTER
Called to check on patient this morning after her visit yesterday,  Patient stated she felt a whole lot better.  Swelling has gone down, blood pressure still running a little high was 154/86 this morning.  Advised patient that if she had any problems to give us a call or she could go come to walk in or go to the ER.

## 2019-02-28 ENCOUNTER — READMISSION MANAGEMENT (OUTPATIENT)
Dept: CALL CENTER | Facility: HOSPITAL | Age: 79
End: 2019-02-28

## 2019-02-28 NOTE — OUTREACH NOTE
Medical Week 2 Survey      Responses   Facility patient discharged from?  [facility]   Does the patient have one of the following disease processes/diagnoses(primary or secondary)?  Other   Week 2 attempt successful?  No   Unsuccessful attempts  Attempt 1          Khushbu Escalante RN

## 2019-03-04 ENCOUNTER — READMISSION MANAGEMENT (OUTPATIENT)
Dept: CALL CENTER | Facility: HOSPITAL | Age: 79
End: 2019-03-04

## 2019-03-06 ENCOUNTER — READMISSION MANAGEMENT (OUTPATIENT)
Dept: CALL CENTER | Facility: HOSPITAL | Age: 79
End: 2019-03-06

## 2019-03-06 NOTE — OUTREACH NOTE
Medical Week 3 Survey      Responses   Facility patient discharged from?  [facility]   Does the patient have one of the following disease processes/diagnoses(primary or secondary)?  Other   Week 3 attempt successful?  No   Unsuccessful attempts  Attempt 1          Vikash Valdez RN

## 2019-03-08 ENCOUNTER — READMISSION MANAGEMENT (OUTPATIENT)
Dept: CALL CENTER | Facility: HOSPITAL | Age: 79
End: 2019-03-08

## 2019-03-08 NOTE — OUTREACH NOTE
Medical Week 3 Survey      Responses   Facility patient discharged from?  [facility]   Does the patient have one of the following disease processes/diagnoses(primary or secondary)?  Other   Week 3 attempt successful?  No   Unsuccessful attempts  Attempt 2          Tod Adkins RN

## 2019-03-12 ENCOUNTER — EPISODE CHANGES (OUTPATIENT)
Dept: CASE MANAGEMENT | Facility: OTHER | Age: 79
End: 2019-03-12

## 2019-03-19 ENCOUNTER — EPISODE CHANGES (OUTPATIENT)
Dept: CASE MANAGEMENT | Facility: OTHER | Age: 79
End: 2019-03-19

## 2019-03-22 ENCOUNTER — EPISODE CHANGES (OUTPATIENT)
Dept: CASE MANAGEMENT | Facility: OTHER | Age: 79
End: 2019-03-22

## 2019-03-28 ENCOUNTER — EPISODE CHANGES (OUTPATIENT)
Dept: CASE MANAGEMENT | Facility: OTHER | Age: 79
End: 2019-03-28

## 2019-03-29 ENCOUNTER — EPISODE CHANGES (OUTPATIENT)
Dept: CASE MANAGEMENT | Facility: OTHER | Age: 79
End: 2019-03-29

## 2019-04-01 ENCOUNTER — EPISODE CHANGES (OUTPATIENT)
Dept: CASE MANAGEMENT | Facility: OTHER | Age: 79
End: 2019-04-01

## 2019-04-01 ENCOUNTER — PATIENT OUTREACH (OUTPATIENT)
Dept: CASE MANAGEMENT | Facility: OTHER | Age: 79
End: 2019-04-01

## 2019-04-01 NOTE — OUTREACH NOTE
"Care Management Plan 4/1/2019   Lifestyle Goals Routine follow-up with doctor(s)   Barriers Disease education   Self Management Medication Adherence   Advanced Directives: Not Interested At This Time   Ed Visits past 12 months: 2 or 3   Hospitalizations past 12 months 2 or 3   Health Literacy Moderate     The main concerns and/or symptoms the patient would like to address are: Patient DC from [facility] on 2-15-19 with atrial flutter. Patient now residing in [place] with family. Patient has meds and \"doctors in [place] now\".     Education/instruction provided by Care Coordinator: CC ensured patient did have a PCP in [place] and she stated she does. Patient declined further calls from CC. CC provided the patient with my contact information for future needs.    Follow Up Outreach Due: None at this time.    Mercedes Spears RN    "

## 2019-04-02 ENCOUNTER — EPISODE CHANGES (OUTPATIENT)
Dept: CASE MANAGEMENT | Facility: OTHER | Age: 79
End: 2019-04-02

## 2019-04-10 RX ORDER — HYDRALAZINE HYDROCHLORIDE 100 MG/1
100 TABLET, FILM COATED ORAL 3 TIMES DAILY
Qty: 270 TABLET | Refills: 3 | Status: SHIPPED | OUTPATIENT
Start: 2019-04-10

## (undated) DEVICE — BITEBLOCK ENDO W/STRAP 60F A/ LF DISP

## (undated) DEVICE — CANN SMPL SOFTECH BIFLO ETCO2 A/M 7FT

## (undated) DEVICE — SINGLE-USE BIOPSY FORCEPS: Brand: RADIAL JAW 4